# Patient Record
Sex: MALE | Race: WHITE | NOT HISPANIC OR LATINO | URBAN - NONMETROPOLITAN AREA
[De-identification: names, ages, dates, MRNs, and addresses within clinical notes are randomized per-mention and may not be internally consistent; named-entity substitution may affect disease eponyms.]

---

## 2021-02-05 ENCOUNTER — IMMUNIZATIONS (OUTPATIENT)
Dept: FAMILY MEDICINE CLINIC | Facility: HOSPITAL | Age: 61
End: 2021-02-05

## 2021-02-05 DIAGNOSIS — Z23 ENCOUNTER FOR IMMUNIZATION: Primary | ICD-10-CM

## 2021-02-05 PROCEDURE — 91301 SARS-COV-2 / COVID-19 MRNA VACCINE (MODERNA) 100 MCG: CPT

## 2021-02-05 PROCEDURE — 0011A SARS-COV-2 / COVID-19 MRNA VACCINE (MODERNA) 100 MCG: CPT

## 2021-03-03 ENCOUNTER — IMMUNIZATIONS (OUTPATIENT)
Dept: FAMILY MEDICINE CLINIC | Facility: HOSPITAL | Age: 61
End: 2021-03-03

## 2021-03-03 DIAGNOSIS — Z23 ENCOUNTER FOR IMMUNIZATION: Primary | ICD-10-CM

## 2021-03-03 PROCEDURE — 91301 SARS-COV-2 / COVID-19 MRNA VACCINE (MODERNA) 100 MCG: CPT

## 2021-03-03 PROCEDURE — 0012A SARS-COV-2 / COVID-19 MRNA VACCINE (MODERNA) 100 MCG: CPT

## 2023-01-01 ENCOUNTER — HOSPITAL ENCOUNTER (INPATIENT)
Facility: HOSPITAL | Age: 63
LOS: 8 days | End: 2023-05-10
Attending: INTERNAL MEDICINE | Admitting: EMERGENCY MEDICINE

## 2023-01-01 ENCOUNTER — APPOINTMENT (INPATIENT)
Dept: GASTROENTEROLOGY | Facility: HOSPITAL | Age: 63
End: 2023-01-01

## 2023-01-01 ENCOUNTER — TRANSCRIBE ORDERS (OUTPATIENT)
Dept: RADIATION ONCOLOGY | Facility: HOSPITAL | Age: 63
End: 2023-01-01

## 2023-01-01 ENCOUNTER — APPOINTMENT (OUTPATIENT)
Dept: RADIOLOGY | Facility: HOSPITAL | Age: 63
End: 2023-01-01

## 2023-01-01 ENCOUNTER — APPOINTMENT (OUTPATIENT)
Dept: RADIOLOGY | Facility: HOSPITAL | Age: 63
End: 2023-01-01
Attending: INTERNAL MEDICINE

## 2023-01-01 ENCOUNTER — APPOINTMENT (OUTPATIENT)
Dept: RADIOLOGY | Facility: HOSPITAL | Age: 63
End: 2023-01-01
Attending: RADIOLOGY

## 2023-01-01 VITALS
SYSTOLIC BLOOD PRESSURE: 95 MMHG | WEIGHT: 221.56 LBS | HEIGHT: 73 IN | HEART RATE: 90 BPM | TEMPERATURE: 101.5 F | DIASTOLIC BLOOD PRESSURE: 66 MMHG | BODY MASS INDEX: 29.36 KG/M2 | OXYGEN SATURATION: 97 % | RESPIRATION RATE: 21 BRPM

## 2023-01-01 DIAGNOSIS — G93.40 ENCEPHALOPATHY: ICD-10-CM

## 2023-01-01 DIAGNOSIS — C34.90 SQUAMOUS CELL LUNG CANCER (HCC): ICD-10-CM

## 2023-01-01 DIAGNOSIS — J96.01 ACUTE RESPIRATORY FAILURE WITH HYPOXIA (HCC): ICD-10-CM

## 2023-01-01 DIAGNOSIS — C76.0 MALIGNANT NEOPLASM OF HEAD, FACE, AND NECK (HCC): ICD-10-CM

## 2023-01-01 DIAGNOSIS — R91.8 LUNG MASS: Primary | ICD-10-CM

## 2023-01-01 DIAGNOSIS — C76.0 MALIGNANT NEOPLASM OF HEAD, FACE, AND NECK (HCC): Primary | ICD-10-CM

## 2023-01-01 DIAGNOSIS — R22.1 NECK MASS: ICD-10-CM

## 2023-01-01 LAB
ANION GAP SERPL CALCULATED.3IONS-SCNC: -1 MMOL/L (ref 4–13)
ANION GAP SERPL CALCULATED.3IONS-SCNC: -2 MMOL/L (ref 4–13)
ANION GAP SERPL CALCULATED.3IONS-SCNC: 0 MMOL/L (ref 4–13)
ANION GAP SERPL CALCULATED.3IONS-SCNC: 0 MMOL/L (ref 4–13)
ANION GAP SERPL CALCULATED.3IONS-SCNC: 1 MMOL/L (ref 4–13)
ANION GAP SERPL CALCULATED.3IONS-SCNC: 2 MMOL/L (ref 4–13)
ANION GAP SERPL CALCULATED.3IONS-SCNC: 2 MMOL/L (ref 4–13)
ANION GAP SERPL CALCULATED.3IONS-SCNC: 3 MMOL/L (ref 4–13)
ANION GAP SERPL CALCULATED.3IONS-SCNC: 5 MMOL/L (ref 4–13)
APTT PPP: 112 SECONDS (ref 23–37)
APTT PPP: 159 SECONDS (ref 23–37)
APTT PPP: 45 SECONDS (ref 23–37)
APTT PPP: 52 SECONDS (ref 23–37)
APTT PPP: 54 SECONDS (ref 23–37)
APTT PPP: 57 SECONDS (ref 23–37)
APTT PPP: 57 SECONDS (ref 23–37)
APTT PPP: 63 SECONDS (ref 23–37)
APTT PPP: 64 SECONDS (ref 23–37)
APTT PPP: 65 SECONDS (ref 23–37)
APTT PPP: 65 SECONDS (ref 23–37)
APTT PPP: 66 SECONDS (ref 23–37)
APTT PPP: 68 SECONDS (ref 23–37)
APTT PPP: 79 SECONDS (ref 23–37)
APTT PPP: 96 SECONDS (ref 23–37)
APTT PPP: 99 SECONDS (ref 23–37)
ATRIAL RATE: 143 BPM
BASE EX.OXY STD BLDV CALC-SCNC: 95.2 % (ref 60–80)
BASE EXCESS BLDA CALC-SCNC: 0.8 MMOL/L
BASE EXCESS BLDA CALC-SCNC: 1.6 MMOL/L
BASE EXCESS BLDA CALC-SCNC: 2.7 MMOL/L
BASE EXCESS BLDA CALC-SCNC: 2.9 MMOL/L
BASE EXCESS BLDA CALC-SCNC: 3.9 MMOL/L
BASE EXCESS BLDA CALC-SCNC: 4.7 MMOL/L
BASE EXCESS BLDV CALC-SCNC: 0.6 MMOL/L
BASOPHILS # BLD AUTO: 0.03 THOUSANDS/ÂΜL (ref 0–0.1)
BASOPHILS # BLD AUTO: 0.06 THOUSANDS/ÂΜL (ref 0–0.1)
BASOPHILS # BLD AUTO: 0.06 THOUSANDS/ÂΜL (ref 0–0.1)
BASOPHILS NFR BLD AUTO: 0 % (ref 0–1)
BUN SERPL-MCNC: 15 MG/DL (ref 5–25)
BUN SERPL-MCNC: 16 MG/DL (ref 5–25)
BUN SERPL-MCNC: 16 MG/DL (ref 5–25)
BUN SERPL-MCNC: 17 MG/DL (ref 5–25)
BUN SERPL-MCNC: 18 MG/DL (ref 5–25)
BUN SERPL-MCNC: 18 MG/DL (ref 5–25)
BUN SERPL-MCNC: 19 MG/DL (ref 5–25)
BUN SERPL-MCNC: 23 MG/DL (ref 5–25)
BUN SERPL-MCNC: 28 MG/DL (ref 5–25)
CA-I BLD-SCNC: 1.05 MMOL/L (ref 1.12–1.32)
CA-I BLD-SCNC: 1.12 MMOL/L (ref 1.12–1.32)
CA-I BLD-SCNC: 1.12 MMOL/L (ref 1.12–1.32)
CALCIUM SERPL-MCNC: 10.3 MG/DL (ref 8.3–10.1)
CALCIUM SERPL-MCNC: 7.7 MG/DL (ref 8.3–10.1)
CALCIUM SERPL-MCNC: 7.8 MG/DL (ref 8.3–10.1)
CALCIUM SERPL-MCNC: 7.9 MG/DL (ref 8.3–10.1)
CALCIUM SERPL-MCNC: 8 MG/DL (ref 8.3–10.1)
CALCIUM SERPL-MCNC: 8.3 MG/DL (ref 8.3–10.1)
CALCIUM SERPL-MCNC: 8.3 MG/DL (ref 8.3–10.1)
CALCIUM SERPL-MCNC: 8.8 MG/DL (ref 8.3–10.1)
CALCIUM SERPL-MCNC: 8.9 MG/DL (ref 8.3–10.1)
CALCIUM SERPL-MCNC: 8.9 MG/DL (ref 8.3–10.1)
CALCIUM SERPL-MCNC: 9 MG/DL (ref 8.3–10.1)
CALCIUM SERPL-MCNC: 9.8 MG/DL (ref 8.3–10.1)
CHLORIDE SERPL-SCNC: 111 MMOL/L (ref 96–108)
CHLORIDE SERPL-SCNC: 112 MMOL/L (ref 96–108)
CHLORIDE SERPL-SCNC: 113 MMOL/L (ref 96–108)
CHLORIDE SERPL-SCNC: 114 MMOL/L (ref 96–108)
CHLORIDE SERPL-SCNC: 115 MMOL/L (ref 96–108)
CHLORIDE SERPL-SCNC: 116 MMOL/L (ref 96–108)
CHLORIDE SERPL-SCNC: 117 MMOL/L (ref 96–108)
CHLORIDE SERPL-SCNC: 117 MMOL/L (ref 96–108)
CHLORIDE SERPL-SCNC: 118 MMOL/L (ref 96–108)
CHLORIDE SERPL-SCNC: 118 MMOL/L (ref 96–108)
CO2 SERPL-SCNC: 28 MMOL/L (ref 21–32)
CO2 SERPL-SCNC: 28 MMOL/L (ref 21–32)
CO2 SERPL-SCNC: 29 MMOL/L (ref 21–32)
CO2 SERPL-SCNC: 31 MMOL/L (ref 21–32)
CO2 SERPL-SCNC: 32 MMOL/L (ref 21–32)
CO2 SERPL-SCNC: 32 MMOL/L (ref 21–32)
CREAT SERPL-MCNC: 0.72 MG/DL (ref 0.6–1.3)
CREAT SERPL-MCNC: 0.77 MG/DL (ref 0.6–1.3)
CREAT SERPL-MCNC: 0.78 MG/DL (ref 0.6–1.3)
CREAT SERPL-MCNC: 0.78 MG/DL (ref 0.6–1.3)
CREAT SERPL-MCNC: 0.8 MG/DL (ref 0.6–1.3)
CREAT SERPL-MCNC: 0.81 MG/DL (ref 0.6–1.3)
CREAT SERPL-MCNC: 0.82 MG/DL (ref 0.6–1.3)
CREAT SERPL-MCNC: 0.83 MG/DL (ref 0.6–1.3)
CREAT SERPL-MCNC: 0.84 MG/DL (ref 0.6–1.3)
CREAT SERPL-MCNC: 0.86 MG/DL (ref 0.6–1.3)
CREAT SERPL-MCNC: 0.93 MG/DL (ref 0.6–1.3)
CREAT SERPL-MCNC: 1.04 MG/DL (ref 0.6–1.3)
EOSINOPHIL # BLD AUTO: 0.01 THOUSAND/ÂΜL (ref 0–0.61)
EOSINOPHIL # BLD AUTO: 0.02 THOUSAND/ÂΜL (ref 0–0.61)
EOSINOPHIL # BLD AUTO: 0.08 THOUSAND/ÂΜL (ref 0–0.61)
EOSINOPHIL NFR BLD AUTO: 0 % (ref 0–6)
EOSINOPHIL NFR BLD AUTO: 0 % (ref 0–6)
EOSINOPHIL NFR BLD AUTO: 1 % (ref 0–6)
ERYTHROCYTE [DISTWIDTH] IN BLOOD BY AUTOMATED COUNT: 13.1 % (ref 11.6–15.1)
ERYTHROCYTE [DISTWIDTH] IN BLOOD BY AUTOMATED COUNT: 13.2 % (ref 11.6–15.1)
ERYTHROCYTE [DISTWIDTH] IN BLOOD BY AUTOMATED COUNT: 13.4 % (ref 11.6–15.1)
ERYTHROCYTE [DISTWIDTH] IN BLOOD BY AUTOMATED COUNT: 13.5 % (ref 11.6–15.1)
ERYTHROCYTE [DISTWIDTH] IN BLOOD BY AUTOMATED COUNT: 13.9 % (ref 11.6–15.1)
ERYTHROCYTE [DISTWIDTH] IN BLOOD BY AUTOMATED COUNT: 14.1 % (ref 11.6–15.1)
ERYTHROCYTE [DISTWIDTH] IN BLOOD BY AUTOMATED COUNT: 14.3 % (ref 11.6–15.1)
GFR SERPL CREATININE-BSD FRML MDRD: 100 ML/MIN/1.73SQ M
GFR SERPL CREATININE-BSD FRML MDRD: 77 ML/MIN/1.73SQ M
GFR SERPL CREATININE-BSD FRML MDRD: 88 ML/MIN/1.73SQ M
GFR SERPL CREATININE-BSD FRML MDRD: 93 ML/MIN/1.73SQ M
GFR SERPL CREATININE-BSD FRML MDRD: 94 ML/MIN/1.73SQ M
GFR SERPL CREATININE-BSD FRML MDRD: 94 ML/MIN/1.73SQ M
GFR SERPL CREATININE-BSD FRML MDRD: 95 ML/MIN/1.73SQ M
GFR SERPL CREATININE-BSD FRML MDRD: 95 ML/MIN/1.73SQ M
GFR SERPL CREATININE-BSD FRML MDRD: 96 ML/MIN/1.73SQ M
GFR SERPL CREATININE-BSD FRML MDRD: 97 ML/MIN/1.73SQ M
GLUCOSE SERPL-MCNC: 105 MG/DL (ref 65–140)
GLUCOSE SERPL-MCNC: 112 MG/DL (ref 65–140)
GLUCOSE SERPL-MCNC: 115 MG/DL (ref 65–140)
GLUCOSE SERPL-MCNC: 121 MG/DL (ref 65–140)
GLUCOSE SERPL-MCNC: 124 MG/DL (ref 65–140)
GLUCOSE SERPL-MCNC: 127 MG/DL (ref 65–140)
GLUCOSE SERPL-MCNC: 128 MG/DL (ref 65–140)
GLUCOSE SERPL-MCNC: 131 MG/DL (ref 65–140)
GLUCOSE SERPL-MCNC: 133 MG/DL (ref 65–140)
GLUCOSE SERPL-MCNC: 137 MG/DL (ref 65–140)
GLUCOSE SERPL-MCNC: 142 MG/DL (ref 65–140)
GLUCOSE SERPL-MCNC: 143 MG/DL (ref 65–140)
GLUCOSE SERPL-MCNC: 149 MG/DL (ref 65–140)
GLUCOSE SERPL-MCNC: 150 MG/DL (ref 65–140)
GLUCOSE SERPL-MCNC: 150 MG/DL (ref 65–140)
GLUCOSE SERPL-MCNC: 152 MG/DL (ref 65–140)
GLUCOSE SERPL-MCNC: 156 MG/DL (ref 65–140)
GLUCOSE SERPL-MCNC: 157 MG/DL (ref 65–140)
GLUCOSE SERPL-MCNC: 158 MG/DL (ref 65–140)
GLUCOSE SERPL-MCNC: 160 MG/DL (ref 65–140)
GLUCOSE SERPL-MCNC: 163 MG/DL (ref 65–140)
GLUCOSE SERPL-MCNC: 166 MG/DL (ref 65–140)
GLUCOSE SERPL-MCNC: 174 MG/DL (ref 65–140)
GLUCOSE SERPL-MCNC: 176 MG/DL (ref 65–140)
GLUCOSE SERPL-MCNC: 177 MG/DL (ref 65–140)
GLUCOSE SERPL-MCNC: 184 MG/DL (ref 65–140)
GLUCOSE SERPL-MCNC: 185 MG/DL (ref 65–140)
GLUCOSE SERPL-MCNC: 188 MG/DL (ref 65–140)
GLUCOSE SERPL-MCNC: 194 MG/DL (ref 65–140)
GLUCOSE SERPL-MCNC: 194 MG/DL (ref 65–140)
GLUCOSE SERPL-MCNC: 203 MG/DL (ref 65–140)
GLUCOSE SERPL-MCNC: 207 MG/DL (ref 65–140)
GLUCOSE SERPL-MCNC: 210 MG/DL (ref 65–140)
GLUCOSE SERPL-MCNC: 219 MG/DL (ref 65–140)
GLUCOSE SERPL-MCNC: 222 MG/DL (ref 65–140)
GLUCOSE SERPL-MCNC: 222 MG/DL (ref 65–140)
GLUCOSE SERPL-MCNC: 245 MG/DL (ref 65–140)
GLUCOSE SERPL-MCNC: 257 MG/DL (ref 65–140)
GLUCOSE SERPL-MCNC: 258 MG/DL (ref 65–140)
GLUCOSE SERPL-MCNC: 279 MG/DL (ref 65–140)
GLUCOSE SERPL-MCNC: 294 MG/DL (ref 65–140)
GLUCOSE SERPL-MCNC: 348 MG/DL (ref 65–140)
GLUCOSE SERPL-MCNC: 350 MG/DL (ref 65–140)
HCO3 BLDA-SCNC: 26.5 MMOL/L (ref 22–28)
HCO3 BLDA-SCNC: 27.2 MMOL/L (ref 22–28)
HCO3 BLDA-SCNC: 27.5 MMOL/L (ref 22–28)
HCO3 BLDA-SCNC: 28.4 MMOL/L (ref 22–28)
HCO3 BLDA-SCNC: 31.8 MMOL/L (ref 22–28)
HCO3 BLDA-SCNC: 34 MMOL/L (ref 22–28)
HCO3 BLDV-SCNC: 26 MMOL/L (ref 24–30)
HCT VFR BLD AUTO: 33.4 % (ref 36.5–49.3)
HCT VFR BLD AUTO: 34.4 % (ref 36.5–49.3)
HCT VFR BLD AUTO: 34.6 % (ref 36.5–49.3)
HCT VFR BLD AUTO: 35.9 % (ref 36.5–49.3)
HCT VFR BLD AUTO: 36.3 % (ref 36.5–49.3)
HCT VFR BLD AUTO: 37.3 % (ref 36.5–49.3)
HCT VFR BLD AUTO: 37.6 % (ref 36.5–49.3)
HCT VFR BLD AUTO: 38.1 % (ref 36.5–49.3)
HCT VFR BLD AUTO: 39.1 % (ref 36.5–49.3)
HGB BLD-MCNC: 10.4 G/DL (ref 12–17)
HGB BLD-MCNC: 10.8 G/DL (ref 12–17)
HGB BLD-MCNC: 10.9 G/DL (ref 12–17)
HGB BLD-MCNC: 10.9 G/DL (ref 12–17)
HGB BLD-MCNC: 11.7 G/DL (ref 12–17)
HGB BLD-MCNC: 11.8 G/DL (ref 12–17)
HGB BLD-MCNC: 12.1 G/DL (ref 12–17)
HGB BLD-MCNC: 12.3 G/DL (ref 12–17)
HGB BLD-MCNC: 12.3 G/DL (ref 12–17)
HOROWITZ INDEX BLDA+IHG-RTO: 70 MM[HG]
HOROWITZ INDEX BLDA+IHG-RTO: 80 MM[HG]
HOROWITZ INDEX BLDA+IHG-RTO: 80 MM[HG]
HOROWITZ INDEX BLDA+IHG-RTO: 90 MM[HG]
HU1 AB TITR SER: NEGATIVE {TITER}
HU2 AB TITR SER IF: NEGATIVE {TITER}
IMM GRANULOCYTES # BLD AUTO: 0.07 THOUSAND/UL (ref 0–0.2)
IMM GRANULOCYTES # BLD AUTO: 0.09 THOUSAND/UL (ref 0–0.2)
IMM GRANULOCYTES # BLD AUTO: 0.15 THOUSAND/UL (ref 0–0.2)
IMM GRANULOCYTES NFR BLD AUTO: 0 % (ref 0–2)
IMM GRANULOCYTES NFR BLD AUTO: 1 % (ref 0–2)
IMM GRANULOCYTES NFR BLD AUTO: 1 % (ref 0–2)
INR PPP: 1.45 (ref 0.84–1.19)
LACTATE SERPL-SCNC: 1.5 MMOL/L (ref 0.5–2)
LYMPHOCYTES # BLD AUTO: 0.57 THOUSANDS/ÂΜL (ref 0.6–4.47)
LYMPHOCYTES # BLD AUTO: 0.79 THOUSANDS/ÂΜL (ref 0.6–4.47)
LYMPHOCYTES # BLD AUTO: 0.84 THOUSANDS/ÂΜL (ref 0.6–4.47)
LYMPHOCYTES NFR BLD AUTO: 4 % (ref 14–44)
LYMPHOCYTES NFR BLD AUTO: 5 % (ref 14–44)
LYMPHOCYTES NFR BLD AUTO: 5 % (ref 14–44)
MAGNESIUM SERPL-MCNC: 1.6 MG/DL (ref 1.6–2.6)
MAGNESIUM SERPL-MCNC: 1.8 MG/DL (ref 1.6–2.6)
MAGNESIUM SERPL-MCNC: 1.9 MG/DL (ref 1.6–2.6)
MAGNESIUM SERPL-MCNC: 2.1 MG/DL (ref 1.6–2.6)
MAGNESIUM SERPL-MCNC: 2.1 MG/DL (ref 1.6–2.6)
MAGNESIUM SERPL-MCNC: 2.2 MG/DL (ref 1.6–2.6)
MCH RBC QN AUTO: 28.3 PG (ref 26.8–34.3)
MCH RBC QN AUTO: 28.6 PG (ref 26.8–34.3)
MCH RBC QN AUTO: 28.8 PG (ref 26.8–34.3)
MCH RBC QN AUTO: 29.1 PG (ref 26.8–34.3)
MCH RBC QN AUTO: 29.1 PG (ref 26.8–34.3)
MCH RBC QN AUTO: 29.4 PG (ref 26.8–34.3)
MCHC RBC AUTO-ENTMCNC: 30.4 G/DL (ref 31.4–37.4)
MCHC RBC AUTO-ENTMCNC: 31.1 G/DL (ref 31.4–37.4)
MCHC RBC AUTO-ENTMCNC: 31.2 G/DL (ref 31.4–37.4)
MCHC RBC AUTO-ENTMCNC: 31.5 G/DL (ref 31.4–37.4)
MCHC RBC AUTO-ENTMCNC: 31.6 G/DL (ref 31.4–37.4)
MCHC RBC AUTO-ENTMCNC: 31.7 G/DL (ref 31.4–37.4)
MCHC RBC AUTO-ENTMCNC: 31.8 G/DL (ref 31.4–37.4)
MCHC RBC AUTO-ENTMCNC: 32.2 G/DL (ref 31.4–37.4)
MCHC RBC AUTO-ENTMCNC: 32.7 G/DL (ref 31.4–37.4)
MCV RBC AUTO: 89 FL (ref 82–98)
MCV RBC AUTO: 90 FL (ref 82–98)
MCV RBC AUTO: 91 FL (ref 82–98)
MCV RBC AUTO: 92 FL (ref 82–98)
MCV RBC AUTO: 95 FL (ref 82–98)
MONOCYTES # BLD AUTO: 1.07 THOUSAND/ÂΜL (ref 0.17–1.22)
MONOCYTES # BLD AUTO: 1.36 THOUSAND/ÂΜL (ref 0.17–1.22)
MONOCYTES # BLD AUTO: 1.4 THOUSAND/ÂΜL (ref 0.17–1.22)
MONOCYTES NFR BLD AUTO: 7 % (ref 4–12)
MONOCYTES NFR BLD AUTO: 8 % (ref 4–12)
MONOCYTES NFR BLD AUTO: 8 % (ref 4–12)
NASAL CANNULA: 8
NEUTROPHILS # BLD AUTO: 14.14 THOUSANDS/ÂΜL (ref 1.85–7.62)
NEUTROPHILS # BLD AUTO: 15.02 THOUSANDS/ÂΜL (ref 1.85–7.62)
NEUTROPHILS # BLD AUTO: 15.37 THOUSANDS/ÂΜL (ref 1.85–7.62)
NEUTS SEG NFR BLD AUTO: 86 % (ref 43–75)
NEUTS SEG NFR BLD AUTO: 87 % (ref 43–75)
NEUTS SEG NFR BLD AUTO: 87 % (ref 43–75)
NRBC BLD AUTO-RTO: 0 /100 WBCS
O2 CT BLDA-SCNC: 15.5 ML/DL (ref 16–23)
O2 CT BLDA-SCNC: 15.5 ML/DL (ref 16–23)
O2 CT BLDA-SCNC: 16.8 ML/DL (ref 16–23)
O2 CT BLDA-SCNC: 16.9 ML/DL (ref 16–23)
O2 CT BLDA-SCNC: 17.4 ML/DL (ref 16–23)
O2 CT BLDA-SCNC: 18.6 ML/DL (ref 16–23)
O2 CT BLDV-SCNC: 17.6 ML/DL
OXYHGB MFR BLDA: 88.3 % (ref 94–97)
OXYHGB MFR BLDA: 92.6 % (ref 94–97)
OXYHGB MFR BLDA: 93.9 % (ref 94–97)
OXYHGB MFR BLDA: 97.2 % (ref 94–97)
OXYHGB MFR BLDA: 97.8 % (ref 94–97)
OXYHGB MFR BLDA: 98.5 % (ref 94–97)
P AXIS: 77 DEGREES
PCA TYPE-1 (ANTI-YO) AB: NEGATIVE
PCO2 BLDA: 41.6 MM HG (ref 36–44)
PCO2 BLDA: 42.1 MM HG (ref 36–44)
PCO2 BLDA: 43 MM HG (ref 36–44)
PCO2 BLDA: 59.1 MM HG (ref 36–44)
PCO2 BLDA: 65.3 MM HG (ref 36–44)
PCO2 BLDA: 76.7 MM HG (ref 36–44)
PCO2 BLDV: 45 MM HG (ref 42–50)
PEEP RESPIRATORY: 10 CM[H2O]
PEEP RESPIRATORY: 10 CM[H2O]
PEEP RESPIRATORY: 8 CM[H2O]
PEEP RESPIRATORY: 8 CM[H2O]
PH BLDA: 7.26 [PH] (ref 7.35–7.45)
PH BLDA: 7.3 [PH] (ref 7.35–7.45)
PH BLDA: 7.31 [PH] (ref 7.35–7.45)
PH BLDA: 7.41 [PH] (ref 7.35–7.45)
PH BLDA: 7.43 [PH] (ref 7.35–7.45)
PH BLDA: 7.43 [PH] (ref 7.35–7.45)
PH BLDV: 7.38 [PH] (ref 7.3–7.4)
PHOSPHATE SERPL-MCNC: 1.2 MG/DL (ref 2.3–4.1)
PHOSPHATE SERPL-MCNC: 1.5 MG/DL (ref 2.3–4.1)
PHOSPHATE SERPL-MCNC: 1.7 MG/DL (ref 2.3–4.1)
PHOSPHATE SERPL-MCNC: 2.1 MG/DL (ref 2.3–4.1)
PHOSPHATE SERPL-MCNC: 2.1 MG/DL (ref 2.3–4.1)
PHOSPHATE SERPL-MCNC: 2.6 MG/DL (ref 2.3–4.1)
PHOSPHATE SERPL-MCNC: 2.6 MG/DL (ref 2.3–4.1)
PHOSPHATE SERPL-MCNC: 2.8 MG/DL (ref 2.3–4.1)
PHOSPHATE SERPL-MCNC: 2.9 MG/DL (ref 2.3–4.1)
PLATELET # BLD AUTO: 281 THOUSANDS/UL (ref 149–390)
PLATELET # BLD AUTO: 287 THOUSANDS/UL (ref 149–390)
PLATELET # BLD AUTO: 300 THOUSANDS/UL (ref 149–390)
PLATELET # BLD AUTO: 310 THOUSANDS/UL (ref 149–390)
PLATELET # BLD AUTO: 321 THOUSANDS/UL (ref 149–390)
PLATELET # BLD AUTO: 353 THOUSANDS/UL (ref 149–390)
PLATELET # BLD AUTO: 361 THOUSANDS/UL (ref 149–390)
PLATELET # BLD AUTO: 366 THOUSANDS/UL (ref 149–390)
PLATELET # BLD AUTO: 366 THOUSANDS/UL (ref 149–390)
PMV BLD AUTO: 10 FL (ref 8.9–12.7)
PMV BLD AUTO: 10.3 FL (ref 8.9–12.7)
PMV BLD AUTO: 10.6 FL (ref 8.9–12.7)
PMV BLD AUTO: 10.7 FL (ref 8.9–12.7)
PMV BLD AUTO: 10.9 FL (ref 8.9–12.7)
PMV BLD AUTO: 8.9 FL (ref 8.9–12.7)
PMV BLD AUTO: 9 FL (ref 8.9–12.7)
PMV BLD AUTO: 9.2 FL (ref 8.9–12.7)
PMV BLD AUTO: 9.4 FL (ref 8.9–12.7)
PO2 BLDA: 112.9 MM HG (ref 75–129)
PO2 BLDA: 120.5 MM HG (ref 75–129)
PO2 BLDA: 144.1 MM HG (ref 75–129)
PO2 BLDA: 60.4 MM HG (ref 75–129)
PO2 BLDA: 70.4 MM HG (ref 75–129)
PO2 BLDA: 72.4 MM HG (ref 75–129)
PO2 BLDV: 92 MM HG (ref 35–45)
POTASSIUM SERPL-SCNC: 3.2 MMOL/L (ref 3.5–5.3)
POTASSIUM SERPL-SCNC: 3.2 MMOL/L (ref 3.5–5.3)
POTASSIUM SERPL-SCNC: 3.3 MMOL/L (ref 3.5–5.3)
POTASSIUM SERPL-SCNC: 3.4 MMOL/L (ref 3.5–5.3)
POTASSIUM SERPL-SCNC: 3.5 MMOL/L (ref 3.5–5.3)
POTASSIUM SERPL-SCNC: 3.6 MMOL/L (ref 3.5–5.3)
POTASSIUM SERPL-SCNC: 3.7 MMOL/L (ref 3.5–5.3)
POTASSIUM SERPL-SCNC: 3.7 MMOL/L (ref 3.5–5.3)
POTASSIUM SERPL-SCNC: 4.2 MMOL/L (ref 3.5–5.3)
POTASSIUM SERPL-SCNC: 4.3 MMOL/L (ref 3.5–5.3)
POTASSIUM SERPL-SCNC: 5.1 MMOL/L (ref 3.5–5.3)
POTASSIUM SERPL-SCNC: 5.4 MMOL/L (ref 3.5–5.3)
PR INTERVAL: 217 MS
PROTHROMBIN TIME: 17.9 SECONDS (ref 11.6–14.5)
QRS AXIS: 76 DEGREES
QRSD INTERVAL: 108 MS
QT INTERVAL: 271 MS
QTC INTERVAL: 418 MS
RBC # BLD AUTO: 3.68 MILLION/UL (ref 3.88–5.62)
RBC # BLD AUTO: 3.75 MILLION/UL (ref 3.88–5.62)
RBC # BLD AUTO: 3.75 MILLION/UL (ref 3.88–5.62)
RBC # BLD AUTO: 3.79 MILLION/UL (ref 3.88–5.62)
RBC # BLD AUTO: 4.06 MILLION/UL (ref 3.88–5.62)
RBC # BLD AUTO: 4.1 MILLION/UL (ref 3.88–5.62)
RBC # BLD AUTO: 4.16 MILLION/UL (ref 3.88–5.62)
RBC # BLD AUTO: 4.18 MILLION/UL (ref 3.88–5.62)
RBC # BLD AUTO: 4.3 MILLION/UL (ref 3.88–5.62)
SODIUM SERPL-SCNC: 143 MMOL/L (ref 135–147)
SODIUM SERPL-SCNC: 144 MMOL/L (ref 135–147)
SODIUM SERPL-SCNC: 144 MMOL/L (ref 135–147)
SODIUM SERPL-SCNC: 145 MMOL/L (ref 135–147)
SODIUM SERPL-SCNC: 146 MMOL/L (ref 135–147)
SODIUM SERPL-SCNC: 147 MMOL/L (ref 135–147)
SODIUM SERPL-SCNC: 147 MMOL/L (ref 135–147)
SODIUM SERPL-SCNC: 149 MMOL/L (ref 135–147)
SODIUM SERPL-SCNC: 150 MMOL/L (ref 135–147)
SODIUM SERPL-SCNC: 151 MMOL/L (ref 135–147)
SPECIMEN SOURCE: ABNORMAL
T WAVE AXIS: 78 DEGREES
TRIGL SERPL-MCNC: 169 MG/DL
VANCOMYCIN SERPL-MCNC: 6.8 UG/ML (ref 10–20)
VENT AC: 16
VENT AC: 20
VENT AC: 22
VENT AC: 22
VENT- AC: AC
VENTRICULAR RATE: 143 BPM
VT SETTING VENT: 480 ML
VT SETTING VENT: 480 ML
VT SETTING VENT: 500 ML
VT SETTING VENT: 500 ML
WBC # BLD AUTO: 13.78 THOUSAND/UL (ref 4.31–10.16)
WBC # BLD AUTO: 14.8 THOUSAND/UL (ref 4.31–10.16)
WBC # BLD AUTO: 15.53 THOUSAND/UL (ref 4.31–10.16)
WBC # BLD AUTO: 15.8 THOUSAND/UL (ref 4.31–10.16)
WBC # BLD AUTO: 16.04 THOUSAND/UL (ref 4.31–10.16)
WBC # BLD AUTO: 17.38 THOUSAND/UL (ref 4.31–10.16)
WBC # BLD AUTO: 17.71 THOUSAND/UL (ref 4.31–10.16)
WBC # BLD AUTO: 17.86 THOUSAND/UL (ref 4.31–10.16)
WBC # BLD AUTO: 18.71 THOUSAND/UL (ref 4.31–10.16)

## 2023-01-01 PROCEDURE — DB061ZZ BEAM RADIATION OF MEDIASTINUM USING PHOTONS 1 - 10 MEV: ICD-10-PCS | Performed by: RADIOLOGY

## 2023-01-01 PROCEDURE — DW011ZZ BEAM RADIATION OF HEAD AND NECK USING PHOTONS 1 - 10 MEV: ICD-10-PCS | Performed by: RADIOLOGY

## 2023-01-01 PROCEDURE — DB021ZZ BEAM RADIATION OF LUNG USING PHOTONS 1 - 10 MEV: ICD-10-PCS | Performed by: RADIOLOGY

## 2023-01-01 PROCEDURE — 0BH17EZ INSERTION OF ENDOTRACHEAL AIRWAY INTO TRACHEA, VIA NATURAL OR ARTIFICIAL OPENING: ICD-10-PCS | Performed by: INTERNAL MEDICINE

## 2023-01-01 PROCEDURE — 0B968ZZ DRAINAGE OF RIGHT LOWER LOBE BRONCHUS, VIA NATURAL OR ARTIFICIAL OPENING ENDOSCOPIC: ICD-10-PCS | Performed by: INTERNAL MEDICINE

## 2023-01-01 PROCEDURE — 4A133B1 MONITORING OF ARTERIAL PRESSURE, PERIPHERAL, PERCUTANEOUS APPROACH: ICD-10-PCS | Performed by: INTERNAL MEDICINE

## 2023-01-01 PROCEDURE — 03HY32Z INSERTION OF MONITORING DEVICE INTO UPPER ARTERY, PERCUTANEOUS APPROACH: ICD-10-PCS | Performed by: INTERNAL MEDICINE

## 2023-01-01 PROCEDURE — 0B958ZZ DRAINAGE OF RIGHT MIDDLE LOBE BRONCHUS, VIA NATURAL OR ARTIFICIAL OPENING ENDOSCOPIC: ICD-10-PCS | Performed by: INTERNAL MEDICINE

## 2023-01-01 PROCEDURE — 0BC38ZZ EXTIRPATION OF MATTER FROM RIGHT MAIN BRONCHUS, VIA NATURAL OR ARTIFICIAL OPENING ENDOSCOPIC: ICD-10-PCS | Performed by: INTERNAL MEDICINE

## 2023-01-01 PROCEDURE — 5A1955Z RESPIRATORY VENTILATION, GREATER THAN 96 CONSECUTIVE HOURS: ICD-10-PCS | Performed by: INTERNAL MEDICINE

## 2023-01-01 PROCEDURE — 4A133J1 MONITORING OF ARTERIAL PULSE, PERIPHERAL, PERCUTANEOUS APPROACH: ICD-10-PCS | Performed by: INTERNAL MEDICINE

## 2023-01-01 PROCEDURE — 0B948ZZ DRAINAGE OF RIGHT UPPER LOBE BRONCHUS, VIA NATURAL OR ARTIFICIAL OPENING ENDOSCOPIC: ICD-10-PCS | Performed by: INTERNAL MEDICINE

## 2023-01-01 PROCEDURE — 06HY33Z INSERTION OF INFUSION DEVICE INTO LOWER VEIN, PERCUTANEOUS APPROACH: ICD-10-PCS | Performed by: INTERNAL MEDICINE

## 2023-01-01 RX ORDER — MIDAZOLAM HYDROCHLORIDE 2 MG/2ML
INJECTION, SOLUTION INTRAMUSCULAR; INTRAVENOUS
Status: DISPENSED
Start: 2023-01-01 | End: 2023-01-01

## 2023-01-01 RX ORDER — DEXTROSE MONOHYDRATE 50 MG/ML
50 INJECTION, SOLUTION INTRAVENOUS CONTINUOUS
Status: DISCONTINUED | OUTPATIENT
Start: 2023-01-01 | End: 2023-01-01

## 2023-01-01 RX ORDER — SODIUM CHLORIDE, SODIUM GLUCONATE, SODIUM ACETATE, POTASSIUM CHLORIDE, MAGNESIUM CHLORIDE, SODIUM PHOSPHATE, DIBASIC, AND POTASSIUM PHOSPHATE .53; .5; .37; .037; .03; .012; .00082 G/100ML; G/100ML; G/100ML; G/100ML; G/100ML; G/100ML; G/100ML
500 INJECTION, SOLUTION INTRAVENOUS ONCE
Status: COMPLETED | OUTPATIENT
Start: 2023-01-01 | End: 2023-01-01

## 2023-01-01 RX ORDER — POTASSIUM CHLORIDE 29.8 MG/ML
40 INJECTION INTRAVENOUS 2 TIMES DAILY
Status: COMPLETED | OUTPATIENT
Start: 2023-01-01 | End: 2023-01-01

## 2023-01-01 RX ORDER — INSULIN LISPRO 100 [IU]/ML
1-6 INJECTION, SOLUTION INTRAVENOUS; SUBCUTANEOUS EVERY 6 HOURS SCHEDULED
Status: DISCONTINUED | OUTPATIENT
Start: 2023-01-01 | End: 2023-01-01

## 2023-01-01 RX ORDER — BISACODYL 10 MG
10 SUPPOSITORY, RECTAL RECTAL DAILY PRN
Status: DISCONTINUED | OUTPATIENT
Start: 2023-01-01 | End: 2023-01-01

## 2023-01-01 RX ORDER — AMOXICILLIN 250 MG
1 CAPSULE ORAL
Status: DISCONTINUED | OUTPATIENT
Start: 2023-01-01 | End: 2023-01-01

## 2023-01-01 RX ORDER — DEXMEDETOMIDINE HYDROCHLORIDE 4 UG/ML
.1-.7 INJECTION, SOLUTION INTRAVENOUS
Status: DISCONTINUED | OUTPATIENT
Start: 2023-01-01 | End: 2023-01-01

## 2023-01-01 RX ORDER — MIDAZOLAM HYDROCHLORIDE 2 MG/2ML
4 INJECTION, SOLUTION INTRAMUSCULAR; INTRAVENOUS ONCE
Status: COMPLETED | OUTPATIENT
Start: 2023-01-01 | End: 2023-01-01

## 2023-01-01 RX ORDER — SUCCINYLCHOLINE/SOD CL,ISO/PF 100 MG/5ML
100 SYRINGE (ML) INTRAVENOUS ONCE
Status: COMPLETED | OUTPATIENT
Start: 2023-01-01 | End: 2023-01-01

## 2023-01-01 RX ORDER — BISACODYL 10 MG
10 SUPPOSITORY, RECTAL RECTAL DAILY
Status: DISCONTINUED | OUTPATIENT
Start: 2023-01-01 | End: 2023-01-01

## 2023-01-01 RX ORDER — FENTANYL CITRATE-0.9 % NACL/PF 10 MCG/ML
100 PLASTIC BAG, INJECTION (ML) INTRAVENOUS CONTINUOUS
Status: DISCONTINUED | OUTPATIENT
Start: 2023-01-01 | End: 2023-01-01

## 2023-01-01 RX ORDER — MAGNESIUM SULFATE HEPTAHYDRATE 40 MG/ML
2 INJECTION, SOLUTION INTRAVENOUS ONCE
Status: COMPLETED | OUTPATIENT
Start: 2023-01-01 | End: 2023-01-01

## 2023-01-01 RX ORDER — INSULIN LISPRO 100 [IU]/ML
2-12 INJECTION, SOLUTION INTRAVENOUS; SUBCUTANEOUS
Status: DISCONTINUED | OUTPATIENT
Start: 2023-01-01 | End: 2023-01-01

## 2023-01-01 RX ORDER — SODIUM CHLORIDE, SODIUM GLUCONATE, SODIUM ACETATE, POTASSIUM CHLORIDE, MAGNESIUM CHLORIDE, SODIUM PHOSPHATE, DIBASIC, AND POTASSIUM PHOSPHATE .53; .5; .37; .037; .03; .012; .00082 G/100ML; G/100ML; G/100ML; G/100ML; G/100ML; G/100ML; G/100ML
250 INJECTION, SOLUTION INTRAVENOUS ONCE
Status: COMPLETED | OUTPATIENT
Start: 2023-01-01 | End: 2023-01-01

## 2023-01-01 RX ORDER — PANTOPRAZOLE SODIUM 40 MG/10ML
40 INJECTION, POWDER, LYOPHILIZED, FOR SOLUTION INTRAVENOUS
Status: DISCONTINUED | OUTPATIENT
Start: 2023-01-01 | End: 2023-01-01

## 2023-01-01 RX ORDER — FENTANYL CITRATE 50 UG/ML
50 INJECTION, SOLUTION INTRAMUSCULAR; INTRAVENOUS ONCE AS NEEDED
Status: DISCONTINUED | OUTPATIENT
Start: 2023-01-01 | End: 2023-01-01

## 2023-01-01 RX ORDER — ASPIRIN 81 MG/1
81 TABLET, CHEWABLE ORAL DAILY
Status: DISCONTINUED | OUTPATIENT
Start: 2023-01-01 | End: 2023-01-01

## 2023-01-01 RX ORDER — FENTANYL CITRATE 50 UG/ML
50 INJECTION, SOLUTION INTRAMUSCULAR; INTRAVENOUS
Status: DISCONTINUED | OUTPATIENT
Start: 2023-01-01 | End: 2023-01-01

## 2023-01-01 RX ORDER — ALBUMIN, HUMAN INJ 5% 5 %
25 SOLUTION INTRAVENOUS ONCE
Status: COMPLETED | OUTPATIENT
Start: 2023-01-01 | End: 2023-01-01

## 2023-01-01 RX ORDER — DEXAMETHASONE SODIUM PHOSPHATE 10 MG/ML
10 INJECTION, SOLUTION INTRAMUSCULAR; INTRAVENOUS ONCE
Status: COMPLETED | OUTPATIENT
Start: 2023-01-01 | End: 2023-01-01

## 2023-01-01 RX ORDER — LORAZEPAM 2 MG/ML
1 INJECTION INTRAMUSCULAR
Status: DISCONTINUED | OUTPATIENT
Start: 2023-01-01 | End: 2023-01-01 | Stop reason: HOSPADM

## 2023-01-01 RX ORDER — HEPARIN SODIUM 10000 [USP'U]/100ML
3-30 INJECTION, SOLUTION INTRAVENOUS
Status: DISCONTINUED | OUTPATIENT
Start: 2023-01-01 | End: 2023-01-01

## 2023-01-01 RX ORDER — PRAVASTATIN SODIUM 40 MG
40 TABLET ORAL
Status: DISCONTINUED | OUTPATIENT
Start: 2023-01-01 | End: 2023-01-01

## 2023-01-01 RX ORDER — ETOMIDATE 2 MG/ML
20 INJECTION INTRAVENOUS ONCE
Status: COMPLETED | OUTPATIENT
Start: 2023-01-01 | End: 2023-01-01

## 2023-01-01 RX ORDER — NICOTINE 21 MG/24HR
14 PATCH, TRANSDERMAL 24 HOURS TRANSDERMAL DAILY
Status: DISCONTINUED | OUTPATIENT
Start: 2023-01-01 | End: 2023-01-01

## 2023-01-01 RX ORDER — POTASSIUM CHLORIDE 14.9 MG/ML
20 INJECTION INTRAVENOUS ONCE
Status: COMPLETED | OUTPATIENT
Start: 2023-01-01 | End: 2023-01-01

## 2023-01-01 RX ORDER — POLYETHYLENE GLYCOL 3350 17 G/17G
17 POWDER, FOR SOLUTION ORAL DAILY PRN
Status: DISCONTINUED | OUTPATIENT
Start: 2023-01-01 | End: 2023-01-01

## 2023-01-01 RX ORDER — FENTANYL CITRATE 50 UG/ML
100 INJECTION, SOLUTION INTRAMUSCULAR; INTRAVENOUS ONCE
Status: COMPLETED | OUTPATIENT
Start: 2023-01-01 | End: 2023-01-01

## 2023-01-01 RX ORDER — SODIUM CHLORIDE FOR INHALATION 3 %
4 VIAL, NEBULIZER (ML) INHALATION
Status: DISCONTINUED | OUTPATIENT
Start: 2023-01-01 | End: 2023-01-01

## 2023-01-01 RX ORDER — POTASSIUM CHLORIDE 20 MEQ/1
40 TABLET, EXTENDED RELEASE ORAL ONCE
Status: COMPLETED | OUTPATIENT
Start: 2023-01-01 | End: 2023-01-01

## 2023-01-01 RX ORDER — LIDOCAINE HYDROCHLORIDE 10 MG/ML
INJECTION, SOLUTION EPIDURAL; INFILTRATION; INTRACAUDAL; PERINEURAL
Status: DISCONTINUED
Start: 2023-01-01 | End: 2023-01-01 | Stop reason: HOSPADM

## 2023-01-01 RX ORDER — SODIUM CHLORIDE, SODIUM GLUCONATE, SODIUM ACETATE, POTASSIUM CHLORIDE, MAGNESIUM CHLORIDE, SODIUM PHOSPHATE, DIBASIC, AND POTASSIUM PHOSPHATE .53; .5; .37; .037; .03; .012; .00082 G/100ML; G/100ML; G/100ML; G/100ML; G/100ML; G/100ML; G/100ML
50 INJECTION, SOLUTION INTRAVENOUS CONTINUOUS
Status: DISCONTINUED | OUTPATIENT
Start: 2023-01-01 | End: 2023-01-01

## 2023-01-01 RX ORDER — INSULIN LISPRO 100 [IU]/ML
1-6 INJECTION, SOLUTION INTRAVENOUS; SUBCUTANEOUS
Status: DISCONTINUED | OUTPATIENT
Start: 2023-01-01 | End: 2023-01-01

## 2023-01-01 RX ORDER — DEXTROSE, SODIUM CHLORIDE, SODIUM LACTATE, POTASSIUM CHLORIDE, AND CALCIUM CHLORIDE 5; .6; .31; .03; .02 G/100ML; G/100ML; G/100ML; G/100ML; G/100ML
100 INJECTION, SOLUTION INTRAVENOUS CONTINUOUS
Status: DISCONTINUED | OUTPATIENT
Start: 2023-01-01 | End: 2023-01-01

## 2023-01-01 RX ORDER — POLYETHYLENE GLYCOL 3350 17 G/17G
17 POWDER, FOR SOLUTION ORAL DAILY
Status: DISCONTINUED | OUTPATIENT
Start: 2023-01-01 | End: 2023-01-01

## 2023-01-01 RX ORDER — ALBUMIN, HUMAN INJ 5% 5 %
SOLUTION INTRAVENOUS
Status: COMPLETED
Start: 2023-01-01 | End: 2023-01-01

## 2023-01-01 RX ORDER — ACETAMINOPHEN 325 MG/1
325 TABLET ORAL ONCE
Status: COMPLETED | OUTPATIENT
Start: 2023-01-01 | End: 2023-01-01

## 2023-01-01 RX ORDER — MIDAZOLAM HYDROCHLORIDE 2 MG/2ML
2 INJECTION, SOLUTION INTRAMUSCULAR; INTRAVENOUS ONCE AS NEEDED
Status: DISCONTINUED | OUTPATIENT
Start: 2023-01-01 | End: 2023-01-01

## 2023-01-01 RX ORDER — NOREPINEPHRINE BITARTRATE 1 MG/ML
INJECTION, SOLUTION INTRAVENOUS
Status: DISPENSED
Start: 2023-01-01 | End: 2023-01-01

## 2023-01-01 RX ORDER — ACETAMINOPHEN 325 MG/1
650 TABLET ORAL EVERY 6 HOURS PRN
Status: DISCONTINUED | OUTPATIENT
Start: 2023-01-01 | End: 2023-01-01

## 2023-01-01 RX ORDER — LEVALBUTEROL INHALATION SOLUTION 1.25 MG/3ML
SOLUTION RESPIRATORY (INHALATION)
Status: COMPLETED
Start: 2023-01-01 | End: 2023-01-01

## 2023-01-01 RX ORDER — LANOLIN ALCOHOL/MO/W.PET/CERES
3 CREAM (GRAM) TOPICAL
Status: DISCONTINUED | OUTPATIENT
Start: 2023-01-01 | End: 2023-01-01

## 2023-01-01 RX ORDER — CHLORHEXIDINE GLUCONATE 0.12 MG/ML
15 RINSE ORAL EVERY 12 HOURS SCHEDULED
Status: DISCONTINUED | OUTPATIENT
Start: 2023-01-01 | End: 2023-01-01

## 2023-01-01 RX ORDER — LANOLIN ALCOHOL/MO/W.PET/CERES
100 CREAM (GRAM) TOPICAL DAILY
Status: DISCONTINUED | OUTPATIENT
Start: 2023-01-01 | End: 2023-01-01

## 2023-01-01 RX ORDER — LEVALBUTEROL INHALATION SOLUTION 1.25 MG/3ML
1.25 SOLUTION RESPIRATORY (INHALATION)
Status: DISCONTINUED | OUTPATIENT
Start: 2023-01-01 | End: 2023-01-01

## 2023-01-01 RX ORDER — KETOROLAC TROMETHAMINE 30 MG/ML
15 INJECTION, SOLUTION INTRAMUSCULAR; INTRAVENOUS ONCE
Status: COMPLETED | OUTPATIENT
Start: 2023-01-01 | End: 2023-01-01

## 2023-01-01 RX ORDER — QUETIAPINE FUMARATE 25 MG/1
25 TABLET, FILM COATED ORAL
Status: DISCONTINUED | OUTPATIENT
Start: 2023-01-01 | End: 2023-01-01

## 2023-01-01 RX ORDER — QUETIAPINE FUMARATE 25 MG/1
12.5 TABLET, FILM COATED ORAL
Status: DISCONTINUED | OUTPATIENT
Start: 2023-01-01 | End: 2023-01-01

## 2023-01-01 RX ORDER — SODIUM CHLORIDE, SODIUM GLUCONATE, SODIUM ACETATE, POTASSIUM CHLORIDE, MAGNESIUM CHLORIDE, SODIUM PHOSPHATE, DIBASIC, AND POTASSIUM PHOSPHATE .53; .5; .37; .037; .03; .012; .00082 G/100ML; G/100ML; G/100ML; G/100ML; G/100ML; G/100ML; G/100ML
1000 INJECTION, SOLUTION INTRAVENOUS ONCE
Status: COMPLETED | OUTPATIENT
Start: 2023-01-01 | End: 2023-01-01

## 2023-01-01 RX ORDER — FENTANYL CITRATE 50 UG/ML
200 INJECTION, SOLUTION INTRAMUSCULAR; INTRAVENOUS ONCE
Status: COMPLETED | OUTPATIENT
Start: 2023-01-01 | End: 2023-01-01

## 2023-01-01 RX ORDER — GLYCOPYRROLATE 0.2 MG/ML
0.1 INJECTION INTRAMUSCULAR; INTRAVENOUS EVERY 4 HOURS PRN
Status: DISCONTINUED | OUTPATIENT
Start: 2023-01-01 | End: 2023-01-01 | Stop reason: HOSPADM

## 2023-01-01 RX ORDER — FUROSEMIDE 10 MG/ML
40 INJECTION INTRAMUSCULAR; INTRAVENOUS ONCE
Status: COMPLETED | OUTPATIENT
Start: 2023-01-01 | End: 2023-01-01

## 2023-01-01 RX ORDER — POTASSIUM CHLORIDE 20MEQ/15ML
40 LIQUID (ML) ORAL ONCE
Status: COMPLETED | OUTPATIENT
Start: 2023-01-01 | End: 2023-01-01

## 2023-01-01 RX ORDER — MIDAZOLAM HYDROCHLORIDE 2 MG/2ML
INJECTION, SOLUTION INTRAMUSCULAR; INTRAVENOUS
Status: COMPLETED
Start: 2023-01-01 | End: 2023-01-01

## 2023-01-01 RX ORDER — ASPIRIN 81 MG/1
81 TABLET ORAL DAILY
Status: DISCONTINUED | OUTPATIENT
Start: 2023-01-01 | End: 2023-01-01

## 2023-01-01 RX ORDER — MAGNESIUM SULFATE HEPTAHYDRATE 40 MG/ML
2 INJECTION, SOLUTION INTRAVENOUS ONCE
Status: DISCONTINUED | OUTPATIENT
Start: 2023-01-01 | End: 2023-01-01

## 2023-01-01 RX ORDER — DEXAMETHASONE SODIUM PHOSPHATE 4 MG/ML
6 INJECTION, SOLUTION INTRA-ARTICULAR; INTRALESIONAL; INTRAMUSCULAR; INTRAVENOUS; SOFT TISSUE EVERY 8 HOURS SCHEDULED
Status: COMPLETED | OUTPATIENT
Start: 2023-01-01 | End: 2023-01-01

## 2023-01-01 RX ORDER — FENTANYL CITRATE 50 UG/ML
INJECTION, SOLUTION INTRAMUSCULAR; INTRAVENOUS
Status: COMPLETED
Start: 2023-01-01 | End: 2023-01-01

## 2023-01-01 RX ORDER — HYDROMORPHONE HCL/PF 1 MG/ML
1 SYRINGE (ML) INJECTION ONCE
Status: COMPLETED | OUTPATIENT
Start: 2023-01-01 | End: 2023-01-01

## 2023-01-01 RX ORDER — CALCIUM GLUCONATE 20 MG/ML
2 INJECTION, SOLUTION INTRAVENOUS ONCE
Status: COMPLETED | OUTPATIENT
Start: 2023-01-01 | End: 2023-01-01

## 2023-01-01 RX ORDER — FUROSEMIDE 10 MG/ML
40 INJECTION INTRAMUSCULAR; INTRAVENOUS
Status: DISCONTINUED | OUTPATIENT
Start: 2023-01-01 | End: 2023-01-01

## 2023-01-01 RX ORDER — ACETAMINOPHEN 325 MG/1
975 TABLET ORAL EVERY 8 HOURS SCHEDULED
Status: DISCONTINUED | OUTPATIENT
Start: 2023-01-01 | End: 2023-01-01

## 2023-01-01 RX ORDER — MIDAZOLAM HYDROCHLORIDE 2 MG/2ML
2 INJECTION, SOLUTION INTRAMUSCULAR; INTRAVENOUS EVERY 4 HOURS PRN
Status: DISCONTINUED | OUTPATIENT
Start: 2023-01-01 | End: 2023-01-01

## 2023-01-01 RX ORDER — ALBUTEROL SULFATE 2.5 MG/3ML
2.5 SOLUTION RESPIRATORY (INHALATION) EVERY 4 HOURS PRN
Status: DISCONTINUED | OUTPATIENT
Start: 2023-01-01 | End: 2023-01-01

## 2023-01-01 RX ORDER — PROPOFOL 10 MG/ML
5-50 INJECTION, EMULSION INTRAVENOUS
Status: DISCONTINUED | OUTPATIENT
Start: 2023-01-01 | End: 2023-01-01

## 2023-01-01 RX ADMIN — SODIUM CHLORIDE SOLN NEBU 3% 4 ML: 3 NEBU SOLN at 07:15

## 2023-01-01 RX ADMIN — INSULIN HUMAN 8 UNITS: 100 INJECTION, SOLUTION PARENTERAL at 05:44

## 2023-01-01 RX ADMIN — MELATONIN 3 MG: at 21:07

## 2023-01-01 RX ADMIN — ALBUTEROL SULFATE 2.5 MG: 2.5 SOLUTION RESPIRATORY (INHALATION) at 18:20

## 2023-01-01 RX ADMIN — SODIUM CHLORIDE SOLN NEBU 3% 4 ML: 3 NEBU SOLN at 19:21

## 2023-01-01 RX ADMIN — CHLORHEXIDINE GLUCONATE 0.12% ORAL RINSE 15 ML: 1.2 LIQUID ORAL at 10:30

## 2023-01-01 RX ADMIN — PROPOFOL 40 MCG/KG/MIN: 10 INJECTION, EMULSION INTRAVENOUS at 22:10

## 2023-01-01 RX ADMIN — Medication 50 MCG/HR: at 20:22

## 2023-01-01 RX ADMIN — CHLORHEXIDINE GLUCONATE 0.12% ORAL RINSE 15 ML: 1.2 LIQUID ORAL at 21:20

## 2023-01-01 RX ADMIN — SODIUM CHLORIDE SOLN NEBU 3% 4 ML: 3 NEBU SOLN at 13:52

## 2023-01-01 RX ADMIN — INSULIN HUMAN 8 UNITS: 100 INJECTION, SOLUTION PARENTERAL at 23:53

## 2023-01-01 RX ADMIN — LEVALBUTEROL HYDROCHLORIDE 1.25 MG: 1.25 SOLUTION RESPIRATORY (INHALATION) at 07:58

## 2023-01-01 RX ADMIN — HEPARIN SODIUM 19 UNITS/KG/HR: 10000 INJECTION, SOLUTION INTRAVENOUS at 00:11

## 2023-01-01 RX ADMIN — ASPIRIN 81 MG 81 MG: 81 TABLET ORAL at 10:12

## 2023-01-01 RX ADMIN — SODIUM CHLORIDE, SODIUM GLUCONATE, SODIUM ACETATE, POTASSIUM CHLORIDE, MAGNESIUM CHLORIDE, SODIUM PHOSPHATE, DIBASIC, AND POTASSIUM PHOSPHATE 500 ML: .53; .5; .37; .037; .03; .012; .00082 INJECTION, SOLUTION INTRAVENOUS at 08:00

## 2023-01-01 RX ADMIN — NICOTINE 14 MG: 14 PATCH, EXTENDED RELEASE TRANSDERMAL at 11:59

## 2023-01-01 RX ADMIN — PIPERACILLIN SODIUM AND TAZOBACTAM SODIUM 3.38 G: 36; 4.5 INJECTION, POWDER, LYOPHILIZED, FOR SOLUTION INTRAVENOUS at 16:47

## 2023-01-01 RX ADMIN — PANTOPRAZOLE SODIUM 40 MG: 40 INJECTION, POWDER, FOR SOLUTION INTRAVENOUS at 08:41

## 2023-01-01 RX ADMIN — PRAVASTATIN SODIUM 40 MG: 40 TABLET ORAL at 16:52

## 2023-01-01 RX ADMIN — IPRATROPIUM BROMIDE 0.5 MG: 0.5 SOLUTION RESPIRATORY (INHALATION) at 07:42

## 2023-01-01 RX ADMIN — LEVALBUTEROL HYDROCHLORIDE 1.25 MG: 1.25 SOLUTION RESPIRATORY (INHALATION) at 19:23

## 2023-01-01 RX ADMIN — PIPERACILLIN SODIUM AND TAZOBACTAM SODIUM 3.38 G: 36; 4.5 INJECTION, POWDER, LYOPHILIZED, FOR SOLUTION INTRAVENOUS at 10:23

## 2023-01-01 RX ADMIN — MAGNESIUM SULFATE HEPTAHYDRATE 2 G: 40 INJECTION, SOLUTION INTRAVENOUS at 07:57

## 2023-01-01 RX ADMIN — POTASSIUM CHLORIDE 20 MEQ: 14.9 INJECTION, SOLUTION INTRAVENOUS at 06:13

## 2023-01-01 RX ADMIN — IPRATROPIUM BROMIDE 0.5 MG: 0.5 SOLUTION RESPIRATORY (INHALATION) at 19:54

## 2023-01-01 RX ADMIN — IPRATROPIUM BROMIDE 0.5 MG: 0.5 SOLUTION RESPIRATORY (INHALATION) at 09:20

## 2023-01-01 RX ADMIN — PROPOFOL 40 MCG/KG/MIN: 10 INJECTION, EMULSION INTRAVENOUS at 13:34

## 2023-01-01 RX ADMIN — LEVALBUTEROL HYDROCHLORIDE 1.25 MG: 1.25 SOLUTION RESPIRATORY (INHALATION) at 19:13

## 2023-01-01 RX ADMIN — DEXTROSE 50 ML/HR: 5 SOLUTION INTRAVENOUS at 11:19

## 2023-01-01 RX ADMIN — IPRATROPIUM BROMIDE 0.5 MG: 0.5 SOLUTION RESPIRATORY (INHALATION) at 13:44

## 2023-01-01 RX ADMIN — PHENYLEPHRINE HYDROCHLORIDE 130 MCG/MIN: 50 INJECTION INTRAVENOUS at 14:42

## 2023-01-01 RX ADMIN — BISACODYL 10 MG: 10 SUPPOSITORY RECTAL at 08:36

## 2023-01-01 RX ADMIN — PROPOFOL 10 MCG/KG/MIN: 10 INJECTION, EMULSION INTRAVENOUS at 08:00

## 2023-01-01 RX ADMIN — HEPARIN SODIUM 23 UNITS/KG/HR: 10000 INJECTION, SOLUTION INTRAVENOUS at 05:55

## 2023-01-01 RX ADMIN — POTASSIUM CHLORIDE 40 MEQ: 29.8 INJECTION, SOLUTION INTRAVENOUS at 11:29

## 2023-01-01 RX ADMIN — INSULIN HUMAN 8 UNITS: 100 INJECTION, SOLUTION PARENTERAL at 12:37

## 2023-01-01 RX ADMIN — NICOTINE 14 MG: 14 PATCH, EXTENDED RELEASE TRANSDERMAL at 11:17

## 2023-01-01 RX ADMIN — NOREPINEPHRINE BITARTRATE 14 MCG/MIN: 1 INJECTION, SOLUTION, CONCENTRATE INTRAVENOUS at 22:00

## 2023-01-01 RX ADMIN — ACETAMINOPHEN 975 MG: 325 TABLET ORAL at 06:01

## 2023-01-01 RX ADMIN — IPRATROPIUM BROMIDE 0.5 MG: 0.5 SOLUTION RESPIRATORY (INHALATION) at 14:25

## 2023-01-01 RX ADMIN — CHLORHEXIDINE GLUCONATE 0.12% ORAL RINSE 15 ML: 1.2 LIQUID ORAL at 20:57

## 2023-01-01 RX ADMIN — IPRATROPIUM BROMIDE 0.5 MG: 0.5 SOLUTION RESPIRATORY (INHALATION) at 19:23

## 2023-01-01 RX ADMIN — PROPOFOL 40 MCG/KG/MIN: 10 INJECTION, EMULSION INTRAVENOUS at 18:09

## 2023-01-01 RX ADMIN — SODIUM CHLORIDE, SODIUM GLUCONATE, SODIUM ACETATE, POTASSIUM CHLORIDE, MAGNESIUM CHLORIDE, SODIUM PHOSPHATE, DIBASIC, AND POTASSIUM PHOSPHATE 50 ML/HR: .53; .5; .37; .037; .03; .012; .00082 INJECTION, SOLUTION INTRAVENOUS at 02:46

## 2023-01-01 RX ADMIN — ACETAMINOPHEN 650 MG: 325 TABLET ORAL at 16:04

## 2023-01-01 RX ADMIN — LEVALBUTEROL HYDROCHLORIDE 1.25 MG: 1.25 SOLUTION RESPIRATORY (INHALATION) at 14:01

## 2023-01-01 RX ADMIN — PROPOFOL 20 MCG/KG/MIN: 10 INJECTION, EMULSION INTRAVENOUS at 03:30

## 2023-01-01 RX ADMIN — SODIUM CHLORIDE SOLN NEBU 3% 4 ML: 3 NEBU SOLN at 19:23

## 2023-01-01 RX ADMIN — IPRATROPIUM BROMIDE 0.5 MG: 0.5 SOLUTION RESPIRATORY (INHALATION) at 13:59

## 2023-01-01 RX ADMIN — FOLIC ACID 1 MG: 5 INJECTION, SOLUTION INTRAMUSCULAR; INTRAVENOUS; SUBCUTANEOUS at 09:04

## 2023-01-01 RX ADMIN — SODIUM CHLORIDE, SODIUM GLUCONATE, SODIUM ACETATE, POTASSIUM CHLORIDE, MAGNESIUM CHLORIDE, SODIUM PHOSPHATE, DIBASIC, AND POTASSIUM PHOSPHATE 100 ML/HR: .53; .5; .37; .037; .03; .012; .00082 INJECTION, SOLUTION INTRAVENOUS at 22:13

## 2023-01-01 RX ADMIN — NOREPINEPHRINE BITARTRATE 14 MCG/MIN: 1 INJECTION, SOLUTION, CONCENTRATE INTRAVENOUS at 15:33

## 2023-01-01 RX ADMIN — THIAMINE HCL TAB 100 MG 100 MG: 100 TAB at 12:41

## 2023-01-01 RX ADMIN — MAGNESIUM SULFATE HEPTAHYDRATE 2 G: 40 INJECTION, SOLUTION INTRAVENOUS at 23:21

## 2023-01-01 RX ADMIN — HYDROMORPHONE HYDROCHLORIDE 1 MG: 1 INJECTION, SOLUTION INTRAMUSCULAR; INTRAVENOUS; SUBCUTANEOUS at 15:10

## 2023-01-01 RX ADMIN — SODIUM CHLORIDE SOLN NEBU 3% 4 ML: 3 NEBU SOLN at 14:26

## 2023-01-01 RX ADMIN — IPRATROPIUM BROMIDE 0.5 MG: 0.5 SOLUTION RESPIRATORY (INHALATION) at 08:07

## 2023-01-01 RX ADMIN — QUETIAPINE FUMARATE 12.5 MG: 25 TABLET ORAL at 21:07

## 2023-01-01 RX ADMIN — LORAZEPAM 1 MG: 2 INJECTION INTRAMUSCULAR; INTRAVENOUS at 14:55

## 2023-01-01 RX ADMIN — HEPARIN SODIUM 21 UNITS/KG/HR: 10000 INJECTION, SOLUTION INTRAVENOUS at 13:15

## 2023-01-01 RX ADMIN — ALBUMIN (HUMAN) 25 G: 12.5 INJECTION, SOLUTION INTRAVENOUS at 00:13

## 2023-01-01 RX ADMIN — HEPARIN SODIUM 17 UNITS/KG/HR: 10000 INJECTION, SOLUTION INTRAVENOUS at 15:32

## 2023-01-01 RX ADMIN — DEXMEDETOMIDINE HYDROCHLORIDE 0.4 MCG/KG/HR: 400 INJECTION INTRAVENOUS at 04:59

## 2023-01-01 RX ADMIN — INSULIN LISPRO 1 UNITS: 100 INJECTION, SOLUTION INTRAVENOUS; SUBCUTANEOUS at 05:57

## 2023-01-01 RX ADMIN — HEPARIN SODIUM 19 UNITS/KG/HR: 10000 INJECTION, SOLUTION INTRAVENOUS at 06:18

## 2023-01-01 RX ADMIN — PRAVASTATIN SODIUM 40 MG: 40 TABLET ORAL at 16:21

## 2023-01-01 RX ADMIN — CHLORHEXIDINE GLUCONATE 0.12% ORAL RINSE 15 ML: 1.2 LIQUID ORAL at 21:07

## 2023-01-01 RX ADMIN — SODIUM CHLORIDE, SODIUM GLUCONATE, SODIUM ACETATE, POTASSIUM CHLORIDE, MAGNESIUM CHLORIDE, SODIUM PHOSPHATE, DIBASIC, AND POTASSIUM PHOSPHATE 1000 ML: .53; .5; .37; .037; .03; .012; .00082 INJECTION, SOLUTION INTRAVENOUS at 10:39

## 2023-01-01 RX ADMIN — FENTANYL CITRATE 150 MCG/HR: 0.05 INJECTION, SOLUTION INTRAMUSCULAR; INTRAVENOUS at 06:19

## 2023-01-01 RX ADMIN — LEVALBUTEROL HYDROCHLORIDE 1.25 MG: 1.25 SOLUTION RESPIRATORY (INHALATION) at 20:28

## 2023-01-01 RX ADMIN — FENTANYL CITRATE 150 MCG/HR: 0.05 INJECTION, SOLUTION INTRAMUSCULAR; INTRAVENOUS at 20:15

## 2023-01-01 RX ADMIN — DEXAMETHASONE SODIUM PHOSPHATE 6 MG: 4 INJECTION INTRA-ARTICULAR; INTRALESIONAL; INTRAMUSCULAR; INTRAVENOUS; SOFT TISSUE at 05:09

## 2023-01-01 RX ADMIN — INSULIN LISPRO 1 UNITS: 100 INJECTION, SOLUTION INTRAVENOUS; SUBCUTANEOUS at 18:22

## 2023-01-01 RX ADMIN — DEXMEDETOMIDINE HYDROCHLORIDE 0.7 MCG/KG/HR: 400 INJECTION INTRAVENOUS at 08:15

## 2023-01-01 RX ADMIN — FOLIC ACID 1 MG: 5 INJECTION, SOLUTION INTRAMUSCULAR; INTRAVENOUS; SUBCUTANEOUS at 10:30

## 2023-01-01 RX ADMIN — INSULIN LISPRO 1 UNITS: 100 INJECTION, SOLUTION INTRAVENOUS; SUBCUTANEOUS at 12:08

## 2023-01-01 RX ADMIN — INSULIN LISPRO 1 UNITS: 100 INJECTION, SOLUTION INTRAVENOUS; SUBCUTANEOUS at 17:46

## 2023-01-01 RX ADMIN — INSULIN LISPRO 3 UNITS: 100 INJECTION, SOLUTION INTRAVENOUS; SUBCUTANEOUS at 16:56

## 2023-01-01 RX ADMIN — ACETAMINOPHEN 650 MG: 325 TABLET ORAL at 00:03

## 2023-01-01 RX ADMIN — FENTANYL CITRATE 150 MCG: 50 INJECTION INTRAMUSCULAR; INTRAVENOUS at 08:20

## 2023-01-01 RX ADMIN — INSULIN LISPRO 1 UNITS: 100 INJECTION, SOLUTION INTRAVENOUS; SUBCUTANEOUS at 06:11

## 2023-01-01 RX ADMIN — LEVALBUTEROL HYDROCHLORIDE 1.25 MG: 1.25 SOLUTION RESPIRATORY (INHALATION) at 07:42

## 2023-01-01 RX ADMIN — PHENYLEPHRINE HYDROCHLORIDE 25 MCG/MIN: 50 INJECTION INTRAVENOUS at 22:00

## 2023-01-01 RX ADMIN — PIPERACILLIN SODIUM AND TAZOBACTAM SODIUM 3.38 G: 36; 4.5 INJECTION, POWDER, LYOPHILIZED, FOR SOLUTION INTRAVENOUS at 00:58

## 2023-01-01 RX ADMIN — PIPERACILLIN SODIUM AND TAZOBACTAM SODIUM 3.38 G: 36; 4.5 INJECTION, POWDER, LYOPHILIZED, FOR SOLUTION INTRAVENOUS at 02:04

## 2023-01-01 RX ADMIN — MAGNESIUM SULFATE HEPTAHYDRATE 2 G: 40 INJECTION, SOLUTION INTRAVENOUS at 08:17

## 2023-01-01 RX ADMIN — VASOPRESSIN 0.04 UNITS/MIN: 20 INJECTION INTRAVENOUS at 16:46

## 2023-01-01 RX ADMIN — HYDROMORPHONE HYDROCHLORIDE 1 MG: 1 INJECTION, SOLUTION INTRAMUSCULAR; INTRAVENOUS; SUBCUTANEOUS at 21:31

## 2023-01-01 RX ADMIN — ETOMIDATE 20 MG: 20 INJECTION, SOLUTION INTRAVENOUS at 07:57

## 2023-01-01 RX ADMIN — CHLORHEXIDINE GLUCONATE 0.12% ORAL RINSE 15 ML: 1.2 LIQUID ORAL at 20:26

## 2023-01-01 RX ADMIN — POLYETHYLENE GLYCOL 3350 17 G: 17 POWDER, FOR SOLUTION ORAL at 10:12

## 2023-01-01 RX ADMIN — ASPIRIN 81 MG 81 MG: 81 TABLET ORAL at 08:35

## 2023-01-01 RX ADMIN — CHLORHEXIDINE GLUCONATE 0.12% ORAL RINSE 15 ML: 1.2 LIQUID ORAL at 08:11

## 2023-01-01 RX ADMIN — PROPOFOL 40 MCG/KG/MIN: 10 INJECTION, EMULSION INTRAVENOUS at 17:55

## 2023-01-01 RX ADMIN — IPRATROPIUM BROMIDE 0.5 MG: 0.5 SOLUTION RESPIRATORY (INHALATION) at 07:24

## 2023-01-01 RX ADMIN — LEVALBUTEROL HYDROCHLORIDE 1.25 MG: 1.25 SOLUTION RESPIRATORY (INHALATION) at 14:26

## 2023-01-01 RX ADMIN — LEVALBUTEROL HYDROCHLORIDE 1.25 MG: 1.25 SOLUTION RESPIRATORY (INHALATION) at 19:54

## 2023-01-01 RX ADMIN — NOREPINEPHRINE BITARTRATE 1 MCG/MIN: 1 INJECTION, SOLUTION, CONCENTRATE INTRAVENOUS at 13:43

## 2023-01-01 RX ADMIN — POTASSIUM CHLORIDE 40 MEQ: 29.8 INJECTION, SOLUTION INTRAVENOUS at 09:18

## 2023-01-01 RX ADMIN — VASOPRESSIN 0.04 UNITS/MIN: 20 INJECTION INTRAVENOUS at 17:33

## 2023-01-01 RX ADMIN — IPRATROPIUM BROMIDE 0.5 MG: 0.5 SOLUTION RESPIRATORY (INHALATION) at 19:24

## 2023-01-01 RX ADMIN — HEPARIN SODIUM 23 UNITS/KG/HR: 10000 INJECTION, SOLUTION INTRAVENOUS at 03:13

## 2023-01-01 RX ADMIN — FENTANYL CITRATE 50 MCG: 50 INJECTION INTRAMUSCULAR; INTRAVENOUS at 08:53

## 2023-01-01 RX ADMIN — IPRATROPIUM BROMIDE 0.5 MG: 0.5 SOLUTION RESPIRATORY (INHALATION) at 20:28

## 2023-01-01 RX ADMIN — DEXAMETHASONE SODIUM PHOSPHATE 6 MG: 4 INJECTION INTRA-ARTICULAR; INTRALESIONAL; INTRAMUSCULAR; INTRAVENOUS; SOFT TISSUE at 21:26

## 2023-01-01 RX ADMIN — HYDROCORTISONE SODIUM SUCCINATE 50 MG: 100 INJECTION, POWDER, FOR SOLUTION INTRAMUSCULAR; INTRAVENOUS at 21:20

## 2023-01-01 RX ADMIN — CALCIUM GLUCONATE 2 G: 20 INJECTION, SOLUTION INTRAVENOUS at 16:13

## 2023-01-01 RX ADMIN — DEXMEDETOMIDINE HYDROCHLORIDE 0.3 MCG/KG/HR: 400 INJECTION INTRAVENOUS at 13:28

## 2023-01-01 RX ADMIN — POTASSIUM PHOSPHATE, MONOBASIC AND POTASSIUM PHOSPHATE, DIBASIC 30 MMOL: 224; 236 INJECTION, SOLUTION, CONCENTRATE INTRAVENOUS at 14:15

## 2023-01-01 RX ADMIN — HEPARIN SODIUM 19 UNITS/KG/HR: 10000 INJECTION, SOLUTION INTRAVENOUS at 23:29

## 2023-01-01 RX ADMIN — NICOTINE 14 MG: 14 PATCH, EXTENDED RELEASE TRANSDERMAL at 08:36

## 2023-01-01 RX ADMIN — VANCOMYCIN HYDROCHLORIDE 1250 MG: 10 INJECTION, POWDER, LYOPHILIZED, FOR SOLUTION INTRAVENOUS at 08:54

## 2023-01-01 RX ADMIN — LEVALBUTEROL HYDROCHLORIDE 1.25 MG: 1.25 SOLUTION RESPIRATORY (INHALATION) at 02:49

## 2023-01-01 RX ADMIN — IPRATROPIUM BROMIDE 0.5 MG: 0.5 SOLUTION RESPIRATORY (INHALATION) at 20:25

## 2023-01-01 RX ADMIN — SODIUM CHLORIDE SOLN NEBU 3% 4 ML: 3 NEBU SOLN at 19:24

## 2023-01-01 RX ADMIN — MIDAZOLAM 4 MG: 1 INJECTION INTRAMUSCULAR; INTRAVENOUS at 08:10

## 2023-01-01 RX ADMIN — POTASSIUM PHOSPHATE, MONOBASIC AND POTASSIUM PHOSPHATE, DIBASIC 30 MMOL: 224; 236 INJECTION, SOLUTION, CONCENTRATE INTRAVENOUS at 23:42

## 2023-01-01 RX ADMIN — SODIUM CHLORIDE SOLN NEBU 3% 4 ML: 3 NEBU SOLN at 07:58

## 2023-01-01 RX ADMIN — POTASSIUM & SODIUM PHOSPHATES POWDER PACK 280-160-250 MG 2 PACKET: 280-160-250 PACK at 09:22

## 2023-01-01 RX ADMIN — PROPOFOL 40 MCG/KG/MIN: 10 INJECTION, EMULSION INTRAVENOUS at 00:31

## 2023-01-01 RX ADMIN — HYDROCORTISONE 25 MG: 5 TABLET ORAL at 23:47

## 2023-01-01 RX ADMIN — PIPERACILLIN SODIUM AND TAZOBACTAM SODIUM 3.38 G: 36; 4.5 INJECTION, POWDER, LYOPHILIZED, FOR SOLUTION INTRAVENOUS at 00:53

## 2023-01-01 RX ADMIN — BISACODYL 10 MG: 10 SUPPOSITORY RECTAL at 13:18

## 2023-01-01 RX ADMIN — INSULIN HUMAN 5 UNITS: 100 INJECTION, SOLUTION PARENTERAL at 18:02

## 2023-01-01 RX ADMIN — NOREPINEPHRINE BITARTRATE 3 MCG/MIN: 1 INJECTION, SOLUTION, CONCENTRATE INTRAVENOUS at 18:22

## 2023-01-01 RX ADMIN — MIDAZOLAM HYDROCHLORIDE 4 MG: 2 INJECTION, SOLUTION INTRAMUSCULAR; INTRAVENOUS at 08:10

## 2023-01-01 RX ADMIN — ACETAMINOPHEN 650 MG: 325 TABLET ORAL at 13:58

## 2023-01-01 RX ADMIN — VANCOMYCIN HYDROCHLORIDE 1250 MG: 10 INJECTION, POWDER, LYOPHILIZED, FOR SOLUTION INTRAVENOUS at 20:12

## 2023-01-01 RX ADMIN — SODIUM CHLORIDE, SODIUM GLUCONATE, SODIUM ACETATE, POTASSIUM CHLORIDE, MAGNESIUM CHLORIDE, SODIUM PHOSPHATE, DIBASIC, AND POTASSIUM PHOSPHATE 250 ML: .53; .5; .37; .037; .03; .012; .00082 INJECTION, SOLUTION INTRAVENOUS at 10:18

## 2023-01-01 RX ADMIN — FUROSEMIDE 40 MG: 10 INJECTION, SOLUTION INTRAMUSCULAR; INTRAVENOUS at 16:04

## 2023-01-01 RX ADMIN — FENTANYL CITRATE 50 MCG: 50 INJECTION INTRAMUSCULAR; INTRAVENOUS at 09:33

## 2023-01-01 RX ADMIN — LEVALBUTEROL HYDROCHLORIDE 1.25 MG: 1.25 SOLUTION RESPIRATORY (INHALATION) at 13:52

## 2023-01-01 RX ADMIN — DEXAMETHASONE SODIUM PHOSPHATE 10 MG: 10 INJECTION, SOLUTION INTRAMUSCULAR; INTRAVENOUS at 21:31

## 2023-01-01 RX ADMIN — PROPOFOL 30 MCG/KG/MIN: 10 INJECTION, EMULSION INTRAVENOUS at 06:12

## 2023-01-01 RX ADMIN — INSULIN LISPRO 2 UNITS: 100 INJECTION, SOLUTION INTRAVENOUS; SUBCUTANEOUS at 17:56

## 2023-01-01 RX ADMIN — INSULIN LISPRO 2 UNITS: 100 INJECTION, SOLUTION INTRAVENOUS; SUBCUTANEOUS at 23:47

## 2023-01-01 RX ADMIN — IPRATROPIUM BROMIDE 0.5 MG: 0.5 SOLUTION RESPIRATORY (INHALATION) at 21:18

## 2023-01-01 RX ADMIN — FOLIC ACID 1 MG: 5 INJECTION, SOLUTION INTRAMUSCULAR; INTRAVENOUS; SUBCUTANEOUS at 08:17

## 2023-01-01 RX ADMIN — MELATONIN 3 MG: at 21:26

## 2023-01-01 RX ADMIN — LEVALBUTEROL HYDROCHLORIDE 1.25 MG: 1.25 SOLUTION RESPIRATORY (INHALATION) at 09:20

## 2023-01-01 RX ADMIN — THIAMINE HYDROCHLORIDE 500 MG: 100 INJECTION, SOLUTION INTRAMUSCULAR; INTRAVENOUS at 09:59

## 2023-01-01 RX ADMIN — PROPOFOL 20 MCG/KG/MIN: 10 INJECTION, EMULSION INTRAVENOUS at 12:26

## 2023-01-01 RX ADMIN — ALBUMIN, HUMAN INJ 5% 25 G: 5 SOLUTION at 00:13

## 2023-01-01 RX ADMIN — MIDAZOLAM 2 MG: 1 INJECTION INTRAMUSCULAR; INTRAVENOUS at 17:00

## 2023-01-01 RX ADMIN — Medication 50 MCG/HR: at 15:44

## 2023-01-01 RX ADMIN — SODIUM CHLORIDE SOLN NEBU 3% 4 ML: 3 NEBU SOLN at 13:44

## 2023-01-01 RX ADMIN — INSULIN LISPRO 1 UNITS: 100 INJECTION, SOLUTION INTRAVENOUS; SUBCUTANEOUS at 23:40

## 2023-01-01 RX ADMIN — INSULIN HUMAN 5 UNITS: 100 INJECTION, SOLUTION PARENTERAL at 12:31

## 2023-01-01 RX ADMIN — NICOTINE 14 MG: 14 PATCH, EXTENDED RELEASE TRANSDERMAL at 09:03

## 2023-01-01 RX ADMIN — Medication 100 MCG/HR: at 08:15

## 2023-01-01 RX ADMIN — INSULIN LISPRO 2 UNITS: 100 INJECTION, SOLUTION INTRAVENOUS; SUBCUTANEOUS at 05:39

## 2023-01-01 RX ADMIN — HEPARIN SODIUM 23 UNITS/KG/HR: 10000 INJECTION, SOLUTION INTRAVENOUS at 17:39

## 2023-01-01 RX ADMIN — VASOPRESSIN 0.04 UNITS/MIN: 20 INJECTION INTRAVENOUS at 20:58

## 2023-01-01 RX ADMIN — NOREPINEPHRINE BITARTRATE 18 MCG/MIN: 1 INJECTION, SOLUTION, CONCENTRATE INTRAVENOUS at 10:18

## 2023-01-01 RX ADMIN — IPRATROPIUM BROMIDE 0.5 MG: 0.5 SOLUTION RESPIRATORY (INHALATION) at 07:11

## 2023-01-01 RX ADMIN — PROPOFOL 40 MCG/KG/MIN: 10 INJECTION, EMULSION INTRAVENOUS at 01:15

## 2023-01-01 RX ADMIN — FUROSEMIDE 40 MG: 10 INJECTION, SOLUTION INTRAMUSCULAR; INTRAVENOUS at 12:57

## 2023-01-01 RX ADMIN — HEPARIN SODIUM 19 UNITS/KG/HR: 10000 INJECTION, SOLUTION INTRAVENOUS at 16:00

## 2023-01-01 RX ADMIN — LEVALBUTEROL HYDROCHLORIDE 1.25 MG: 1.25 SOLUTION RESPIRATORY (INHALATION) at 07:15

## 2023-01-01 RX ADMIN — IPRATROPIUM BROMIDE 0.5 MG: 0.5 SOLUTION RESPIRATORY (INHALATION) at 07:37

## 2023-01-01 RX ADMIN — LEVALBUTEROL HYDROCHLORIDE 1.25 MG: 1.25 SOLUTION RESPIRATORY (INHALATION) at 13:32

## 2023-01-01 RX ADMIN — DEXAMETHASONE SODIUM PHOSPHATE 6 MG: 4 INJECTION INTRA-ARTICULAR; INTRALESIONAL; INTRAMUSCULAR; INTRAVENOUS; SOFT TISSUE at 14:03

## 2023-01-01 RX ADMIN — ASPIRIN 81 MG: 81 TABLET, COATED ORAL at 10:29

## 2023-01-01 RX ADMIN — NOREPINEPHRINE BITARTRATE 30 MCG/MIN: 1 INJECTION, SOLUTION, CONCENTRATE INTRAVENOUS at 11:59

## 2023-01-01 RX ADMIN — KETOROLAC TROMETHAMINE 15 MG: 30 INJECTION, SOLUTION INTRAMUSCULAR; INTRAVENOUS at 00:05

## 2023-01-01 RX ADMIN — PROPOFOL 50 MCG/KG/MIN: 10 INJECTION, EMULSION INTRAVENOUS at 23:52

## 2023-01-01 RX ADMIN — FOLIC ACID 1 MG: 5 INJECTION, SOLUTION INTRAMUSCULAR; INTRAVENOUS; SUBCUTANEOUS at 10:52

## 2023-01-01 RX ADMIN — MELATONIN 3 MG: at 22:35

## 2023-01-01 RX ADMIN — PIPERACILLIN SODIUM AND TAZOBACTAM SODIUM 3.38 G: 36; 4.5 INJECTION, POWDER, LYOPHILIZED, FOR SOLUTION INTRAVENOUS at 11:35

## 2023-01-01 RX ADMIN — PANTOPRAZOLE SODIUM 40 MG: 40 INJECTION, POWDER, FOR SOLUTION INTRAVENOUS at 08:11

## 2023-01-01 RX ADMIN — DEXMEDETOMIDINE HYDROCHLORIDE 0.4 MCG/KG/HR: 400 INJECTION INTRAVENOUS at 10:13

## 2023-01-01 RX ADMIN — FENTANYL CITRATE 50 MCG: 50 INJECTION INTRAMUSCULAR; INTRAVENOUS at 09:12

## 2023-01-01 RX ADMIN — LEVALBUTEROL HYDROCHLORIDE 1.25 MG: 1.25 SOLUTION RESPIRATORY (INHALATION) at 20:25

## 2023-01-01 RX ADMIN — PIPERACILLIN SODIUM AND TAZOBACTAM SODIUM 3.38 G: 36; 4.5 INJECTION, POWDER, LYOPHILIZED, FOR SOLUTION INTRAVENOUS at 02:40

## 2023-01-01 RX ADMIN — PIPERACILLIN SODIUM AND TAZOBACTAM SODIUM 3.38 G: 36; 4.5 INJECTION, POWDER, LYOPHILIZED, FOR SOLUTION INTRAVENOUS at 09:33

## 2023-01-01 RX ADMIN — SODIUM CHLORIDE SOLN NEBU 3% 4 ML: 3 NEBU SOLN at 13:32

## 2023-01-01 RX ADMIN — PROPOFOL 20 MCG/KG/MIN: 10 INJECTION, EMULSION INTRAVENOUS at 11:26

## 2023-01-01 RX ADMIN — SODIUM CHLORIDE, SODIUM GLUCONATE, SODIUM ACETATE, POTASSIUM CHLORIDE, MAGNESIUM CHLORIDE, SODIUM PHOSPHATE, DIBASIC, AND POTASSIUM PHOSPHATE 100 ML/HR: .53; .5; .37; .037; .03; .012; .00082 INJECTION, SOLUTION INTRAVENOUS at 08:31

## 2023-01-01 RX ADMIN — HEPARIN SODIUM 19 UNITS/KG/HR: 10000 INJECTION, SOLUTION INTRAVENOUS at 08:19

## 2023-01-01 RX ADMIN — INSULIN HUMAN 8 UNITS: 100 INJECTION, SOLUTION PARENTERAL at 23:54

## 2023-01-01 RX ADMIN — HEPARIN SODIUM 19 UNITS/KG/HR: 10000 INJECTION, SOLUTION INTRAVENOUS at 00:10

## 2023-01-01 RX ADMIN — IPRATROPIUM BROMIDE 0.5 MG: 0.5 SOLUTION RESPIRATORY (INHALATION) at 19:13

## 2023-01-01 RX ADMIN — MIDAZOLAM 2 MG: 1 INJECTION INTRAMUSCULAR; INTRAVENOUS at 08:52

## 2023-01-01 RX ADMIN — INSULIN LISPRO 2 UNITS: 100 INJECTION, SOLUTION INTRAVENOUS; SUBCUTANEOUS at 12:31

## 2023-01-01 RX ADMIN — PIPERACILLIN SODIUM AND TAZOBACTAM SODIUM 3.38 G: 36; 4.5 INJECTION, POWDER, LYOPHILIZED, FOR SOLUTION INTRAVENOUS at 10:18

## 2023-01-01 RX ADMIN — PRAVASTATIN SODIUM 40 MG: 40 TABLET ORAL at 16:17

## 2023-01-01 RX ADMIN — CHLORHEXIDINE GLUCONATE 0.12% ORAL RINSE 15 ML: 1.2 LIQUID ORAL at 08:41

## 2023-01-01 RX ADMIN — Medication 100 MG: at 07:58

## 2023-01-01 RX ADMIN — PIPERACILLIN SODIUM AND TAZOBACTAM SODIUM 3.38 G: 36; 4.5 INJECTION, POWDER, LYOPHILIZED, FOR SOLUTION INTRAVENOUS at 16:44

## 2023-01-01 RX ADMIN — LEVALBUTEROL HYDROCHLORIDE 1.25 MG: 1.25 SOLUTION RESPIRATORY (INHALATION) at 03:30

## 2023-01-01 RX ADMIN — FENTANYL CITRATE 50 MCG: 50 INJECTION INTRAMUSCULAR; INTRAVENOUS at 20:08

## 2023-01-01 RX ADMIN — VASOPRESSIN 0.04 UNITS/MIN: 20 INJECTION INTRAVENOUS at 10:17

## 2023-01-01 RX ADMIN — PROPOFOL 10 MCG/KG/MIN: 10 INJECTION, EMULSION INTRAVENOUS at 05:20

## 2023-01-01 RX ADMIN — INSULIN LISPRO 3 UNITS: 100 INJECTION, SOLUTION INTRAVENOUS; SUBCUTANEOUS at 23:57

## 2023-01-01 RX ADMIN — DEXTROSE, SODIUM CHLORIDE, SODIUM LACTATE, POTASSIUM CHLORIDE, AND CALCIUM CHLORIDE 100 ML/HR: 5; .6; .31; .03; .02 INJECTION, SOLUTION INTRAVENOUS at 06:08

## 2023-01-01 RX ADMIN — SODIUM PHOSPHATE, MONOBASIC, MONOHYDRATE AND SODIUM PHOSPHATE, DIBASIC, ANHYDROUS 30 MMOL: 142; 276 INJECTION, SOLUTION INTRAVENOUS at 15:41

## 2023-01-01 RX ADMIN — INSULIN LISPRO 2 UNITS: 100 INJECTION, SOLUTION INTRAVENOUS; SUBCUTANEOUS at 23:53

## 2023-01-01 RX ADMIN — HEPARIN SODIUM 19 UNITS/KG/HR: 10000 INJECTION, SOLUTION INTRAVENOUS at 08:39

## 2023-01-01 RX ADMIN — INSULIN LISPRO 1 UNITS: 100 INJECTION, SOLUTION INTRAVENOUS; SUBCUTANEOUS at 23:52

## 2023-01-01 RX ADMIN — ACETAMINOPHEN 325 MG: 325 TABLET ORAL at 00:05

## 2023-01-01 RX ADMIN — HYDROCORTISONE SODIUM SUCCINATE 50 MG: 100 INJECTION, POWDER, FOR SOLUTION INTRAMUSCULAR; INTRAVENOUS at 13:58

## 2023-01-01 RX ADMIN — QUETIAPINE FUMARATE 12.5 MG: 25 TABLET ORAL at 21:26

## 2023-01-01 RX ADMIN — POTASSIUM CHLORIDE 40 MEQ: 1.5 SOLUTION ORAL at 11:59

## 2023-01-01 RX ADMIN — FENTANYL CITRATE 50 MCG: 50 INJECTION INTRAMUSCULAR; INTRAVENOUS at 17:10

## 2023-01-01 RX ADMIN — IPRATROPIUM BROMIDE 0.5 MG: 0.5 SOLUTION RESPIRATORY (INHALATION) at 14:01

## 2023-01-01 RX ADMIN — QUETIAPINE FUMARATE 25 MG: 25 TABLET ORAL at 22:35

## 2023-01-01 RX ADMIN — ASPIRIN 81 MG: 81 TABLET, COATED ORAL at 08:41

## 2023-01-01 RX ADMIN — FOLIC ACID 1 MG: 5 INJECTION, SOLUTION INTRAMUSCULAR; INTRAVENOUS; SUBCUTANEOUS at 08:32

## 2023-01-01 RX ADMIN — LEVALBUTEROL HYDROCHLORIDE 1.25 MG: 1.25 SOLUTION RESPIRATORY (INHALATION) at 19:24

## 2023-01-01 RX ADMIN — DEXMEDETOMIDINE HYDROCHLORIDE 0.4 MCG/KG/HR: 400 INJECTION INTRAVENOUS at 06:22

## 2023-01-01 RX ADMIN — NOREPINEPHRINE BITARTRATE 4 MCG/MIN: 1 INJECTION, SOLUTION, CONCENTRATE INTRAVENOUS at 08:28

## 2023-01-01 RX ADMIN — PIPERACILLIN SODIUM AND TAZOBACTAM SODIUM 3.38 G: 36; 4.5 INJECTION, POWDER, LYOPHILIZED, FOR SOLUTION INTRAVENOUS at 21:48

## 2023-01-01 RX ADMIN — LEVALBUTEROL HYDROCHLORIDE 1.25 MG: 1.25 SOLUTION RESPIRATORY (INHALATION) at 07:24

## 2023-01-01 RX ADMIN — THIAMINE HYDROCHLORIDE 500 MG: 100 INJECTION, SOLUTION INTRAMUSCULAR; INTRAVENOUS at 10:30

## 2023-01-01 RX ADMIN — DEXMEDETOMIDINE HYDROCHLORIDE 0.2 MCG/KG/HR: 400 INJECTION INTRAVENOUS at 02:53

## 2023-01-01 RX ADMIN — VASOPRESSIN 0.04 UNITS/MIN: 20 INJECTION INTRAVENOUS at 00:37

## 2023-01-01 RX ADMIN — HYDROCORTISONE SODIUM SUCCINATE 50 MG: 100 INJECTION, POWDER, FOR SOLUTION INTRAMUSCULAR; INTRAVENOUS at 05:37

## 2023-01-01 RX ADMIN — FENTANYL CITRATE 50 MCG: 50 INJECTION INTRAMUSCULAR; INTRAVENOUS at 17:03

## 2023-01-01 RX ADMIN — NICOTINE 14 MG: 14 PATCH, EXTENDED RELEASE TRANSDERMAL at 08:43

## 2023-01-01 RX ADMIN — GLYCOPYRROLATE 0.1 MG: 0.2 INJECTION, SOLUTION INTRAMUSCULAR; INTRAVENOUS at 15:04

## 2023-01-01 RX ADMIN — IPRATROPIUM BROMIDE 0.5 MG: 0.5 SOLUTION RESPIRATORY (INHALATION) at 07:58

## 2023-01-01 RX ADMIN — NICOTINE 14 MG: 14 PATCH, EXTENDED RELEASE TRANSDERMAL at 10:11

## 2023-01-01 RX ADMIN — FOLIC ACID 1 MG: 5 INJECTION, SOLUTION INTRAMUSCULAR; INTRAVENOUS; SUBCUTANEOUS at 08:41

## 2023-01-01 RX ADMIN — MIDAZOLAM 2 MG: 1 INJECTION INTRAMUSCULAR; INTRAVENOUS at 08:57

## 2023-01-01 RX ADMIN — PIPERACILLIN SODIUM AND TAZOBACTAM SODIUM 3.38 G: 36; 4.5 INJECTION, POWDER, LYOPHILIZED, FOR SOLUTION INTRAVENOUS at 16:52

## 2023-01-01 RX ADMIN — INSULIN HUMAN 8 UNITS: 100 INJECTION, SOLUTION PARENTERAL at 17:55

## 2023-01-01 RX ADMIN — PIPERACILLIN SODIUM AND TAZOBACTAM SODIUM 3.38 G: 36; 4.5 INJECTION, POWDER, LYOPHILIZED, FOR SOLUTION INTRAVENOUS at 08:41

## 2023-01-01 RX ADMIN — NOREPINEPHRINE BITARTRATE 1 MCG/MIN: 1 INJECTION, SOLUTION, CONCENTRATE INTRAVENOUS at 22:58

## 2023-01-01 RX ADMIN — IPRATROPIUM BROMIDE 0.5 MG: 0.5 SOLUTION RESPIRATORY (INHALATION) at 13:31

## 2023-01-01 RX ADMIN — PROPOFOL 30 MCG/KG/MIN: 10 INJECTION, EMULSION INTRAVENOUS at 12:00

## 2023-01-01 RX ADMIN — MAGNESIUM SULFATE HEPTAHYDRATE 2 G: 40 INJECTION, SOLUTION INTRAVENOUS at 13:10

## 2023-01-01 RX ADMIN — CHLORHEXIDINE GLUCONATE 0.12% ORAL RINSE 15 ML: 1.2 LIQUID ORAL at 20:58

## 2023-01-01 RX ADMIN — SODIUM CHLORIDE SOLN NEBU 3% 4 ML: 3 NEBU SOLN at 08:07

## 2023-01-01 RX ADMIN — INSULIN LISPRO 2 UNITS: 100 INJECTION, SOLUTION INTRAVENOUS; SUBCUTANEOUS at 16:23

## 2023-01-01 RX ADMIN — PANTOPRAZOLE SODIUM 40 MG: 40 INJECTION, POWDER, FOR SOLUTION INTRAVENOUS at 10:43

## 2023-01-01 RX ADMIN — PROPOFOL 25 MCG/KG/MIN: 10 INJECTION, EMULSION INTRAVENOUS at 23:00

## 2023-01-01 RX ADMIN — VASOPRESSIN 0.04 UNITS/MIN: 20 INJECTION INTRAVENOUS at 10:47

## 2023-01-01 RX ADMIN — PIPERACILLIN SODIUM AND TAZOBACTAM SODIUM 3.38 G: 36; 4.5 INJECTION, POWDER, LYOPHILIZED, FOR SOLUTION INTRAVENOUS at 00:49

## 2023-01-01 RX ADMIN — SODIUM CHLORIDE SOLN NEBU 3% 4 ML: 3 NEBU SOLN at 09:20

## 2023-01-01 RX ADMIN — POTASSIUM PHOSPHATE, MONOBASIC AND POTASSIUM PHOSPHATE, DIBASIC 30 MMOL: 224; 236 INJECTION, SOLUTION, CONCENTRATE INTRAVENOUS at 10:03

## 2023-01-01 RX ADMIN — SODIUM PHOSPHATE, MONOBASIC, MONOHYDRATE AND SODIUM PHOSPHATE, DIBASIC, ANHYDROUS 30 MMOL: 142; 276 INJECTION, SOLUTION INTRAVENOUS at 12:06

## 2023-01-01 RX ADMIN — NOREPINEPHRINE BITARTRATE 11 MCG/MIN: 1 INJECTION, SOLUTION, CONCENTRATE INTRAVENOUS at 21:34

## 2023-01-01 RX ADMIN — CHLORHEXIDINE GLUCONATE 0.12% ORAL RINSE 15 ML: 1.2 LIQUID ORAL at 21:26

## 2023-01-01 RX ADMIN — PIPERACILLIN SODIUM AND TAZOBACTAM SODIUM 3.38 G: 36; 4.5 INJECTION, POWDER, LYOPHILIZED, FOR SOLUTION INTRAVENOUS at 20:10

## 2023-01-01 RX ADMIN — DEXMEDETOMIDINE HYDROCHLORIDE 0.4 MCG/KG/HR: 400 INJECTION INTRAVENOUS at 01:13

## 2023-01-01 RX ADMIN — CHLORHEXIDINE GLUCONATE 0.12% ORAL RINSE 15 ML: 1.2 LIQUID ORAL at 10:11

## 2023-01-01 RX ADMIN — DEXMEDETOMIDINE HYDROCHLORIDE 0.4 MCG/KG/HR: 400 INJECTION INTRAVENOUS at 21:02

## 2023-01-01 RX ADMIN — HEPARIN SODIUM 19 UNITS/KG/HR: 10000 INJECTION, SOLUTION INTRAVENOUS at 16:42

## 2023-01-01 RX ADMIN — NICOTINE 14 MG: 14 PATCH, EXTENDED RELEASE TRANSDERMAL at 08:11

## 2023-01-01 RX ADMIN — HYDROCORTISONE 25 MG: 5 TABLET ORAL at 08:37

## 2023-01-01 RX ADMIN — PIPERACILLIN SODIUM AND TAZOBACTAM SODIUM 3.38 G: 36; 4.5 INJECTION, POWDER, LYOPHILIZED, FOR SOLUTION INTRAVENOUS at 10:12

## 2023-01-01 RX ADMIN — MIDAZOLAM 2 MG: 1 INJECTION INTRAMUSCULAR; INTRAVENOUS at 19:58

## 2023-01-01 RX ADMIN — THIAMINE HYDROCHLORIDE 500 MG: 100 INJECTION, SOLUTION INTRAMUSCULAR; INTRAVENOUS at 11:24

## 2023-01-01 RX ADMIN — PROPOFOL 40 MCG/KG/MIN: 10 INJECTION, EMULSION INTRAVENOUS at 05:37

## 2023-01-01 RX ADMIN — SODIUM CHLORIDE SOLN NEBU 3% 4 ML: 3 NEBU SOLN at 20:25

## 2023-01-01 RX ADMIN — INSULIN LISPRO 3 UNITS: 100 INJECTION, SOLUTION INTRAVENOUS; SUBCUTANEOUS at 05:49

## 2023-01-01 RX ADMIN — LEVALBUTEROL HYDROCHLORIDE 1.25 MG: 1.25 SOLUTION RESPIRATORY (INHALATION) at 13:59

## 2023-01-01 RX ADMIN — IPRATROPIUM BROMIDE 0.5 MG: 0.5 SOLUTION RESPIRATORY (INHALATION) at 02:49

## 2023-01-01 RX ADMIN — INSULIN LISPRO 1 UNITS: 100 INJECTION, SOLUTION INTRAVENOUS; SUBCUTANEOUS at 12:37

## 2023-01-01 RX ADMIN — PROPOFOL 30 MCG/KG/MIN: 10 INJECTION, EMULSION INTRAVENOUS at 16:42

## 2023-01-01 RX ADMIN — SODIUM CHLORIDE SOLN NEBU 3% 4 ML: 3 NEBU SOLN at 07:11

## 2023-01-01 RX ADMIN — FENTANYL CITRATE 100 MCG/HR: 0.05 INJECTION, SOLUTION INTRAMUSCULAR; INTRAVENOUS at 19:22

## 2023-01-01 RX ADMIN — PROPOFOL 10 MCG/KG/MIN: 10 INJECTION, EMULSION INTRAVENOUS at 14:05

## 2023-01-01 RX ADMIN — Medication 50 MCG/HR: at 08:20

## 2023-01-01 RX ADMIN — INSULIN LISPRO 1 UNITS: 100 INJECTION, SOLUTION INTRAVENOUS; SUBCUTANEOUS at 17:55

## 2023-01-01 RX ADMIN — VASOPRESSIN 0.04 UNITS/MIN: 20 INJECTION INTRAVENOUS at 01:37

## 2023-01-01 RX ADMIN — PIPERACILLIN SODIUM AND TAZOBACTAM SODIUM 3.38 G: 36; 4.5 INJECTION, POWDER, LYOPHILIZED, FOR SOLUTION INTRAVENOUS at 17:45

## 2023-01-01 RX ADMIN — HYDROCORTISONE 25 MG: 5 TABLET ORAL at 16:21

## 2023-01-01 RX ADMIN — INSULIN HUMAN 5 UNITS: 100 INJECTION, SOLUTION PARENTERAL at 05:39

## 2023-01-01 RX ADMIN — FOLIC ACID 1 MG: 5 INJECTION, SOLUTION INTRAMUSCULAR; INTRAVENOUS; SUBCUTANEOUS at 10:12

## 2023-01-01 RX ADMIN — FENTANYL CITRATE 50 MCG: 50 INJECTION INTRAMUSCULAR; INTRAVENOUS at 12:25

## 2023-01-01 RX ADMIN — VANCOMYCIN HYDROCHLORIDE 1250 MG: 10 INJECTION, POWDER, LYOPHILIZED, FOR SOLUTION INTRAVENOUS at 10:23

## 2023-01-01 RX ADMIN — HEPARIN SODIUM 18 UNITS/KG/HR: 10000 INJECTION, SOLUTION INTRAVENOUS at 21:05

## 2023-01-01 RX ADMIN — SODIUM CHLORIDE SOLN NEBU 3% 4 ML: 3 NEBU SOLN at 19:13

## 2023-01-01 RX ADMIN — Medication 50 MCG/HR: at 00:11

## 2023-01-01 RX ADMIN — SODIUM CHLORIDE SOLN NEBU 3% 4 ML: 3 NEBU SOLN at 19:54

## 2023-01-01 RX ADMIN — MIDAZOLAM 2 MG: 1 INJECTION INTRAMUSCULAR; INTRAVENOUS at 06:40

## 2023-01-01 RX ADMIN — LEVALBUTEROL HYDROCHLORIDE 1.25 MG: 1.25 SOLUTION RESPIRATORY (INHALATION) at 08:07

## 2023-01-01 RX ADMIN — SODIUM CHLORIDE SOLN NEBU 3% 4 ML: 3 NEBU SOLN at 07:24

## 2023-01-01 RX ADMIN — ASPIRIN 81 MG: 81 TABLET, COATED ORAL at 11:59

## 2023-01-01 RX ADMIN — PROPOFOL 20 MCG/KG/MIN: 10 INJECTION, EMULSION INTRAVENOUS at 17:02

## 2023-01-01 RX ADMIN — PROPOFOL 40 MCG/KG/MIN: 10 INJECTION, EMULSION INTRAVENOUS at 14:20

## 2023-01-01 RX ADMIN — INSULIN LISPRO 1 UNITS: 100 INJECTION, SOLUTION INTRAVENOUS; SUBCUTANEOUS at 00:30

## 2023-01-01 RX ADMIN — FENTANYL CITRATE 50 MCG: 50 INJECTION INTRAMUSCULAR; INTRAVENOUS at 06:28

## 2023-01-01 RX ADMIN — IPRATROPIUM BROMIDE 0.5 MG: 0.5 SOLUTION RESPIRATORY (INHALATION) at 13:52

## 2023-01-01 RX ADMIN — FENTANYL CITRATE 150 MCG/HR: 0.05 INJECTION, SOLUTION INTRAMUSCULAR; INTRAVENOUS at 05:55

## 2023-01-01 RX ADMIN — PRAVASTATIN SODIUM 40 MG: 40 TABLET ORAL at 15:56

## 2023-01-01 RX ADMIN — FENTANYL CITRATE 50 MCG: 50 INJECTION INTRAMUSCULAR; INTRAVENOUS at 11:19

## 2023-01-01 RX ADMIN — PRAVASTATIN SODIUM 40 MG: 40 TABLET ORAL at 17:48

## 2023-01-01 RX ADMIN — Medication 50 MCG/HR: at 10:51

## 2023-01-01 RX ADMIN — IPRATROPIUM BROMIDE 0.5 MG: 0.5 SOLUTION RESPIRATORY (INHALATION) at 07:15

## 2023-01-01 RX ADMIN — POTASSIUM CHLORIDE 20 MEQ: 14.9 INJECTION, SOLUTION INTRAVENOUS at 10:13

## 2023-01-01 RX ADMIN — LEVALBUTEROL HYDROCHLORIDE 1.25 MG: 1.25 SOLUTION RESPIRATORY (INHALATION) at 19:21

## 2023-01-01 RX ADMIN — ACETAMINOPHEN 650 MG: 325 TABLET ORAL at 23:00

## 2023-01-01 RX ADMIN — PIPERACILLIN SODIUM AND TAZOBACTAM SODIUM 3.38 G: 36; 4.5 INJECTION, POWDER, LYOPHILIZED, FOR SOLUTION INTRAVENOUS at 01:11

## 2023-01-01 RX ADMIN — CHLORHEXIDINE GLUCONATE 0.12% ORAL RINSE 15 ML: 1.2 LIQUID ORAL at 11:59

## 2023-01-01 RX ADMIN — SODIUM CHLORIDE SOLN NEBU 3% 4 ML: 3 NEBU SOLN at 13:59

## 2023-01-01 RX ADMIN — PIPERACILLIN SODIUM AND TAZOBACTAM SODIUM 3.38 G: 36; 4.5 INJECTION, POWDER, LYOPHILIZED, FOR SOLUTION INTRAVENOUS at 18:02

## 2023-01-01 RX ADMIN — Medication 20 MG: at 08:36

## 2023-01-01 RX ADMIN — SODIUM CHLORIDE SOLN NEBU 3% 4 ML: 3 NEBU SOLN at 18:21

## 2023-01-01 RX ADMIN — LEVALBUTEROL HYDROCHLORIDE 1.25 MG: 1.25 SOLUTION RESPIRATORY (INHALATION) at 13:44

## 2023-01-01 RX ADMIN — PIPERACILLIN SODIUM AND TAZOBACTAM SODIUM 3.38 G: 36; 4.5 INJECTION, POWDER, LYOPHILIZED, FOR SOLUTION INTRAVENOUS at 01:55

## 2023-01-01 RX ADMIN — FENTANYL CITRATE 50 MCG: 50 INJECTION INTRAMUSCULAR; INTRAVENOUS at 20:49

## 2023-01-01 RX ADMIN — SODIUM CHLORIDE SOLN NEBU 3% 4 ML: 3 NEBU SOLN at 07:37

## 2023-01-01 RX ADMIN — CHLORHEXIDINE GLUCONATE 0.12% ORAL RINSE 15 ML: 1.2 LIQUID ORAL at 08:36

## 2023-01-01 RX ADMIN — INSULIN HUMAN 5 UNITS: 100 INJECTION, SOLUTION PARENTERAL at 23:47

## 2023-01-01 RX ADMIN — CHLORHEXIDINE GLUCONATE 0.12% ORAL RINSE 15 ML: 1.2 LIQUID ORAL at 20:12

## 2023-01-01 RX ADMIN — INSULIN HUMAN 8 UNITS: 100 INJECTION, SOLUTION PARENTERAL at 18:26

## 2023-01-01 RX ADMIN — ACETAMINOPHEN 650 MG: 325 TABLET ORAL at 16:17

## 2023-01-01 RX ADMIN — INSULIN LISPRO 1 UNITS: 100 INJECTION, SOLUTION INTRAVENOUS; SUBCUTANEOUS at 05:44

## 2023-01-01 RX ADMIN — LEVALBUTEROL HYDROCHLORIDE 1.25 MG: 1.25 SOLUTION RESPIRATORY (INHALATION) at 07:11

## 2023-01-01 RX ADMIN — FENTANYL CITRATE 50 MCG: 50 INJECTION INTRAMUSCULAR; INTRAVENOUS at 08:57

## 2023-01-01 RX ADMIN — FENTANYL CITRATE 50 MCG: 50 INJECTION INTRAMUSCULAR; INTRAVENOUS at 08:06

## 2023-01-01 RX ADMIN — SODIUM CHLORIDE SOLN NEBU 3% 4 ML: 3 NEBU SOLN at 13:31

## 2023-01-01 RX ADMIN — IPRATROPIUM BROMIDE 0.5 MG: 0.5 SOLUTION RESPIRATORY (INHALATION) at 13:32

## 2023-01-01 RX ADMIN — LEVALBUTEROL HYDROCHLORIDE 1.25 MG: 1.25 SOLUTION RESPIRATORY (INHALATION) at 13:31

## 2023-01-01 RX ADMIN — POTASSIUM CHLORIDE 40 MEQ: 1500 TABLET, EXTENDED RELEASE ORAL at 10:29

## 2023-01-01 RX ADMIN — FENTANYL CITRATE 50 MCG: 50 INJECTION INTRAMUSCULAR; INTRAVENOUS at 04:36

## 2023-01-01 RX ADMIN — LEVALBUTEROL HYDROCHLORIDE 1.25 MG: 1.25 SOLUTION RESPIRATORY (INHALATION) at 07:38

## 2023-01-01 RX ADMIN — IPRATROPIUM BROMIDE 0.5 MG: 0.5 SOLUTION RESPIRATORY (INHALATION) at 19:21

## 2023-01-01 RX ADMIN — MIDAZOLAM 2 MG: 1 INJECTION INTRAMUSCULAR; INTRAVENOUS at 00:58

## 2023-01-01 RX ADMIN — CHLORHEXIDINE GLUCONATE 0.12% ORAL RINSE 15 ML: 1.2 LIQUID ORAL at 09:03

## 2023-01-01 RX ADMIN — LEVALBUTEROL HYDROCHLORIDE 1.25 MG: 1.25 SOLUTION RESPIRATORY (INHALATION) at 21:18

## 2023-01-01 RX ADMIN — IPRATROPIUM BROMIDE 0.5 MG: 0.5 SOLUTION RESPIRATORY (INHALATION) at 03:20

## 2023-01-01 RX ADMIN — PANTOPRAZOLE SODIUM 40 MG: 40 INJECTION, POWDER, FOR SOLUTION INTRAVENOUS at 10:12

## 2023-01-01 RX ADMIN — CHLORHEXIDINE GLUCONATE 0.12% ORAL RINSE 15 ML: 1.2 LIQUID ORAL at 21:06

## 2023-01-01 RX ADMIN — PROPOFOL 50 MCG/KG/MIN: 10 INJECTION, EMULSION INTRAVENOUS at 20:58

## 2023-01-01 RX ADMIN — PROPOFOL 40 MCG/KG/MIN: 10 INJECTION, EMULSION INTRAVENOUS at 11:22

## 2023-01-01 RX ADMIN — ASPIRIN 81 MG 81 MG: 81 TABLET ORAL at 08:11

## 2023-04-20 ENCOUNTER — APPOINTMENT (EMERGENCY)
Dept: RADIOLOGY | Facility: HOSPITAL | Age: 63
End: 2023-04-20

## 2023-04-20 ENCOUNTER — HOSPITAL ENCOUNTER (INPATIENT)
Facility: HOSPITAL | Age: 63
LOS: 1 days | Discharge: HOME | End: 2023-04-21
Attending: EMERGENCY MEDICINE | Admitting: FAMILY MEDICINE

## 2023-04-20 ENCOUNTER — APPOINTMENT (EMERGENCY)
Dept: CT IMAGING | Facility: HOSPITAL | Age: 63
End: 2023-04-20

## 2023-04-20 DIAGNOSIS — J18.9 PNEUMONIA OF RIGHT LOWER LOBE DUE TO INFECTIOUS ORGANISM: Primary | ICD-10-CM

## 2023-04-20 DIAGNOSIS — R09.02 HYPOXIA: ICD-10-CM

## 2023-04-20 DIAGNOSIS — E87.1 HYPONATREMIA: ICD-10-CM

## 2023-04-20 DIAGNOSIS — R91.8 LUNG MASS: ICD-10-CM

## 2023-04-20 DIAGNOSIS — E83.52 HYPERCALCEMIA: ICD-10-CM

## 2023-04-20 DIAGNOSIS — R22.1 NECK MASS: ICD-10-CM

## 2023-04-20 DIAGNOSIS — R65.10 SIRS (SYSTEMIC INFLAMMATORY RESPONSE SYNDROME) (HCC): ICD-10-CM

## 2023-04-20 DIAGNOSIS — I82.B11 THROMBOSIS OF RIGHT SUBCLAVIAN VEIN (HCC): ICD-10-CM

## 2023-04-20 DIAGNOSIS — R59.1 LYMPHADENOPATHY: ICD-10-CM

## 2023-04-20 PROBLEM — I82.B19 SUBCLAVIAN VEIN THROMBOSIS (HCC): Status: ACTIVE | Noted: 2023-04-20

## 2023-04-20 PROBLEM — E11.9 TYPE 2 DIABETES MELLITUS, WITHOUT LONG-TERM CURRENT USE OF INSULIN (HCC): Status: ACTIVE | Noted: 2023-04-20

## 2023-04-20 LAB
ALBUMIN SERPL BCP-MCNC: 3.9 G/DL (ref 3.5–5)
ALP SERPL-CCNC: 109 U/L (ref 34–104)
ALT SERPL W P-5'-P-CCNC: 38 U/L (ref 7–52)
ANION GAP SERPL CALCULATED.3IONS-SCNC: 7 MMOL/L (ref 4–13)
APTT PPP: 119 SECONDS (ref 23–37)
APTT PPP: 31 SECONDS (ref 23–37)
AST SERPL W P-5'-P-CCNC: 34 U/L (ref 13–39)
ATRIAL RATE: 115 BPM
BASOPHILS # BLD AUTO: 0.07 THOUSANDS/ΜL (ref 0–0.1)
BASOPHILS NFR BLD AUTO: 1 % (ref 0–1)
BILIRUB SERPL-MCNC: 0.55 MG/DL (ref 0.2–1)
BUN SERPL-MCNC: 6 MG/DL (ref 5–25)
CALCIUM SERPL-MCNC: 12.3 MG/DL (ref 8.4–10.2)
CHLORIDE SERPL-SCNC: 93 MMOL/L (ref 96–108)
CO2 SERPL-SCNC: 29 MMOL/L (ref 21–32)
CREAT SERPL-MCNC: 0.61 MG/DL (ref 0.6–1.3)
EOSINOPHIL # BLD AUTO: 0.3 THOUSAND/ΜL (ref 0–0.61)
EOSINOPHIL NFR BLD AUTO: 2 % (ref 0–6)
ERYTHROCYTE [DISTWIDTH] IN BLOOD BY AUTOMATED COUNT: 12.8 % (ref 11.6–15.1)
GFR SERPL CREATININE-BSD FRML MDRD: 107 ML/MIN/1.73SQ M
GLUCOSE SERPL-MCNC: 118 MG/DL (ref 65–140)
GLUCOSE SERPL-MCNC: 150 MG/DL (ref 65–140)
GLUCOSE SERPL-MCNC: 161 MG/DL (ref 65–140)
GLUCOSE SERPL-MCNC: 91 MG/DL (ref 65–140)
HCT VFR BLD AUTO: 44 % (ref 36.5–49.3)
HGB BLD-MCNC: 14.4 G/DL (ref 12–17)
IMM GRANULOCYTES # BLD AUTO: 0.08 THOUSAND/UL (ref 0–0.2)
IMM GRANULOCYTES NFR BLD AUTO: 1 % (ref 0–2)
INR PPP: 1.16 (ref 0.84–1.19)
LACTATE SERPL-SCNC: 1.5 MMOL/L (ref 0.5–2)
LIPASE SERPL-CCNC: 17 U/L (ref 11–82)
LYMPHOCYTES # BLD AUTO: 1.05 THOUSANDS/ΜL (ref 0.6–4.47)
LYMPHOCYTES NFR BLD AUTO: 7 % (ref 14–44)
MCH RBC QN AUTO: 28.8 PG (ref 26.8–34.3)
MCHC RBC AUTO-ENTMCNC: 32.7 G/DL (ref 31.4–37.4)
MCV RBC AUTO: 88 FL (ref 82–98)
MONOCYTES # BLD AUTO: 1.36 THOUSAND/ΜL (ref 0.17–1.22)
MONOCYTES NFR BLD AUTO: 10 % (ref 4–12)
NEUTROPHILS # BLD AUTO: 11.4 THOUSANDS/ΜL (ref 1.85–7.62)
NEUTS SEG NFR BLD AUTO: 79 % (ref 43–75)
NRBC BLD AUTO-RTO: 0 /100 WBCS
OSMOLALITY UR/SERPL-RTO: 280 MMOL/KG (ref 282–298)
OSMOLALITY UR: 180 MMOL/KG
P AXIS: 81 DEGREES
PLATELET # BLD AUTO: 374 THOUSANDS/UL (ref 149–390)
PMV BLD AUTO: 8.7 FL (ref 8.9–12.7)
POTASSIUM SERPL-SCNC: 4 MMOL/L (ref 3.5–5.3)
PR INTERVAL: 192 MS
PROT SERPL-MCNC: 7.2 G/DL (ref 6.4–8.4)
PROTHROMBIN TIME: 14.9 SECONDS (ref 11.6–14.5)
QRS AXIS: 63 DEGREES
QRSD INTERVAL: 114 MS
QT INTERVAL: 330 MS
QTC INTERVAL: 456 MS
RBC # BLD AUTO: 5 MILLION/UL (ref 3.88–5.62)
S PYO DNA THROAT QL NAA+PROBE: NOT DETECTED
SODIUM 24H UR-SCNC: 47 MOL/L
SODIUM SERPL-SCNC: 129 MMOL/L (ref 135–147)
T WAVE AXIS: 45 DEGREES
VENTRICULAR RATE: 115 BPM
WBC # BLD AUTO: 14.26 THOUSAND/UL (ref 4.31–10.16)

## 2023-04-20 RX ORDER — NICOTINE 21 MG/24HR
21 PATCH, TRANSDERMAL 24 HOURS TRANSDERMAL DAILY
Status: DISCONTINUED | OUTPATIENT
Start: 2023-04-20 | End: 2023-04-21 | Stop reason: HOSPADM

## 2023-04-20 RX ORDER — HEPARIN SODIUM 1000 [USP'U]/ML
4000 INJECTION, SOLUTION INTRAVENOUS; SUBCUTANEOUS EVERY 6 HOURS PRN
Status: DISCONTINUED | OUTPATIENT
Start: 2023-04-20 | End: 2023-04-20

## 2023-04-20 RX ORDER — VALSARTAN 160 MG/1
1 TABLET ORAL DAILY
Status: ON HOLD | COMMUNITY
Start: 2023-03-16

## 2023-04-20 RX ORDER — HEPARIN SODIUM 1000 [USP'U]/ML
8000 INJECTION, SOLUTION INTRAVENOUS; SUBCUTANEOUS ONCE
Status: COMPLETED | OUTPATIENT
Start: 2023-04-20 | End: 2023-04-20

## 2023-04-20 RX ORDER — CALCITONIN SALMON 200 [USP'U]/ML
4 INJECTION, SOLUTION INTRAMUSCULAR; SUBCUTANEOUS EVERY 12 HOURS SCHEDULED
Status: COMPLETED | OUTPATIENT
Start: 2023-04-20 | End: 2023-04-21

## 2023-04-20 RX ORDER — ACETAMINOPHEN 325 MG/1
650 TABLET ORAL EVERY 6 HOURS PRN
Status: DISCONTINUED | OUTPATIENT
Start: 2023-04-20 | End: 2023-04-21 | Stop reason: HOSPADM

## 2023-04-20 RX ORDER — HEPARIN SODIUM 10000 [USP'U]/100ML
3-30 INJECTION, SOLUTION INTRAVENOUS
Status: DISCONTINUED | OUTPATIENT
Start: 2023-04-20 | End: 2023-04-21

## 2023-04-20 RX ORDER — HEPARIN SODIUM 10000 [USP'U]/100ML
3-30 INJECTION, SOLUTION INTRAVENOUS
Status: DISCONTINUED | OUTPATIENT
Start: 2023-04-20 | End: 2023-04-20

## 2023-04-20 RX ORDER — CEFTRIAXONE 1 G/50ML
1000 INJECTION, SOLUTION INTRAVENOUS EVERY 24 HOURS
Status: DISCONTINUED | OUTPATIENT
Start: 2023-04-21 | End: 2023-04-21 | Stop reason: HOSPADM

## 2023-04-20 RX ORDER — HEPARIN SODIUM 1000 [USP'U]/ML
8000 INJECTION, SOLUTION INTRAVENOUS; SUBCUTANEOUS EVERY 6 HOURS PRN
Status: DISCONTINUED | OUTPATIENT
Start: 2023-04-20 | End: 2023-04-21

## 2023-04-20 RX ORDER — SODIUM CHLORIDE 9 MG/ML
100 INJECTION, SOLUTION INTRAVENOUS CONTINUOUS
Status: DISCONTINUED | OUTPATIENT
Start: 2023-04-20 | End: 2023-04-21 | Stop reason: HOSPADM

## 2023-04-20 RX ORDER — NICOTINE 21 MG/24HR
1 PATCH, TRANSDERMAL 24 HOURS TRANSDERMAL DAILY
Status: DISCONTINUED | OUTPATIENT
Start: 2023-04-20 | End: 2023-04-20

## 2023-04-20 RX ORDER — AZITHROMYCIN 250 MG/1
500 TABLET, FILM COATED ORAL EVERY 24 HOURS
Status: DISCONTINUED | OUTPATIENT
Start: 2023-04-21 | End: 2023-04-21 | Stop reason: HOSPADM

## 2023-04-20 RX ORDER — ALLOPURINOL 300 MG/1
300 TABLET ORAL DAILY
Status: DISCONTINUED | OUTPATIENT
Start: 2023-04-21 | End: 2023-04-21 | Stop reason: HOSPADM

## 2023-04-20 RX ORDER — CEFTRIAXONE 1 G/50ML
1000 INJECTION, SOLUTION INTRAVENOUS ONCE
Status: COMPLETED | OUTPATIENT
Start: 2023-04-20 | End: 2023-04-20

## 2023-04-20 RX ORDER — GLIPIZIDE 5 MG/1
5 TABLET, FILM COATED, EXTENDED RELEASE ORAL DAILY
Status: ON HOLD | COMMUNITY

## 2023-04-20 RX ORDER — HEPARIN SODIUM 1000 [USP'U]/ML
4000 INJECTION, SOLUTION INTRAVENOUS; SUBCUTANEOUS EVERY 6 HOURS PRN
Status: DISCONTINUED | OUTPATIENT
Start: 2023-04-20 | End: 2023-04-21

## 2023-04-20 RX ORDER — INSULIN LISPRO 100 [IU]/ML
2-12 INJECTION, SOLUTION INTRAVENOUS; SUBCUTANEOUS
Status: DISCONTINUED | OUTPATIENT
Start: 2023-04-20 | End: 2023-04-21 | Stop reason: HOSPADM

## 2023-04-20 RX ORDER — SIMVASTATIN 20 MG
1 TABLET ORAL DAILY
Status: ON HOLD | COMMUNITY

## 2023-04-20 RX ORDER — HEPARIN SODIUM 1000 [USP'U]/ML
8000 INJECTION, SOLUTION INTRAVENOUS; SUBCUTANEOUS EVERY 6 HOURS PRN
Status: DISCONTINUED | OUTPATIENT
Start: 2023-04-20 | End: 2023-04-20

## 2023-04-20 RX ORDER — PRAVASTATIN SODIUM 20 MG
20 TABLET ORAL
Status: DISCONTINUED | OUTPATIENT
Start: 2023-04-20 | End: 2023-04-21 | Stop reason: HOSPADM

## 2023-04-20 RX ORDER — ONDANSETRON 2 MG/ML
4 INJECTION INTRAMUSCULAR; INTRAVENOUS EVERY 6 HOURS PRN
Status: DISCONTINUED | OUTPATIENT
Start: 2023-04-20 | End: 2023-04-21 | Stop reason: HOSPADM

## 2023-04-20 RX ORDER — ALLOPURINOL 300 MG/1
1 TABLET ORAL DAILY
Status: ON HOLD | COMMUNITY

## 2023-04-20 RX ADMIN — SODIUM CHLORIDE 100 ML/HR: 0.9 INJECTION, SOLUTION INTRAVENOUS at 13:45

## 2023-04-20 RX ADMIN — AZITHROMYCIN MONOHYDRATE 500 MG: 500 INJECTION, POWDER, LYOPHILIZED, FOR SOLUTION INTRAVENOUS at 10:10

## 2023-04-20 RX ADMIN — HEPARIN SODIUM 8000 UNITS: 1000 INJECTION INTRAVENOUS; SUBCUTANEOUS at 11:28

## 2023-04-20 RX ADMIN — CALCITONIN SALMON 416 UNITS: 200 INJECTION, SOLUTION INTRAMUSCULAR; SUBCUTANEOUS at 13:51

## 2023-04-20 RX ADMIN — CALCITONIN SALMON 416 UNITS: 200 INJECTION, SOLUTION INTRAMUSCULAR; SUBCUTANEOUS at 20:14

## 2023-04-20 RX ADMIN — HEPARIN SODIUM 18 UNITS/KG/HR: 10000 INJECTION, SOLUTION INTRAVENOUS at 11:28

## 2023-04-20 RX ADMIN — SODIUM CHLORIDE 1000 ML: 0.9 INJECTION, SOLUTION INTRAVENOUS at 08:49

## 2023-04-20 RX ADMIN — NICOTINE 21 MG: 21 PATCH, EXTENDED RELEASE TRANSDERMAL at 13:09

## 2023-04-20 RX ADMIN — SODIUM CHLORIDE 100 ML/HR: 0.9 INJECTION, SOLUTION INTRAVENOUS at 22:56

## 2023-04-20 RX ADMIN — PRAVASTATIN SODIUM 20 MG: 20 TABLET ORAL at 17:34

## 2023-04-20 RX ADMIN — IOHEXOL 85 ML: 350 INJECTION, SOLUTION INTRAVENOUS at 09:52

## 2023-04-20 RX ADMIN — CEFTRIAXONE 1000 MG: 1 INJECTION, SOLUTION INTRAVENOUS at 09:21

## 2023-04-20 NOTE — ASSESSMENT & PLAN NOTE
Extensive necrotic lymphadenopathy involving partially imaged mediastinum, bilateral hilum, and left greater than right bilateral cervical levels III and IV suspicious for malignancy      2   Retropharyngeal edema from C1-C6      3   Partially imaged right pleural effusion and groundglass opacities in the right lower lobe  Please refer to the report of concurrent CT chest/abdomen/pelvis for additional findings  So placed to ENT and pulmonology  Discussed with IR    Will have outpatient biopsy of neck mass to be done at another campus as we do not have pathology onsite here

## 2023-04-20 NOTE — ASSESSMENT & PLAN NOTE
Probably has hypercalcemia secondary to malignancy  Calcium of 12 3  We will check PTH level    Placed on IV fluid hydration and ordered calcitonin and recheck BMP tomorrow

## 2023-04-20 NOTE — ASSESSMENT & PLAN NOTE
As noted on CT chest right middle lobe 5 7 cm necrotic cavitary mass with extensive mediastinal adenopathy  Also placed to pulmonology  Will probably require biopsies to be done on an outpatient basis  Counseled on smoking cessation    Discussed with patient that he is high risk for malignancy based on CT findings

## 2023-04-20 NOTE — H&P
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 more likely than lymphoma  Can Not rule out infection at this point in time and hence will place on Rocephin and Zithromax  Hypercalcemia  Assessment & Plan  Probably has hypercalcemia secondary to malignancy  Calcium of 12 3  We will check PTH level  Placed on IV fluid hydration and ordered calcitonin and recheck BMP tomorrow    Lung mass  Assessment & Plan  As noted on CT chest right middle lobe 5 7 cm necrotic cavitary mass with extensive mediastinal adenopathy  Also placed to pulmonology  Will probably require biopsies to be done on an outpatient basis  Counseled on smoking cessation  Discussed with patient that he is high risk for malignancy based on CT findings    Type 2 diabetes mellitus, without long-term current use of insulin (HCC)  Assessment & Plan  No results found for: HGBA1C    Recent Labs     04/20/23  1343   POCGLU 91       Blood Sugar Average: Last 72 hrs:  (P) 91   On diabetic diet and insulin sliding scale for now  Hold glipizide    Subclavian vein thrombosis (HCC)  Assessment & Plan  Noted To have subclavian vein thrombosis probably secondary to underlying malignancy    Currently on heparin drip will need to transition to DOACs upon discharge    Hyponatremia  Assessment & Plan   secondary to lung mass suspect SIADH we will check urine studies including urine sodium urine osmolarity serum osmolarity  Right now we will place on IV fluid hydration secondary to hypercalcemia  Under effect on sodium levels    Neck mass  Assessment & Plan  Extensive necrotic lymphadenopathy involving partially imaged mediastinum, bilateral hilum, and left greater than right bilateral cervical levels III and IV suspicious for malignancy      2   Retropharyngeal edema from C1-C6      3   Partially imaged right pleural effusion and groundglass opacities in the right lower lobe  Please refer to the report of concurrent CT chest/abdomen/pelvis for additional findings  So placed to ENT and pulmonology  Discussed with IR  Will have outpatient biopsy of neck mass to be done at another campus as we do not have pathology onsite here         VTE Prophylaxis: Heparin Drip  / sequential compression device   Code Status: Full code  POLST: There is no POLST form on file for this patient (pre-hospital)  Discussion with family: disCussed with wife at bedside    Anticipated Length of Stay:  Patient will be admitted on an Inpatient basis with an anticipated length of stay of more than 2 midnights  Justification for Hospital Stay: Newly diagnosed lung and head and neck lymphadenopathy    Total Time for Visit, including Counseling / Coordination of Care: 60 minutes  Greater than 50% of this total time spent on direct patient counseling and coordination of care  Chief Complaint:   Cough    History of Present Illness:    Gumaro De Leon is a 66 y o  male who presents with cough and URI symptoms for the last few weeks  Patient states that he also has a little blood in his sputum at times  He states that his cough has been getting worse over the last few days and also having some swelling in his neck and mild shortness of breath which got him to come to the ED to be evaluated  No known diagnosis of cancer in the past smokes 1 pack/day for over 30 years    Denies any weight loss fevers chills or night sweats  Review of Systems:    Review of Systems   Constitutional: Negative for appetite change, chills, fatigue and fever  HENT: Positive for sore throat  Negative for hearing loss and trouble swallowing  Eyes: Negative for photophobia, discharge and visual disturbance  Respiratory: Positive for cough and shortness of breath  Negative for chest tightness  Cardiovascular: Negative for chest pain and palpitations  Gastrointestinal: Negative for abdominal pain, blood in stool and vomiting  Endocrine: Negative for polydipsia and polyuria  Genitourinary: Negative for difficulty urinating, dysuria, flank pain and hematuria  Musculoskeletal: Negative for back pain and gait problem  Skin: Negative for rash  Allergic/Immunologic: Negative for environmental allergies and food allergies  Neurological: Negative for dizziness, seizures, syncope and headaches  Hematological: Does not bruise/bleed easily  Psychiatric/Behavioral: Negative for behavioral problems  All other systems reviewed and are negative  Past Medical and Surgical History:     Past Medical History:   Diagnosis Date   • Diabetes mellitus ([facility] [address] )        Past Surgical History:   Procedure Laterality Date   • TONSILLECTOMY         Meds/Allergies:    Prior to Admission medications    Medication Sig Start Date End Date Taking? Authorizing Provider   allopurinol (ZYLOPRIM) 300 mg tablet Take 1 tablet by mouth in the morning   Yes Historical Provider, MD   glipiZIDE (GLUCOTROL XL) 5 mg 24 hr tablet Take 5 mg by mouth daily   Yes Historical Provider, MD   simvastatin (ZOCOR) 20 mg tablet Take 1 tablet by mouth in the morning   Yes Historical Provider, MD   valsartan (DIOVAN) 160 mg tablet Take 1 tablet by mouth daily 3/16/23  Yes Historical Provider, MD     I have reviewed home medications with patient personally      Allergies: No Known Allergies    Social History:     Marital Status: /Civil Union "    Social History     Substance and Sexual Activity   Alcohol Use Yes     Social History     Tobacco Use   Smoking Status Every Day   • Packs/day: 1 00   • Types: Cigarettes   Smokeless Tobacco Never     Social History     Substance and Sexual Activity   Drug Use Not Currently       Family History:    History reviewed  No pertinent family history  Physical Exam:     Vitals:   Blood Pressure: 133/76 (04/20/23 1216)  Pulse: 103 (04/20/23 1216)  Temperature: 98 8 °F (37 1 °C) (04/20/23 1216)  Temp Source: Temporal (04/20/23 1216)  Respirations: 18 (04/20/23 1216)  Height: 6' 1\" (185 4 cm) (04/20/23 1216)  Weight - Scale: 99 2 kg (218 lb 11 1 oz) (04/20/23 1216)  SpO2: 94 % (04/20/23 1216)    Physical Exam  Vitals and nursing note reviewed  Constitutional:       Appearance: Normal appearance  HENT:      Head: Normocephalic and atraumatic  Right Ear: External ear normal       Left Ear: External ear normal       Nose: Nose normal       Mouth/Throat:      Mouth: Mucous membranes are dry  Pharynx: Oropharynx is clear  Eyes:      Pupils: Pupils are equal, round, and reactive to light  Cardiovascular:      Rate and Rhythm: Normal rate and regular rhythm  Heart sounds: Normal heart sounds  Pulmonary:      Effort: Pulmonary effort is normal       Breath sounds: Normal breath sounds  Abdominal:      General: Bowel sounds are normal       Palpations: Abdomen is soft  Tenderness: There is no abdominal tenderness  Musculoskeletal:         General: Normal range of motion  Cervical back: Normal range of motion and neck supple  Lymphadenopathy:      Cervical: Cervical adenopathy present  Skin:     General: Skin is warm and dry  Capillary Refill: Capillary refill takes less than 2 seconds  Neurological:      General: No focal deficit present  Mental Status: He is alert and oriented to person, place, and time     Psychiatric:         Mood and Affect: Mood normal  " Additional Data:     Lab Results: I have personally reviewed pertinent reports  Results from last 7 days   Lab Units 04/20/23  0849   WBC Thousand/uL 14 26*   HEMOGLOBIN g/dL 14 4   HEMATOCRIT % 44 0   PLATELETS Thousands/uL 374   NEUTROS PCT % 79*   LYMPHS PCT % 7*   MONOS PCT % 10   EOS PCT % 2     Results from last 7 days   Lab Units 04/20/23  0849   SODIUM mmol/L 129*   POTASSIUM mmol/L 4 0   CHLORIDE mmol/L 93*   CO2 mmol/L 29   BUN mg/dL 6   CREATININE mg/dL 0 61   ANION GAP mmol/L 7   CALCIUM mg/dL 12 3*   ALBUMIN g/dL 3 9   TOTAL BILIRUBIN mg/dL 0 55   ALK PHOS U/L 109*   ALT U/L 38   AST U/L 34   GLUCOSE RANDOM mg/dL 150*     Results from last 7 days   Lab Units 04/20/23  0849   INR  1 16     Results from last 7 days   Lab Units 04/20/23  1343   POC GLUCOSE mg/dl 91         Results from last 7 days   Lab Units 04/20/23  0849   LACTIC ACID mmol/L 1 5       Imaging: I have personally reviewed pertinent reports  CT chest abdomen pelvis wo contrast   Final Result by Adrian Young MD (04/20 7346)      Right middle lobe 5 7 cm necrotic/cavitary mass with extensive mediastinal adenopathy narrowing the tracheal airway  No significant adenopathy is seen in the abdomen and pelvis the exception of some small periceliac nodes  Prominent right axillary    node  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 more likely than lymphoma  Edema the right axilla related to 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  There is compression of the SVC  No obvious filling defect in the pulmonary arterial system although this is poorly evaluated delayed phase examination  The    patient would be at risk for pulmonary embolism      This was discussed with Dr Greta Bo at 11:10 AM            Workstation performed: [id number]         CT soft tissue neck   Final Result by Yudelka Dickens MD (04/20 3664)      1    Extensive necrotic lymphadenopathy involving partially imaged mediastinum, bilateral hilum, and left greater than right bilateral cervical levels III and IV suspicious for malignancy  2   Retropharyngeal edema from C1-C6  3   Partially imaged right pleural effusion and groundglass opacities in the right lower lobe  Please refer to the report of concurrent CT chest/abdomen/pelvis for additional findings  The study was marked in EPIC for significant notification  Workstation performed: [id number]         XR chest 1 view portable   ED Interpretation by Kamar Echeverria MD (04/20 0900)   Right lower lobe pneumonia      Final Result by Jaison Kahn MD (04/20 2803)         1  Infiltrate at the right lung base along with a right pleural effusion  2   Right paratracheal fullness with narrowing of the airway  CT scan of the chest recommended  The study was marked in [facility] for immediate notification  Workstation performed: [id number]             EKG, Pathology, and Other Studies Reviewed on Admission:   · EKG: sinus rhythm    Allscripts / Epic Records Reviewed: Yes     ** Please Note: This note has been constructed using a voice recognition system   **

## 2023-04-20 NOTE — ASSESSMENT & PLAN NOTE
secondary to lung mass suspect SIADH we will check urine studies including urine sodium urine osmolarity serum osmolarity    Right now we will place on IV fluid hydration secondary to hypercalcemia  Under effect on sodium levels

## 2023-04-20 NOTE — PROGRESS NOTES
Patient nauseous at dinner  Declined prn zofran at this time  Patient ate 25%  Per family patient had a bloody nose which was not actively bleeding when notified  PTT returned at 119 and heparin was placed on hold per order paramters  Family at bedside

## 2023-04-20 NOTE — ASSESSMENT & PLAN NOTE
No results found for: HGBA1C    Recent Labs     04/20/23  1343   POCGLU 91       Blood Sugar Average: Last 72 hrs:  (P) 91   On diabetic diet and insulin sliding scale for now    Hold glipizide

## 2023-04-20 NOTE — ED NOTES
Pt removed from ox currently stating 93% [id number] later  pt dropped to 88-89 w/ good pleft on rm air physican notified pt was not ambulated and placed back on 2l o2 nasal can       Jennifer Garza  04/20/23 0726

## 2023-04-20 NOTE — CONSULTS
Dr Audrey Vaughan dictating report of ENT consultation on Malena Jang    Report of consultation : Mr Elodia Uriostegui has been admitted to [facility] from the emergency room where he presents with 2-week history of coughing  He has a longstanding history of smoking for 30 years  He has had some shortness of breath however he is feeling better since he has been in the hospital   He denies any nasal blockage or soreness in his throat  He had CT scan of his chest and neck which revealed a large necrotic mass with the lung with extensive mediastinal lymphadenopathy  Is also reported to be lymphadenopathy in the axilla with thrombosis of subclavian vein  He also has evidence of lymphadenopathy and level 3 and 5 in his neck  There is reported to be soft tissue prominence of the retropharyngeal space  Patient denies any difficulty with swallowing  On examination patient is laying comfortably in bed  He has nasal oxygen in place  Examination of his ears revealed both external ear canals are patent and tympanic membranes are normal   Examination of nasal cavity reveals slight dryness of nasal mucosa  There was no evidence of acute infection in the nasal cavity  It was no evidence of bleeding in the nasal cavity  Examination of oral cavity does not reveal any infection or neoplasm along floor of the mouth margins of the tongue posterior pharyngeal wall retromolar trigone soft and hard palate  He has had tonsillectomy several years ago which has healed nicely  Careful palpation of his neck does not reveal any discrete cervical lymph nodes clinically palpable  Impression : 5 7 cm necrotic mass with extensive mediastinal adenopathy  The lung masses in the right middle lobe  There is also reported to be narrowing of the tracheal airway    #2 metastatic lymph node enlargement in the axilla    I feel that his primary diagnosis 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

## 2023-04-20 NOTE — ASSESSMENT & PLAN NOTE
Noted To have subclavian vein thrombosis probably secondary to underlying malignancy    Currently on heparin drip will need to transition to DOACs upon discharge

## 2023-04-20 NOTE — ED PROVIDER NOTES
History  Chief Complaint   Patient presents with   • Shortness of Breath     Patient c/o sob, cough and swollen neck  Patient complains of for a few days of cough  Feels short of breath  Bringing up phlegm  States his neck feels swollen  No fevers or chills  No vomiting or diarrhea  No rash  No drooling  History provided by:  Patient   used: No    Shortness of Breath  Severity:  Mild  Onset quality:  Gradual  Timing:  Constant  Progression:  Unchanged  Chronicity:  New  Context: URI    Context: not animal exposure and not pollens    Relieved by:  Nothing  Worsened by:  Nothing  Ineffective treatments:  None tried  Associated symptoms: cough and sputum production    Associated symptoms: no abdominal pain, no chest pain, no ear pain, no fever, no headaches, no neck pain, no rash, no sore throat, no vomiting and no wheezing        None       Past Medical History:   Diagnosis Date   • Diabetes mellitus ([facility]        Past Surgical History:   Procedure Laterality Date   • TONSILLECTOMY         History reviewed  No pertinent family history  I have reviewed and agree with the history as documented  E-Cigarette/Vaping   • E-Cigarette Use Never User      E-Cigarette/Vaping Substances     Social History     Tobacco Use   • Smoking status: Every Day     Packs/day: 1 00     Types: Cigarettes   • Smokeless tobacco: Never   Vaping Use   • Vaping Use: Never used   Substance Use Topics   • Alcohol use: Yes   • Drug use: Not Currently       Review of Systems   Constitutional: Negative for chills and fever  HENT: Negative for ear pain, hearing loss, sore throat, trouble swallowing and voice change  Eyes: Negative for pain and discharge  Respiratory: Positive for cough, sputum production and shortness of breath  Negative for wheezing  Cardiovascular: Negative for chest pain and palpitations     Gastrointestinal: Negative for abdominal pain, blood in stool, constipation, diarrhea, nausea and vomiting  Genitourinary: Negative for dysuria, flank pain, frequency and hematuria  Musculoskeletal: Negative for joint swelling, neck pain and neck stiffness  Skin: Negative for rash and wound  Neurological: Negative for dizziness, seizures, syncope, facial asymmetry and headaches  Psychiatric/Behavioral: Negative for hallucinations, self-injury and suicidal ideas  All other systems reviewed and are negative  Physical Exam  Physical Exam  Vitals and nursing note reviewed  Constitutional:       General: He is not in acute distress  Appearance: He is well-developed  HENT:      Head: Normocephalic and atraumatic  Right Ear: External ear normal       Left Ear: External ear normal       Mouth/Throat:      Comments: Oropharynx with slight erythema  Eyes:      General: No scleral icterus  Right eye: No discharge  Left eye: No discharge  Extraocular Movements: Extraocular movements intact  Conjunctiva/sclera: Conjunctivae normal    Cardiovascular:      Rate and Rhythm: Normal rate and regular rhythm  Heart sounds: Normal heart sounds  No murmur heard  Pulmonary:      Effort: Pulmonary effort is normal       Breath sounds: Normal breath sounds  No wheezing or rales  Abdominal:      General: Bowel sounds are normal  There is no distension  Palpations: Abdomen is soft  Tenderness: There is no abdominal tenderness  There is no guarding or rebound  Musculoskeletal:         General: No deformity  Normal range of motion  Cervical back: Normal range of motion and neck supple  Skin:     General: Skin is warm and dry  Findings: No rash  Neurological:      General: No focal deficit present  Mental Status: He is alert and oriented to person, place, and time  Cranial Nerves: No cranial nerve deficit  Psychiatric:         Mood and Affect: Mood normal          Behavior: Behavior normal          Thought Content:  Thought content normal          Judgment: Judgment normal          Vital Signs  ED Triage Vitals   Temperature Pulse Respirations Blood Pressure SpO2   04/20/23 0843 04/20/23 0843 04/20/23 0843 04/20/23 0843 04/20/23 0843   (!) 96 7 °F (35 9 °C) (!) 115 20 139/87 92 %      Temp Source Heart Rate Source Patient Position - Orthostatic VS BP Location FiO2 (%)   04/20/23 0843 04/20/23 0843 04/20/23 0843 04/20/23 0843 --   Temporal Monitor Lying Right arm       Pain Score       04/20/23 0915       No Pain           Vitals:    04/20/23 0915 04/20/23 1014 04/20/23 1045 04/20/23 1100   BP: 134/76 133/72 128/75 130/79   Pulse: 105 105 96 98   Patient Position - Orthostatic VS:             Visual Acuity      ED Medications  Medications   heparin (VTE/PE) high (has no administration in time range)   sodium chloride 0 9 % bolus 1,000 mL (0 mL Intravenous Stopped 4/20/23 0949)   cefTRIAXone (ROCEPHIN) IVPB (premix in dextrose) 1,000 mg 50 mL (0 mg Intravenous Stopped 4/20/23 0951)   azithromycin (ZITHROMAX) 500 mg in sodium chloride 0 9 % 250 mL IVPB (500 mg Intravenous New Bag 4/20/23 1010)   iohexol (OMNIPAQUE) 350 MG/ML injection (SINGLE-DOSE) 85 mL (85 mL Intravenous Given 4/20/23 0952)       Diagnostic Studies  Results Reviewed     Procedure Component Value Units Date/Time    Comprehensive metabolic panel [[id number]]  (Abnormal) Collected: 04/20/23 0849    Lab Status: Final result Specimen: Blood from Arm, Right Updated: 04/20/23 6039     Sodium 129 mmol/L      Potassium 4 0 mmol/L      Chloride 93 mmol/L      CO2 29 mmol/L      ANION GAP 7 mmol/L      BUN 6 mg/dL      Creatinine 0 61 mg/dL      Glucose 150 mg/dL      Calcium 12 3 mg/dL      AST 34 U/L      ALT 38 U/L      Alkaline Phosphatase 109 U/L      Total Protein 7 2 g/dL      Albumin 3 9 g/dL      Total Bilirubin 0 55 mg/dL      eGFR 107 ml/min/1 73sq m     Narrative:      [place] guidelines for Chronic Kidney Disease (CKD):   •  Stage 1 with normal or high GFR (GFR > 90 mL/min/1 73 square meters)  •  Stage 2 Mild CKD (GFR = 60-89 mL/min/1 73 square meters)  •  Stage 3A Moderate CKD (GFR = 45-59 mL/min/1 73 square meters)  •  Stage 3B Moderate CKD (GFR = 30-44 mL/min/1 73 square meters)  •  Stage 4 Severe CKD (GFR = 15-29 mL/min/1 73 square meters)  •  Stage 5 End Stage CKD (GFR <15 mL/min/1 73 square meters)  Note: GFR calculation is accurate only with a steady state creatinine    Lipase [[id number]]  (Normal) Collected: 04/20/23 0849    Lab Status: Final result Specimen: Blood from Arm, Right Updated: 04/20/23 0937     Lipase 17 u/L     Lactic acid, plasma (w/reflex if result > 2 0) [[id number]]  (Normal) Collected: 04/20/23 0849    Lab Status: Final result Specimen: Blood from Arm, Right Updated: 04/20/23 0937     LACTIC ACID 1 5 mmol/L     Narrative:      Result may be elevated if tourniquet was used during collection  Protime-INR [[id number]]  (Abnormal) Collected: 04/20/23 0849    Lab Status: Final result Specimen: Blood from Arm, Right Updated: 04/20/23 0936     Protime 14 9 seconds      INR 1 16    APTT [[id number]]  (Normal) Collected: 04/20/23 0849    Lab Status: Final result Specimen: Blood from Arm, Right Updated: 04/20/23 0936     PTT 31 seconds     Blood culture #2 [[id number]] Collected: 04/20/23 0920    Lab Status: In process Specimen: Blood from Arm, Right Updated: 04/20/23 0924    Blood culture #1 [[id number]] Collected: 04/20/23 0921    Lab Status:  In process Specimen: Blood from Arm, Left Updated: 04/20/23 0924    Strep A PCR [[id number]]  (Normal) Collected: 04/20/23 0849    Lab Status: Final result Specimen: Throat Updated: 04/20/23 0920     STREP A PCR Not Detected    CBC and differential [[id number]]  (Abnormal) Collected: 04/20/23 0849    Lab Status: Final result Specimen: Blood from Arm, Right Updated: 04/20/23 0856     WBC 14 26 Thousand/uL      RBC 5 00 Million/uL      Hemoglobin 14 4 g/dL      Hematocrit 44 0 %      MCV 88 fL      MCH 28 8 pg      MCHC 32 7 g/dL      RDW 12 8 %      MPV 8 7 fL      Platelets 578 Thousands/uL      nRBC 0 /100 WBCs      Neutrophils Relative 79 %      Immat GRANS % 1 %      Lymphocytes Relative 7 %      Monocytes Relative 10 %      Eosinophils Relative 2 %      Basophils Relative 1 %      Neutrophils Absolute 11 40 Thousands/µL      Immature Grans Absolute 0 08 Thousand/uL      Lymphocytes Absolute 1 05 Thousands/µL      Monocytes Absolute 1 36 Thousand/µL      Eosinophils Absolute 0 30 Thousand/µL      Basophils Absolute 0 07 Thousands/µL                  CT soft tissue neck   Final Result by Blake Don MD (04/20 1048)      1  Extensive necrotic lymphadenopathy involving partially imaged mediastinum, bilateral hilum, and left greater than right bilateral cervical levels III and IV suspicious for malignancy  2   Retropharyngeal edema from C1-C6  3   Partially imaged right pleural effusion and groundglass opacities in the right lower lobe  Please refer to the report of concurrent CT chest/abdomen/pelvis for additional findings  The study was marked in EPIC for significant notification  Workstation performed: [id number]         XR chest 1 view portable   ED Interpretation by Sirena Atwood MD (04/20 0900)   Right lower lobe pneumonia      Final Result by Luis Bridges MD (04/20 9024)         1  Infiltrate at the right lung base along with a right pleural effusion  2   Right paratracheal fullness with narrowing of the airway  CT scan of the chest recommended  The study was marked in [facility] for immediate notification              Workstation performed: [id number]         CT chest abdomen pelvis wo contrast    (Results Pending)              Procedures  ECG 12 Lead Documentation Only    Date/Time: 4/20/2023 8:48 AM  Performed by: Sirena Atwood MD  Authorized by: Sirena Atwood MD     ECG reviewed by me, the ED Provider: yes    Patient location:  ED  Rate:     ECG rate: 110  Rhythm:     Rhythm: sinus rhythm    Ectopy:     Ectopy: none    QRS:     QRS axis:  Normal             ED Course  ED Course as of 04/20/23 1114   Thu Apr 20, 2023   0913 Patient in the bed dropped to 89% without any exertion  No home oxygen  No history of COPD    1058 Discussed with ENT on-call, Dr Kaylynn Santiago  States the patient can stay here  He can see the patient as consult  SBIRT 20yo+    Flowsheet Row Most Recent Value   Initial Alcohol Screen: US AUDIT-C     1  How often do you have a drink containing alcohol? 0 Filed at: 04/20/2023 0853   2  How many drinks containing alcohol do you have on a typical day you are drinking? 0 Filed at: 04/20/2023 0853   3a  Male UNDER 65: How often do you have five or more drinks on one occasion? 0 Filed at: 04/20/2023 0853   3b  FEMALE Any Age, or MALE 65+: How often do you have 4 or more drinks on one occassion? 0 Filed at: 04/20/2023 0853   Audit-C Score 0 Filed at: 04/20/2023 3543   MARIUSZ: How many times in the past year have you    Used an illegal drug or used a prescription medication for non-medical reasons? Never Filed at: 04/20/2023 4004                    Medical Decision Making  Amount and/or Complexity of Data Reviewed  Labs: ordered  Decision-making details documented in ED Course  Radiology: ordered and independent interpretation performed  Decision-making details documented in ED Course  ECG/medicine tests: ordered and independent interpretation performed  Decision-making details documented in ED Course  Risk  OTC drugs  Prescription drug management  Decision regarding hospitalization            Disposition  Final diagnoses:   Pneumonia of right lower lobe due to infectious organism   Hypoxia   Hyponatremia   Hypercalcemia   Lymphadenopathy - With retropharyngeal edema   Thrombosis of right subclavian vein ([facility] )     Time reflects when diagnosis was documented in both MDM as applicable and the Disposition within this note     Time User Action Codes Description Comment    4/20/2023  9:44 AM Viktoria Beaver Add [J18 9] Pneumonia of right lower lobe due to infectious organism     4/20/2023  9:45 AM Vivienne Gonzales Add [R09 02] Hypoxia     4/20/2023  9:45 AM Viktoria Beaver Add [E87 1] Hyponatremia     4/20/2023  9:45 AM Viktoria Beaver Add [E83 52] Hypercalcemia     4/20/2023 11:06 AM Viktoria Beaver Add [R59 1] Lymphadenopathy     4/20/2023 11:06 AM Viktoria Beaver Modify [R59 1] Lymphadenopathy With retropharyngeal edema    4/20/2023 11:14 AM Viktoria Beaver Add Uriel Vita  B11] Thrombosis of right subclavian vein [facility])       ED Disposition     ED Disposition   Admit    Condition   Stable    Date/Time   Thu Apr 20, 2023 11:05 AM    Comment   Case was discussed with Dr Mehreen Issa and the patient's admission status was agreed to be to the service of Dr Mehreen Issa  Follow-up Information    None         Patient's Medications    No medications on file       No discharge procedures on file      PDMP Review     None          ED Provider  Electronically Signed by           Maggie Vázquez MD  04/20/23 3495 [facility] Monalisa Magana MD  04/20/23 0848

## 2023-04-20 NOTE — ASSESSMENT & PLAN NOTE
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

## 2023-04-21 VITALS
BODY MASS INDEX: 28.14 KG/M2 | TEMPERATURE: 98.1 F | OXYGEN SATURATION: 92 % | HEIGHT: 73 IN | HEART RATE: 96 BPM | WEIGHT: 212.3 LBS | RESPIRATION RATE: 17 BRPM | SYSTOLIC BLOOD PRESSURE: 127 MMHG | DIASTOLIC BLOOD PRESSURE: 76 MMHG

## 2023-04-21 PROBLEM — D68.59 HYPERCOAGULOPATHY (HCC): Status: ACTIVE | Noted: 2023-01-01

## 2023-04-21 PROBLEM — R65.10 SIRS (SYSTEMIC INFLAMMATORY RESPONSE SYNDROME) (HCC): Status: ACTIVE | Noted: 2023-01-01

## 2023-04-21 LAB
ALBUMIN SERPL BCP-MCNC: 3.5 G/DL (ref 3.5–5)
ALP SERPL-CCNC: 92 U/L (ref 34–104)
ALT SERPL W P-5'-P-CCNC: 25 U/L (ref 7–52)
ANION GAP SERPL CALCULATED.3IONS-SCNC: 7 MMOL/L (ref 4–13)
APTT PPP: 49 SECONDS (ref 23–37)
APTT PPP: 66 SECONDS (ref 23–37)
AST SERPL W P-5'-P-CCNC: 22 U/L (ref 13–39)
BILIRUB SERPL-MCNC: 0.38 MG/DL (ref 0.2–1)
BUN SERPL-MCNC: 6 MG/DL (ref 5–25)
CALCIUM SERPL-MCNC: 10.1 MG/DL (ref 8.4–10.2)
CHLORIDE SERPL-SCNC: 101 MMOL/L (ref 96–108)
CO2 SERPL-SCNC: 24 MMOL/L (ref 21–32)
CREAT SERPL-MCNC: 0.49 MG/DL (ref 0.6–1.3)
ERYTHROCYTE [DISTWIDTH] IN BLOOD BY AUTOMATED COUNT: 13 % (ref 11.6–15.1)
GFR SERPL CREATININE-BSD FRML MDRD: 117 ML/MIN/1.73SQ M
GLUCOSE SERPL-MCNC: 123 MG/DL (ref 65–140)
GLUCOSE SERPL-MCNC: 126 MG/DL (ref 65–140)
GLUCOSE SERPL-MCNC: 149 MG/DL (ref 65–140)
HCT VFR BLD AUTO: 39.5 % (ref 36.5–49.3)
HGB BLD-MCNC: 13.1 G/DL (ref 12–17)
MCH RBC QN AUTO: 29 PG (ref 26.8–34.3)
MCHC RBC AUTO-ENTMCNC: 33.2 G/DL (ref 31.4–37.4)
MCV RBC AUTO: 88 FL (ref 82–98)
PLATELET # BLD AUTO: 365 THOUSANDS/UL (ref 149–390)
PMV BLD AUTO: 8.8 FL (ref 8.9–12.7)
POTASSIUM SERPL-SCNC: 4.1 MMOL/L (ref 3.5–5.3)
PROCALCITONIN SERPL-MCNC: 0.07 NG/ML
PROT SERPL-MCNC: 6.5 G/DL (ref 6.4–8.4)
PTH-INTACT SERPL-MCNC: 13.8 PG/ML (ref 18.4–80.1)
RBC # BLD AUTO: 4.51 MILLION/UL (ref 3.88–5.62)
SODIUM SERPL-SCNC: 132 MMOL/L (ref 135–147)
TSH SERPL DL<=0.05 MIU/L-ACNC: 1.51 UIU/ML (ref 0.45–4.5)
WBC # BLD AUTO: 15.3 THOUSAND/UL (ref 4.31–10.16)

## 2023-04-21 RX ADMIN — HEPARIN SODIUM 4000 UNITS: 1000 INJECTION INTRAVENOUS; SUBCUTANEOUS at 09:29

## 2023-04-21 RX ADMIN — NICOTINE 21 MG: 21 PATCH, EXTENDED RELEASE TRANSDERMAL at 08:21

## 2023-04-21 RX ADMIN — CEFTRIAXONE 1000 MG: 1 INJECTION, SOLUTION INTRAVENOUS at 09:30

## 2023-04-21 RX ADMIN — SODIUM CHLORIDE 100 ML/HR: 0.9 INJECTION, SOLUTION INTRAVENOUS at 09:31

## 2023-04-21 RX ADMIN — AZITHROMYCIN MONOHYDRATE 500 MG: 250 TABLET ORAL at 11:58

## 2023-04-21 RX ADMIN — APIXABAN 10 MG: 5 TABLET, FILM COATED ORAL at 11:58

## 2023-04-21 RX ADMIN — CALCITONIN SALMON 416 UNITS: 200 INJECTION, SOLUTION INTRAMUSCULAR; SUBCUTANEOUS at 08:21

## 2023-04-21 RX ADMIN — ALLOPURINOL 300 MG: 300 TABLET ORAL at 08:21

## 2023-04-21 RX ADMIN — HEPARIN SODIUM 15 UNITS/KG/HR: 10000 INJECTION, SOLUTION INTRAVENOUS at 04:07

## 2023-04-21 NOTE — ASSESSMENT & PLAN NOTE
· Noted To have subclavian vein thrombosis probably secondary to underlying malignancy      · Currently on heparin drip will need to transition to DOACs upon discharge

## 2023-04-21 NOTE — DISCHARGE SUMMARY
[address]  Discharge- Allie Hernandez 1960, 66 y o  male MRN: [id number]  Unit/Bed#: -01 Encounter: [id number]  Primary Care Provider: Shalom Villegas DO   Date and time admitted to hospital: 4/20/2023  8:37 AM    * Acute pneumonia  Assessment & Plan  · Patient noted on CT scan to have new diagnosis of lymphadenopathy and lung mass Right middle lobe 5 7 cm necrotic/cavitary mass with extensive mediastinal adenopathy narrowing the tracheal airway  No significant adenopathy is seen in the abdomen and pelvis the exception of some small periceliac nodes  Prominent right axillary node  · 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 more likely than lymphoma  · Discussion with pulmonology low suspicion for pneumonia  · Elevated WBC and HR likely due to mass effect  Patient is afebrile  · Discontinue antibiotics  · procal normal    · Blood cultures x 2 pending    SIRS (systemic inflammatory response syndrome) (HCC)  Assessment & Plan  · Discussion with pulmonology likely WBC and HR is from the mass and not infection driven  · Patient afebrile with normal procalcitonin   · Leukocytosis likely reactive     Neck mass  Assessment & Plan  · CT imaging with the following : Extensive necrotic lymphadenopathy involving partially imaged mediastinum, bilateral hilum, and left greater than right bilateral cervical levels III and IV suspicious for malignancy  Retropharyngeal edema from C1-C6  Partially imaged right pleural effusion and groundglass opacities in the right lower lobe  Please refer to the report of concurrent CT chest/abdomen/pelvis for additional findings  · ENT evaluated due to concern for tracheal narrowing, based on eval patient is stable from an oropharynx and nasal standpoint     · Pulmonology consult pending; previous provider further discussed case with IR to have outpatient biopsy of neck mass to be done at another campus as we do not have pathology onsite here    Hypercoagulopathy [facility])  Assessment & Plan  · Probable hypercoagulopathy due to likely underlying malignancy evident by thrombosis right subclavian vein   · Patient initiated on IV heparin now transitioned to eliquis 10 mg bid x 7days then 5 mg bid thereafter  · Out patient follow up with pulmonology, oncology/hematatology     Type 2 diabetes mellitus, without long-term current use of insulin [facility])  Assessment & Plan  No results found for: HGBA1C    Recent Labs     04/20/23  1343 04/20/23  1607 04/20/23  2051 04/21/23  0741   POCGLU 91 118 161* 126       Blood Sugar Average: Last 72 hrs:  (P) 124   · Home oral med held  · Continue SSI and blood glucose monitoring  · CC diet    Subclavian vein thrombosis (HCC)  Assessment & Plan  · Noted To have subclavian vein thrombosis probably secondary to underlying malignancy  · Currently on heparin drip will need to transition to DOACs upon discharge    Hyponatremia  Assessment & Plan  ·  secondary to lung mass suspect SIADH   · Urine labs reviewed  · Continue IVF await repeat am labs  · Bmp in am     Hypercalcemia  Assessment & Plan  · Probably has hypercalcemia secondary to malignancy  · Calcium of 12 3 on initial labs  ·  Will check PTH level  · Continue IV fluid hydration   · Labs pending this morning     Lung mass  Assessment & Plan  · As noted on CT chest right middle lobe 5 7 cm necrotic cavitary mass with extensive mediastinal adenopathy  · Also placed to pulmonology  · Will likely require biopsies to be done on an outpatient basis  · Counseled on smoking cessation      · Previous provider discussed with patient that he is high risk for malignancy based on CT findings        Medical Problems     Resolved Problems  Date Reviewed: 4/21/2023   None       Discharging Physician / Practitioner: GILLIAN Andre  PCP: Roman Uribe DO  Admission Date:   Admission Orders (From admission, onward)     Ordered        04/20/23 1103 INPATIENT ADMISSION  Once                      Discharge Date: 04/21/23    Consultations During Hospital Stay:  · ENT   · pulmonology    Procedures Performed:   · none    Significant Findings / Test Results:   · SIRS  · Acute pneumonia ruled out   · Lung mass; extensive necrotic lymphoadenopathy mediastinum, bilateral hilum, and left>right bilateral cervical levels III and IV suspicious for malignancy  · Neck Mass/Retropharyngeal edema from C1-C6  Evaluated by ENT oropharyngeal intact no concerns  Incidental Findings:   · Right Subclavian vein thrombosis extending to the SVC  Patient discharging on eliquis     · Hyponatremia  · Hypercalcemia   · I reviewed the above mentioned incidental findings with the patient and/or family and they expressed understanding  Test Results Pending at Discharge (will require follow up):   · PTH  · Blood cultures x 2      Outpatient Tests Requested:  · Cbc/bmp Wednesday  · IR biopsy  · PET scan     Complications:  none    Reason for Admission: Lung mass/SIRS neck mass need for ENT and pulmonology evaluation     Hospital Course:   Ciera Marino is a 66 y o  male patient who originally presented to the hospital on 4/20/2023 due to cough and URI symptoms for a few weeks  Patient noting blood in his sputum, swelling in the neck, mild SOB  Patient has hx of 1 pack/day smoking for over 30 years  Patient found to have on imaging with Lung mass with necrotic lymphoadenopathy, Neck mass with edema,and SVC thrombosis  Patient started on IV heparin, IV antibiotics after discussion with pulmonology  ENT consulted and evaluated on day of admission due to concern for cervical edema  ENT had no oropharyngeal concerns  Patient evaluated next day by pulmonology has low suspicion for pneumonia and HR/leukocytosis most likely due to lung mass/malignancy concern  IV heparin transitioned to eliquis, rx sent to pharmacy   Patient cleared by pulmonology with out patient follow up with their team to "schedule IR biopsy and PET SCAN, further recommended palliative and hematology/oncology out patient referrals that were also sent  Follow up labs Wednesday for electrolyte monitoring  Hyponatremia likely in setting of dehydration  Hypocalcemia resolved with IVF suspect in setting of malignancy  Patient cleared for discharge  Ambulated prior to discharge off oxygen with no desaturations  The patient, initially admitted to the hospital as inpatient, was discharged earlier than expected given the following: patient does not have pneumonia, symptoms likely being driven by malignancy with need for out patient work up   Please see above list of diagnoses and related plan for additional information  Condition at Discharge: stable    Discharge Day Visit / Exam:   Subjective:  Patient reports feeling better and understands he needs more of an out patient work up  Family at bedside discussed plan at length questions answered   Vitals: Blood Pressure: 127/76 (04/21/23 0721)  Pulse: 96 (04/21/23 0721)  Temperature: 98 1 °F (36 7 °C) (04/21/23 0721)  Temp Source: Temporal (04/20/23 1515)  Respirations: 17 (04/21/23 0721)  Height: 6' 1\" (185 4 cm) (04/20/23 1216)  Weight - Scale: 96 3 kg (212 lb 4 9 oz) (04/21/23 0555)  SpO2: 92 % (04/21/23 0721)  Exam:   Physical Exam  Vitals and nursing note reviewed  Constitutional:       General: He is not in acute distress  Appearance: He is well-developed  HENT:      Head: Normocephalic and atraumatic  Eyes:      Conjunctiva/sclera: Conjunctivae normal    Cardiovascular:      Rate and Rhythm: Normal rate and regular rhythm  Heart sounds: No murmur heard  Pulmonary:      Effort: Pulmonary effort is normal  No respiratory distress  Breath sounds: Normal breath sounds  Abdominal:      Palpations: Abdomen is soft  Tenderness: There is no abdominal tenderness  Musculoskeletal:         General: No swelling  Cervical back: Neck supple     Skin:     " General: Skin is warm and dry  Capillary Refill: Capillary refill takes less than 2 seconds  Neurological:      Mental Status: He is alert and oriented to person, place, and time  Psychiatric:         Mood and Affect: Mood normal           Discussion with Family: Updated  (wife and daughter) at bedside  Discharge instructions/Information to patient and family:   See after visit summary for information provided to patient and family  Provisions for Follow-Up Care:  See after visit summary for information related to follow-up care and any pertinent home health orders  Disposition:   Home    Planned Readmission: none     Discharge Statement:  I spent 40 minutes discharging the patient  This time was spent on the day of discharge  I had direct contact with the patient on the day of discharge  Greater than 50% of the total time was spent examining patient, answering all patient questions, arranging and discussing plan of care with patient as well as directly providing post-discharge instructions  Additional time then spent on discharge activities  Discharge Medications:  See after visit summary for reconciled discharge medications provided to patient and/or family        **Please Note: This note may have been constructed using a voice recognition system**

## 2023-04-21 NOTE — NURSING NOTE
Reviewed AVS with patient  Discussed s/s to monitor for, smoking cessation, new and current medications, follow-up visits, and provided education on blood thinners  Patient and spouse verbalized understanding  IV catheters removed and intact   Patient escorted out by PCA

## 2023-04-21 NOTE — CASE MANAGEMENT
Case Management Discharge Planning Note    Patient name Pinkeugene [place]  Location /-32 MRN [id number]  : 1960 Date 2023       Current Admission Date: 2023  Current Admission Diagnosis:Acute pneumonia   Patient Active Problem List    Diagnosis Date Noted   • Acute pneumonia 2023   • Lung mass 2023   • Neck mass 2023   • Hypercalcemia 2023   • Hyponatremia 2023   • Subclavian vein thrombosis ([facility] [address] ) 2023   • Type 2 diabetes mellitus, without long-term current use of insulin ([facility] [address] ) 2023      LOS (days): 1  Geometric Mean LOS (GMLOS) (days): 2 90  Days to GMLOS:1 9     OBJECTIVE:  Risk of Unplanned Readmission Score: 9 39         Current admission status: Inpatient   Preferred Pharmacy:   [address] [place] [address]  Phone: [phone] Fax: [phone]    Primary Care Provider: Jed Marshall DO    Primary Insurance: BLUE CROSS  Secondary Insurance:     DISCHARGE DETAILS:                 CM placing call to [facility], to inquire the cost of pts prescribed Eliquis, as per pharmacist, they do not have an active insurance card on file for pt and are unable to do price check at this time  CM giving pt two coupons, one for first month free Eliquis and one for $10/month Eliquis    Pt educating pt he must bring in his current insurance card to pharmacy , pt and family verbalize understanding

## 2023-04-21 NOTE — ASSESSMENT & PLAN NOTE
· Probable hypercoagulopathy due to likely underlying malignancy evident by thrombosis right subclavian vein   · Patient initiated on IV heparin now transitioned to eliquis 10 mg bid x 7days then 5 mg bid thereafter  · Out patient follow up with pulmonology, oncology/hematatology

## 2023-04-21 NOTE — PLAN OF CARE
Problem: Potential for Falls  Goal: Patient will remain free of falls  Description: INTERVENTIONS:  - Educate patient/family on patient safety including physical limitations  - Instruct patient to call for assistance with activity   - Consult OT/PT to assist with strengthening/mobility   - Keep Call bell within reach  - Keep bed low and locked with side rails adjusted as appropriate  - Keep care items and personal belongings within reach  - Initiate and maintain comfort rounds  - Make Fall Risk Sign visible to staff  - Offer Toileting every 2 Hours, in advance of need  - Initiate/Maintain bed alarm  - Obtain necessary fall risk management equipment  - Apply yellow socks and bracelet for high fall risk patients  - Consider moving patient to room near nurses station  Outcome: Progressing     Problem: PAIN - ADULT  Goal: Verbalizes/displays adequate comfort level or baseline comfort level  Description: Interventions:  - Encourage patient to monitor pain and request assistance  - Assess pain using appropriate pain scale  - Administer analgesics based on type and severity of pain and evaluate response  - Implement non-pharmacological measures as appropriate and evaluate response  - Consider cultural and social influences on pain and pain management  - Notify physician/advanced practitioner if interventions unsuccessful or patient reports new pain  Outcome: Progressing     Problem: INFECTION - ADULT  Goal: Absence or prevention of progression during hospitalization  Description: INTERVENTIONS:  - Assess and monitor for signs and symptoms of infection  - Monitor lab/diagnostic results  - Monitor all insertion sites, i e  indwelling lines, tubes, and drains  - Monitor endotracheal if appropriate and nasal secretions for changes in amount and color  - [place] appropriate cooling/warming therapies per order  - Administer medications as ordered  - Instruct and encourage patient and family to use good hand hygiene technique  - Identify and instruct in appropriate isolation precautions for identified infection/condition  Outcome: Progressing  Goal: Absence of fever/infection during neutropenic period  Description: INTERVENTIONS:  - Monitor WBC    Outcome: Progressing     Problem: SAFETY ADULT  Goal: Patient will remain free of falls  Description: INTERVENTIONS:  - Educate patient/family on patient safety including physical limitations  - Instruct patient to call for assistance with activity   - Consult OT/PT to assist with strengthening/mobility   - Keep Call bell within reach  - Keep bed low and locked with side rails adjusted as appropriate  - Keep care items and personal belongings within reach  - Initiate and maintain comfort rounds  - Make Fall Risk Sign visible to staff  - Offer Toileting every 2 Hours, in advance of need  - Initiate/Maintain bed alarm  - Obtain necessary fall risk management equipment  - Apply yellow socks and bracelet for high fall risk patients  - Consider moving patient to room near nurses station  Outcome: Progressing  Goal: Maintain or return to baseline ADL function  Description: INTERVENTIONS:  -  Assess patient's ability to carry out ADLs; assess patient's baseline for ADL function and identify physical deficits which impact ability to perform ADLs (bathing, care of mouth/teeth, toileting, grooming, dressing, etc )  - Assess/evaluate cause of self-care deficits   - Assess range of motion  - Assess patient's mobility; develop plan if impaired  - Assess patient's need for assistive devices and provide as appropriate  - Encourage maximum independence but intervene and supervise when necessary  - Involve family in performance of ADLs  - Assess for home care needs following discharge   - Consider OT consult to assist with ADL evaluation and planning for discharge  - Provide patient education as appropriate  Outcome: Progressing  Goal: Maintains/Returns to pre admission functional level  Description: INTERVENTIONS:  - Perform BMAT or MOVE assessment daily    - Set and communicate daily mobility goal to care team and patient/family/caregiver  - Collaborate with rehabilitation services on mobility goals if consulted  - Perform Range of Motion 3 times a day  - Reposition patient every 2 hours  - Dangle patient 3 times a day  - Stand patient 3 times a day  - Ambulate patient 3 times a day  - Out of bed to chair 3 times a day   - Out of bed for meals 3 times a day  - Out of bed for toileting  - Record patient progress and toleration of activity level   Outcome: Progressing     Problem: DISCHARGE PLANNING  Goal: Discharge to home or other facility with appropriate resources  Description: INTERVENTIONS:  - Identify barriers to discharge w/patient and caregiver  - Arrange for needed discharge resources and transportation as appropriate  - Identify discharge learning needs (meds, wound care, etc )  - Arrange for interpretive services to assist at discharge as needed  - Refer to Case Management Department for coordinating discharge planning if the patient needs post-hospital services based on physician/advanced practitioner order or complex needs related to functional status, cognitive ability, or social support system  Outcome: Progressing     Problem: Knowledge Deficit  Goal: Patient/family/caregiver demonstrates understanding of disease process, treatment plan, medications, and discharge instructions  Description: Complete learning assessment and assess knowledge base    Interventions:  - Provide teaching at level of understanding  - Provide teaching via preferred learning methods  Outcome: Progressing

## 2023-04-21 NOTE — ASSESSMENT & PLAN NOTE
· Probably has hypercalcemia secondary to malignancy  · Calcium of 12 3 on initial labs  ·  Will check PTH level      · Continue IV fluid hydration   · Labs pending this morning

## 2023-04-21 NOTE — ASSESSMENT & PLAN NOTE
· As noted on CT chest right middle lobe 5 7 cm necrotic cavitary mass with extensive mediastinal adenopathy  · Also placed to pulmonology  · Will likely require biopsies to be done on an outpatient basis  · Counseled on smoking cessation      · Previous provider discussed with patient that he is high risk for malignancy based on CT findings

## 2023-04-21 NOTE — ASSESSMENT & PLAN NOTE
· Discussion with pulmonology likely WBC and HR is from the mass and not infection driven  · Patient afebrile with normal procalcitonin   · Leukocytosis likely reactive

## 2023-04-21 NOTE — ASSESSMENT & PLAN NOTE
No results found for: HGBA1C    Recent Labs     04/20/23  1343 04/20/23  1607 04/20/23  2051 04/21/23  0741   POCGLU 91 118 161* 126       Blood Sugar Average: Last 72 hrs:  (P) 124   · Home oral med held  · Continue SSI and blood glucose monitoring  · CC diet

## 2023-04-21 NOTE — INCIDENTAL FINDINGS
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 more likely than lymphoma    Edema the right axilla related to 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  There is compression of the SVC  No obvious filling defect in the pulmonary arterial system although this is poorly evaluated delayed phase examination  The patient would be at risk for pulmonary embolism  1  Extensive necrotic lymphadenopathy involving partially imaged mediastinum, bilateral hilum, and left greater than right bilateral cervical levels III and IV suspicious for malignancy  2  Retropharyngeal edema from C1-C6  3  Partially imaged right pleural effusion and groundglass opacities in the right lower lobe  Please refer to the report of concurrent CT chest/abdomen/pelvis for additional findings       Follow up required: yes   Follow up should be done within 1-2 week(s)    Please notify the following clinician to assist with the follow up:   Dr Jason Badillo

## 2023-04-21 NOTE — UTILIZATION REVIEW
Initial Clinical Review    Admission: Date/Time/Statement:   Admission Orders (From admission, onward)     Ordered        04/20/23 1105  INPATIENT ADMISSION  Once                      Orders Placed This Encounter   Procedures   • INPATIENT ADMISSION     Standing Status:   Standing     Number of Occurrences:   1     Order Specific Question:   Level of Care     Answer:   Med Surg [16]     Order Specific Question:   Estimated length of stay     Answer:   More than 2 Midnights     Order Specific Question:   Certification     Answer:   I certify that inpatient services are medically necessary for this patient for a duration of greater than two midnights  See H&P and MD Progress Notes for additional information about the patient's course of treatment  ED Arrival Information     Expected   -    Arrival   4/20/2023 08:36    Acuity   Emergent            Means of arrival   Walk-In    Escorted by   Spouse    Service   Hospitalist    Admission type   Emergency            Arrival complaint   Sob,  Swollen throat            Chief Complaint   Patient presents with   • Shortness of Breath     Patient c/o sob, cough and swollen neck  Initial Presentation: 66 y o  male to ED presents with worsening cough and URI symptoms for last few wks  Also noted some swelling in his neck and mild shortness of breath  Pt states he also had a little blood in his sputum at times  Smokes 1 pack/day for over 30 years  PMH for T2DM, Subclavian vein thrombosis  Admit Inpatient level of are for Acute pneumonia, Hypercalcemia, Hyponatremia, Lung mass and Neck mass  New diagnosis of lymphadenopathy and lung mass  Ca of 12 3, check PTH level  IVFs  Check calcitonin and recheck BMP tomorrow 4/21  Na 129  Pulmonology and ENT consult     Right middle lobe 5 7 cm necrotic/cavitary mass with extensive mediastinal adenopathy narrowing the tracheal airway   No significant adenopathy is seen in the abdomen and pelvis the exception of some small periceliac nodes   Prominent right axillary node  4/20  Per ED Tech; Pt removed from ox currently stating 93% [id number] later  pt dropped to 88-89 w/ good pleft on rm air physican notified pt was not ambulated and placed back on 2l o2 nasal can  ENT cons; 5 7 cm necrotic mass with extensive mediastinal adenopathy  The lung masses in the right middle lobe  There is also reported to be narrowing of the tracheal airway  #2 metastatic lymph node enlargement in the axilla  Feel that his primary diagnosis 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 secondary to longstanding history of smoking  Will leave up to pulmonary consultation to make further arrangements for biopsy of the mass in his lung and mediastinum  Based on my evaluation today think he is stable from the standpoint of oropharynx and nose      Date: 4/21   Day 2:   Pulmonology cons; 30 pack-year tobacco history, shortness of breath and found to have extensive mediastinal and cervical adenopathy  Overall constellation of radiographic findings is very concerning for primary malignancy (likely lung)  Right middle lobe 5 7 cm necrotic/cavitary mass with extensive mediastinal adenopathy  Extensive necrotic lymphadenopathy involving mediastinum, bilateral hilum and right bilateral cervical  Pharyngeal edema C1-C6  Right pleural effusion  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  Recommend IR biopsy of one of the cervical lymph nodes which will hopefully provide diagnosis and staging of what is presumably advanced cancer  If cervical lymph node biopsy is nondiagnostic then could proceed with percutaneous biopsy of the right mass  Lymph nodes are also amenable to EBUS if needed but would recommend less invasive approach first  Would likely need outpatient PET scan  Outpt f/u  Progress notes; Hypercoagulopathy   Probable hypercoagulopathy due to likely underlying malignancy evident by thrombosis right subclavian vein  Low suspicion for pneumonia  D/c antibiotics  Bld cultures pending  ED Triage Vitals   Temperature Pulse Respirations Blood Pressure SpO2   04/20/23 0843 04/20/23 0843 04/20/23 0843 04/20/23 0843 04/20/23 0843   (!) 96 7 °F (35 9 °C) (!) 115 20 139/87 92 %      Temp Source Heart Rate Source Patient Position - Orthostatic VS BP Location FiO2 (%)   04/20/23 0843 04/20/23 0843 04/20/23 0843 04/20/23 0843 --   Temporal Monitor Lying Right arm       Pain Score       04/20/23 0915       No Pain          Wt Readings from Last 1 Encounters:   04/21/23 96 3 kg (212 lb 4 9 oz)     Additional Vital Signs:   04/21/23 07:21:01 98 1 °F (36 7 °C) 96 17 127/76 93 92 % -- -- -- --   04/20/23 2230 -- -- -- -- -- 91 % 28 2 L/min Nasal cannula --   04/20/23 22:17:13 98 8 °F (37 1 °C) 99 17 134/77 96 89 %   Abnormal  -- -- -- --   04/20/23 2015 -- -- -- -- -- 91 % 28 2 L/min Nasal cannula --   04/20/23 15:15:16 98 8 °F (37 1 °C) 105 18 136/74 95 91 % 28 2 L/min None (Room air) --   04/20/23 15:00:05 98 2 °F (36 8 °C) 108   Abnormal  18 134/73 93 92 % -- -- -- --   04/20/23 12:16:05 98 8 °F (37 1 °C) 103 18 133/76 95 94 % -- -- -- --   04/20/23 1130 -- 101 22 117/67 85 95 % 28 2 L/min Nasal cannula      Pertinent Labs/Diagnostic Test Results:   CT chest abdomen pelvis wo contrast   Final Result by Alberto Lewis MD (04/20 1115)      Right middle lobe 5 7 cm necrotic/cavitary mass with extensive mediastinal adenopathy narrowing the tracheal airway  No significant adenopathy is seen in the abdomen and pelvis the exception of some small periceliac nodes  Prominent right axillary    node  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 more likely than lymphoma  Edema the right axilla related to 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  There is compression of the SVC    No obvious filling defect in the pulmonary arterial system although this is poorly evaluated delayed phase examination  The    patient would be at risk for pulmonary embolism      This was discussed with Dr Sergio Barrera at 11:10 AM            Workstation performed: [id number]         CT soft tissue neck   Final Result by Susu Yee MD (04/20 1048)      1  Extensive necrotic lymphadenopathy involving partially imaged mediastinum, bilateral hilum, and left greater than right bilateral cervical levels III and IV suspicious for malignancy  2   Retropharyngeal edema from C1-C6  3   Partially imaged right pleural effusion and groundglass opacities in the right lower lobe  Please refer to the report of concurrent CT chest/abdomen/pelvis for additional findings  The study was marked in EPIC for significant notification  Workstation performed: [id number]         XR chest 1 view portable   ED Interpretation by Kecia Betts MD (04/20 0900)   Right lower lobe pneumonia      Final Result by Missy Aj MD (04/20 0078)         1  Infiltrate at the right lung base along with a right pleural effusion  2   Right paratracheal fullness with narrowing of the airway  CT scan of the chest recommended  The study was marked in [facility] for immediate notification              Workstation performed: [id number]               Results from last 7 days   Lab Units 04/21/23  0900 04/20/23  0849   WBC Thousand/uL 15 30* 14 26*   HEMOGLOBIN g/dL 13 1 14 4   HEMATOCRIT % 39 5 44 0   PLATELETS Thousands/uL 365 374   NEUTROS ABS Thousands/µL  --  11 40*         Results from last 7 days   Lab Units 04/21/23  0823 04/20/23  0849   SODIUM mmol/L 132* 129*   POTASSIUM mmol/L 4 1 4 0   CHLORIDE mmol/L 101 93*   CO2 mmol/L 24 29   ANION GAP mmol/L 7 7   BUN mg/dL 6 6   CREATININE mg/dL 0 49* 0 61   EGFR ml/min/1 73sq m 117 107   CALCIUM mg/dL 10 1 12 3*     Results from last 7 days   Lab Units 04/21/23  0823 04/20/23  0849   AST U/L 22 34   ALT U/L 25 38   ALK PHOS U/L 92 109*   TOTAL PROTEIN g/dL 6 5 7 2   ALBUMIN g/dL 3 5 3 9   TOTAL BILIRUBIN mg/dL 0 38 0 55     Results from last 7 days   Lab Units 04/21/23  1149 04/21/23  0741 04/20/23  2051 04/20/23  1607 04/20/23  1343   POC GLUCOSE mg/dl 149* 126 161* 118 91     Results from last 7 days   Lab Units 04/21/23  0823 04/20/23  0849   GLUCOSE RANDOM mg/dL 123 150*     Results from last 7 days   Lab Units 04/20/23  1441   OSMOLALITY, SERUM mmol/*       Results from last 7 days   Lab Units 04/21/23  0823 04/21/23  0122 04/20/23  1724 04/20/23  0849   PROTIME seconds  --   --   --  14 9*   INR   --   --   --  1 16   PTT seconds 49* 66* 119* 31     Results from last 7 days   Lab Units 04/21/23  0823   TSH 3RD GENERATON uIU/mL 1 506     Results from last 7 days   Lab Units 04/21/23  0900   PROCALCITONIN ng/ml 0 07     Results from last 7 days   Lab Units 04/20/23  0849   LACTIC ACID mmol/L 1 5       Results from last 7 days   Lab Units 04/20/23  0849   LIPASE u/L 17             Results from last 7 days   Lab Units 04/20/23  1459 04/20/23  1441   OSMOLALITY, SERUM mmol/KG  --  280*   OSMO UR mmol/*  --      Results from last 7 days   Lab Units 04/20/23  1459   SODIUM UR  47       Results from last 7 days   Lab Units 04/20/23  0921 04/20/23  0920   BLOOD CULTURE  Received in Microbiology Lab  Culture in Progress  Received in Microbiology Lab  Culture in Progress         ED Treatment:   Medication Administration from 04/20/2023 0836 to 04/20/2023 1213       Date/Time Order Dose Route Action     04/20/2023 0849 EDT sodium chloride 0 9 % bolus 1,000 mL 1,000 mL Intravenous New Bag     04/20/2023 0921 EDT cefTRIAXone (ROCEPHIN) IVPB (premix in dextrose) 1,000 mg 50 mL 1,000 mg Intravenous New Bag     04/20/2023 1010 EDT azithromycin (ZITHROMAX) 500 mg in sodium chloride 0 9 % 250 mL IVPB 500 mg Intravenous New Bag     04/20/2023 0952 EDT iohexol (OMNIPAQUE) 350 MG/ML injection (SINGLE-DOSE) 85 mL 85 mL Intravenous Given     04/20/2023 1128 EDT heparin (porcine) injection 8,000 Units 8,000 Units Intravenous Given     04/20/2023 1128 EDT heparin (porcine) 25,000 units in 0 45% NaCl 250 mL infusion (premix) 18 Units/kg/hr Intravenous New Bag        Past Medical History:   Diagnosis Date   • Diabetes mellitus ([facility] [address] )      Present on Admission:  **None**      Admitting Diagnosis: Hypercalcemia [E83 52]  Hyponatremia [E87 1]  Lymphadenopathy [R59 1]  SOB (shortness of breath) [R06 02]  Hypoxia [R09 02]  Thrombosis of right subclavian vein (HCC) [F50  B11]  Pneumonia of right lower lobe due to infectious organism [J18 9]  Age/Sex: 66 y o  male     Admission Orders:  Scheduled Medications:  allopurinol, 300 mg, Oral, Daily  apixaban, 10 mg, Oral, BID  azithromycin, 500 mg, Oral, Q24H  cefTRIAXone, 1,000 mg, Intravenous, Q24H  insulin lispro, 2-12 Units, Subcutaneous, TID AC  nicotine, 21 mg, Transdermal, Daily  pravastatin, 20 mg, Oral, Daily With Dinner      Continuous IV Infusions:  sodium chloride, 100 mL/hr, Intravenous, Continuous    heparin (porcine) 25,000 units in 0 45% NaCl 250 mL infusion (premix)  Rate: 3-30 mL/hr Dose: 3-30 Units/kg/hr  Weight Dosing Info: 100 kg (Order-Specific)  Freq: Titrated Route: IV  Last Dose: 17 Units/kg/hr (04/21/23 0925)  Start: 04/20/23 1245 End: 04/21/23 1148      PRN Meds:  acetaminophen, 650 mg, Oral, Q6H PRN  ondansetron, 4 mg, Intravenous, Q6H PRN      Bld and culture x2  IP CONSULT TO PULMONOLOGY  IP CONSULT TO ENT    Network Utilization Review Department  ATTENTION: Please call with any questions or concerns to [phone] and carefully listen to the prompts so that you are directed to the right person  All voicemails are confidential   [place] all requests for admission clinical reviews, approved or denied determinations and any other requests to dedicated fax number below belonging to the campus where the patient is receiving treatment   List of dedicated fax numbers for the Facilities:  [facility] ADMISSION DENIALS (Administrative/Medical Necessity) [phone]   [address] (Maternity/NICU/Pediatrics) Martha Lowe [address] Day[place] Rubio  [phone]   [address] [place] [id number] [place] [address] [address] [facility] [place] [address] 5 [facility] [phone]

## 2023-04-21 NOTE — ASSESSMENT & PLAN NOTE
·  secondary to lung mass suspect SIADH   · Urine labs reviewed  · Continue IVF await repeat am labs  · Bmp in am

## 2023-04-21 NOTE — ASSESSMENT & PLAN NOTE
· CT imaging with the following : Extensive necrotic lymphadenopathy involving partially imaged mediastinum, bilateral hilum, and left greater than right bilateral cervical levels III and IV suspicious for malignancy  Retropharyngeal edema from C1-C6  Partially imaged right pleural effusion and groundglass opacities in the right lower lobe  Please refer to the report of concurrent CT chest/abdomen/pelvis for additional findings  · ENT evaluated due to concern for tracheal narrowing, based on eval patient is stable from an oropharynx and nasal standpoint     · Pulmonology consult pending; previous provider further discussed case with IR to have outpatient biopsy of neck mass to be done at another campus as we do not have pathology onsite here

## 2023-04-21 NOTE — CONSULTS
Consultation - Pulmonary Medicine   Gordyergiovanny Artist 66 y o  male MRN: [id number]  Unit/Bed#: -01 Encounter: [id number]      Physician Requesting Consult: Dr Dave Duvall  Reason for Consult: Lung mass, extensive lymphadenopathy involving neck and mediastinum    Assessment/Plan:    Patient is a 67 y/o male with a 30 pack-year tobacco history who presents due to shortness of breath and found to have extensive mediastinal and cervical adenopathy  Overall constellation of radiographic findings is very concerning for primary malignancy (likely lung)  Differenital diagnosis includes head and neck cancer, less likely infection, less likely inflammatory lung disease such as sarcoidosis or other autoimmune process    Problems       1  Right middle lobe 5 7 cm necrotic/cavitary mass with extensive mediastinal adenopathy  2  Extensive necrotic lymphadenopathy involving mediastinum, bilateral hilum and right bilateral cervical  3  Pharyngeal edema C1-C6  4  Right pleural effusion  5  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      Recommendations     1  Okay to discharge to home from pulmonary standpoint  Will need close outpatient follow-up  2  Would recommend IR biopsy of one of the cervical lymph nodes which will hopefully provide diagnosis and staging of what is presumably advanced cancer  If cervical lymph node biopsy is nondiagnostic then could proceed with percutaneous biopsy of the right mass  Lymph nodes are also amenable to EBUS if needed but would recommend less invasive approach first    3  Would likely need outpatient PET scan  Will need hematology oncology consultation  4  May benefit from palliative care consultation sooner rather than later as an outpatient  5  We will need heparin changed to NOAC for treatment of subclavian thrombosis  6  Follow-up with primary care physician, pulmonary, hematology oncology and possibly infectious disease depending on diagnosis  7   Please reach out if there are further concerns or questions    ____________________________________________________________    HPI:    Elian Desouza is a 66 y o  male with who presented to [address] on 4/20/2023 due to cough and upper respiratory symptoms for the past several weeks  Ports a scant hemoptysis  Also reported some neck swelling and shortness of breath that prompted him to come to the emergency room  Has a 30-pack-year tobacco history  Denies any fevers chills sweats change in appetite or weight loss  The emergency room vital signs remarkable for temperature 96 7 satting 92% on room air  Otherwise he was hemodynamically stable  Labs were remarkable for sodium 129, chloride 93, calcium 12 3, alkaline phosphatase 109  CBC showed leukocytosis with WBC of 14 26    He underwent imaging to include CT of the neck and chest that showed extensive necrotic adenopathy involving the mediastinum, bilateral hilum and left greater than right bilateral cervical levels 3 through 4, retropharyngeal edema from C6 1 to C6, 5 7 cm necrotic/cavitary mass, right-sided pleural effusion  [facility] was consulted for possible biopsy of one of the cervical lymph nodes and/or the right-sided lung mass  However they feel that the procedure should be performed at a alternate campus  Patient is due to be transferred  Patient was seen this morning  Overall feeling okay  His cough is somewhat improved  States that he felt he had an upper respiratory infection  He was not experiencing any fevers, chills or sweats  Denied any changes in his appetite or his weight  He has not had any recent travel history  He has a pet dog and cat but has not had any wild animal exposure  He works as a  and has some exposure to inhalants/dusts/chemicals on his job but otherwise no significant occupational or hobby exposures  Has had some specks of blood in his sputum but no sarah hemoptysis    Denies any systemic symptoms such as fevers, arthralgias (other than chronic neck pain from arthritis) myalgias or skin changes  PFT results:  None on file     Review of Systems:    Full 12 point review of systems was performed  Aside from what was mentioned in the HPI, it is otherwise negative  Historical Information   Past Medical History:   Diagnosis Date   • Diabetes mellitus ([facility] [address] )      Past Surgical History:   Procedure Laterality Date   • TONSILLECTOMY       Social History   Social History     Substance and Sexual Activity   Alcohol Use Yes     Social History     Tobacco Use   Smoking Status Every Day   • Packs/day: 1 00   • Types: Cigarettes   Smokeless Tobacco Never       Occupational history:      Family History:   History reviewed  No pertinent family history  Medications: The patient's active and prehospital medications were reviewed    Current Facility-Administered Medications   Medication Dose Route Frequency Provider Last Rate   • acetaminophen  650 mg Oral Q6H PRN Mónica Thrasher MD     • allopurinol  300 mg Oral Daily Mónica Thrasher MD     • azithromycin  500 mg Oral Q24H Mónica Thrasher MD     • cefTRIAXone  1,000 mg Intravenous Q24H Mónica Thrasher MD     • heparin (porcine)  3-30 Units/kg/hr (Order-Specific) Intravenous Titrated Mónica Thrasher MD 15 Units/kg/hr (04/21/23 0407)   • heparin (porcine)  4,000 Units Intravenous Q6H PRN Mónica Thrasher MD     • heparin (porcine)  8,000 Units Intravenous Q6H PRN Mónica Thrasher MD     • insulin lispro  2-12 Units Subcutaneous TID AC Mónica Thrasher MD     • nicotine  21 mg Transdermal Daily Mónica Thrasher MD     • ondansetron  4 mg Intravenous Q6H PRN Mónica Thrasher MD     • pravastatin  20 mg Oral Daily With Lynette Banks MD     • sodium chloride  100 mL/hr Intravenous Continuous Mónica Thrasher  mL/hr (04/20/23 2256)         PhysicalExamination:  Vitals:   Vitals:    04/20/23 2217 04/20/23 2230 04/21/23 0555 04/21/23 0721   BP: 134/77   127/76   BP Location:       Pulse: 99 "96   Resp: 17   17   Temp: 98 8 °F (37 1 °C)   98 1 °F (36 7 °C)   TempSrc:       SpO2: (!) 89% 91%  92%   Weight:   96 3 kg (212 lb 4 9 oz)    Height:         Body mass index is 28 01 kg/m²  Temp  Min: 96 7 °F (35 9 °C)  Max: 98 8 °F (37 1 °C)  IBW (Ideal Body Weight): 79 9 kg    SpO2: 92 %,   SpO2 Activity: At Rest,   O2 Device: Nasal cannula    /76   Pulse 96   Temp 98 1 °F (36 7 °C)   Resp 17   Ht 6' 1\" (1 854 m)   Wt 96 3 kg (212 lb 4 9 oz)   SpO2 92%   BMI 28 01 kg/m²     General Appearance:    Alert, cooperative, no distress, appears stated age   Head:    Normocephalic, without obvious abnormality, atraumatic   Eyes:    PERRL, conjunctiva/corneas clear, both eyes        Ears:    Normal  external ear canals, both ears   Nose:   Nares normal, septum midline, mucosa normal,   Throat:   Lips, mucosa, and tongue normal; teeth and gums normal   Neck:   Supple, symmetrical, trachea midline, no adenopathy;             Lungs:     Clear to auscultation bilaterally, respirations unlabored       Heart:    Regular rate and rhythm, S1 and S2 normal, no murmur, rub    or gallop   Abdomen:     Soft, non-tender, bowel sounds active all four quadrants,     no masses,            Extremities:   Extremities normal, atraumatic, no cyanosis or edema   Pulses:   2+ and symmetric all extremities   Skin:   Skin color, no rashes or lesions   Lymph nodes:   Cervical, submental,  supraclavicular, nodes normal   Neurologic:   CNII-XII  grossly intact           Diagnostic Data:  CBC:   Results from last 7 days   Lab Units 04/21/23  0900 04/20/23  0849   WBC Thousand/uL 15 30* 14 26*   HEMOGLOBIN g/dL 13 1 14 4   HEMATOCRIT % 39 5 44 0   PLATELETS Thousands/uL 365 374       CMP:   Results from last 7 days   Lab Units 04/20/23  0849   SODIUM mmol/L 129*   POTASSIUM mmol/L 4 0   CHLORIDE mmol/L 93*   CO2 mmol/L 29   BUN mg/dL 6   CREATININE mg/dL 0 61   CALCIUM mg/dL 12 3*   ALK PHOS U/L 109*   ALT U/L 38   AST U/L 34 " Microbiology:  Results from last 7 days   Lab Units 04/20/23  0921 04/20/23  0920   BLOOD CULTURE  Received in Microbiology Lab  Culture in Progress  Received in Microbiology Lab  Culture in Progress  ABG: No results found for: PHART, HTA3KBR, PO2ART, FUA1VYI, F2PQCCYK, BEART, SOURCE    Imaging: Personally reviewed the images and reports    CT soft tissue neck 4/20/2023    IMPRESSION:     1  Extensive necrotic lymphadenopathy involving partially imaged mediastinum, bilateral hilum, and left greater than right bilateral cervical levels III and IV suspicious for malignancy      2   Retropharyngeal edema from C1-C6      3   Partially imaged right pleural effusion and groundglass opacities in the right lower lobe  Please refer to the report of concurrent CT chest/abdomen/pelvis for additional findings        CT chest 4/20/2023  IMPRESSION:     Right middle lobe 5 7 cm necrotic/cavitary mass with extensive mediastinal adenopathy narrowing the tracheal airway  No significant adenopathy is seen in the abdomen and pelvis the exception of some small periceliac nodes  Prominent right axillary   node      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

## 2023-04-21 NOTE — ASSESSMENT & PLAN NOTE
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

## 2023-04-21 NOTE — PLAN OF CARE
Problem: Potential for Falls  Goal: Patient will remain free of falls  Description: INTERVENTIONS:  - Educate patient/family on patient safety including physical limitations  - Instruct patient to call for assistance with activity   - Consult OT/PT to assist with strengthening/mobility   - Keep Call bell within reach  - Keep bed low and locked with side rails adjusted as appropriate  - Keep care items and personal belongings within reach  - Initiate and maintain comfort rounds  - Make Fall Risk Sign visible to staff  - Apply yellow socks and bracelet for high fall risk patients  - Consider moving patient to room near nurses station  Outcome: Progressing     Problem: PAIN - ADULT  Goal: Verbalizes/displays adequate comfort level or baseline comfort level  Description: Interventions:  - Encourage patient to monitor pain and request assistance  - Assess pain using appropriate pain scale  - Administer analgesics based on type and severity of pain and evaluate response  - Implement non-pharmacological measures as appropriate and evaluate response  - Consider cultural and social influences on pain and pain management  - Notify physician/advanced practitioner if interventions unsuccessful or patient reports new pain  Outcome: Progressing     Problem: INFECTION - ADULT  Goal: Absence or prevention of progression during hospitalization  Description: INTERVENTIONS:  - Assess and monitor for signs and symptoms of infection  - Monitor lab/diagnostic results  - Monitor all insertion sites, i e  indwelling lines, tubes, and drains  - Monitor endotracheal if appropriate and nasal secretions for changes in amount and color  - [place] appropriate cooling/warming therapies per order  - Administer medications as ordered  - Instruct and encourage patient and family to use good hand hygiene technique  - Identify and instruct in appropriate isolation precautions for identified infection/condition  Outcome: Progressing  Goal: Absence of fever/infection during neutropenic period  Description: INTERVENTIONS:  - Monitor WBC    Outcome: Progressing     Problem: SAFETY ADULT  Goal: Patient will remain free of falls  Description: INTERVENTIONS:  - Educate patient/family on patient safety including physical limitations  - Instruct patient to call for assistance with activity   - Consult OT/PT to assist with strengthening/mobility   - Keep Call bell within reach  - Keep bed low and locked with side rails adjusted as appropriate  - Keep care items and personal belongings within reach  - Initiate and maintain comfort rounds  - Make Fall Risk Sign visible to staff  - Obtain necessary fall risk management equipment  - Apply yellow socks and bracelet for high fall risk patients  - Consider moving patient to room near nurses station  Outcome: Progressing  Goal: Maintain or return to baseline ADL function  Description: INTERVENTIONS:  -  Assess patient's ability to carry out ADLs; assess patient's baseline for ADL function and identify physical deficits which impact ability to perform ADLs (bathing, care of mouth/teeth, toileting, grooming, dressing, etc )  - Assess/evaluate cause of self-care deficits   - Assess range of motion  - Assess patient's mobility; develop plan if impaired  - Assess patient's need for assistive devices and provide as appropriate  - Encourage maximum independence but intervene and supervise when necessary  - Involve family in performance of ADLs  - Assess for home care needs following discharge   - Consider OT consult to assist with ADL evaluation and planning for discharge  - Provide patient education as appropriate  Outcome: Progressing  Goal: Maintains/Returns to pre admission functional level  Description: INTERVENTIONS:  - Perform BMAT or MOVE assessment daily    - Set and communicate daily mobility goal to care team and patient/family/caregiver     - Collaborate with rehabilitation services on mobility goals if consulted  - Ambulate patient 3 times a day  - Out of bed to chair 3 times a day   - Out of bed for meals 3 times a day  - Out of bed for toileting  - Record patient progress and toleration of activity level   Outcome: Progressing     Problem: DISCHARGE PLANNING  Goal: Discharge to home or other facility with appropriate resources  Description: INTERVENTIONS:  - Identify barriers to discharge w/patient and caregiver  - Arrange for needed discharge resources and transportation as appropriate  - Identify discharge learning needs (meds, wound care, etc )  - Arrange for interpretive services to assist at discharge as needed  - Refer to Case Management Department for coordinating discharge planning if the patient needs post-hospital services based on physician/advanced practitioner order or complex needs related to functional status, cognitive ability, or social support system  Outcome: Progressing     Problem: Knowledge Deficit  Goal: Patient/family/caregiver demonstrates understanding of disease process, treatment plan, medications, and discharge instructions  Description: Complete learning assessment and assess knowledge base    Interventions:  - Provide teaching at level of understanding  - Provide teaching via preferred learning methods  Outcome: Progressing     Problem: CARDIOVASCULAR - ADULT  Goal: Maintains optimal cardiac output and hemodynamic stability  Description: INTERVENTIONS:  - Monitor I/O, vital signs and rhythm  - Monitor for S/S and trends of decreased cardiac output  - Administer and titrate ordered vasoactive medications to optimize hemodynamic stability  - Assess quality of pulses, skin color and temperature  - Assess for signs of decreased coronary artery perfusion  - Instruct patient to report change in severity of symptoms  Outcome: Progressing     Problem: HEMATOLOGIC - ADULT  Goal: Maintains hematologic stability  Description: INTERVENTIONS  - Assess for signs and symptoms of bleeding or hemorrhage  - Monitor labs  - Administer supportive blood products/factors as ordered and appropriate  Outcome: Progressing

## 2023-04-24 ENCOUNTER — DOCUMENTATION (OUTPATIENT)
Dept: HEMATOLOGY ONCOLOGY | Facility: CLINIC | Age: 63
End: 2023-04-24

## 2023-04-24 NOTE — UTILIZATION REVIEW
NOTIFICATION OF ADMISSION DISCHARGE   This is a Notification of Discharge from [address]  Please be advised that this patient has been discharge from our facility  Below you will find the admission and discharge date and time including the patient’s disposition  UTILIZATION REVIEW CONTACT:  Raine Dillon  Utilization   Network Utilization Review Department  Phone: [phone] x carefully listen to the prompts  All voicemails are confidential   Email: [email or web address]     ADMISSION INFORMATION  PRESENTATION DATE: 4/20/2023  8:37 AM  OBERVATION ADMISSION DATE:   INPATIENT ADMISSION DATE: 4/20/23 11:05 AM   DISCHARGE DATE: 4/21/2023  1:14 PM   DISPOSITION:Home/Self Care    IMPORTANT INFORMATION:  Send all requests for admission clinical reviews, approved or denied determinations and any other requests to dedicated fax number below belonging to the campus where the patient is receiving treatment   List of dedicated fax numbers:  [address] DENIALS (Administrative/Medical Necessity) [phone]   [address] (Maternity/NICU/Pediatrics) [phone]   [facility] [phone]   [place] [address] [phone]   [address] [phone]   [address] [facility] [phone]   [address] [place] [address] [phone]   [address] [place] [address] 119 [phone]   [facility]  [phone]   [address] [facility] [phone]   [address] [facility] [address] [facility] [phone]

## 2023-04-24 NOTE — PROGRESS NOTES
Intake received/ Chart reviewed: 4/24/23    Pathology completed:  - Referred to IR, Not scheduled as of yet  I did call into IR and spoke with Barney Maurer and case is still waiting to be review by physician to be scheduled  Imaging completed:  - 4/20/23 CT CAP  - 4/20/23 CXR  - PET/ CT Scheduled 5/10/23    I'll keep patient on my radar, as a confirm dx is needed prior to his med/ onc consult  Otherwise, All records needed are in patients chart  No records retrieval needed at this time

## 2023-04-25 ENCOUNTER — TELEPHONE (OUTPATIENT)
Dept: PULMONOLOGY | Facility: CLINIC | Age: 63
End: 2023-04-25

## 2023-04-25 LAB
BACTERIA BLD CULT: NORMAL
BACTERIA BLD CULT: NORMAL

## 2023-04-25 NOTE — TELEPHONE ENCOUNTER
S/W Keeley Hidden from Procedures she is calling in asking if this should be done one side or both they weren't sure and would need this clarified  [phone]   If this is just for the lung she said then please contact IR and let them know  She seemed unsure of what was to be done

## 2023-04-26 NOTE — TELEPHONE ENCOUNTER
Tried to call the number  No one answered  Can you please let them know I would like one of the cervical lymph nodes biopsied (the radiologist can choose the best site)  We don't have to biopsy the lung mass at this time  Only if the cervical lymph node cannot be biopsied or doesn't yield an answer

## 2023-04-27 ENCOUNTER — TELEPHONE (OUTPATIENT)
Dept: PULMONOLOGY | Facility: CLINIC | Age: 63
End: 2023-04-27

## 2023-04-27 NOTE — TELEPHONE ENCOUNTER
I think that we should proceed with the biopsy first  Once that is obtained and we have a diagnosis it will be easier to justify getting a PET scan approved

## 2023-04-27 NOTE — TELEPHONE ENCOUNTER
Patients wife LVM stating that a new script for the PET scan has to be put in and it has to say that this is medically necessary in order for the insurance to cover it  If not it will cost the patient $10,000  Please advise

## 2023-04-28 ENCOUNTER — APPOINTMENT (INPATIENT)
Dept: NON INVASIVE DIAGNOSTICS | Facility: HOSPITAL | Age: 63
End: 2023-04-28

## 2023-04-28 ENCOUNTER — HOSPITAL ENCOUNTER (INPATIENT)
Facility: HOSPITAL | Age: 63
LOS: 4 days | End: 2023-05-02
Attending: EMERGENCY MEDICINE | Admitting: STUDENT IN AN ORGANIZED HEALTH CARE EDUCATION/TRAINING PROGRAM

## 2023-04-28 ENCOUNTER — APPOINTMENT (EMERGENCY)
Dept: CT IMAGING | Facility: HOSPITAL | Age: 63
End: 2023-04-28

## 2023-04-28 ENCOUNTER — APPOINTMENT (INPATIENT)
Dept: MRI IMAGING | Facility: HOSPITAL | Age: 63
End: 2023-04-28

## 2023-04-28 ENCOUNTER — APPOINTMENT (INPATIENT)
Dept: CT IMAGING | Facility: HOSPITAL | Age: 63
End: 2023-04-28

## 2023-04-28 DIAGNOSIS — R91.8 LUNG MASS: ICD-10-CM

## 2023-04-28 DIAGNOSIS — E83.52 HYPERCALCEMIA: ICD-10-CM

## 2023-04-28 DIAGNOSIS — I26.99 BILATERAL PULMONARY EMBOLISM (HCC): ICD-10-CM

## 2023-04-28 DIAGNOSIS — J96.01 ACUTE RESPIRATORY FAILURE WITH HYPOXIA (HCC): ICD-10-CM

## 2023-04-28 DIAGNOSIS — G93.40 ACUTE ENCEPHALOPATHY: ICD-10-CM

## 2023-04-28 DIAGNOSIS — R41.82 AMS (ALTERED MENTAL STATUS): ICD-10-CM

## 2023-04-28 DIAGNOSIS — E87.1 HYPONATREMIA: ICD-10-CM

## 2023-04-28 DIAGNOSIS — R22.1 NECK MASS: ICD-10-CM

## 2023-04-28 DIAGNOSIS — R09.02 HYPOXIA: ICD-10-CM

## 2023-04-28 DIAGNOSIS — R41.0 CONFUSION: ICD-10-CM

## 2023-04-28 DIAGNOSIS — J90 PLEURAL EFFUSION, RIGHT: Primary | ICD-10-CM

## 2023-04-28 PROBLEM — Z72.0 TOBACCO ABUSE: Status: ACTIVE | Noted: 2019-02-18

## 2023-04-28 PROBLEM — I10 ESSENTIAL HYPERTENSION: Status: ACTIVE | Noted: 2019-02-18

## 2023-04-28 PROBLEM — E78.2 MIXED DYSLIPIDEMIA: Status: ACTIVE | Noted: 2019-02-18

## 2023-04-28 PROBLEM — M10.9 GOUT: Status: ACTIVE | Noted: 2023-04-28

## 2023-04-28 PROBLEM — R65.10 SEVERE SYSTEMIC INFLAMMATORY RESPONSE SYNDROME (SIRS) (HCC): Status: ACTIVE | Noted: 2023-04-28

## 2023-04-28 PROBLEM — E11.9 TYPE 2 DIABETES MELLITUS WITHOUT COMPLICATION, WITHOUT LONG-TERM CURRENT USE OF INSULIN (HCC): Chronic | Status: ACTIVE | Noted: 2023-04-20

## 2023-04-28 LAB
25(OH)D3 SERPL-MCNC: 18.5 NG/ML (ref 30–100)
2HR DELTA HS TROPONIN: -8 NG/L
4HR DELTA HS TROPONIN: -1 NG/L
ALBUMIN SERPL BCP-MCNC: 3.5 G/DL (ref 3.5–5)
ALP SERPL-CCNC: 151 U/L (ref 34–104)
ALT SERPL W P-5'-P-CCNC: 137 U/L (ref 7–52)
ANION GAP SERPL CALCULATED.3IONS-SCNC: 6 MMOL/L (ref 4–13)
ANION GAP SERPL CALCULATED.3IONS-SCNC: 6 MMOL/L (ref 4–13)
ANION GAP SERPL CALCULATED.3IONS-SCNC: 7 MMOL/L (ref 4–13)
ANION GAP SERPL CALCULATED.3IONS-SCNC: 9 MMOL/L (ref 4–13)
AORTIC ROOT: 3.9 CM
AORTIC VALVE MEAN VELOCITY: 18.1 M/S
APICAL FOUR CHAMBER EJECTION FRACTION: 77 %
APTT PPP: 38 SECONDS (ref 23–37)
APTT PPP: 60 SECONDS (ref 23–37)
APTT PPP: 71 SECONDS (ref 23–37)
AST SERPL W P-5'-P-CCNC: 75 U/L (ref 13–39)
AV AREA BY CONTINUOUS VTI: 1.5 CM2
AV AREA PEAK VELOCITY: 1.3 CM2
AV LVOT MEAN GRADIENT: 3 MMHG
AV LVOT PEAK GRADIENT: 5 MMHG
AV MEAN GRADIENT: 15 MMHG
AV PEAK GRADIENT: 27 MMHG
AV VALVE AREA: 1.48 CM2
AV VELOCITY RATIO: 0.41
BACTERIA UR QL AUTO: ABNORMAL /HPF
BASOPHILS # BLD AUTO: 0.06 THOUSANDS/ΜL (ref 0–0.1)
BASOPHILS NFR BLD AUTO: 0 % (ref 0–1)
BILIRUB SERPL-MCNC: 0.66 MG/DL (ref 0.2–1)
BILIRUB UR QL STRIP: NEGATIVE
BNP SERPL-MCNC: 30 PG/ML (ref 0–100)
BUN SERPL-MCNC: 11 MG/DL (ref 5–25)
BUN SERPL-MCNC: 11 MG/DL (ref 5–25)
BUN SERPL-MCNC: 12 MG/DL (ref 5–25)
BUN SERPL-MCNC: 12 MG/DL (ref 5–25)
CA-I BLD-SCNC: 1.57 MMOL/L (ref 1.12–1.32)
CALCIUM SERPL-MCNC: 11.3 MG/DL (ref 8.4–10.2)
CALCIUM SERPL-MCNC: 11.4 MG/DL (ref 8.4–10.2)
CALCIUM SERPL-MCNC: 13.3 MG/DL (ref 8.4–10.2)
CALCIUM SERPL-MCNC: 13.4 MG/DL (ref 8.4–10.2)
CARDIAC TROPONIN I PNL SERPL HS: 36 NG/L
CARDIAC TROPONIN I PNL SERPL HS: 43 NG/L
CARDIAC TROPONIN I PNL SERPL HS: 44 NG/L
CHLORIDE SERPL-SCNC: 90 MMOL/L (ref 96–108)
CHLORIDE SERPL-SCNC: 92 MMOL/L (ref 96–108)
CHLORIDE SERPL-SCNC: 96 MMOL/L (ref 96–108)
CHLORIDE SERPL-SCNC: 97 MMOL/L (ref 96–108)
CLARITY UR: CLEAR
CO2 SERPL-SCNC: 29 MMOL/L (ref 21–32)
CO2 SERPL-SCNC: 30 MMOL/L (ref 21–32)
COLOR UR: YELLOW
CREAT SERPL-MCNC: 0.71 MG/DL (ref 0.6–1.3)
CREAT SERPL-MCNC: 0.75 MG/DL (ref 0.6–1.3)
CREAT SERPL-MCNC: 0.81 MG/DL (ref 0.6–1.3)
CREAT SERPL-MCNC: 0.83 MG/DL (ref 0.6–1.3)
DOP CALC AO PEAK VEL: 2.57 M/S
DOP CALC AO VTI: 47.65 CM
DOP CALC LVOT AREA: 3.14 CM2
DOP CALC LVOT DIAMETER: 2 CM
DOP CALC LVOT PEAK VEL VTI: 22.44 CM
DOP CALC LVOT PEAK VEL: 1.06 M/S
DOP CALC LVOT STROKE INDEX: 30.7 ML/M2
DOP CALC LVOT STROKE VOLUME: 70.46
E WAVE DECELERATION TIME: 204 MS
EOSINOPHIL # BLD AUTO: 0.09 THOUSAND/ΜL (ref 0–0.61)
EOSINOPHIL NFR BLD AUTO: 1 % (ref 0–6)
ERYTHROCYTE [DISTWIDTH] IN BLOOD BY AUTOMATED COUNT: 12.5 % (ref 11.6–15.1)
ERYTHROCYTE [DISTWIDTH] IN BLOOD BY AUTOMATED COUNT: 12.5 % (ref 11.6–15.1)
FLUAV RNA RESP QL NAA+PROBE: NEGATIVE
FLUBV RNA RESP QL NAA+PROBE: NEGATIVE
FRACTIONAL SHORTENING: 32 (ref 28–44)
GFR SERPL CREATININE-BSD FRML MDRD: 101 ML/MIN/1.73SQ M
GFR SERPL CREATININE-BSD FRML MDRD: 94 ML/MIN/1.73SQ M
GFR SERPL CREATININE-BSD FRML MDRD: 95 ML/MIN/1.73SQ M
GFR SERPL CREATININE-BSD FRML MDRD: 99 ML/MIN/1.73SQ M
GLUCOSE SERPL-MCNC: 110 MG/DL (ref 65–140)
GLUCOSE SERPL-MCNC: 116 MG/DL (ref 65–140)
GLUCOSE SERPL-MCNC: 130 MG/DL (ref 65–140)
GLUCOSE SERPL-MCNC: 130 MG/DL (ref 65–140)
GLUCOSE SERPL-MCNC: 149 MG/DL (ref 65–140)
GLUCOSE SERPL-MCNC: 164 MG/DL (ref 65–140)
GLUCOSE SERPL-MCNC: 166 MG/DL (ref 65–140)
GLUCOSE SERPL-MCNC: 204 MG/DL (ref 65–140)
GLUCOSE SERPL-MCNC: 96 MG/DL (ref 65–140)
GLUCOSE SERPL-MCNC: 98 MG/DL (ref 65–140)
GLUCOSE UR STRIP-MCNC: NEGATIVE MG/DL
HCT VFR BLD AUTO: 37.7 % (ref 36.5–49.3)
HCT VFR BLD AUTO: 37.9 % (ref 36.5–49.3)
HGB BLD-MCNC: 12.7 G/DL (ref 12–17)
HGB BLD-MCNC: 12.9 G/DL (ref 12–17)
HGB UR QL STRIP.AUTO: ABNORMAL
IMM GRANULOCYTES # BLD AUTO: 0.11 THOUSAND/UL (ref 0–0.2)
IMM GRANULOCYTES NFR BLD AUTO: 1 % (ref 0–2)
INR PPP: 1.71 (ref 0.84–1.19)
INTERVENTRICULAR SEPTUM IN DIASTOLE (PARASTERNAL SHORT AXIS VIEW): 1.4 CM
INTERVENTRICULAR SEPTUM: 1.4 CM (ref 0.6–1.1)
KETONES UR STRIP-MCNC: NEGATIVE MG/DL
LACTATE SERPL-SCNC: 0.9 MMOL/L (ref 0.5–2)
LEFT ATRIUM SIZE: 3.9 CM
LEFT INTERNAL DIMENSION IN SYSTOLE: 3 CM (ref 2.1–4)
LEFT VENTRICLE DIASTOLIC VOLUME (MOD BIPLANE): 52 ML
LEFT VENTRICLE SYSTOLIC VOLUME (MOD BIPLANE): 13 ML
LEFT VENTRICULAR INTERNAL DIMENSION IN DIASTOLE: 4.4 CM (ref 3.5–6)
LEFT VENTRICULAR POSTERIOR WALL IN END DIASTOLE: 1.3 CM
LEFT VENTRICULAR STROKE VOLUME: 54 ML
LEUKOCYTE ESTERASE UR QL STRIP: NEGATIVE
LV EF: 74 %
LVSV (TEICH): 54 ML
LYMPHOCYTES # BLD AUTO: 1 THOUSANDS/ΜL (ref 0.6–4.47)
LYMPHOCYTES NFR BLD AUTO: 7 % (ref 14–44)
MAGNESIUM SERPL-MCNC: 1.4 MG/DL (ref 1.9–2.7)
MAGNESIUM SERPL-MCNC: 1.8 MG/DL (ref 1.9–2.7)
MCH RBC QN AUTO: 28.9 PG (ref 26.8–34.3)
MCH RBC QN AUTO: 29 PG (ref 26.8–34.3)
MCHC RBC AUTO-ENTMCNC: 33.7 G/DL (ref 31.4–37.4)
MCHC RBC AUTO-ENTMCNC: 34 G/DL (ref 31.4–37.4)
MCV RBC AUTO: 85 FL (ref 82–98)
MCV RBC AUTO: 86 FL (ref 82–98)
MONOCYTES # BLD AUTO: 1.73 THOUSAND/ΜL (ref 0.17–1.22)
MONOCYTES NFR BLD AUTO: 12 % (ref 4–12)
MV E'TISSUE VEL-LAT: 10 CM/S
MV E'TISSUE VEL-SEP: 8 CM/S
MV PEAK A VEL: 0.94 M/S
MV PEAK E VEL: 82 CM/S
MV STENOSIS PRESSURE HALF TIME: 59 MS
MV VALVE AREA P 1/2 METHOD: 3.73
NEUTROPHILS # BLD AUTO: 11.9 THOUSANDS/ΜL (ref 1.85–7.62)
NEUTS SEG NFR BLD AUTO: 79 % (ref 43–75)
NITRITE UR QL STRIP: NEGATIVE
NON-SQ EPI CELLS URNS QL MICRO: ABNORMAL /HPF
NRBC BLD AUTO-RTO: 0 /100 WBCS
PH UR STRIP.AUTO: 6 [PH]
PHOSPHATE SERPL-MCNC: 2.4 MG/DL (ref 2.3–4.1)
PLATELET # BLD AUTO: 490 THOUSANDS/UL (ref 149–390)
PLATELET # BLD AUTO: 516 THOUSANDS/UL (ref 149–390)
PMV BLD AUTO: 8.8 FL (ref 8.9–12.7)
PMV BLD AUTO: 9.1 FL (ref 8.9–12.7)
POTASSIUM SERPL-SCNC: 3.5 MMOL/L (ref 3.5–5.3)
POTASSIUM SERPL-SCNC: 3.5 MMOL/L (ref 3.5–5.3)
POTASSIUM SERPL-SCNC: 3.6 MMOL/L (ref 3.5–5.3)
POTASSIUM SERPL-SCNC: 3.6 MMOL/L (ref 3.5–5.3)
PROCALCITONIN SERPL-MCNC: 0.15 NG/ML
PROT SERPL-MCNC: 7 G/DL (ref 6.4–8.4)
PROT UR STRIP-MCNC: NEGATIVE MG/DL
PROTHROMBIN TIME: 20.1 SECONDS (ref 11.6–14.5)
PTH-INTACT SERPL-MCNC: 11.6 PG/ML (ref 18.4–80.1)
RA PRESSURE ESTIMATED: 3 MMHG
RBC # BLD AUTO: 4.4 MILLION/UL (ref 3.88–5.62)
RBC # BLD AUTO: 4.45 MILLION/UL (ref 3.88–5.62)
RBC #/AREA URNS AUTO: ABNORMAL /HPF
RSV RNA RESP QL NAA+PROBE: NEGATIVE
SARS-COV-2 RNA RESP QL NAA+PROBE: NEGATIVE
SL CV LV EF: 70
SL CV PED ECHO LEFT VENTRICLE DIASTOLIC VOLUME (MOD BIPLANE) 2D: 88 ML
SL CV PED ECHO LEFT VENTRICLE SYSTOLIC VOLUME (MOD BIPLANE) 2D: 34 ML
SODIUM SERPL-SCNC: 128 MMOL/L (ref 135–147)
SODIUM SERPL-SCNC: 128 MMOL/L (ref 135–147)
SODIUM SERPL-SCNC: 131 MMOL/L (ref 135–147)
SODIUM SERPL-SCNC: 133 MMOL/L (ref 135–147)
SP GR UR STRIP.AUTO: 1.02 (ref 1–1.03)
TRICUSPID ANNULAR PLANE SYSTOLIC EXCURSION: 2 CM
UROBILINOGEN UR QL STRIP.AUTO: 0.2 E.U./DL
WBC # BLD AUTO: 14.89 THOUSAND/UL (ref 4.31–10.16)
WBC # BLD AUTO: 16.47 THOUSAND/UL (ref 4.31–10.16)
WBC #/AREA URNS AUTO: ABNORMAL /HPF

## 2023-04-28 RX ORDER — HEPARIN SODIUM 10000 [USP'U]/100ML
3-30 INJECTION, SOLUTION INTRAVENOUS
Status: DISCONTINUED | OUTPATIENT
Start: 2023-04-28 | End: 2023-04-30

## 2023-04-28 RX ORDER — SODIUM CHLORIDE 9 MG/ML
125 INJECTION, SOLUTION INTRAVENOUS CONTINUOUS
Status: DISCONTINUED | OUTPATIENT
Start: 2023-04-28 | End: 2023-04-29

## 2023-04-28 RX ORDER — MAGNESIUM SULFATE HEPTAHYDRATE 40 MG/ML
2 INJECTION, SOLUTION INTRAVENOUS ONCE
Status: COMPLETED | OUTPATIENT
Start: 2023-04-28 | End: 2023-04-28

## 2023-04-28 RX ORDER — ONDANSETRON 2 MG/ML
4 INJECTION INTRAMUSCULAR; INTRAVENOUS EVERY 4 HOURS PRN
Status: DISCONTINUED | OUTPATIENT
Start: 2023-04-28 | End: 2023-05-02 | Stop reason: HOSPADM

## 2023-04-28 RX ORDER — INSULIN LISPRO 100 [IU]/ML
1-6 INJECTION, SOLUTION INTRAVENOUS; SUBCUTANEOUS
Status: DISCONTINUED | OUTPATIENT
Start: 2023-04-28 | End: 2023-05-02 | Stop reason: HOSPADM

## 2023-04-28 RX ORDER — CALCITONIN SALMON 200 [USP'U]/ML
4 INJECTION, SOLUTION INTRAMUSCULAR; SUBCUTANEOUS EVERY 12 HOURS SCHEDULED
Status: DISCONTINUED | OUTPATIENT
Start: 2023-04-28 | End: 2023-04-29

## 2023-04-28 RX ORDER — IBUPROFEN 400 MG/1
400 TABLET ORAL EVERY 6 HOURS PRN
Status: ON HOLD | COMMUNITY

## 2023-04-28 RX ORDER — ALLOPURINOL 300 MG/1
300 TABLET ORAL DAILY
Status: DISCONTINUED | OUTPATIENT
Start: 2023-04-28 | End: 2023-05-02 | Stop reason: HOSPADM

## 2023-04-28 RX ORDER — INSULIN LISPRO 100 [IU]/ML
1-6 INJECTION, SOLUTION INTRAVENOUS; SUBCUTANEOUS
Status: DISCONTINUED | OUTPATIENT
Start: 2023-04-28 | End: 2023-04-28

## 2023-04-28 RX ORDER — DOCUSATE SODIUM 100 MG/1
100 CAPSULE, LIQUID FILLED ORAL 2 TIMES DAILY PRN
Status: DISCONTINUED | OUTPATIENT
Start: 2023-04-28 | End: 2023-05-02 | Stop reason: HOSPADM

## 2023-04-28 RX ORDER — LOSARTAN POTASSIUM 50 MG/1
100 TABLET ORAL DAILY
Status: DISCONTINUED | OUTPATIENT
Start: 2023-04-28 | End: 2023-05-02 | Stop reason: HOSPADM

## 2023-04-28 RX ORDER — MAGNESIUM HYDROXIDE/ALUMINUM HYDROXICE/SIMETHICONE 120; 1200; 1200 MG/30ML; MG/30ML; MG/30ML
30 SUSPENSION ORAL EVERY 6 HOURS PRN
Status: DISCONTINUED | OUTPATIENT
Start: 2023-04-28 | End: 2023-05-02 | Stop reason: HOSPADM

## 2023-04-28 RX ORDER — PRAVASTATIN SODIUM 40 MG
40 TABLET ORAL
Status: DISCONTINUED | OUTPATIENT
Start: 2023-04-28 | End: 2023-05-02 | Stop reason: HOSPADM

## 2023-04-28 RX ORDER — SODIUM CHLORIDE 9 MG/ML
100 INJECTION, SOLUTION INTRAVENOUS CONTINUOUS
Status: DISCONTINUED | OUTPATIENT
Start: 2023-04-28 | End: 2023-04-28

## 2023-04-28 RX ORDER — ACETAMINOPHEN 325 MG/1
650 TABLET ORAL EVERY 6 HOURS PRN
Status: DISCONTINUED | OUTPATIENT
Start: 2023-04-28 | End: 2023-05-02 | Stop reason: HOSPADM

## 2023-04-28 RX ADMIN — SODIUM CHLORIDE 100 ML/HR: 0.9 INJECTION, SOLUTION INTRAVENOUS at 14:43

## 2023-04-28 RX ADMIN — IOHEXOL 85 ML: 350 INJECTION, SOLUTION INTRAVENOUS at 13:43

## 2023-04-28 RX ADMIN — IOHEXOL 100 ML: 350 INJECTION, SOLUTION INTRAVENOUS at 03:08

## 2023-04-28 RX ADMIN — SODIUM CHLORIDE 100 ML/HR: 0.9 INJECTION, SOLUTION INTRAVENOUS at 09:57

## 2023-04-28 RX ADMIN — GADOBUTROL 9 ML: 604.72 INJECTION INTRAVENOUS at 08:28

## 2023-04-28 RX ADMIN — SODIUM CHLORIDE 125 ML/HR: 0.9 INJECTION, SOLUTION INTRAVENOUS at 22:20

## 2023-04-28 RX ADMIN — INSULIN LISPRO 1 UNITS: 100 INJECTION, SOLUTION INTRAVENOUS; SUBCUTANEOUS at 17:44

## 2023-04-28 RX ADMIN — LOSARTAN POTASSIUM 100 MG: 50 TABLET, FILM COATED ORAL at 09:24

## 2023-04-28 RX ADMIN — SODIUM CHLORIDE 500 ML: 0.9 INJECTION, SOLUTION INTRAVENOUS at 06:15

## 2023-04-28 RX ADMIN — PRAVASTATIN SODIUM 40 MG: 40 TABLET ORAL at 17:02

## 2023-04-28 RX ADMIN — MAGNESIUM SULFATE HEPTAHYDRATE 2 G: 40 INJECTION, SOLUTION INTRAVENOUS at 11:15

## 2023-04-28 RX ADMIN — HEPARIN SODIUM 18 UNITS/KG/HR: 10000 INJECTION, SOLUTION INTRAVENOUS at 03:57

## 2023-04-28 RX ADMIN — MAGNESIUM SULFATE HEPTAHYDRATE 2 G: 40 INJECTION, SOLUTION INTRAVENOUS at 21:38

## 2023-04-28 RX ADMIN — CALCITONIN SALMON 396 UNITS: 200 INJECTION, SOLUTION INTRAMUSCULAR; SUBCUTANEOUS at 09:24

## 2023-04-28 RX ADMIN — ALLOPURINOL 300 MG: 300 TABLET ORAL at 09:24

## 2023-04-28 RX ADMIN — CALCITONIN SALMON 396 UNITS: 200 INJECTION, SOLUTION INTRAMUSCULAR; SUBCUTANEOUS at 21:04

## 2023-04-28 RX ADMIN — HEPARIN SODIUM 18 UNITS/KG/HR: 10000 INJECTION, SOLUTION INTRAVENOUS at 18:38

## 2023-04-28 RX ADMIN — SODIUM CHLORIDE 125 ML/HR: 0.9 INJECTION, SOLUTION INTRAVENOUS at 17:04

## 2023-04-28 NOTE — PHYSICAL THERAPY NOTE
PHYSICAL THERAPY EVALUATION  NAME:  Walt Benton  DATE: 04/28/23    AGE:   66 y o  Mrn:   [id number]  ADMIT DX:  Hypercalcemia [E83 52]  Confusion [R41 0]  Hyponatremia [E87 1]  Lung mass [R91 8]  Hypoxia [R09 02]  Pleural effusion, right [J90]  Bilateral pulmonary embolism (HCC) [I26 99]  Acute respiratory failure with hypoxia (HCC) [J96 01]  Acute encephalopathy [G93 40]  AMS (altered mental status) [R41 82]    Past Medical History:   Diagnosis Date    Bilateral pulmonary embolism ([facility] ) 04/28/2023    Current smoker     Diabetes mellitus ([place] [address] )     GERD (gastroesophageal reflux disease)     Gout     Hypertension     Lung cancer ([place] [address] )     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)     Mixed dyslipidemia     Pleural effusion, right 04/28/2023    Subclavian vein thrombosis (HCC)     Type 2 diabetes mellitus without complication, without long-term current use of insulin ([place] [address] ) 04/20/2023     Length Of Stay: 0  Performed at least 2 patient identifiers during session: Name and Birthday  PHYSICAL THERAPY EVALUATION :    04/28/23 1017   PT Last Visit   PT Visit Date 04/28/23   Note Type   Note type Evaluation   Pain Assessment   Pain Assessment Tool 0-10   Pain Score No Pain   Restrictions/Precautions   Other Precautions Chair Alarm; Bed Alarm;Cognitive; Fall Risk;Multiple lines;Telemetry;O2   Home Living   Type of Home House  (5 SAM R HR)   Home Layout One level;Performs ADLs on one level; Able to live on main level with bedroom/bathroom   Bathroom Shower/Tub Walk-in shower   Bathroom Toilet Raised   Bathroom Equipment Shower chair;Grab bars in [facility]   (no AD )   Additional Comments Pt reports living in a [facility] with 5 SAM R HR and not using an AD PTA  Prior Function   Level of [place] Independent with ADLs; Independent with functional mobility; Independent with IADLS   Lives With Spouse   Receives Help From Family   IADLs Independent with driving; Independent with meal prep; Independent with medication "management   Falls in the last 6 months 0   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)   Comments Reports being independent with mobility, ADLs and ADLs  General   Additional Pertinent History MRI brain showing: Tiny focus of acute or subacute infarct in the left cerebellum  CTA chest showing: Left lower lobe segmental pulmonary embolism  Right lower lobe segmental pulmonary embolism, Massively extensively diffuse mediastinal and hilar adenopathy resulting in mass effect on the trachea and pulmonary arteries bilaterally, Moderate diffuse edematous changes throughout the lungs  Right right lower lobe pulmonary infarction    Right middle lobe atelectasis secondary to mass effect  Cognition   Attention Difficulty attending to directions   Orientation Level Oriented to person;Oriented to place;Oriented to situation  (partially to time reporting \"it's 4, now it's March  then reported \"It was April so now it is March  \" cues for month  able to report year)   Following Commands Follows one step commands with increased time or repetition   Comments requires cues for increased attention to task and redirection   Subjective   Subjective \"I'm in engineering  \"   RLE Assessment   RLE Assessment WFL  (4/5)   LLE Assessment   LLE Assessment WFL  (4/5)   Coordination   Movements are Fluid and Coordinated 0   Coordination and Movement Description impaired with L LE during ambulation, left leg slight drag, varied TANISHA, slight ataxia on swing through   Rapid Alternating Movements   (slightly impaired left LE)   Light Touch   RLE Light Touch Grossly intact   LLE Light Touch Grossly intact   Bed Mobility   Supine to Sit 5  Supervision   Additional items Verbal cues; Impulsive   Additional Comments HOB flat without bedrails  pt quick to complete bed mobility     Transfers   Sit to Stand   (steadying assistance)   Additional items Increased time required;Verbal cues   Stand to Sit   (steadying assistance)   Additional items " "Increased time required;Verbal cues   Stand pivot   (steadying asisstance)   Additional items Increased time required;Verbal cues   Additional Comments no AD  sit<>stand with wide TANISHA with inc time with steadying assistance to complete for balance  spt without AD with steadying assistance for balance with cues for turnign completely, direction and, hand placement prior to sitting      Ambulation/Elevation   Gait pattern Narrow TANISHA; Wide TANISHA; Improper Weight shift;L Foot drag;Decreased foot clearance; Short stride; Ataxia; Excessively slow; Foward flexed  (reaching for external support)   Gait Assistance 4  Minimal assist  (miniaml)   Additional items Assist x 1;Verbal cues   Assistive Device None   Distance ambulated 25'x1 without AD with minimal assistance with varied TANISHA, scissoring at times left LE, slight ataxia note don swing through with decreased left foot clearance  cues for inc step length and foot clearance  Balance   Static Sitting Good   Dynamic Sitting Fair +   Static Standing Fair   Dynamic Standing Fair -   Ambulatory Poor +   Endurance Deficit   Endurance Deficit Yes   Endurance Deficit Description fatigue, min PRETTY  Spo2 at rest on 2-3 lpm 90-92%  HR 90s to low 100s  with activity, O2 at 3 lpm SpO2 at 94%  min PRETTY  cues for pursed lip breathing  HR increased to 109 bpm    Activity Tolerance   Activity Tolerance Patient limited by fatigue   Medical Staff Made Aware Smiley CASTREJON   Nurse Made Aware RNFranco   Assessment   Prognosis Good   Problem List Decreased strength;Decreased endurance; Impaired balance;Decreased mobility; Decreased coordination;Decreased safety awareness;Decreased cognition   Barriers to Discharge Decreased caregiver support; Inaccessible home environment   Barriers to Discharge Comments requires assistance to complete mobility, lives with spouse who works during the day     Goals   Patient Goals \"Go to the bathroom\" (see OT note)   STG Expiration Date 05/12/23   PT Treatment Day 1   Plan " Treatment/Interventions ADL retraining;Functional transfer training;LE strengthening/ROM; Elevations; Therapeutic exercise; Endurance training;Patient/family training;Equipment eval/education;Cognitive reorientation; Bed mobility;Gait training; Compensatory technique education;Spoke to nursing;Spoke to case management;OT   PT Frequency 3-5x/wk   Recommendation   PT Discharge Recommendation Post acute rehabilitation services  (consider acute inpatient rehab )   Equipment Recommended   (TBD by rehab)   AM-PAC Basic Mobility Inpatient   Turning in Flat Bed Without Bedrails 3   Lying on Back to Sitting on Edge of Flat Bed Without Bedrails 3   Moving Bed to Chair 3   Standing Up From Chair Using Arms 3   Walk in Room 3   Climb 3-5 Stairs With Railing 1   Basic Mobility Inpatient Raw Score 16   Basic Mobility Standardized Score 38 32   Highest Level Of Mobility   -HLM Goal 5: Stand one or more mins   -HLM Achieved 7: Walk 25 feet or more   Additional Treatment Session   Start Time 1033   End Time 1043   Treatment Assessment Pt tolerated session fairly  He was able to amublate inc distance with RW, but continues to require minimal assistance at times for RW management as well as balance especially when turning  He requires increased cues for proper use and safety with RW  He fatigues quickly  He is limtied by decreased strenght, bnalance, endurance and coordination as well as impaired cognition  He will continue to benefit form PT services to maximize LOF  Equipment Use initiated use of RW  cues for hand placement for safety with RW  sit<>stand with SBA with inc time with min cues for hand placement  spt with RW with steadying assistance for balance with tactile and verbal cues for staying wihtin TANISHA of RW when turning throughout and for turnign completely wiht RW prior to sititng  cues for increased attention to task   ambulated 25'x1 with RW with steadying to minAx1 wiht manual cues for RW at times and verbal cues for direction, inc foot clearance, uprihgt posture and to stay within TANISHA of RW  pt noted to have NBOS with slight ataxia left LE and left foot drag  cues for inc foot clearance  then ambulated 72'x1 with RW with steadying to minAx1 with manual cues for RW management and balance when turning  cues for positioning and safety with RW  Spo2 94 on 3 lpm with mobility  -109 bpm with activity  min PRETTY noted  cues for pursed lip breathing  End of Consult   Patient Position at End of Consult Bedside chair;Bed/Chair alarm activated; All needs within reach       Pt requires PT/OT co-eval due to medical complexity, signficant assistance with mobility and/or cognitive-behavioral impairments  (Please find full objective findings from PT assessment regarding body systems outlined above)  Assessment: Pt is a 66 y o  male seen for PT evaluation s/p admission to [address] [place] [address] on 4/28/2023 with Acute encephalopathy  Order placed for PT services    Upon evaluation: Pt is presenting with impaired functional mobility due to decreased strength, decreased endurance, impaired balance, impaired coordination, gait deviations, impaired cognition, decreased safety awareness, impaired judgment, and fall risk requiring  supervision assistance for bed mobility, steadying assistance for transfers, and minimal assistance for ambulation with RW   Pt's clinical presentation is currently unpredictable given the functional mobility deficits above, especially weakness, decreased endurance, impaired coordination, gait deviations, decreased activity tolerance, decreased functional mobility tolerance, decreased safety awareness, impaired judgement, and SOB upon exertion, coupled with fall risks as indicated by AM-PAC 6-Clicks: 13/99 as well as impaired balance, polypharmacy, impaired coordination, impaired judgement, decreased safety awareness and decreased cognition and combined with medical complications of abnormal WBCs, abnormal sodium values, low SpO2 values, new onset O2 use, multiple readmissions, need for input for mobility technique/safety and elevated platelets, right pleural effusion, acute encephalopathy, bilateral PE on heparin, SIRS, acute respiratory failure with hypoxia requiring new onset O2, Tiny focus of acute or subacute infarct in the left cerebellum, hypercalcemia, hyponatremia  Pt's PMHx and comorbidities that may affect physical performance and progress include: DM, HTN and recent diagnosis subclavian vein thrombosis, lung/neck mass metastasis to mediastinum, gout  Personal factors affecting pt at time of IE include: inaccessible home environment, step(s) to enter environment, limited home support, inability to perform current job functions, inability to perform IADLs, inability to perform ADLs, inability to navigate level surfaces without external assistance, inability to ambulate household distances, limited insight into impairments and tobacco use  Pt will benefit from continued skilled PT services to address deficits as defined above and to maximize level of functional mobility to facilitate return toward PLOF and improved QOL  From PT/mobility standpoint, recommendation at time of d/c would be post acute rehab ([facility]) pending progress in order to reduce fall risk and maximize pt's functional independence and consistency with mobility in order to facilitate return to PLOF  Recommend trial with walker next 1-2 sessions and ther ex next 1-2 sessions  The patient's AM-PAC Basic Mobility Inpatient Short Form Raw Score is 16  A Raw score of less than or equal to 16 suggests the patient may benefit from discharge to post-acute rehabilitation services  Please also refer to the recommendation of the Physical Therapist for safe discharge planning  Goals: Pt will: Perform bed mobility tasks with modified Independent to reposition in bed and prepare for transfers   Pt will perform transfers with modified Independent to decrease burden of care, decrease risk for falls and improve activity tolerance and prepare for ambulation  Pt will ambulate with LRAD for >/= 250' with  modified Independent  to decrease burden of care, decrease risk for falls, improve activity tolerance and improve gait quality and to access home environment  Pt will complete 1 step and >/= 12 steps with unilateral handrail with modified Independent to decrease burden of care, decrease risk for falls, improve activity tolerance and improve gait quality  Pt will participate in objective balance assessment to determine baseline fall risk  Pt will participate in SSWS assessment to determine level of mobility  Pt will increase B LE strength >/= 1/2 MMT grade to facilitate functional mobility        Karina Love, PT,DPT

## 2023-04-28 NOTE — ASSESSMENT & PLAN NOTE
Patient presenting for acute encephalopathy for several hours  Recent diagnosis of lung/neck masses (likely malignant) and subclavian thrombus  Patient mildly hypoxic at presentation (2 LNC), but encephalopathy not resolving with treatment of hypoxia in ER  Patient not taking any known psychoactive medications    Most likely remaining etiologies on differential diagnosis include:    Multifactorial in nature-electrolyte imbalance, underlying malignancy,  MRI shows infection of the cerebellum which can also contribute

## 2023-04-28 NOTE — PLAN OF CARE
Problem: Potential for Falls  Goal: Patient will remain free of falls  Description: INTERVENTIONS:  - Educate patient/family on patient safety including physical limitations  - Instruct patient to call for assistance with activity   - Consult OT/PT to assist with strengthening/mobility   - Keep Call bell within reach  - Keep bed low and locked with side rails adjusted as appropriate  - Keep care items and personal belongings within reach  - Initiate and maintain comfort rounds  - Make Fall Risk Sign visible to staff  - Offer Toileting every 2 Hours, in advance of need  - Initiate/Maintain bed/chair alarm  - Apply yellow socks and bracelet for high fall risk patients  - Consider moving patient to room near nurses station  Outcome: Progressing     Problem: PAIN - ADULT  Goal: Verbalizes/displays adequate comfort level or baseline comfort level  Description: Interventions:  - Encourage patient to monitor pain and request assistance  - Assess pain using appropriate pain scale  - Administer analgesics based on type and severity of pain and evaluate response  - Implement non-pharmacological measures as appropriate and evaluate response  - Consider cultural and social influences on pain and pain management  - Notify physician/advanced practitioner if interventions unsuccessful or patient reports new pain  Outcome: Progressing     Problem: INFECTION - ADULT  Goal: Absence or prevention of progression during hospitalization  Description: INTERVENTIONS:  - Assess and monitor for signs and symptoms of infection  - Monitor lab/diagnostic results  - Monitor all insertion sites, i e  indwelling lines, tubes, and drains  - Monitor endotracheal if appropriate and nasal secretions for changes in amount and color  - [place] appropriate cooling/warming therapies per order  - Administer medications as ordered  - Instruct and encourage patient and family to use good hand hygiene technique  - Identify and instruct in appropriate isolation precautions for identified infection/condition  Outcome: Progressing     Problem: SAFETY ADULT  Goal: Patient will remain free of falls  Description: INTERVENTIONS:  - Educate patient/family on patient safety including physical limitations  - Instruct patient to call for assistance with activity   - Consult OT/PT to assist with strengthening/mobility   - Keep Call bell within reach  - Keep bed low and locked with side rails adjusted as appropriate  - Keep care items and personal belongings within reach  - Initiate and maintain comfort rounds  - Make Fall Risk Sign visible to staff  - Offer Toileting every 2 Hours, in advance of need  - Initiate/Maintain bed/chair alarm  - Apply yellow socks and bracelet for high fall risk patients  - Consider moving patient to room near nurses station  Outcome: Progressing  Goal: Maintain or return to baseline ADL function  Description: INTERVENTIONS:  -  Assess patient's ability to carry out ADLs; assess patient's baseline for ADL function and identify physical deficits which impact ability to perform ADLs (bathing, care of mouth/teeth, toileting, grooming, dressing, etc )  - Assess/evaluate cause of self-care deficits   - Assess range of motion  - Assess patient's mobility; develop plan if impaired  - Assess patient's need for assistive devices and provide as appropriate  - Encourage maximum independence but intervene and supervise when necessary  - Involve family in performance of ADLs  - Assess for home care needs following discharge   - Consider OT consult to assist with ADL evaluation and planning for discharge  - Provide patient education as appropriate  Outcome: Progressing  Goal: Maintains/Returns to pre admission functional level  Description: INTERVENTIONS:  - Perform BMAT or MOVE assessment daily    - Set and communicate daily mobility goal to care team and patient/family/caregiver     - Collaborate with rehabilitation services on mobility goals if consulted  - Perform Range of Motion 3 times a day  - Reposition patient every 2 hours if unable to reposition self  - Record patient progress and toleration of activity level   Outcome: Progressing     Problem: DISCHARGE PLANNING  Goal: Discharge to home or other facility with appropriate resources  Description: INTERVENTIONS:  - Identify barriers to discharge w/patient and caregiver  - Arrange for needed discharge resources and transportation as appropriate  - Identify discharge learning needs (meds, wound care, etc )  - Arrange for interpretive services to assist at discharge as needed  - Refer to Case Management Department for coordinating discharge planning if the patient needs post-hospital services based on physician/advanced practitioner order or complex needs related to functional status, cognitive ability, or social support system  Outcome: Progressing     Problem: Knowledge Deficit  Goal: Patient/family/caregiver demonstrates understanding of disease process, treatment plan, medications, and discharge instructions  Description: Complete learning assessment and assess knowledge base    Interventions:  - Provide teaching at level of understanding  - Provide teaching via preferred learning methods  Outcome: Progressing     Problem: RESPIRATORY - ADULT  Goal: Achieves optimal ventilation and oxygenation  Description: INTERVENTIONS:  - Assess for changes in respiratory status  - Assess for changes in mentation and behavior  - Position to facilitate oxygenation and minimize respiratory effort  - Oxygen administered by appropriate delivery if ordered  - Initiate smoking cessation education as indicated  - Encourage broncho-pulmonary hygiene including cough, deep breathe, Incentive Spirometry  - Assess the need for suctioning and aspirate as needed  - Assess and instruct to report SOB or any respiratory difficulty  - Respiratory Therapy support as indicated  Outcome: Progressing

## 2023-04-28 NOTE — CONSULTS
INTERPROFESSIONAL (PHONE) Diana Rivera 66 y o  male MRN: [id number]  Encounter Date: 04/28/23      Reason for Consult / Principal Problem: Altered mental status  Consulting Provider: Prieto Owens MD, MS    Requesting Provider: Dr Thu Bullock      Patient not seen, recommendation based on information provided by primary team physician  over the phone and the record review  ASSESSMENT:  82-year-old male with a complicated past medical history including diabetes, hypertension, prolonged smoking history, recent diagnosed lung/neck mass, subclavian vein thrombosis on Eliquis, who was admitted for confusion, short of breath and found to have pulmonary embolism  Patient was found to have hypercalcemia  Neurology was consulted after MRI showed tiny subacute infarct in left cerebellum  Per primary team, patient's neuro exam is most significant for not able to answer orientation question  Possible mild right facial droop and bilateral LE drift  RECOMMENDATIONS:  Subacute punctate left cerebellum infarct  -Recommend to complete stroke work-up with CTA head and neck  -The size of the infarct unlikely explain patient altered mental status   -Etiology of the stroke, suspect related with a hypercoag status under the setting of a malignancy and thrombosis  However, other etiology, such as cardiac embolic cannot completely rule out  Pending complete work-up  -okay to continue Eliquis, pending CTA head and neck, may consider to add aspirin based on results      Reported altered mental status  -Per primary team, patient neurological examination was significant for not able to answer questions, bilateral LE drift   -The differential diagnosis today is broad, could be well be metabolic encephalopathy that related with hypoxia, hypercalcemia, we will defer the management to the primary team   -The tiny subacute left cerebellar infarct should not cause altered mental status  -Other possibilities, including but not limited to CNS infection, paraneoplastic syndrome, seizure cannot completely rule out  If patient's mental status does not improve back to baseline quickly after metabolic disturbance addressed, low threshold to perform lumbar puncture and obtain routine EEG for further investigation  Please call neurology for any question  Total time spent in review of data, discussion with requesting provider and rendering advice was  31 + minutes, >50% of the total time devoted to medical consultative verbal/EMR discussion between providers  Written report will be generated in the EMR

## 2023-04-28 NOTE — ASSESSMENT & PLAN NOTE
Small right pleural effusion noted on CT  Similar in appearance to April 20, unlikely etiology for acute hypoxia  Likely reactive in setting of lung mass  No emergent treatment at this time  Check BNP; consider TTE pending results

## 2023-04-28 NOTE — ASSESSMENT & PLAN NOTE
Recently discovered lung and neck masses, likely malignant  Patient denies any shortness of breath and has no overt dyspnea despite mild acute hypoxia  CT April 20 did not indicate any airway obstruction at that time, and patient has no stridor or other overt indication of airway obstruction now  Neck mass is unlikely etiology of acute hypoxia and does not require urgent treatment  See diagnosis of lung mass for further detail  Patient evaluated by pulmonology for lung mass  Recommends inpatient biopsy since patient developed bilateral PE and remain on heparin drip  Tk with IR, will try to do biopsy on Monday, hold heparin Sunday night

## 2023-04-28 NOTE — ED PROVIDER NOTES
History  Chief Complaint   Patient presents with    Altered Mental Status     Pt since yesterday has been increasingly confused  Pt had unsteady gait walking back to room, pt also having a cough and believes he has pneumonia  Pt had recent dx of blood clot last week  Patient is a 82-year-old male brought to the emergency department by wife with complaints of shortness of breath, wife reports patient's symptoms have been worsening over the past week, he has become slightly confused, especially since 1:00 in the afternoon, nursing staff noted patient to have a staggering gait upon ambulation to triage room, and therefore initiated a stroke alert, patient denies any speech impairment, no headache, he reports generalized weakness with difficulty ambulating but cannot specify whether he feels more weak on 1 side or the other, was recently seen approximately 1 week ago and diagnosed with pneumonia, later felt to be more likely noninfectious and related to primary lung cancer with mediastinal metastases          Prior to Admission Medications   Prescriptions Last Dose Informant Patient Reported? Taking?   allopurinol (ZYLOPRIM) 300 mg tablet   Yes No   Sig: Take 1 tablet by mouth in the morning   apixaban (Eliquis) 5 mg   No No   Sig: Take 2 tablets (10 mg total) by mouth 2 (two) times a day for 7 days, THEN 1 tablet (5 mg total) 2 (two) times a day for 23 days  glipiZIDE (GLUCOTROL XL) 5 mg 24 hr tablet   Yes No   Sig: Take 5 mg by mouth daily   simvastatin (ZOCOR) 20 mg tablet   Yes No   Sig: Take 1 tablet by mouth in the morning   valsartan (DIOVAN) 160 mg tablet   Yes No   Sig: Take 1 tablet by mouth daily      Facility-Administered Medications: None       Past Medical History:   Diagnosis Date    Bilateral pulmonary embolism ([facility] ) 4/28/2023    [facility]        Past Surgical History:   Procedure Laterality Date    TONSILLECTOMY         History reviewed  No pertinent family history    I have reviewed and agree with the history as documented  E-Cigarette/Vaping    E-Cigarette Use Never User      E-Cigarette/Vaping Substances     Social History     Tobacco Use    Smoking status: Every Day     Packs/day: 1 00     Types: Cigarettes    Smokeless tobacco: Never   Vaping Use    Vaping Use: Never used   Substance Use Topics    Alcohol use: Yes    Drug use: Not Currently       Review of Systems   Constitutional: Negative  HENT: Positive for congestion  Eyes: Negative  Respiratory: Positive for cough and shortness of breath  Cardiovascular: Negative  Gastrointestinal: Negative  Endocrine: Negative  Genitourinary: Negative  Musculoskeletal: Negative  Skin: Negative  Allergic/Immunologic: Negative  Neurological: Negative  Hematological: Negative  Psychiatric/Behavioral: Positive for confusion  Physical Exam  Physical Exam  Constitutional:       Appearance: He is well-developed  HENT:      Head: Normocephalic  Mouth/Throat:      Mouth: Mucous membranes are moist    Eyes:      Extraocular Movements: Extraocular movements intact  Cardiovascular:      Rate and Rhythm: Tachycardia present  Heart sounds: Normal heart sounds  Pulmonary:      Effort: Pulmonary effort is normal       Breath sounds: Wheezing and rhonchi present  Abdominal:      Palpations: Abdomen is soft  Musculoskeletal:         General: Normal range of motion  Cervical back: Normal range of motion  Skin:     General: Skin is warm  Neurological:      Mental Status: He is alert  GCS: GCS eye subscore is 4  GCS verbal subscore is 5  GCS motor subscore is 6  Cranial Nerves: No cranial nerve deficit, dysarthria or facial asymmetry  Sensory: Sensation is intact  No sensory deficit  Motor: No weakness or pronator drift        Comments: Good strength in all 4 extremities, no facial asymmetry         Vital Signs  ED Triage Vitals   Temperature Pulse Respirations Blood Pressure SpO2   04/28/23 0035 04/28/23 0030 04/28/23 0030 04/28/23 0030 04/28/23 0030   98 1 °F (36 7 °C) (!) 111 19 139/76 93 %      Temp Source Heart Rate Source Patient Position - Orthostatic VS BP Location FiO2 (%)   04/28/23 0035 04/28/23 0030 04/28/23 0030 04/28/23 0030 --   Temporal Monitor Lying Right arm       Pain Score       --                  Vitals:    04/28/23 0315 04/28/23 0330 04/28/23 0345 04/28/23 0400   BP: 139/63 152/70 136/62 141/77   Pulse: 95 101 95 94   Patient Position - Orthostatic VS: Lying Lying Lying Lying         Visual Acuity  Visual Acuity    Flowsheet Row Most Recent Value   L Pupil Size (mm) 3   R Pupil Size (mm) 3          ED Medications  Medications   heparin (porcine) 25,000 units in 0 45% NaCl 250 mL infusion (premix) (18 Units/kg/hr × 95 kg (Order-Specific) Intravenous New Bag 4/28/23 0357)   iohexol (OMNIPAQUE) 350 MG/ML injection (SINGLE-DOSE) 100 mL (100 mL Intravenous Given 4/28/23 0308)       Diagnostic Studies  Results Reviewed     Procedure Component Value Units Date/Time    HS Troponin I 2hr [[id number]]  (Normal) Collected: 04/28/23 0304    Lab Status: Final result Specimen: Blood from Arm, Left Updated: 04/28/23 0402     hs TnI 2hr 36 ng/L      Delta 2hr hsTnI -8 ng/L     FLU/RSV/COVID - if FLU/RSV clinically relevant [[id number]]  (Normal) Collected: 04/28/23 0057    Lab Status: Final result Specimen: Nares from Nose Updated: 04/28/23 0309     SARS-CoV-2 Negative     INFLUENZA A PCR Negative     INFLUENZA B PCR Negative     RSV PCR Negative    Narrative:      FOR PEDIATRIC PATIENTS - copy/paste COVID Guidelines URL to browser: [email or web address]    SARS-CoV-2 assay is a Nucleic Acid Amplification assay intended for the  qualitative detection of nucleic acid from SARS-CoV-2 in nasopharyngeal  swabs  Results are for the presumptive identification of SARS-CoV-2 RNA      Positive results are indicative of infection with SARS-CoV-2, the virus  causing COVID-19, but do not rule out bacterial infection or co-infection  with other viruses  Laboratories within the [place] and its  territories are required to report all positive results to the appropriate  public health authorities  Negative results do not preclude SARS-CoV-2  infection and should not be used as the sole basis for treatment or other  patient management decisions  Negative results must be combined with  clinical observations, patient history, and epidemiological information  This test has not been FDA cleared or approved  This test has been authorized by FDA under an Emergency Use Authorization  (EUA)  This test is only authorized for the duration of time the  declaration that circumstances exist justifying the authorization of the  emergency use of an in vitro diagnostic tests for detection of SARS-CoV-2  virus and/or diagnosis of COVID-19 infection under section 564(b)(1) of  the Act, 21 U  S C  254TKV-9(W)(1), unless the authorization is terminated  or revoked sooner  The test has been validated but independent review by FDA  and CLIA is pending  Test performed using [employer] Gene[employer]pert: This RT-PCR assay targets N2,  a region unique to SARS-CoV-2  A conserved region in the E-gene was chosen  for pan-Sarbecovirus detection which includes SARS-CoV-2  According to CMS-2020-01-R, this platform meets the definition of high-throughput technology      HS Troponin I 4hr [[id number]]     Lab Status: No result Specimen: Blood     Basic metabolic panel [[id number]]  (Abnormal) Collected: 04/28/23 0047    Lab Status: Final result Specimen: Blood from Arm, Left Updated: 04/28/23 0159     Sodium 128 mmol/L      Potassium 3 5 mmol/L      Chloride 90 mmol/L      CO2 29 mmol/L      ANION GAP 9 mmol/L      BUN 12 mg/dL      Creatinine 0 83 mg/dL      Glucose 110 mg/dL      Calcium 13 4 mg/dL      eGFR 94 ml/min/1 73sq m     Narrative:      National Kidney Disease Foundation guidelines for Chronic Kidney Disease (CKD):     Stage 1 with normal or high GFR (GFR > 90 mL/min/1 73 square meters)    Stage 2 Mild CKD (GFR = 60-89 mL/min/1 73 square meters)    Stage 3A Moderate CKD (GFR = 45-59 mL/min/1 73 square meters)    Stage 3B Moderate CKD (GFR = 30-44 mL/min/1 73 square meters)    Stage 4 Severe CKD (GFR = 15-29 mL/min/1 73 square meters)    Stage 5 End Stage CKD (GFR <15 mL/min/1 73 square meters)  Note: GFR calculation is accurate only with a steady state creatinine    Protime-INR [[id number]]  (Abnormal) Collected: 04/28/23 0047    Lab Status: Final result Specimen: Blood from Arm, Left Updated: 04/28/23 0156     Protime 20 1 seconds      INR 1 71    APTT [[id number]]  (Abnormal) Collected: 04/28/23 0047    Lab Status: Final result Specimen: Blood from Arm, Left Updated: 04/28/23 0156     PTT 38 seconds     HS Troponin 0hr (reflex protocol) [[id number]]  (Normal) Collected: 04/28/23 0047    Lab Status: Final result Specimen: Blood from Arm, Left Updated: 04/28/23 0131     hs TnI 0hr 44 ng/L     CBC and Platelet [[id number]]  (Abnormal) Collected: 04/28/23 0047    Lab Status: Final result Specimen: Blood from Arm, Left Updated: 04/28/23 0102     WBC 16 47 Thousand/uL      RBC 4 45 Million/uL      Hemoglobin 12 9 g/dL      Hematocrit 37 9 %      MCV 85 fL      MCH 29 0 pg      MCHC 34 0 g/dL      RDW 12 5 %      Platelets 330 Thousands/uL      MPV 9 1 fL     Fingerstick Glucose (POCT) [[id number]]  (Normal) Collected: 04/28/23 0035    Lab Status: Final result Updated: 04/28/23 0044     POC Glucose 116 mg/dl                  CTA ED chest PE study   Final Result by Kevin Steinberg MD (04/28 0871)      Left lower lobe segmental pulmonary embolism  Right lower lobe segmental pulmonary embolism         Massively extensively diffuse mediastinal and hilar adenopathy resulting in mass effect on the trachea and pulmonary arteries bilaterally         Moderate diffuse edematous changes throughout the lungs  Right right lower lobe pulmonary infarction    Right middle lobe atelectasis secondary to mass effect  I personally discussed this study with Geeta Rollins on 4/28/2023 3:38 AM             Workstation performed: [id number]         CT head wo contrast   Final Result by Bruce Brenner MD (04/28 3488)      No acute intracranial abnormality  Workstation performed: [id number]                    Procedures  ECG 12 Lead Documentation Only    Date/Time: 4/28/2023 1:02 AM  Performed by: Bird Mckeon DO  Authorized by: Bird Mckeon DO     Indications / Diagnosis:  Shortness of breath  ECG reviewed by me, the ED Provider: yes    Patient location:  ED  Previous ECG:     Comparison to cardiac monitor: Yes    Interpretation:     Interpretation: non-specific    Rate:     ECG rate:  103    ECG rate assessment: tachycardic    Rhythm:     Rhythm: sinus tachycardia    QRS:     QRS intervals: Wide  Conduction:     Conduction: abnormal      Abnormal conduction: non-specific intraventricular conduction delay    ST segments:     ST segments:  Normal  T waves:     T waves: normal               ED Course  ED Course as of 04/28/23 0419   Fri Apr 28, 2023   0039 Stroke alert called by nursing staff prior to my evaluation of patient, discussed with on-call neurologist, symptoms have been progressively worsening over the past week according to wife, on exam neurologically intact, will proceed with a CT brain, however stroke alert can be canceled                               SBIRT 20yo+    Flowsheet Row Most Recent Value   Initial Alcohol Screen: US AUDIT-C     1  How often do you have a drink containing alcohol? 0 Filed at: 04/28/2023 0051   2  How many drinks containing alcohol do you have on a typical day you are drinking? 0 Filed at: 04/28/2023 0051   3a  Male UNDER 65: How often do you have five or more drinks on one occasion? 0 Filed at: 04/28/2023 0051   3b   FEMALE Any Age, or MALE 65+: How often do you have 4 or more drinks on one occassion? 0 Filed at: 04/28/2023 0051   Audit-C Score 0 Filed at: 04/28/2023 0292   MARIUSZ: How many times in the past year have you    Used an illegal drug or used a prescription medication for non-medical reasons? Never Filed at: 04/28/2023 0051                    Medical Decision Making  Patient is a 79-year-old male, recently diagnosed with metastatic lung cancer, brought to the emergency department by wife for complaints of confusion with shortness of breath, wife reports has been worsening since he was discharged from this hospital a week ago, nursing staff was concerned that patient had an unsteady gait and therefore a stroke alert was called, on exam patient has no facial asymmetry, equal strength in all 4 extremities, no speech impairment, he is noted to be hypoxic on room air and was placed on nasal cannula oxygen, patient was discussed with neurologist who is in agreement with discontinuing stroke alert since patient's symptoms have been worsening over the past week with no clear-cut last known well time, combined with physical exam findings, work-up in the emergency department with several lab abnormalities, CTA of the chest consistent with bilateral subsegmental PEs, patient's troponin mildly elevated, remained stable on nasal cannula oxygen, therefore admission was advised, heparin drip was started and patient was discussed with hospitalist service who agreed to admit for further evaluation and treatment    AMS (altered mental status): acute illness or injury  Bilateral pulmonary embolism ([address] ): acute illness or injury  Confusion: acute illness or injury  Hypercalcemia: acute illness or injury  Hyponatremia: acute illness or injury  Hypoxia: acute illness or injury  Amount and/or Complexity of Data Reviewed  Independent Historian: spouse  External Data Reviewed: labs, radiology, ECG and notes  Labs: ordered    Radiology: ordered  ECG/medicine tests: ordered and independent interpretation performed  Risk  Prescription drug management  Decision regarding hospitalization  Disposition  Final diagnoses:   AMS (altered mental status)   Confusion   Bilateral pulmonary embolism (HCC)   Hypercalcemia   Hyponatremia   Hypoxia     Time reflects when diagnosis was documented in both MDM as applicable and the Disposition within this note     Time User Action Codes Description Comment    4/28/2023 12:38 AM Polanczyk Rodman Israel Add [R41 82] AMS (altered mental status)     4/28/2023  3:53 AM Polanczyk Rodman Israel Add [R41 0] Confusion     4/28/2023  3:53 AM Polanczyk, Rodman Israel Add [I26 99] Bilateral pulmonary embolism ([address] )     4/28/2023  3:54 AM Polanczyk Rodman Israel Add [E83 52] Hypercalcemia     4/28/2023  3:54 AM Polanczyk Rodman Israel Add [E87 1] Hyponatremia     4/28/2023  3:54 AM Polanczyk Rodman Israel Add [R09 02] Hypoxia       ED Disposition     ED Disposition   Admit    Condition   Stable    Date/Time   Fri Apr 28, 2023  3:54 AM    Comment   Case was discussed with Dr Lars Healy and the patient's admission status was agreed to be Admission Status: inpatient status to the service of Dr Lars Healy   Follow-up Information    None         Patient's Medications   Discharge Prescriptions    No medications on file       No discharge procedures on file      PDMP Review     None          ED Provider  Electronically Signed by           Madie Wright DO  04/28/23 8772

## 2023-04-28 NOTE — ASSESSMENT & PLAN NOTE
Mild hyponatremia (128) at time of presentation, down from 132 on April 21  Likely due to underlying malignancy and recent poor oral intake  Unlikely this contributes significantly to patient's acute encephalopathy based on duration and mild nature of hyponatremia  Patient appears euvolemic on clinical exam, but has some pulmonary edema on CT and is mildly hypoxic  Will give small NS bolus and reassess clinical condition and labs  Sodium level improved, with IV hydration    Nephrology consult appreciated

## 2023-04-28 NOTE — CONSULTS
"Pulmonary Consultation   Tonio Hwang 66 y o  male MRN: [id number]  Unit/Bed#: -01 Encounter: [id number]      Reason for consultation: PE, Lung mass    Requesting physician: Dr Yamileth Joshua    Impressions/Recommendations:     · Right middle lobe lung mass with extensive mediastinal, axillary and cervical adenopathy -highly suggestive of metastatic disease  Would recommend obtaining tissue diagnosis via biopsy of either the axillary or cervical lymph nodes by interventional radiology  This will provide staging information as well as minimize risk of anesthesia  Discussed recommendations with Dr Yamileth Joshua   He will coordinate with IR    · Bilateral pulmonary embolism and subclavian vein thrombosis-would continue IV heparin pending biopsy    · Acute hypoxic respiratory failure secondary to PE-oxygenation is stable on 2 L nasal cannula    · Ongoing tobacco use-encourage smoking cessation  Nicotine replacement if needed    Chief Complaint: \"Shortness of breath\"    History of Present Illness   HPI:  Tonio Hwang is a 66 y o  male who was admitted yesterday with acute encephalopathy  He was recently hospitalized with subclavian thrombus and found to have extensive mediastinal mass with cervical adenopathy  He was started on Eliquis and represented yesterday with confusion  CT of the chest showed bilateral pulmonary emboli and patient is now on IV heparin infusion  Patient remains somewhat confused but is able to give a basic history  He does report a cough productive of yellow sputum  He denies hemoptysis  He denies wheezing  He has shortness of breath with exertion  He has a significant smoking history of 1 pack/day for the past 30 years, he has recently cut back to 1 pack/week  He has lost 10 pounds over the past month  Review of Systems   Constitutional: Positive for fatigue and unexpected weight change  Negative for chills and fever  HENT: Negative for postnasal drip and sore throat      Eyes: Negative " for visual disturbance  Respiratory:        As noted in HPI   Cardiovascular: Negative for chest pain  Gastrointestinal: Negative for abdominal pain, diarrhea and vomiting  Musculoskeletal: Negative for arthralgias  Skin: Negative for rash  Neurological: Negative for headaches  Hematological: Negative for adenopathy  Psychiatric/Behavioral: Positive for confusion  All other systems reviewed and are negative  All other 12-point review of systems are negative  Historical Information   Past Medical History:   Diagnosis Date    Bilateral pulmonary embolism ([place] [id number] ) 04/28/2023    Current smoker     Diabetes mellitus (HCC)     GERD (gastroesophageal reflux disease)     Gout     Hypertension     Lung cancer ([place] [address] )     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)     Mixed dyslipidemia     Pleural effusion, right 04/28/2023    Subclavian vein thrombosis (HCC)     Type 2 diabetes mellitus without complication, without long-term current use of insulin ([place] [address] ) 04/20/2023     Past Surgical History:   Procedure Laterality Date    ANAL FISSURECTOMY      TONSILLECTOMY       History reviewed  No pertinent family history      Tobacco history: Up to 1 pack/day for the past 30 years, currently smoking 1 pack/week    Family history: Negative from pulmonary perspective    Meds/Allergies   Current Facility-Administered Medications   Medication Dose Route Frequency    acetaminophen (TYLENOL) tablet 650 mg  650 mg Oral Q6H PRN    allopurinol (ZYLOPRIM) tablet 300 mg  300 mg Oral Daily    aluminum-magnesium hydroxide-simethicone (MYLANTA) oral suspension 30 mL  30 mL Oral Q6H PRN    calcitonin (salmon) (MIACALCIN) injection 396 Units  4 Units/kg Subcutaneous Q12H [address]    docusate sodium (COLACE) capsule 100 mg  100 mg Oral BID PRN    heparin (porcine) 25,000 units in 0 45% NaCl 250 mL infusion (premix)  3-30 Units/kg/hr (Order-Specific) Intravenous Titrated    insulin lispro (HumaLOG) 100 units/mL subcutaneous "injection 1-6 Units  1-6 Units Subcutaneous HS    insulin lispro (HumaLOG) 100 units/mL subcutaneous injection 1-6 Units  1-6 Units Subcutaneous TID AC    losartan (COZAAR) tablet 100 mg  100 mg Oral Daily    magnesium sulfate 2 g/50 mL IVPB (premix) 2 g  2 g Intravenous Once    ondansetron (ZOFRAN) injection 4 mg  4 mg Intravenous Q4H PRN    pravastatin (PRAVACHOL) tablet 40 mg  40 mg Oral Daily With Dinner    sodium chloride 0 9 % infusion  100 mL/hr Intravenous Continuous     No Known Allergies    Vitals: Blood pressure 128/72, pulse 92, temperature 98 6 °F (37 °C), temperature source Oral, resp  rate 21, height 6' 1\" (1 854 m), weight 93 9 kg (207 lb 0 2 oz), SpO2 91 % , 2L, Body mass index is 27 31 kg/m²  Intake/Output Summary (Last 24 hours) at 4/28/2023 1012  Last data filed at 4/28/2023 0901  Gross per 24 hour   Intake 52 44 ml   Output 1150 ml   Net -1097 56 ml     Physical Exam  Constitutional:       General: He is not in acute distress  Appearance: He is ill-appearing  He is not toxic-appearing  HENT:      Head: Normocephalic  Mouth/Throat:      Pharynx: No oropharyngeal exudate  Eyes:      General: No scleral icterus  Pupils: Pupils are equal, round, and reactive to light  Neck:      Vascular: No JVD  Cardiovascular:      Rate and Rhythm: Normal rate and regular rhythm  Abdominal:      Palpations: Abdomen is soft  Tenderness: There is no abdominal tenderness  Musculoskeletal:      Cervical back: Neck supple  Comments: Right axillary adenopathy   Lymphadenopathy:      Cervical: Cervical adenopathy present  Skin:     General: Skin is warm and dry  Neurological:      Mental Status: He is alert  Motor: Weakness present  Labs: I have personally reviewed pertinent lab results      Results from last 7 days   Lab Units 04/28/23  0629 04/28/23  0047   WBC Thousand/uL 14 89* 16 47*   HEMOGLOBIN g/dL 12 7 12 9   HEMATOCRIT % 37 7 37 9   PLATELETS " Thousands/uL 516* 490*         Results from last 7 days   Lab Units 04/28/23  0629 04/28/23  0047   POTASSIUM mmol/L 3 6 3 5   CHLORIDE mmol/L 92* 90*   CO2 mmol/L 30 29   BUN mg/dL 12 12   CREATININE mg/dL 0 81 0 83   CALCIUM mg/dL 13 3* 13 4*   ALK PHOS U/L 151*  --    ALT U/L 137*  --    AST U/L 75*  --      Results from last 7 days   Lab Units 04/28/23 0047   INR  1 71*   PTT seconds 38*     Results from last 7 days   Lab Units 04/28/23  0629   MAGNESIUM mg/dL 1 4*     Imaging and other studies: I have personally reviewed pertinent reports  and I have personally reviewed pertinent films in PACS chest yesterday measuring 5 76 x 3 cm with extensive mediastinal and hilar adenopathy as well as cervical and axillary adenopathy  There is pulmonary embolus in bilateral lower lobes    Code Status: Level 1 - Full Code    Thank you for allowing us to participate in the care of your patient      Karine Taylor DO

## 2023-04-28 NOTE — QUICK NOTE
Patient seen and evaluated during the rounds  Family member at the bedside  MRI came back positive for cerebellar infarct, NIHSS score is 4 (1-LOC, 1-LOC questions, right facial droop, lower extremity drift bilaterally)  Discussed the case with on-call neurologist, recommends a stroke work-up completion  CTA head neck shows atherosclerotic change -As per neurology, suspect stroke etiology related to underlying hypercoagulable status secondary to malignancy  Patient remained on heparin drip due to PE, will continue at this time, neurology recommends to add aspirin once patient off of heparin drip  Check PT/OT, echocardiogram shows no PFO  Pending speech and swallow evaluation-but advised the nurse to check bedside swallow evaluation  Continue statin, heparin drip at this time  Family member updated on the treatment plan and option  Patient also evaluated by pulmonologist, recommends biopsy, since patient remained on heparin drip, recommends inpatient biopsy  Discussed with IR, will plan for biopsy on Monday, hold heparin and placed 6 hours prior-especially Sunday evening  Family member updated  And also evaluated by nephrology

## 2023-04-28 NOTE — ASSESSMENT & PLAN NOTE
Recently diagnosed condition, presumably due to underlying malignancy  Treated with eliquis since last week; switched to heparin infusion in ER due to new bilateral PE and decline in clinical condition  Continue heparin at this time  May consider to switch to different anticoagulation, may be Lovenox considering patient's underlying lung mass

## 2023-04-28 NOTE — ASSESSMENT & PLAN NOTE
Patient meets 3 of 4 SIRS criteria with leukocytosis, tachycardia, and tachypnea  Associated acute organ dysfunction manifesting as acute encephalopathy and acute hypoxic respiratory failure  Suspect SIRS due to combination of pulmonary emboli, underlying malignancy, and metabolic abnormalities (ie, hypercalcemia, hyponatremia)  COVID-19, RSV, flu negative  Will check procalcitonin, lactic acid, and blood cultures  Defer antibiotics based on more likely non-infectious etiology; monitor clinical condition and serial labs at this time

## 2023-04-28 NOTE — ASSESSMENT & PLAN NOTE
Moderate-severe hypercalcemia (13 4) with acute encephalopathy  Likely due to underlying malignancy  PTH (13 8) low on April 21  Vitamin D level not previously checked, but patient is not on any known vitamin D supplementation  Recheck calcium, along with ionized calcium and vitamin D   IV crystalloids limited by pulmonary edema; will give small NS bolus at this time, along with calcitonin 4 units/kg Q12 hours  Consider addition of zoledronic acid pending labs  Calcium is improving, nephrology consult appreciated, patient remained on IV hydration    Plan is to proceed with Zometa if lab improves

## 2023-04-28 NOTE — H&P
H&P Exam - Lauren Level 66 y o  male MRN: [id number]    Unit/Bed#: ED 01 Encounter: [id number]      Assessment/Plan     * Acute encephalopathy  Assessment & Plan  Patient presenting for acute encephalopathy for several hours  Recent diagnosis of lung/neck masses (likely malignant) and subclavian thrombus  Patient mildly hypoxic at presentation (2 LNC), but encephalopathy not resolving with treatment of hypoxia in ER  Patient not taking any known psychoactive medications  Most likely remaining etiologies on differential diagnosis include:    1)  Hypercalcemia (13 4) - see associated diagnosis below for plan of care  2)  Brain metastasis - will obtain MRI brain without/with contrast to evaluate further  3)  Less likely infectious etiology  Patient afebrile with negative COVID-19/flu/RSV test   Check urine/blood cultures  Pleural effusion, right  Assessment & Plan  Small right pleural effusion noted on CT  Similar in appearance to April 20, unlikely etiology for acute hypoxia  Likely reactive in setting of lung mass  No emergent treatment at this time  Check BNP; consider TTE pending results  Acute respiratory failure with hypoxia [facility])  Assessment & Plan  Patient requiring 2 LNC at time of presentation  Likely due to combination of recently discovered lung mass, bilateral PE, and right pleural effusion  See associated diagnoses for plan of care  Check BNP; consider TTE to evaluate cardiac function as well  Severe systemic inflammatory response syndrome (SIRS) (HCC)  Assessment & Plan  Patient meets 3 of 4 SIRS criteria with leukocytosis, tachycardia, and tachypnea  Associated acute organ dysfunction manifesting as acute encephalopathy and acute hypoxic respiratory failure  Suspect SIRS due to combination of pulmonary emboli, underlying malignancy, and metabolic abnormalities (ie, hypercalcemia, hyponatremia)  COVID-19, RSV, flu negative    Will check procalcitonin, lactic acid, and blood cultures  Defer antibiotics based on more likely non-infectious etiology; monitor clinical condition and serial labs at this time  Bilateral pulmonary embolism [facility])  Assessment & Plan  Segmental PE noted in bilateral lower lobes, new from April 20  Patient also has previously diagnosed right subclavian thrombus  Presumably due to underlying malignancy  -140s/60-70s in ER, slight tachycardia 100s, no heart strain on EKG, troponin stable in 40s x3 overnight  Patient has mild hypoxia, but stable on 2 LNC  Started eliquis 1 week ago, switched to heparin infusion in ER  Will continue heparin for now pending further evaluation  Gout  Assessment & Plan  Chronic stable condition  Treated as outpatient with allopurinol 100 mg daily  Continue outpatient medication regimen  Essential hypertension  Assessment & Plan  Chronic stable condition  Treated as outpatient with valsartan 160 mg daily  Substitute losartan based on hospital formulary  Tobacco abuse  Assessment & Plan  Chronic condition  Encouraged cessation; patient declines assistance at this time  Offer nicotine patch during admission  Type 2 diabetes mellitus without complication, without long-term current use of insulin ([facility] [address] )  Assessment & Plan  No results found for: HGBA1C    Recent Labs     04/28/23  0035   POCGLU 116       Blood Sugar Average: Last 72 hrs:  (P) 116     Last hemoglobin A1c 6 2 in May 2022; recheck A1c with morning labs  Treated as outpatient with glipizide 5 mg daily  Hold oral diabetes medications and initiate insulin protocol during hospitalization  Subclavian vein thrombosis (HCC)  Assessment & Plan  Recently diagnosed condition, presumably due to underlying malignancy  Treated with eliquis since last week; switched to heparin infusion in ER due to new bilateral PE and decline in clinical condition  Continue heparin at this time        Hyponatremia  Assessment & Plan  Mild hyponatremia (128) at time of presentation, down from 132 on April 21  Likely due to underlying malignancy and recent poor oral intake  Unlikely this contributes significantly to patient's acute encephalopathy based on duration and mild nature of hyponatremia  Patient appears euvolemic on clinical exam, but has some pulmonary edema on CT and is mildly hypoxic  Will give small NS bolus and reassess clinical condition and labs  Hypercalcemia  Assessment & Plan  Moderate-severe hypercalcemia (13 4) with acute encephalopathy  Likely due to underlying malignancy  PTH (13 8) low on April 21  Vitamin D level not previously checked, but patient is not on any known vitamin D supplementation  Recheck calcium, along with ionized calcium and vitamin D   IV crystalloids limited by pulmonary edema; will give small NS bolus at this time, along with calcitonin 4 units/kg Q12 hours  Consider addition of zoledronic acid pending labs  Neck mass  Assessment & Plan  Recently discovered lung and neck masses, likely malignant  Patient denies any shortness of breath and has no overt dyspnea despite mild acute hypoxia  CT April 20 did not indicate any airway obstruction at that time, and patient has no stridor or other overt indication of airway obstruction now  Neck mass is unlikely etiology of acute hypoxia and does not require urgent treatment  See diagnosis of lung mass for further detail  Lung mass  Assessment & Plan  Recently diagnosed right lung and neck masses, likely malignant  Patient also diagnosed last week with subclavian vein thrombus, and now with bilateral segmental PE, likely due to same  Currently pending appointment for IR biopsy of neck mass to confirm diagnosis, followed by PET-CT  Patient then needs Pulmonary, Oncology, and Palliative Care appointments (referrals already in place)  Recommend close coordination with case management to assist in scheduling where needed        History of Present Illness History, ROS and PFSH unobtainable from patient due to acute encephalopathy  All history below obtained from patient's spouse Gardenia Quinones) at bedside, ER staff, and electronic record  HPI:  Adrianna Oseguera is a 66 y o  male who presents to ER for acute confusion since at least afternoon of presentation  He was recently hospitalized April 20-21 after presenting for cough and shortness of breath, which he suspected was pneumonia  Instead, symptoms are more likely due to malignancy, with right lung/neck masses, lymphadenopathy, and a right subclavian thrombus  He was discharged home with a new prescription for eliquis, along with plans for outpatient evaluation, including IR biopsy of neck mass, PET-CT, and appointments with Pulmonary, Oncology, and Palliative Care  His wife reports that he has remained short of breath with frequent cough this week, along with poor appetite and sleep  However, no acute change until day of presentation  He woke at about 0300, which is a couple hours earlier than usual, and stated that he needed to get labs done  He then went out to the living room, and she suspected he was going to rest in a recliner for improved comfort  He went to work for his usual shift (8-4), but left early, telling his supervisor that he needed to complete labs  He did not return to work, and the supervisor called his wife  In the meantime, the patient had already gone home, thinking his workday was over  Unclear whether he attempted to have labs drawn anywhere  Throughout the afternoon/evening, he remained disoriented, frequently asking inappropriate questions, possibly having visual hallucinations (seeing his dog in exam room), and making unusual statements  No headache, vertigo/lightheadedness, change in vision (aside from possible hallucination), change in speech, focal numbness/weakness, nausea/vomiting, or witnessed involuntary movements      PCP: Oscar Fofana, DO    Review of Systems   All other systems reviewed and are negative  Historical Information   Past Medical History:   Diagnosis Date    Bilateral pulmonary embolism ([facility] ) 04/28/2023    Current smoker     Diabetes mellitus (HCC)     GERD (gastroesophageal reflux disease)     Gout     Hypertension     Lung cancer ([facility] )     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)     Mixed dyslipidemia     Pleural effusion, right 04/28/2023    Subclavian vein thrombosis (HCC)     Type 2 diabetes mellitus without complication, without long-term current use of insulin ([place] [id number] ) 04/20/2023     Past Surgical History:   Procedure Laterality Date    TONSILLECTOMY       Social History   Social History     Substance and Sexual Activity   Alcohol Use Yes     Social History     Substance and Sexual Activity   Drug Use Not Currently     Social History     Tobacco Use   Smoking Status Every Day    Packs/day: 1 00    Types: Cigarettes   Smokeless Tobacco Never     E-Cigarette/Vaping    E-Cigarette Use Never User       E-Cigarette/Vaping Substances     Family History: History reviewed  No pertinent family history  Meds/Allergies   all medications and allergies reviewed  No Known Allergies    Reviewed PDMP to confirm prescribing pattern for controlled substances  Objective   Vitals: Blood pressure 141/77, pulse 94, temperature 98 1 °F (36 7 °C), temperature source Temporal, resp  rate 21, weight 99 1 kg (218 lb 7 6 oz), SpO2 94 %  No intake or output data in the 24 hours ending 04/28/23 0422    Invasive Devices     Peripheral Intravenous Line  Duration           Peripheral IV 04/28/23 Distal;Dorsal (posterior); Left Forearm <1 day    Peripheral IV 04/28/23 Left Antecubital <1 day                Physical Exam  Vitals and nursing note reviewed  Constitutional:       General: He is not in acute distress  Appearance: He is well-developed and normal weight  He is not ill-appearing, toxic-appearing or diaphoretic  Comments: Patient lying in ER bed    Alert and conversing easily, but clearly disoriented, frequently asking inappropriate questions and unable to provide any reliable history  No significant distress  Oxygen saturation 93% on 2 LNC supplemental oxygen  HENT:      Head: Normocephalic and atraumatic  Right Ear: External ear normal       Left Ear: External ear normal       Nose: Nose normal  No congestion or rhinorrhea  Mouth/Throat:      Mouth: Mucous membranes are moist       Pharynx: Oropharynx is clear  No oropharyngeal exudate or posterior oropharyngeal erythema  Eyes:      General: No scleral icterus  Right eye: No discharge  Left eye: No discharge  Conjunctiva/sclera: Conjunctivae normal       Pupils: Pupils are equal, round, and reactive to light  Neck:      Vascular: No JVD  Trachea: No tracheal deviation  Comments: Anterior neck grossly swollen, limiting palpation  Nontender  Cardiovascular:      Rate and Rhythm: Normal rate and regular rhythm  Pulses: Normal pulses  Heart sounds: Murmur heard  Systolic murmur is present with a grade of 2/6  No friction rub  No gallop  Pulmonary:      Effort: Pulmonary effort is normal  No respiratory distress  Breath sounds: No stridor  Examination of the right-middle field reveals decreased breath sounds  Examination of the right-lower field reveals decreased breath sounds  Decreased breath sounds present  No wheezing, rhonchi or rales  Chest:      Chest wall: No tenderness  Abdominal:      General: Bowel sounds are normal  There is no distension  Palpations: Abdomen is soft  There is no mass  Tenderness: There is no abdominal tenderness  There is no right CVA tenderness, left CVA tenderness, guarding or rebound  Musculoskeletal:         General: No swelling, tenderness, deformity or signs of injury  Cervical back: Neck supple  No muscular tenderness  Right lower leg: No edema  Left lower leg: No edema     Skin: General: Skin is warm and dry  Capillary Refill: Capillary refill takes less than 2 seconds  Coloration: Skin is not jaundiced or pale  Findings: No bruising, erythema, lesion or rash  Neurological:      General: No focal deficit present  Mental Status: He is alert  He is disoriented  GCS: GCS eye subscore is 4  GCS verbal subscore is 4  GCS motor subscore is 6  Cranial Nerves: No dysarthria or facial asymmetry  Sensory: No sensory deficit  Motor: No weakness, tremor, atrophy or abnormal muscle tone  Psychiatric:         Mood and Affect: Mood normal  Affect is labile  Cognition and Memory: Cognition is impaired  Memory is impaired  Comments: Unable to fully assess due to acute encephalopathy       Lab Results: I have personally reviewed pertinent reports  Imaging: I have personally reviewed pertinent reports  and I have personally reviewed pertinent films in PACS  EKG, Pathology, and Other Studies: I have personally reviewed pertinent reports  and I have personally reviewed pertinent films in PACS    Code Status: Level 1 - Full Code  Advance Directive and Living Will:      Power of :    POLST:      Counseling / Coordination of Care  Greater than 75 minutes spent on patient care, including review of electronic record, interpreting results of studies, face-to-face with patient (ie, conducting history/physical exam and discussing diagnosis, prognosis, and plan of care), entering orders, coordinating care with other providers and staff, and completing documentation

## 2023-04-28 NOTE — PLAN OF CARE
Problem: NEUROSENSORY - ADULT  Goal: Achieves stable or improved neurological status  Description: INTERVENTIONS  - Monitor and report changes in neurological status  - Monitor vital signs such as temperature, blood pressure, glucose, and any other labs ordered   - Initiate measures to prevent increased intracranial pressure  - Monitor for seizure activity and implement precautions if appropriate      Outcome: Progressing  Goal: Remains free of injury related to seizures activity  Description: INTERVENTIONS  - Maintain airway, patient safety  and administer oxygen as ordered  - Monitor patient for seizure activity, document and report duration and description of seizure to physician/advanced practitioner  - If seizure occurs,  ensure patient safety during seizure  - Reorient patient post seizure  - Seizure pads on all 4 side rails  - Instruct patient/family to notify RN of any seizure activity including if an aura is experienced  - Instruct patient/family to call for assistance with activity based on nursing assessment  - Administer anti-seizure medications if ordered    Outcome: Progressing  Goal: Achieves maximal functionality and self care  Description: INTERVENTIONS  - Monitor swallowing and airway patency with patient fatigue and changes in neurological status  - Encourage and assist patient to increase activity and self care  - Encourage visually impaired, hearing impaired and aphasic patients to use assistive/communication devices  Outcome: Progressing     Problem: Neurological Deficit  Goal: Neurological status is stable or improving  Description: Interventions:  - Monitor and assess patient's level of consciousness, motor function, sensory function, and level of assistance needed for ADLs  - Monitor and report changes from baseline  Collaborate with interdisciplinary team to initiate plan and implement interventions as ordered  - Provide and maintain a safe environment    - Consider seizure precautions  - Consider fall precautions  - Consider aspiration precautions  - Consider bleeding precautions  Outcome: Progressing     Problem: Activity Intolerance/Impaired Mobility  Goal: Mobility/activity is maintained at optimum level for patient  Description: Interventions:  - Assess and monitor patient  barriers to mobility and need for assistive/adaptive devices  - Assess patient's emotional response to limitations  - Collaborate with interdisciplinary team and initiate plans and interventions as ordered  - Encourage independent activity per ability   - Maintain proper body alignment  - Perform active/passive rom as tolerated/ordered  - Plan activities to conserve energy   - Turn patient as appropriate  Outcome: Progressing     Problem: Communication Impairment  Goal: Ability to express needs and understand communication  Description: Assess patient's communication skills and ability to understand information  Patient will demonstrate use of effective communication techniques, alternative methods of communication and understanding even if not able to speak  - Encourage communication and provide alternate methods of communication as needed  - Collaborate with case management/ for discharge needs  - Include patient/family/caregiver in decisions related to communication  Outcome: Progressing     Problem: Potential for Aspiration  Goal: Non-ventilated patient's risk of aspiration is minimized  Description: Assess and monitor vital signs, respiratory status, and labs (WBC)  Monitor for signs of aspiration (tachypnea, cough, rales, wheezing, cyanosis, fever)  - Assess and monitor patient's ability to swallow  - Place patient up in chair to eat if possible  - HOB up at 90 degrees to eat if unable to get patient up into chair   - Supervise patient during oral intake  - Instruct patient/ family to take small bites    - Instruct patient/ family to take small single sips when taking liquids  - Follow patient-specific strategies generated by speech pathologist   Outcome: Progressing     Problem: Nutrition  Goal: Nutrition/Hydration status is improving  Description: Monitor and assess patient's nutrition/hydration status for malnutrition (ex- brittle hair, bruises, dry skin, pale skin and conjunctiva, muscle wasting, smooth red tongue, and disorientation)  Collaborate with interdisciplinary team and initiate plan and interventions as ordered  Monitor patient's weight and dietary intake as ordered or per policy  Utilize nutrition screening tool and intervene per policy  Determine patient's food preferences and provide high-protein, high-caloric foods as appropriate  - Assist patient with eating   - Allow adequate time for meals   - Encourage patient to take dietary supplement as ordered  - Collaborate with clinical nutritionist   - Include patient/family/caregiver in decisions related to nutrition    Outcome: Progressing

## 2023-04-28 NOTE — ASSESSMENT & PLAN NOTE
No results found for: HGBA1C    Recent Labs     04/28/23  0035   POCGLU 116       Blood Sugar Average: Last 72 hrs:  (P) 116     Last hemoglobin A1c 6 2 in May 2022; recheck A1c with morning labs  Treated as outpatient with glipizide 5 mg daily  Hold oral diabetes medications and initiate insulin protocol during hospitalization

## 2023-04-28 NOTE — OCCUPATIONAL THERAPY NOTE
Occupational Therapy Evaluation     Evaluation: [id number]  Treatment: [id number]    Patient Name: Kevin Sainz  Today's Date: 4/28/2023  Problem List  Principal Problem:    Acute encephalopathy  Active Problems:    Lung mass    Neck mass    Hypercalcemia    Hyponatremia    Subclavian vein thrombosis (HCC)    Type 2 diabetes mellitus without complication, without long-term current use of insulin (HCC)    Tobacco abuse    Essential hypertension    Gout    Bilateral pulmonary embolism (HCC)    Severe systemic inflammatory response syndrome (SIRS) (HCC)    Acute respiratory failure with hypoxia (HCC)    Pleural effusion, right    Past Medical History  Past Medical History:   Diagnosis Date    Bilateral pulmonary embolism ([address] ) 04/28/2023    Current smoker     Diabetes mellitus (HCC)     GERD (gastroesophageal reflux disease)     Gout     Hypertension     Lung cancer (HCC)     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)     Mixed dyslipidemia     Pleural effusion, right 04/28/2023    Subclavian vein thrombosis (HCC)     Type 2 diabetes mellitus without complication, without long-term current use of insulin ([facility] [address] ) 04/20/2023     Past Surgical History  Past Surgical History:   Procedure Laterality Date    ANAL FISSURECTOMY      TONSILLECTOMY           04/28/23 1018   Note Type   Note type Evaluation   Pain Assessment   Pain Assessment Tool 0-10   Pain Score No Pain   Restrictions/Precautions   Other Precautions Chair Alarm; Bed Alarm;Cognitive;Multiple lines; Fall Risk;O2;Telemetry   Home Living   Type of Home House  (5 SAM R HR)   Home Layout One level;Performs ADLs on one level; Able to live on main level with bedroom/bathroom   Bathroom Shower/Tub Walk-in shower   Bathroom Toilet Raised   Bathroom Equipment Shower chair;Grab bars [facility]   (no AD at home)   Additional Comments Pt reports living in a 1 story home with 5 SAM   Pt ambulates with no AD at baselne   Prior Function   Level of [place] Independent with ADLs;Independent with functional mobility; Independent with IADLS   Lives With Spouse   Receives Help From Family   IADLs Independent with driving; Independent with meal prep; Independent with medication management   Falls in the last 6 months 0   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)   Comments Pt reports completing ADLs, IADLs, functional ambulation and community mobility + driving @ I   ADL   Where Assessed Edge of bed   Grooming Assistance 5  Supervision/Setup   Grooming Deficit Setup;Verbal cueing;Supervision/safety; Increased time to complete   UB Dressing Assistance 5  Supervision/Setup   UB Dressing Deficit Setup;Verbal cueing;Supervision/safety; Increased time to complete   LB Dressing Assistance   ([address] )   [address]; Requires assistive device for steadying;Verbal cueing;Supervision/safety; Increased time to complete; Don/doff R sock; Don/doff L sock;Pull up over hips   Toileting Assistance    ([address] )   Toileting Deficit Steadying;Supervison/safety;Verbal cueing; Increased time to complete;Grab bar use;Clothing management up;Clothing management down;Perineal hygiene   Additional Comments Pt completing ADL tasks while seated at EOB  UB dressing @ S after set-up  LB dressing @ Steadying Assist including donning socks/pants around feet and CM around waist with no UB support  Pt requiring increased time and increased effort while donnign B socks   Please refer to treatment note fo rperformance during otileting and grooming tasks   Bed Mobility   Supine to Sit 5  Supervision   Additional items Increased time required;Verbal cues   Additional Comments HOB flat, no bedrails   Transfers   Sit to Stand   [facility] Assist)   Additional items Increased time required;Verbal cues   Stand to Sit   (Steadying Assist)   Additional items Increased time required;Verbal cues   Stand pivot   (Steadying Assist)   Additional items Increased time required;Verbal cues   Toilet "transfer   (Steadying Assist)   Additional items Increased time required;Standard toilet;Verbal cues   Additional Comments Pt completing functional transfers wiht use of no AD for UB support  Steadying Assist for STS and SPT with increased time and vc'ing for safety/technique  occasional swaying noted, although able to be self-corrected   Balance   Static Sitting Good   Dynamic Sitting Fair +   Static Standing Fair   Dynamic Standing Fair -   Activity Tolerance   Activity Tolerance Patient limited by fatigue   Medical Staff Made Aware [facility] with RN, Adiel Billingsley Assessment   RUE Assessment WFL   LUE Assessment   LUE Assessment WFL   Hand Function   Gross Motor Coordination Functional   Fine Motor Coordination Functional   Sensation   Light Touch No apparent deficits   Cognition   Overall Cognitive Status Impaired   Arousal/Participation Alert; Responsive; Cooperative   Attention Difficulty attending to directions   Orientation Level Oriented to person;Oriented to place;Oriented to situation  (partially to time reporting \"it's 4, now it's March  then reported \"It was April so now it is March  \" cues for month)   Memory Within functional limits   Following Commands Follows one step commands with increased time or repetition   Assessment   Limitation Decreased ADL status; Decreased UE strength;Decreased Safe judgement during ADL;Decreased cognition;Decreased endurance;Decreased self-care trans;Decreased high-level ADLs   Prognosis Good   Assessment Pt is a 66 y o  male, admitted to [address] [place] [address] 4/28/2023 d/t experiencing AMS  Dx: acute encephalopathy  Pt with PMHx impacting their performance during ADL tasks, including: recent lung CA with METS diagnosis, B PE, current smoker, DM, GERD, HTN  Prior to admission to the hospital Pt was performing ADLs without physical assistance  IADLs without physical assistance  Functional transfers/ambulation without physical assistance   Cognitive status " was PTA was intact  OT order placed to assess Pt's ADLs, cognitive status, and performance during functional tasks in order to maximize safety and independence while making most appropriate d/c recommendations  Pt's clinical presentation is currently unstable/unpredictable given new onset deficits that effect Pt's occupational performance and ability to safely return to PLOF including decrease activity tolerance, decrease standing balance, decrease performance during ADL tasks, decrease cognition, decrease safety awareness , decrease UB MS, decrease generalized strength, decrease activity engagement, decrease performance during functional transfers, steps to enter home, high fall risk and limited insight to deficits combined with medical complications of change in mental status, low SpO2 values, new onset O2 use, multiple readmissions, impulsivity during admission, need for input for mobility technique/safety and acute B PE  Pt on 3L during session maintaining >92% with minimal SOB noted  HR remained between 95-110bpm with activity  Personal factors affecting Pt at time of initial evaluation include: step(s) to enter environment, inability to perform IADLs, new need for AD, inability to navigate community distances, limited insight into impairments and decreased initiation and engagement  Pt will benefit from continued skilled OT services to address deficits as defined above and to maximize level independence/participation during ADLs and functional tasks to facilitate return toward PLOF and improved quality of life  From an occupational therapy standpoint, recommendation at time of d/c would be post acute rehab  Plan   Treatment Interventions ADL retraining;Functional transfer training;UE strengthening/ROM; Endurance training;Cognitive reorientation;Patient/family training;Equipment evaluation/education; Neuromuscular reeducation; Compensatory technique education;Continued evaluation; Energy conservation; Activityengagement   Goal Expiration Date 05/12/23   OT Treatment Day 1   OT Frequency 3-5x/wk   Recommendation   OT Discharge Recommendation Post acute rehabilitation services   [facility] Daily Activity Inpatient   Lower Body Dressing 3   Bathing 3   Toileting 3   Upper Body Dressing 3   Grooming 3   Eating 3   Daily Activity Raw Score 18   Daily Activity Standardized Score (Calc for Raw Score >=11) 38 66   [facility] Applied Cognition Inpatient   Following a Speech/Presentation 2   Understanding Ordinary Conversation 3   Taking Medications 2   Remembering Where Things Are Placed or Put Away 3   Remembering List of 4-5 Errands 2   Taking Care of Complicated Tasks 2   Applied Cognition Raw Score 14   Applied Cognition Standardized Score 32 02   Additional Treatment Session   Start Time 1034   End Time 1043   Treatment Assessment Pt seen for treatment session #1 this date  Pt alert and agreeable to participate at this time  Pt completing short distance ambulation into restroom @ Steadying Assist with no AD  Pt completing toileting tasks @ Steadying Assist including STS from toilet, CM aroudn waist and thorough pericare  Pt with incresed fatigue and unable to stand at sinkside at this itme  instead, Pt returnign to recliner chair and completing grooming tasks while seated OOB in recliner chair  At en dof session Pt's call bell in reach, chair alamr intact and all needs met at this time  Additional Treatment Day 1     The patient's raw score on the AM-PAC Daily Activity Inpatient Short Form is 18  A raw score of less than 19 suggests the patient may benefit from discharge to post-acute rehabilitation services  Please refer to the recommendation of the Occupational Therapist for safe discharge planning  Pt goals to be met by 5/12/2023    Pt will demonstrate ability to complete LB dressing @ Mod I in order to increase safety and independence during meaningful tasks     Pt will demonstrate ability to drake/doff socks/shoes while sitting EOb @ Mod i in order to increase safety and independence during meaningful tasks  Pt will demonstrate ability to complete toileting tasks including CM and pericare @ Mod I in order to increase safety and independence during meaningful tasks  Pt will demonstrate ability to complete EOB, chair, toilet/commode transfers @ Mod I in order to increase performance and participation during functional tasks  Pt will demonstrate ability to stand for 10+ minutes while maintaining G balance with use of least restrictive device for UB support PRN  Pt will demonstrate ability to tolerate 30-35 minute OT session with no vc'ing for deep breathing or use of energy conservation techniques in order to increase activity tolerance during functional tasks  Pt will demonstrate Good carryover of use of energy conservation/compensatory strategies during ADLs and functional tasks in order to increase safety and reduce risk for falls  Pt will demonstrate Good attention and participation in continued evaluation of functional ambulation house hold distances in order to assist with safe d/c planning  Pt will attend to continued cognitive assessments 100% of the time in order to provide most appropriate d/c recommendations  Pt will follow 100% simple 2-step commands and be A&O x4 consistently with environmental cues to increase participation in functional activities  Pt will identify 3 areas of interest/hobbies and 1 intervention on how to incorporate into daily life in order to increase interaction with environment and peers as well as increase participation in meaningful tasks  Pt will demonstrate 100% carryover of BUE HEP in order to increase BUE MS and increase performance during functional tasks upon d/c home      Vera Lyons OTR/L

## 2023-04-28 NOTE — ASSESSMENT & PLAN NOTE
Patient requiring 2 LNC at time of presentation  Likely due to combination of recently discovered lung mass, bilateral PE, and right pleural effusion  See associated diagnoses for plan of care  Echocardiogram showed:  Left Ventricle: Left ventricular cavity size is normal  Wall thickness is increased  There is mild to moderate concentric hypertrophy  The left ventricular ejection fraction is >70%  Systolic function is vigorous  Although no diagnostic regional wall motion abnormality was identified, this possibility cannot be completely excluded on the basis of this study  Diastolic function is mildly abnormal, consistent with grade I (abnormal) relaxation  Left atrial filling pressure is normal     Atrial Septum: No patent foramen ovale detected using saline contrast injection with provocation by abdominal compression    Aortic Valve: The leaflets are moderately thickened  The leaflets are moderately calcified  There is mildly reduced mobility  There is no evidence of regurgitation  There is mild stenosis  The aortic valve peak velocity is 2 57 m/s  The aortic valve mean gradient is 15 mmHg  The dimensionless velocity index is 0 41  The aortic valve area is 1 48 cm2    Mitral Valve: There is mild calcification  The leaflets exhibit normal mobility  There is trace regurgitation  There is no evidence of stenosis    Tricuspid Valve: Tricuspid valve structure is normal  There is trace regurgitation  There is no evidence of stenosis    Aorta: The aortic root is mildly dilated  The aortic root is 3 90 cm    Pericardium: There is a trivial pericardial effusion      No prior study available for comparison

## 2023-04-28 NOTE — ASSESSMENT & PLAN NOTE
Recently diagnosed right lung and neck masses, likely malignant  Patient also diagnosed last week with subclavian vein thrombus, and now with bilateral segmental PE, likely due to same  Currently pending appointment for IR biopsy of neck mass to confirm diagnosis, followed by PET-CT  Patient then needs Pulmonary, Oncology, and Palliative Care appointments (referrals already in place)  Pulmonary consult appreciated, recommends inpatient biopsy since patient developed bilateral PE and remained on heparin drip  As per IR, will try to proceed on Monday  We will hold heparin Sunday night

## 2023-04-28 NOTE — ASSESSMENT & PLAN NOTE
Segmental PE noted in bilateral lower lobes, new from April 20  Patient also has previously diagnosed right subclavian thrombus  Presumably due to underlying malignancy  -140s/60-70s in ER, slight tachycardia 100s, no heart strain on EKG, troponin stable in 40s x3 overnight  Patient has mild hypoxia, but stable on 2 LNC  Started eliquis 1 week ago, switched to heparin infusion in ER  Will continue heparin for now pending further evaluation      Considering patient's underlying lung mass, suspect malignancy, may consider to adjust anticoagulation upon discharge, may consider Lovenox

## 2023-04-28 NOTE — ASSESSMENT & PLAN NOTE
Chronic stable condition  Treated as outpatient with allopurinol 100 mg daily  Continue outpatient medication regimen

## 2023-04-28 NOTE — PLAN OF CARE
Problem: PHYSICAL THERAPY ADULT  Goal: Performs mobility at highest level of function for planned discharge setting  See evaluation for individualized goals  Description: Treatment/Interventions: ADL retraining, Functional transfer training, LE strengthening/ROM, Elevations, Therapeutic exercise, Endurance training, Patient/family training, Equipment eval/education, Cognitive reorientation, Bed mobility, Gait training, Compensatory technique education, Spoke to nursing, Spoke to case management, OT  Equipment Recommended:  (TBD by rehab)       See flowsheet documentation for full assessment, interventions and recommendations  Note: Prognosis: Good  Problem List: Decreased strength, Decreased endurance, Impaired balance, Decreased mobility, Decreased coordination, Decreased safety awareness, Decreased cognition  Assessment: Pt is a 66 y o  male seen for PT evaluation s/p admission to [address] [place] [address] on 4/28/2023 with Acute encephalopathy  Order placed for PT services    Upon evaluation: Pt is presenting with impaired functional mobility due to decreased strength, decreased endurance, impaired balance, impaired coordination, gait deviations, impaired cognition, decreased safety awareness, impaired judgment, and fall risk requiring  supervision assistance for bed mobility, steadying assistance for transfers, and minimal assistance for ambulation with RW   Pt's clinical presentation is currently unpredictable given the functional mobility deficits above, especially weakness, decreased endurance, impaired coordination, gait deviations, decreased activity tolerance, decreased functional mobility tolerance, decreased safety awareness, impaired judgement, and SOB upon exertion, coupled with fall risks as indicated by AM-PAC 6-Clicks: 02/40 as well as impaired balance, polypharmacy, impaired coordination, impaired judgement, decreased safety awareness and decreased cognition and combined with medical complications of abnormal WBCs, abnormal sodium values, low SpO2 values, new onset O2 use, multiple readmissions, need for input for mobility technique/safety and elevated platelets, right pleural effusion, acute encephalopathy, bilateral PE on heparin, SIRS, acute respiratory failure with hypoxia requiring new onset O2, Tiny focus of acute or subacute infarct in the left cerebellum, hypercalcemia, hyponatremia  Pt's PMHx and comorbidities that may affect physical performance and progress include: DM, HTN and recent diagnosis subclavian vein thrombosis, lung/neck mass metastasis to mediastinum, gout  Personal factors affecting pt at time of IE include: inaccessible home environment, step(s) to enter environment, limited home support, inability to perform current job functions, inability to perform IADLs, inability to perform ADLs, inability to navigate level surfaces without external assistance, inability to ambulate household distances, limited insight into impairments and tobacco use  Pt will benefit from continued skilled PT services to address deficits as defined above and to maximize level of functional mobility to facilitate return toward PLOF and improved QOL  From PT/mobility standpoint, recommendation at time of d/c would be post acute rehab ([facility]) pending progress in order to reduce fall risk and maximize pt's functional independence and consistency  Barriers to Discharge: Decreased caregiver support, Inaccessible home environment  Barriers to Discharge Comments: requires assistance to complete mobility, lives with spouse who works during the day  PT Discharge Recommendation: Post acute rehabilitation services (consider acute inpatient rehab )    See flowsheet documentation for full assessment

## 2023-04-28 NOTE — ASSESSMENT & PLAN NOTE
Chronic stable condition  Treated as outpatient with valsartan 160 mg daily  Substitute losartan based on hospital formulary

## 2023-04-28 NOTE — PLAN OF CARE
Problem: PHYSICAL THERAPY ADULT  Goal: Performs mobility at highest level of function for planned discharge setting  See evaluation for individualized goals  Description: Treatment/Interventions: ADL retraining, Functional transfer training, LE strengthening/ROM, Elevations, Therapeutic exercise, Endurance training, Patient/family training, Equipment eval/education, Cognitive reorientation, Bed mobility, Gait training, Compensatory technique education, Spoke to nursing, Spoke to case management, OT  Equipment Recommended:  (TBD by rehab)       See flowsheet documentation for full assessment, interventions and recommendations  5/[id number] by Brenda Padilla PT  Note: Prognosis: Good  Problem List: Decreased strength, Decreased endurance, Impaired balance, Decreased mobility, Decreased coordination, Decreased safety awareness, Decreased cognition  Pt tolerated session fairly  He was able to amublate inc distance with RW, but continues to require minimal assistance at times for RW management as well as balance especially when turning  He requires increased cues for proper use and safety with RW  He fatigues quickly  He is limtied by decreased strenght, bnalance, endurance and coordination as well as impaired cognition  He will continue to benefit form PT services to maximize LOF  Barriers to Discharge: Decreased caregiver support, Inaccessible home environment  Barriers to Discharge Comments: requires assistance to complete mobility, lives with spouse who works during the day  PT Discharge Recommendation: Post acute rehabilitation services (consider acute inpatient rehab )    See flowsheet documentation for full assessment

## 2023-04-28 NOTE — PLAN OF CARE
Problem: OCCUPATIONAL THERAPY ADULT  Goal: Performs self-care activities at highest level of function for planned discharge setting  See evaluation for individualized goals  Description: Treatment Interventions: ADL retraining, Functional transfer training, UE strengthening/ROM, Endurance training, Cognitive reorientation, Patient/family training, Equipment evaluation/education, Neuromuscular reeducation, Compensatory technique education, Continued evaluation, Energy conservation, Activityengagement          See flowsheet documentation for full assessment, interventions and recommendations  Note: Limitation: Decreased ADL status, Decreased UE strength, Decreased Safe judgement during ADL, Decreased cognition, Decreased endurance, Decreased self-care trans, Decreased high-level ADLs  Prognosis: Good  Assessment: Pt is a 66 y o  male, admitted to [address] [place] [address] 4/28/2023 d/t experiencing AMS  Dx: acute encephalopathy  Pt with PMHx impacting their performance during ADL tasks, including: recent lung CA with METS diagnosis, B PE, current smoker, DM, GERD, HTN  Prior to admission to the hospital Pt was performing ADLs without physical assistance  IADLs without physical assistance  Functional transfers/ambulation without physical assistance  Cognitive status was PTA was intact  OT order placed to assess Pt's ADLs, cognitive status, and performance during functional tasks in order to maximize safety and independence while making most appropriate d/c recommendations   Pt's clinical presentation is currently unstable/unpredictable given new onset deficits that effect Pt's occupational performance and ability to safely return to OF including decrease activity tolerance, decrease standing balance, decrease performance during ADL tasks, decrease cognition, decrease safety awareness , decrease UB MS, decrease generalized strength, decrease activity engagement, decrease performance during functional transfers, steps to enter home, high fall risk and limited insight to deficits combined with medical complications of change in mental status, low SpO2 values, new onset O2 use, multiple readmissions, impulsivity during admission, need for input for mobility technique/safety and acute B PE  Pt on 3L during session maintaining >92% with minimal SOB noted  HR remained between 95-110bpm with activity  Personal factors affecting Pt at time of initial evaluation include: step(s) to enter environment, inability to perform IADLs, new need for AD, inability to navigate community distances, limited insight into impairments and decreased initiation and engagement  Pt will benefit from continued skilled OT services to address deficits as defined above and to maximize level independence/participation during ADLs and functional tasks to facilitate return toward PLOF and improved quality of life  From an occupational therapy standpoint, recommendation at time of d/c would be post acute rehab       OT Discharge Recommendation: Post acute rehabilitation services

## 2023-04-28 NOTE — CONSULTS
CONSULTATION-NEPHROLOGY   Syed Mcclelland 66 y o  male MRN: [id number]  Unit/Bed#: -01 Encounter: [id number]        Assessment and Plan:    1  Moderate, potentially symptomatic, hypercalcemia presumably of malignancy   · Initially noted during previous hospitalization treated with IV fluids and calcitonin with improvement  PTH was suppressed  · Primary team is ordered calcitonin-May be refractory at this point  Will initiate patient on normal saline 100 mL an hour x16 hours-cautiously-monitor for signs and symptoms of pulmonary edema/volume overload  Give Lasix as needed  · We will likely treat with 4 mg Zometa tomorrow once volume status is improved/LFTs downtrend  I discussed with patient's wife and daughter Radha Villela (RN) and they are agreeable to proceed (aware of risk of jaw osteonecrosis/collapsing FSGS although less likely)  · Rest of hypercalcemia work-up is ordered including PTHrP  2  Hyponatremia  · Treat with saline and trend  3  Hypomagnesemia  · 2 g IV magnesium now  4  Lung and neck mass with adenopathy  · Unclear primary  Evaluated by pulmonology and ENT last visit  Management per primary team and other specialists required  5  Acute encephalopathy  · MRI pending to rule out CVA versus brain metastasis  Hypercalcemia may be contributing  6   Hypercoagulable state with recent subclavian vein thrombosis and newly identified bilateral PE  7  SIRS criteria  8  Acute hypoxic respiratory failure  · Likely due to malignancy, PE and effusion  Patient is likely intravascularly volume depleted given osmotic diuresis from hypercalcemia  We will treat with saline as above but hold and give Lasix for signs and symptoms of volume overload      HPI:    Syed Mcclelland is a 66 y o  male with diabetes, hypertension, history of smoker for 30 years who was recently diagnosed lung and neck mass with lymphadenopathy, recent subclavian vein thrombosis transition to Eliquis    Presented to [facility] emergency department today for confusion, shortness of breath, cough, poor appetite and insomnia  CTA significant for pulmonary embolism  Nephrology was consulted for hypercalcemia  Upon discussion with the patient he is a poor historian  He appears confused  He is not able to answer many questions  He denies nausea, vomiting, diarrhea, dysuria, urgency, frequency  He is eating breakfast and seems to be tolerating well  From a calcium perspective, he was noted to have hypercalcemia during last admission which was treated with calcitonin and saline  Reason for Consult: Hypercalcemia    Review of Systems:  A complete 10-point review of systems was performed  Aside from what was mentioned in the HPI, it is otherwise negative  Historical Information   Past Medical History:   Diagnosis Date    Bilateral pulmonary embolism ([facility] ) 04/28/2023    Current smoker     Diabetes mellitus (HCC)     GERD (gastroesophageal reflux disease)     Gout     Hypertension     Lung cancer ([place] [address] )     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)     Mixed dyslipidemia     Pleural effusion, right 04/28/2023    Subclavian vein thrombosis (HCC)     Type 2 diabetes mellitus without complication, without long-term current use of insulin ([place] [address] ) 04/20/2023     Past Surgical History:   Procedure Laterality Date    ANAL FISSURECTOMY      TONSILLECTOMY       Social History   Social History     Substance and Sexual Activity   Alcohol Use Yes     Social History     Substance and Sexual Activity   Drug Use Not Currently     Social History     Tobacco Use   Smoking Status Every Day    Packs/day: 1 00    Types: Cigarettes   Smokeless Tobacco Never       Family History:   History reviewed  No pertinent family history      Medications:  Pertinent medications were reviewed  Current Facility-Administered Medications   Medication Dose Route Frequency Provider Last Rate    acetaminophen  650 mg Oral Q6H PRN MD Kole Riveras "allopurinol  300 mg Oral Daily Lc Li MD      aluminum-magnesium hydroxide-simethicone  30 mL Oral Q6H PRN Lc Li MD      calcitonin (salmon)  4 Units/kg Subcutaneous Q12H [facility] Lc Li MD      docusate sodium  100 mg Oral BID PRN Lc Li MD      heparin (porcine)  3-30 Units/kg/hr (Order-Specific) Intravenous Titrated Lc Li MD 18 Units/kg/hr (04/28/23 0357)    insulin lispro  1-6 Units Subcutaneous HS Lc Li MD      insulin lispro  1-6 Units Subcutaneous TID AC Lc Li MD      losartan  100 mg Oral Daily Lc Li MD      ondansetron  4 mg Intravenous Q4H PRN Lc Li MD      pravastatin  40 mg Oral Daily With Tobi Thayer MD           No Known Allergies      Vitals:   /72 (BP Location: Right arm)   Pulse 92   Temp 98 6 °F (37 °C) (Oral)   Resp 21   Ht 6' 1\" (1 854 m)   Wt 93 9 kg (207 lb 0 2 oz)   SpO2 91%   BMI 27 31 kg/m²   Body mass index is 27 31 kg/m²  SpO2: 91 %,   SpO2 Activity: At Rest,   O2 Device: Nasal cannula      Intake/Output Summary (Last 24 hours) at 4/28/2023 0927  Last data filed at 4/28/2023 0701  Gross per 24 hour   Intake 52 44 ml   Output 700 ml   Net -647 56 ml     Invasive Devices     Peripheral Intravenous Line  Duration           Peripheral IV 04/28/23 Distal;Dorsal (posterior); Left Forearm <1 day    Peripheral IV 04/28/23 Left Antecubital <1 day                Physical Exam:  General: conscious, cooperative, in no acute distress  Eyes: conjunctivae pink, anicteric sclerae  ENT: lips and mucous membranes moist  Neck: supple, no JVD, no masses  Chest: Diminished breath sounds bilateral, no crackles, ronchus or wheezings on nasal cannula  CVS: S1 & S2, normal rate, regular rhythm  Abdomen: soft, non-tender, non-distended, normoactive bowel sounds obese  Extremities: no edema of both legs  Skin: no rash, cedric cheeks  Neuro: awake, alert, " "oriented      Diagnostic Data:  Lab: I have personally reviewed pertinent lab results  ,   CBC:  Results from last 7 days   Lab Units 04/28/23  0629   WBC Thousand/uL 14 89*   HEMOGLOBIN g/dL 12 7   HEMATOCRIT % 37 7   PLATELETS Thousands/uL 516*      CMP:   Lab Results   Component Value Date    SODIUM 128 (L) 04/28/2023    K 3 6 04/28/2023    CL 92 (L) 04/28/2023    CO2 30 04/28/2023    BUN 12 04/28/2023    CREATININE 0 81 04/28/2023    CALCIUM 13 3 (HH) 04/28/2023    AST 75 (H) 04/28/2023     (H) 04/28/2023    ALKPHOS 151 (H) 04/28/2023    EGFR 95 04/28/2023   ,   PT/INR:   Lab Results   Component Value Date    INR 1 71 (H) 04/28/2023   ,   Magnesium: No components found for: MAG,  Phosphorous:   Lab Results   Component Value Date    PHOS 2 4 04/28/2023       Microbiology:  @[facility],(urinecx:7)@        GILLIAN Parr    Portions of the record may have been created with voice recognition software  Occasional wrong word or \"sound a like\" substitutions may have occurred due to the inherent limitations of voice recognition software  Read the chart carefully and recognize, using context, where substitutions have occurred        "

## 2023-04-29 PROBLEM — I63.9 NEW INFARCTION OF CEREBELLUM (HCC): Status: ACTIVE | Noted: 2023-04-29

## 2023-04-29 LAB
ALBUMIN SERPL BCP-MCNC: 3.2 G/DL (ref 3.5–5)
ALP SERPL-CCNC: 123 U/L (ref 34–104)
ALT SERPL W P-5'-P-CCNC: 90 U/L (ref 7–52)
ANION GAP SERPL CALCULATED.3IONS-SCNC: 6 MMOL/L (ref 4–13)
ANION GAP SERPL CALCULATED.3IONS-SCNC: 6 MMOL/L (ref 4–13)
ANION GAP SERPL CALCULATED.3IONS-SCNC: 7 MMOL/L (ref 4–13)
ANION GAP SERPL CALCULATED.3IONS-SCNC: 7 MMOL/L (ref 4–13)
APTT PPP: 63 SECONDS (ref 23–37)
AST SERPL W P-5'-P-CCNC: 40 U/L (ref 13–39)
BILIRUB DIRECT SERPL-MCNC: 0.05 MG/DL (ref 0–0.2)
BILIRUB SERPL-MCNC: 0.38 MG/DL (ref 0.2–1)
BUN SERPL-MCNC: 10 MG/DL (ref 5–25)
BUN SERPL-MCNC: 9 MG/DL (ref 5–25)
CA-I BLD-SCNC: 1.34 MMOL/L (ref 1.12–1.32)
CALCIUM SERPL-MCNC: 10.5 MG/DL (ref 8.4–10.2)
CALCIUM SERPL-MCNC: 10.5 MG/DL (ref 8.4–10.2)
CALCIUM SERPL-MCNC: 10.9 MG/DL (ref 8.4–10.2)
CALCIUM SERPL-MCNC: 11 MG/DL (ref 8.4–10.2)
CHLORIDE SERPL-SCNC: 100 MMOL/L (ref 96–108)
CHLORIDE SERPL-SCNC: 100 MMOL/L (ref 96–108)
CHLORIDE SERPL-SCNC: 98 MMOL/L (ref 96–108)
CHLORIDE SERPL-SCNC: 99 MMOL/L (ref 96–108)
CHOLEST SERPL-MCNC: 110 MG/DL
CO2 SERPL-SCNC: 27 MMOL/L (ref 21–32)
CO2 SERPL-SCNC: 28 MMOL/L (ref 21–32)
CO2 SERPL-SCNC: 28 MMOL/L (ref 21–32)
CO2 SERPL-SCNC: 29 MMOL/L (ref 21–32)
CREAT SERPL-MCNC: 0.65 MG/DL (ref 0.6–1.3)
CREAT SERPL-MCNC: 0.67 MG/DL (ref 0.6–1.3)
CREAT SERPL-MCNC: 0.68 MG/DL (ref 0.6–1.3)
CREAT SERPL-MCNC: 0.7 MG/DL (ref 0.6–1.3)
GFR SERPL CREATININE-BSD FRML MDRD: 101 ML/MIN/1.73SQ M
GFR SERPL CREATININE-BSD FRML MDRD: 103 ML/MIN/1.73SQ M
GFR SERPL CREATININE-BSD FRML MDRD: 103 ML/MIN/1.73SQ M
GFR SERPL CREATININE-BSD FRML MDRD: 105 ML/MIN/1.73SQ M
GLUCOSE SERPL-MCNC: 107 MG/DL (ref 65–140)
GLUCOSE SERPL-MCNC: 113 MG/DL (ref 65–140)
GLUCOSE SERPL-MCNC: 113 MG/DL (ref 65–140)
GLUCOSE SERPL-MCNC: 116 MG/DL (ref 65–140)
GLUCOSE SERPL-MCNC: 117 MG/DL (ref 65–140)
GLUCOSE SERPL-MCNC: 121 MG/DL (ref 65–140)
GLUCOSE SERPL-MCNC: 123 MG/DL (ref 65–140)
GLUCOSE SERPL-MCNC: 123 MG/DL (ref 65–140)
HDLC SERPL-MCNC: 34 MG/DL
KAPPA LC FREE SER-MCNC: 46.9 MG/L (ref 3.3–19.4)
KAPPA LC FREE/LAMBDA FREE SER: 1.25 {RATIO} (ref 0.26–1.65)
LAMBDA LC FREE SERPL-MCNC: 37.5 MG/L (ref 5.7–26.3)
LDLC SERPL CALC-MCNC: 58 MG/DL (ref 0–100)
NONHDLC SERPL-MCNC: 76 MG/DL
POTASSIUM SERPL-SCNC: 3.5 MMOL/L (ref 3.5–5.3)
POTASSIUM SERPL-SCNC: 3.6 MMOL/L (ref 3.5–5.3)
PROT SERPL-MCNC: 6.5 G/DL (ref 6.4–8.4)
SODIUM SERPL-SCNC: 133 MMOL/L (ref 135–147)
SODIUM SERPL-SCNC: 134 MMOL/L (ref 135–147)
TRIGL SERPL-MCNC: 88 MG/DL

## 2023-04-29 RX ADMIN — ALLOPURINOL 300 MG: 300 TABLET ORAL at 08:36

## 2023-04-29 RX ADMIN — HEPARIN SODIUM 18 UNITS/KG/HR: 10000 INJECTION, SOLUTION INTRAVENOUS at 22:00

## 2023-04-29 RX ADMIN — LOSARTAN POTASSIUM 100 MG: 50 TABLET, FILM COATED ORAL at 08:36

## 2023-04-29 RX ADMIN — SODIUM CHLORIDE 125 ML/HR: 0.9 INJECTION, SOLUTION INTRAVENOUS at 05:55

## 2023-04-29 RX ADMIN — HEPARIN SODIUM 18 UNITS/KG/HR: 10000 INJECTION, SOLUTION INTRAVENOUS at 07:37

## 2023-04-29 RX ADMIN — PRAVASTATIN SODIUM 40 MG: 40 TABLET ORAL at 17:11

## 2023-04-29 RX ADMIN — CALCITONIN SALMON 396 UNITS: 200 INJECTION, SOLUTION INTRAMUSCULAR; SUBCUTANEOUS at 11:34

## 2023-04-29 NOTE — SPEECH THERAPY NOTE
Speech-Language Pathology Bedside Swallow Evaluation    Patient Name: Latoya Otto    Today's Date: 4/29/2023     Problem List  Principal Problem:    Acute respiratory failure with hypoxia ([address] )  Active Problems:    Lung mass    Neck mass    Hypercalcemia    Hyponatremia    Subclavian vein thrombosis (HCC)    Type 2 diabetes mellitus without complication, without long-term current use of insulin (HCC)    Tobacco abuse    Essential hypertension    Gout    Bilateral pulmonary embolism (HCC)    Severe systemic inflammatory response syndrome (SIRS) (HCC)    Pleural effusion, right    Acute encephalopathy    New infarction of cerebellum [facility])      Past Medical History  Past Medical History:   Diagnosis Date    Bilateral pulmonary embolism ([address] ) 04/28/2023    Current smoker     Diabetes mellitus (HCC)     GERD (gastroesophageal reflux disease)     Gout     Hypertension     [facility]     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)     Mixed dyslipidemia     Pleural effusion, right 04/28/2023    Subclavian vein thrombosis (HCC)     Type 2 diabetes mellitus without complication, without long-term current use of insulin ([facility] ) 04/20/2023       Past Surgical History  Past Surgical History:   Procedure Laterality Date    ANAL FISSURECTOMY      TONSILLECTOMY         Summary   Bedside dysphagia order received to further assess oropharyngeal function  RN reporting intermittent cough with consumption of thin liquids  R/o swallow relation  Evaluation completed and pt presented with functional appearing oral and pharyngeal swallowing abilities  Sarina Devonshire did affect duration of oral stage with solids however was overall functional  Encouraged moistening agents and increasing liquid washes to accommodate  Also communicated dry mouth concerns with RN for possible trailing of medical treatments  Degree of xeromstomia is reported by pt to negatively impact intake as he will discontinue meal d/t same     Intermittent dry cough throughout, feel it is non-related to swallow based upon timing and dry quality  Spoke with MD who agreed it is related to lung mass size and location in regards to trachea  Dysphagia evaluation only  Pt to continue on a regular diet with thin liquids  Encourage po  Pt will require reminders for use of liquid wash strategy d/t cognitive deficits  Pt also may benefit from multiple little meals d/t to quick agitation levels related to dry mouth resulting in discontinuation of intake  Cognitive deficits evident per screen  Reported by nsg to be a change since admission and non-related to stroke  Further work-up is being completed as MD feels cognitive change maybe d/t other factors  Suggest withholding from addressing cognition until further medical management completed  When attempting to address pt near immediately became agitated and defensive when targeting general orientation  Oriented to self only  Reoriented with no delayed recall  Pt to be discharged from [address] [place] [address] services at this time however re-consult once it is anticipated that pt will participate in cognitive intervention  Discussed same with RN  Risk/s for Aspiration: n/a     Recommended Diet: regular diet and thin liquids   Recommended Form of Meds: whole with liquid   Aspiration precautions and swallowing strategies: alternating bites and sips and moistening of food as able  Other Recommendations: Potential for cognitive treatment in future (refer to above)  Current Medical Status  Walt Benton is a 66 y o  male who presents to ER for acute confusion since at least afternoon of presentation  He was recently hospitalized April 20-21 after presenting for cough and shortness of breath, which he suspected was pneumonia  Instead, symptoms are more likely due to malignancy, with right lung/neck masses, lymphadenopathy, and a right subclavian thrombus    He was discharged home with a new prescription for eliquis, along with plans for outpatient evaluation, including IR biopsy of neck mass, PET-CT, and appointments with Pulmonary, Oncology, and Palliative Care  His wife reports that he has remained short of breath with frequent cough this week, along with poor appetite and sleep  However, no acute change until day of presentation  He woke at about 0300, which is a couple hours earlier than usual, and stated that he needed to get labs done  He then went out to the living room, and she suspected he was going to rest in a recliner for improved comfort  He went to work for his usual shift (8-4), but left early, telling his supervisor that he needed to complete labs  He did not return to work, and the supervisor called his wife  In the meantime, the patient had already gone home, thinking his workday was over  Unclear whether he attempted to have labs drawn anywhere  Throughout the afternoon/evening, he remained disoriented, frequently asking inappropriate questions, possibly having visual hallucinations (seeing his dog in exam room), and making unusual statements  No headache, vertigo/lightheadedness, change in vision (aside from possible hallucination), change in speech, focal numbness/weakness, nausea/vomiting, or witnessed involuntary movements  Current Precautions:  Fall     Allergies:  No known food allergies    Past medical history:  Please see H&P for details    Special Studies:  4/28/23 CT head:Atherosclerotic change of the carotid bifurcations with moderate stenosis of the distal common carotid artery and mild narrowing of the proximal internal carotid artery on the right  No significant left-sided stenosis  Unremarkable intracranial   vasculature  Extensive lower cervical adenopathy as seen on recent CT of the chest  This results in significant narrowing of the trachea in the transverse dimension, unchanged  There is also a filling defect present within the right innominate vein within the inferior neck extending into the superior aspect of the SVC  This is consistent with thrombosis  There is only partial thrombosis of the most superior aspect of the SVC  Below this the vessel enhances appropriately  4/28/23 MRI brain:1   Tiny focus of acute or subacute infarct in the left cerebellum  2   No intracranial metastasis  4/28/23 CT chest:Left lower lobe segmental pulmonary embolism  Right lower lobe segmental pulmonary embolism     Massively extensively diffuse mediastinal and hilar adenopathy resulting in mass effect on the trachea and pulmonary arteries bilaterally     Moderate diffuse edematous changes throughout the lungs  Right right lower lobe pulmonary infarction    Right middle lobe atelectasis secondary to mass effect  Social/Education/Vocational Hx:  Pt lives home with wife  Swallow Information   Current Risks for Dysphagia & Aspiration: CVA and AMS  Current Symptoms/Concerns: stroke pathway  coughing with thin at times per RN report  Current Diet: regular diet and thin liquids   Baseline Diet: regular diet and thin liquids      Baseline Assessment   Behavior/Cognition: alert  Speech/Language Status: able to participate in conversation with confusion  Intermittent mild word distortions but when asked for clarification self corrected and anomia  Patient Positioning: with RN assist, transitioned pt to chair bedside  Pain Status/Interventions/Response to Interventions:  No report of or nonverbal indications of pain  Swallow Mechanism Exam  Facial: symmetrical  Labial: WFL  Lingual: WFL  Velum: symmetrical  Mandible: adequate ROM  Dentition: adequate  Vocal quality:clear/adequate   Volitional Cough: strong/productive   Respiratory Status: supplemental via nasal cannula      Consistencies Assessed and Performance   Consistencies Administered: thin liquids, puree, mechanical soft solids and hard solids    Oral Stage: WFL  Mastication was slightly prolonged d/t xeromstomia but adequate with the materials administered today    Bolus formation and transfer were functional with no significant oral residue noted  No overt s/s reduced oral control  Pharyngeal Stage: WFL  Swallow Mechanics:  Swallowing initiation appeared prompt  Laryngeal rise was palpated and judged to be within functional limits  No coughing, throat clearing, change in vocal quality or respiratory status noted today  Esophageal Concerns: none reported    Strategies and Efficacy: frequent liquid washes, moistening agents for food    Summary and Recommendations (see above)    Results Reviewed with: patient, RN and MD     Treatment Recommended: dysphagia evaluation only   Re-consult following medical intervention for cognitive evaluation (when medical staff feels pt will be participatory)      Rody Paula

## 2023-04-29 NOTE — PROGRESS NOTES
[address]  Progress Note  Name: Syed Mcclelland I  MRN: [id number]  Unit/Bed#: -01 I Date of Admission: 4/28/2023   Date of Service: 4/29/2023 I Hospital Day: 1    Assessment/Plan   New infarction of cerebellum [facility])  Assessment & Plan  MRI:Tiny focus of acute or subacute infarct in the left cerebellum  Initial NIHSS score was 4-repeat NIHSS score is 1  Neurology consult appreciated-suspect most likely hypercoagulable state secondary to underlying lung mass  Recommends to continue current heparin drip as per PE protocol  Once patient off of heparin drip, may start aspirin  CTA head neck shows atherosclerosis  Echocardiogram shows no PFO  Speech and swallow evaluation done, patient passed the swallow test  Follow PT/OT  Follow lipid and A1c    Acute encephalopathy  Assessment & Plan  Patient presenting for acute encephalopathy for several hours  Recent diagnosis of lung/neck masses (likely malignant) and subclavian thrombus  Patient mildly hypoxic at presentation (2 LNC), but encephalopathy not resolving with treatment of hypoxia in ER  Patient not taking any known psychoactive medications  Most likely remaining etiologies on differential diagnosis include:    Multifactorial in nature-electrolyte imbalance, underlying malignancy,  MRI shows infection of the cerebellum which can also contribute      Bilateral pulmonary embolism [facility])  Assessment & Plan  Segmental PE noted in bilateral lower lobes, new from April 20  Patient also has previously diagnosed right subclavian thrombus  Presumably due to underlying malignancy  -140s/60-70s in ER, slight tachycardia 100s, no heart strain on EKG, troponin stable in 40s x3 overnight  Patient has mild hypoxia, but stable on 2 LNC  Started eliquis 1 week ago, switched to heparin infusion in ER  Will continue heparin for now pending further evaluation      Considering patient's underlying lung mass, suspect malignancy, may consider to adjust anticoagulation upon discharge, may consider Lovenox      Hypercalcemia  Assessment & Plan  Moderate-severe hypercalcemia (13 4) with acute encephalopathy  Likely due to underlying malignancy  PTH (13 8) low on April 21  Vitamin D level not previously checked, but patient is not on any known vitamin D supplementation  Recheck calcium, along with ionized calcium and vitamin D   IV crystalloids limited by pulmonary edema; will give small NS bolus at this time, along with calcitonin 4 units/kg Q12 hours  Consider addition of zoledronic acid pending labs  Calcium is improving, nephrology consult appreciated, patient remained on IV hydration  Plan is to proceed with Zometa if lab improves      Lung mass  Assessment & Plan  Recently diagnosed right lung and neck masses, likely malignant  Patient also diagnosed last week with subclavian vein thrombus, and now with bilateral segmental PE, likely due to same  Currently pending appointment for IR biopsy of neck mass to confirm diagnosis, followed by PET-CT  Patient then needs Pulmonary, Oncology, and Palliative Care appointments (referrals already in place)  Pulmonary consult appreciated, recommends inpatient biopsy since patient developed bilateral PE and remained on heparin drip  As per IR, will try to proceed on Monday  We will hold heparin Sunday night  * Acute respiratory failure with hypoxia [facility])  Assessment & Plan  Patient requiring 2 LNC at time of presentation  Likely due to combination of recently discovered lung mass, bilateral PE, and right pleural effusion  See associated diagnoses for plan of care  Echocardiogram showed:  Left Ventricle: Left ventricular cavity size is normal  Wall thickness is increased  There is mild to moderate concentric hypertrophy  The left ventricular ejection fraction is >70%  Systolic function is vigorous   Although no diagnostic regional wall motion abnormality was identified, this possibility cannot be completely excluded on the basis of this study  Diastolic function is mildly abnormal, consistent with grade I (abnormal) relaxation  Left atrial filling pressure is normal     Atrial Septum: No patent foramen ovale detected using saline contrast injection with provocation by abdominal compression    Aortic Valve: The leaflets are moderately thickened  The leaflets are moderately calcified  There is mildly reduced mobility  There is no evidence of regurgitation  There is mild stenosis  The aortic valve peak velocity is 2 57 m/s  The aortic valve mean gradient is 15 mmHg  The dimensionless velocity index is 0 41  The aortic valve area is 1 48 cm2    Mitral Valve: There is mild calcification  The leaflets exhibit normal mobility  There is trace regurgitation  There is no evidence of stenosis    Tricuspid Valve: Tricuspid valve structure is normal  There is trace regurgitation  There is no evidence of stenosis    Aorta: The aortic root is mildly dilated  The aortic root is 3 90 cm    Pericardium: There is a trivial pericardial effusion    No prior study available for comparison      Pleural effusion, right  Assessment & Plan  Small right pleural effusion noted on CT  Similar in appearance to April 20, unlikely etiology for acute hypoxia  Likely reactive in setting of lung mass  No emergent treatment at this time  Check BNP; consider TTE pending results  Severe systemic inflammatory response syndrome (SIRS) (HCC)  Assessment & Plan  Patient meets 3 of 4 SIRS criteria with leukocytosis, tachycardia, and tachypnea  Associated acute organ dysfunction manifesting as acute encephalopathy and acute hypoxic respiratory failure  Suspect SIRS due to combination of pulmonary emboli, underlying malignancy, and metabolic abnormalities (ie, hypercalcemia, hyponatremia)  COVID-19, RSV, flu negative  Will check procalcitonin, lactic acid, and blood cultures    Defer antibiotics based on more likely non-infectious etiology; monitor clinical condition and serial labs at this time  Gout  Assessment & Plan  Chronic stable condition  Treated as outpatient with allopurinol 100 mg daily  Continue outpatient medication regimen  Essential hypertension  Assessment & Plan  Chronic stable condition  Treated as outpatient with valsartan 160 mg daily  Substitute losartan based on hospital formulary  Tobacco abuse  Assessment & Plan  Chronic condition  Encouraged cessation; patient declines assistance at this time  Offer nicotine patch during admission  Type 2 diabetes mellitus without complication, without long-term current use of insulin ([facility] [address] )  Assessment & Plan  No results found for: HGBA1C    Recent Labs     04/28/23  0035   POCGLU 116       Blood Sugar Average: Last 72 hrs:  (P) 116     Last hemoglobin A1c 6 2 in May 2022; recheck A1c with morning labs  Treated as outpatient with glipizide 5 mg daily  Hold oral diabetes medications and initiate insulin protocol during hospitalization  Subclavian vein thrombosis (HCC)  Assessment & Plan  Recently diagnosed condition, presumably due to underlying malignancy  Treated with eliquis since last week; switched to heparin infusion in ER due to new bilateral PE and decline in clinical condition  Continue heparin at this time  May consider to switch to different anticoagulation, may be Lovenox considering patient's underlying lung mass  Hyponatremia  Assessment & Plan  Mild hyponatremia (128) at time of presentation, down from 132 on April 21  Likely due to underlying malignancy and recent poor oral intake  Unlikely this contributes significantly to patient's acute encephalopathy based on duration and mild nature of hyponatremia  Patient appears euvolemic on clinical exam, but has some pulmonary edema on CT and is mildly hypoxic  Will give small NS bolus and reassess clinical condition and labs    Sodium level improved, with IV hydration  Nephrology consult appreciated      Neck mass  Assessment & Plan  Recently discovered lung and neck masses, likely malignant  Patient denies any shortness of breath and has no overt dyspnea despite mild acute hypoxia  CT April 20 did not indicate any airway obstruction at that time, and patient has no stridor or other overt indication of airway obstruction now  Neck mass is unlikely etiology of acute hypoxia and does not require urgent treatment  See diagnosis of lung mass for further detail  Patient evaluated by pulmonology for lung mass  Recommends inpatient biopsy since patient developed bilateral PE and remain on heparin drip  Tk with IR, will try to do biopsy on Monday, hold heparin Sunday night  VTE Pharmacologic Prophylaxis: VTE Score: 10 High Risk (Score >/= 5) - Pharmacological DVT Prophylaxis Ordered: heparin drip  Sequential Compression Devices Ordered  Patient Centered Rounds: I performed bedside rounds with nursing staff today  Discussions with Specialists or Other Care Team Provider: Nephrology    Education and Discussions with Family / Patient: Updated  (wife) at bedside  Total Time Spent on Date of Encounter in care of patient: 10 minutes This time was spent on one or more of the following: performing physical exam; counseling and coordination of care; obtaining or reviewing history; documenting in the medical record; reviewing/ordering tests, medications or procedures; communicating with other healthcare professionals and discussing with patient's family/caregivers  Current Length of Stay: 1 day(s)  Current Patient Status: Inpatient   Certification Statement: The patient will continue to require additional inpatient hospital stay due to To monitor above condition  Discharge Plan: Anticipate discharge in >72 hrs to rehab facility  Code Status: Level 1 - Full Code    Subjective:   Seen and evaluated during the rounds    Patient sitting upright position, patient is much more alert compared to yesterday  Still confused  Denies any nausea, vomiting, diarrhea  Objective:     Vitals:   Temp (24hrs), Av 8 °F (36 6 °C), Min:97 3 °F (36 3 °C), Max:98 5 °F (36 9 °C)    Temp:  [97 3 °F (36 3 °C)-98 5 °F (36 9 °C)] 97 4 °F (36 3 °C)  HR:  [] 89  Resp:  [14-24] 14  BP: (110-136)/(60-74) 131/60  SpO2:  [88 %-96 %] 96 %  Body mass index is 27 31 kg/m²  Input and Output Summary (last 24 hours): Intake/Output Summary (Last 24 hours) at 2023 0948  Last data filed at 2023 0901  Gross per 24 hour   Intake 3353 36 ml   Output 1550 ml   Net 1803 36 ml       Physical Exam:   Physical Exam  Vitals and nursing note reviewed  Constitutional:       Appearance: He is not diaphoretic  HENT:      Mouth/Throat:      Mouth: Mucous membranes are moist       Pharynx: Oropharynx is clear  No oropharyngeal exudate  Eyes:      General: No scleral icterus  Left eye: No discharge  Extraocular Movements: Extraocular movements intact  Conjunctiva/sclera: Conjunctivae normal       Pupils: Pupils are equal, round, and reactive to light  Cardiovascular:      Rate and Rhythm: Normal rate  Heart sounds: No murmur heard  No friction rub  No gallop  Pulmonary:      Effort: Pulmonary effort is normal  No respiratory distress  Breath sounds: No stridor  No wheezing or rhonchi  Abdominal:      General: Abdomen is flat  Bowel sounds are normal  There is no distension  Palpations: There is no mass  Tenderness: There is no abdominal tenderness  Hernia: No hernia is present  Musculoskeletal:         General: No swelling, tenderness, deformity or signs of injury  Normal range of motion  Cervical back: Normal range of motion  Lymphadenopathy:      Cervical: Cervical adenopathy present  Skin:     Coloration: Skin is not jaundiced or pale  Findings: No bruising     Neurological:      Mental Status: He is alert  Sensory: No sensory deficit  Comments: Oriented to person, month, unable to recall place  Additional Data:     Labs:  Results from last 7 days   Lab Units 04/28/23  0629   WBC Thousand/uL 14 89*   HEMOGLOBIN g/dL 12 7   HEMATOCRIT % 37 7   PLATELETS Thousands/uL 516*   NEUTROS PCT % 79*   LYMPHS PCT % 7*   MONOS PCT % 12   EOS PCT % 1     Results from last 7 days   Lab Units 04/29/23  0834 04/29/23  0459   SODIUM mmol/L 134* 134*   POTASSIUM mmol/L 3 5 3 5   CHLORIDE mmol/L 100 99   CO2 mmol/L 28 28   BUN mg/dL 10 10   CREATININE mg/dL 0 68 0 65   ANION GAP mmol/L 6 7   CALCIUM mg/dL 10 5* 10 9*   ALBUMIN g/dL  --  3 2*   TOTAL BILIRUBIN mg/dL  --  0 38   ALK PHOS U/L  --  123*   ALT U/L  --  90*   AST U/L  --  40*   GLUCOSE RANDOM mg/dL 116 113     Results from last 7 days   Lab Units 04/28/23  0047   INR  1 71*     Results from last 7 days   Lab Units 04/29/23  0738 04/28/23  2045 04/28/23  1739 04/28/23  1600 04/28/23  1106 04/28/23  0754 04/28/23  0035   POC GLUCOSE mg/dl 107 130 164* 204* 130 96 116         Results from last 7 days   Lab Units 04/28/23  0629   LACTIC ACID mmol/L 0 9   PROCALCITONIN ng/ml 0 15       Lines/Drains:  Invasive Devices     Peripheral Intravenous Line  Duration           Peripheral IV 04/28/23 Distal;Dorsal (posterior); Left Forearm 1 day    Peripheral IV 04/28/23 Left Antecubital 1 day                  Telemetry:  Telemetry Orders (From admission, onward)             24 Hour Telemetry Monitoring  Continuous x 24 Hours (Telem)        References:    Telemetry Guidelines   Question:  Reason for 24 Hour Telemetry  Answer:  Metabolic/Electrolyte Disturbance with High Probability of Dysrhythmia (K level <3 or >6, or KCL infusion >10mEq/hr)                 Telemetry Reviewed: Normal Sinus Rhythm  Indication for Continued Telemetry Use: Arrthymias requiring medical therapy             Imaging: No pertinent imaging reviewed      Recent Cultures (last 7 days):   Results from last 7 days   Lab Units 04/28/23  0631 04/28/23  8796   BLOOD CULTURE  Received in Microbiology Lab  Culture in Progress  Received in Microbiology Lab  Culture in Progress  Last 24 Hours Medication List:   Current Facility-Administered Medications   Medication Dose Route Frequency Provider Last Rate    acetaminophen  650 mg Oral Q6H PRN Lc Li MD      allopurinol  300 mg Oral Daily Lc Li MD      aluminum-magnesium hydroxide-simethicone  30 mL Oral Q6H PRN Lc Li MD      calcitonin (salmon)  4 Units/kg Subcutaneous Q12H [facility] Lc Li MD      docusate sodium  100 mg Oral BID PRN Lc Li MD      heparin (porcine)  3-30 Units/kg/hr (Order-Specific) Intravenous Titrated Lc Li MD 18 Units/kg/hr (04/29/23 0737)    insulin lispro  1-6 Units Subcutaneous HS Lc Li MD      insulin lispro  1-6 Units Subcutaneous TID [facility] Lc Li MD      losartan  100 mg Oral Daily Lc Li MD      ondansetron  4 mg Intravenous Q4H PRN Lc Li MD      pravastatin  40 mg Oral Daily With Tobi Thayer MD          Today, Patient Was Seen By: Dayana Calderón MD    **Please Note: This note may have been constructed using a voice recognition system  **

## 2023-04-29 NOTE — UTILIZATION REVIEW
Initial Clinical Review    Admission: Date/Time/Statement:   Admission Orders (From admission, onward)     Ordered        04/28/23 0355  INPATIENT ADMISSION  Once                      Orders Placed This Encounter   Procedures    INPATIENT ADMISSION     Standing Status:   Standing     Number of Occurrences:   1     Order Specific Question:   Level of Care     Answer:   Med Surg [16]     Order Specific Question:   Estimated length of stay     Answer:   More than 2 Midnights     Order Specific Question:   Certification     Answer:   I certify that inpatient services are medically necessary for this patient for a duration of greater than two midnights  See H&P and MD Progress Notes for additional information about the patient's course of treatment  ED Arrival Information     Expected   -    Arrival   4/28/2023 00:18    Acuity   Emergent            Means of arrival   Walk-In    Escorted by   Spouse    Service   Hospitalist    Admission type   Emergency            Arrival complaint   ams           Chief Complaint   Patient presents with    Altered Mental Status     Pt since yesterday has been increasingly confused  Pt had unsteady gait walking back to room, pt also having a cough and believes he has pneumonia  Pt had recent dx of blood clot last week  Initial Presentation: 66 y o  male with PMH of DM2, tobacco abuse, gout and HTN who presents to ER from home for evaluation of acute confusion that started this afternoon  He was recently hospitalized April 20-21 after presenting for cough and shortness of breath  Symptoms are likely due to malignancy, with right lung/neck masses, lymphadenopathy, and a right subclavian thrombus  He was discharged home with a new prescription for Eliquis, along with plans for outpatient evaluation, including IR biopsy of neck mass, PET-CT, and appointments with Pulmonary, Oncology, and Palliative Care    His wife reports that he has remained short of breath with frequent cough this week, along with poor appetite and sleep  However, no acute change until day of presentation  He woke at about 0300, which is a couple hours earlier than usual, and stated that he needed to get labs done  He went to work for his usual shift (8-4), but left early, telling his supervisor that he needed to complete labs  He did not return to work, and the supervisor called his wife  In the meantime, the patient had already gone home, thinking his workday was over  Unclear whether he attempted to have labs drawn anywhere  Throughout the afternoon/evening, he remained disoriented, frequently asking inappropriate questions, possibly having visual hallucinations (seeing his dog in exam room), and making unusual statements  ED CTA chest revealed B/L PE, new from 4/20/23  PE:  Alert, disoriented  Cognition and memory impaired  Decreased breath sounds  4/28 Inpatient admission for evaluation and treatment of acute encephalopathy, acute respiratory failure with hypoxia,  B/L PE, hyponatremia, hypercalcemia:  Check MRI brain, follow urine and blood cultures  Check BNP, TTE  Heparin infusion  Small NS bolus and reassess  Recheck calcium with ionized calcium and Vitamin D, calcitonin 4 units/kg Q12 H      4/28 Pulmonology consult:  Right middle lobe lung mass with extensive mediastinal, axillary and cervical adenopathy highly suggestive of metastatic disease  Would recommend obtaining tissue diagnosis via biopsy of either the axillary or cervical lymph nodes by interventional radiology  This will provide staging information as well as minimize risk of anesthesia  Bilateral pulmonary embolism and subclavian vein thrombosis- continue IV heparin pending biopsy  Acute hypoxic respiratory failure secondary to PE-oxygenation is stable on 2 L nasal cannula  Nephrology consult: Moderate, potentially symptomatic, hypercalcemia presumably of malignancy   Initially noted during previous hospitalization treated with IV fluids and calcitonin with improvement  PTH was suppressed  Primary team is ordered calcitonin-May be refractory at this point  Will initiate patient on normal saline 100 mL an hour x16 hours-cautiously-monitor for signs and symptoms of pulmonary edema/volume overload  Give Lasix as needed  We will likely treat with 4 mg Zometa tomorrow once volume status is improved/LFTs downtrend  I discussed with patient's wife and daughter Lois Baezly (RN) and they are agreeable to proceed (aware of risk of jaw osteonecrosis/collapsing FSGS although less likely)  Rest of hypercalcemia work-up is ordered including PTHrP  2   Hyponatremia  Treat with saline and trend  3   Hypomagnesemia, 2 g IV magnesium now  Acute encephalopathy  MRI pending to rule out CVA versus brain metastasis  Hypercalcemia may be contributing  Neurology consult:  Neurology was consulted after MRI showed tiny subacute infarct in left cerebellum -Recommend to complete stroke work-up with CTA head and neck  The size of the infarct unlikely explain patient altered mental status  Etiology of the stroke, suspect related with a hypercoag status under the setting of a malignancy and thrombosis  However, other etiology, such as cardiac embolic cannot completely rule out  Pending complete work-up  OK to continue Eliquis, pending CTA head and neck, may consider to add aspirin based on results  Reported altered mental status  The differential diagnosis today is broad, could be well be metabolic encephalopathy that related with hypoxia, hypercalcemia, other possibilities, including but not limited to CNS infection, paraneoplastic syndrome, seizure cannot completely rule out  If patient's mental status does not improve back to baseline quickly after metabolic disturbance addressed, low threshold to perform lumbar puncture and obtain routine EEG for further investigation     Physical Therapy discharge recommendation is post acute rehab      4/29 Internal Medicine: Patient is much more alert compared to yesterday  Still confused  New infarction of cerebellum  Neurology recommends to continue heparin gtt per PE protocol  Once patient off heparin gtt, can start aspirin  Patient passed Speech eval  Calcium is improving  Pulmonary recommends inpatient biopsy, Per IR, will try to proceed on Monday  Will hold heparin Sunday night  PE: Alert, oriented to person, month, unable to recall place  Cervical adenopathy present  Nephrology: Moderate potentially symptomatic hypercalcemia  Corrected nicely with calcitonin and normal saline  Ionized calcium only slightly elevated  PTH is suppressed  PTH related peptide and serum/urine electrophoresis still pending  Etiology potentially volume depletion versus or in addition to malignancy related  Patient admits that he is not hydrating appropriately at home  Will defer Zometa at this time and will leave this to his outpatient hematology/oncology provider  Hypovolemic hyponatremia  Serum sodium nearing normal with normal saline           ED Triage Vitals   Temperature Pulse Respirations Blood Pressure SpO2   04/28/23 0035 04/28/23 0030 04/28/23 0030 04/28/23 0030 04/28/23 0030   98 1 °F (36 7 °C) (!) 111 19 139/76 93 % room air      Temp Source Heart Rate Source Patient Position - Orthostatic VS BP Location FiO2 (%)   04/28/23 0035 04/28/23 0030 04/28/23 0030 04/28/23 0030 --   Temporal Monitor Lying Right arm       Pain Score       04/28/23 0611       No Pain          Wt Readings from Last 1 Encounters:   04/28/23 93 9 kg (207 lb)     Additional Vital Signs:     Date/Time Temp Pulse Resp BP MAP (mmHg) SpO2 Calculated FIO2 (%) - Nasal Cannula Nasal Cannula O2 Flow Rate (L/min) O2 Device   04/29/23 1114 97 7 °F (36 5 °C) -- -- -- -- -- -- -- Nasal cannula   04/29/23 0739 97 4 °F (36 3 °C) Abnormal  -- -- -- -- -- -- -- Nasal cannula   04/29/23 0306 97 3 °F (36 3 °C) Abnormal  89 14 131/60 87 96 % 32 3 L/min Nasal cannula   04/28/23 2306 98 1 °F (36 7 °C) 89 21 123/72 92 91 % -- -- --   04/28/23 1913 98 5 °F (36 9 °C) 93 15 121/66 87 93 % -- -- --   04/28/23 1500 97 5 °F (36 4 °C) 96 16 110/67 83 93 % 32 3 L/min Nasal cannula   04/28/23 1300 -- 95 -- 119/74 -- -- -- -- --   04/28/23 1243 -- 97 19 -- -- 88 % Abnormal  36 4 L/min  Nasal cannula   04/28/23 1200 -- 101 24 Abnormal  119/74 92 94 % 32 3 L/min Nasal cannula   04/28/23 1106 -- 100 20 136/67 93 92 % 32 3 L/min Nasal cannula   04/28/23 0751 98 6 °F (37 °C) 92 21 128/72 92 91 % 28 2 L/min Nasal cannula   04/28/23 0611 97 9 °F (36 6 °C) 98 22 127/75 96 90 % 28 2 L/min Nasal cannula   04/28/23 0545 -- 101 21 121/73 92 91 % 28 2 L/min Nasal cannula   04/28/23 0530 -- 97 22 128/71 94 90 % 28 2 L/min Nasal cannula   04/28/23 0515 -- 95 21 126/70 86 91 % 28 2 L/min Nasal cannula   04/28/23 0500 -- 94 22 135/73 98 91 % 28 2 L/min Nasal cannula   04/28/23 0445 -- 94 22 130/72 93 90 % 28 2 L/min Nasal cannula   04/28/23 0430 -- 101 19 137/68 95 91 % 28 2 L/min Nasal cannula   04/28/23 0415 -- 97 21 130/75 97 92 % 28 2 L/min Nasal cannula   04/28/23 0400 -- 94 21 141/77 92 94 % 28 2 L/min Nasal cannula   04/28/23 0345 -- 95 16 136/62 89 93 % 28 2 L/min Nasal cannula   04/28/23 0330 -- 101 20 152/70 100 92 % 28 2 L/min Nasal cannula   04/28/23 0315 -- 95 17 139/63 90 93 % 28 2 L/min Nasal cannula   04/28/23 0300 -- 99 19 143/59 92 93 % 28 2 L/min Nasal cannula   04/28/23 0245 -- 97 18 135/78 -- 91 % 28 2 L/min Nasal cannula   04/28/23 0230 -- 97 21 131/68 90 92 % 28 2 L/min Nasal cannula   04/28/23 0215 -- 99 22 135/70 95 90 % 28 2 L/min Nasal cannula   04/28/23 0200 -- 98 19 127/64 88 90 % 28 2 L/min Nasal cannula   04/28/23 0145 -- 99 21 136/70 97 92 % 28 2 L/min Nasal cannula   04/28/23 0130 -- 101 21 139/66 96 92 % 28 2 L/min Nasal cannula   04/28/23 0115 -- 102 17 135/71 97 91 % 28 2 L/min Nasal cannula   04/28/23 0100 -- 102 20 136/70 -- 91 % 28 2 L/min Nasal cannula   04/28/23 0058 -- -- -- -- -- "-- -- -- Nasal cannula   04/28/23 0045 -- 105 19 145/69 -- 93 %  28 2 L/min Nasal cannula   04/28/23 0035 98 1 °F (36 7 °C) 106 Abnormal  18 139/76 -- 89 % Abnormal  -- -- None (Room air)         Date and Time Eye Opening Best Verbal Response Best Motor Response [place] Coma Scale Score   04/29/23 1600 4 4 6 14   04/29/23 1200 4 4 6 14   04/29/23 0800 4 4 6 14   04/29/23 0400 4 4 6 14   04/29/23 0000 4 5 6 15   04/28/23 1945 4 4 6 14   04/28/23 1100 4 4 6 14   04/28/23 1000 4 4 6 14   04/28/23 0900 4 4 6 14   04/28/23 0800 4 4 6 14   04/28/23 0600 4 4 6 14   04/28/23 0500 4 5 6 15   04/28/23 0400 4 5 6 15   04/28/23 0300 4 5 6 15   04/28/23 0200 4 5 6 15   04/28/23 0059 4 5 6 15   04/28/23 0035 4 5 6 15       Pertinent Labs/Diagnostic Test Results:       CTA head and neck w wo contrast   Final Result by Hernan Rey DO (04/28 1414)      Atherosclerotic change of the carotid bifurcations with moderate stenosis of the distal common carotid artery and mild narrowing of the proximal internal carotid artery on the right  No significant left-sided stenosis  Unremarkable intracranial    vasculature  Extensive lower cervical adenopathy as seen on recent CT of the chest  This results in significant narrowing of the trachea in the transverse dimension, unchanged  There is also a filling defect present within the right innominate vein within the inferior neck extending into the superior aspect of the SVC  This is consistent with thrombosis  There is only partial thrombosis of the most superior aspect of the SVC  Below this the vessel enhances appropriately  This examination was marked \"immediate notification\" in Epic in order to begin the standard process by which the radiology reading room liaison alerts the referring practitioner  MRI brain w wo contrast   Final Result by Tyrone Palafox MD (04/28 9850)      1  Tiny focus of acute or subacute infarct in the left cerebellum        2   No " intracranial metastasis  The study was marked in [facility] for immediate notification  CTA ED chest PE study   Final Result by Beny Méndez MD (04/28 1414)      Left lower lobe segmental pulmonary embolism  Right lower lobe segmental pulmonary embolism         Massively extensively diffuse mediastinal and hilar adenopathy resulting in mass effect on the trachea and pulmonary arteries bilaterally         Moderate diffuse edematous changes throughout the lungs  Right right lower lobe pulmonary infarction    Right middle lobe atelectasis secondary to mass effect  CT head wo contrast   Final Result by Shravan Medina MD (04/28 8025)      No acute intracranial abnormality  4/28 ECHO:      Left Ventricle: Left ventricular cavity size is normal  Wall thickness is increased  There is mild to moderate concentric hypertrophy  The left ventricular ejection fraction is >70%  Systolic function is vigorous  Although no diagnostic regional wall motion abnormality was identified, this possibility cannot be completely excluded on the basis of this study  Diastolic function is mildly abnormal, consistent with grade I (abnormal) relaxation  Left atrial filling pressure is normal     Atrial Septum: No patent foramen ovale detected using saline contrast injection with provocation by abdominal compression    Aortic Valve: The leaflets are moderately thickened  The leaflets are moderately calcified  There is mildly reduced mobility  There is no evidence of regurgitation  There is mild stenosis  The aortic valve peak velocity is 2 57 m/s  The aortic valve mean gradient is 15 mmHg  The dimensionless velocity index is 0 41  The aortic valve area is 1 48 cm2    Mitral Valve: There is mild calcification  The leaflets exhibit normal mobility  There is trace regurgitation  There is no evidence of stenosis    Tricuspid Valve: Tricuspid valve structure is normal  There is trace regurgitation   There is no evidence of stenosis    Aorta: The aortic root is mildly dilated  The aortic root is 3 90 cm    Pericardium: There is a trivial pericardial effusion    No prior study available for comparison      4/28 ED EKG:    Previous ECG:     Comparison to cardiac monitor:  Yes     Interpretation:     Interpretation: non-specific     Rate:     ECG rate:  103     ECG rate assessment: tachycardic     Rhythm:     Rhythm: sinus tachycardia     QRS:     QRS intervals:  Wide   Conduction:     Conduction: abnormal       Abnormal conduction: non-specific intraventricular conduction delay     ST segments:     ST segments:  Normal   T waves:     T waves: normal                       Results from last 7 days   Lab Units 04/28/23  0057   SARS-COV-2  Negative     Results from last 7 days   Lab Units 04/28/23  0629 04/28/23  0047   WBC Thousand/uL 14 89* 16 47*   HEMOGLOBIN g/dL 12 7 12 9   HEMATOCRIT % 37 7 37 9   PLATELETS Thousands/uL 516* 490*   NEUTROS ABS Thousands/µL 11 90*  --          Results from last 7 days   Lab Units 04/29/23  1159 04/29/23  0834 04/29/23  0459 04/29/23  0018 04/28/23  1958 04/28/23  1837 04/28/23  0629   SODIUM mmol/L 134* 134* 134* 133* 133* 131* 128*   POTASSIUM mmol/L 3 6 3 5 3 5 3 5 3 6 3 5 3 6   CHLORIDE mmol/L 100 100 99 98 97 96 92*   CO2 mmol/L 27 28 28 29 29 29 30   ANION GAP mmol/L 7 6 7 6 7 6 6   BUN mg/dL 9 10 10 10 11 11 12   CREATININE mg/dL 0 67 0 68 0 65 0 70 0 71 0 75 0 81   EGFR ml/min/1 73sq m 103 103 105 101 101 99 95   CALCIUM mg/dL 10 5* 10 5* 10 9* 11 0* 11 3* 11 4* 13 3*   CALCIUM, IONIZED mmol/L  --   --  1 34*  --   --   --  1 57*   MAGNESIUM mg/dL  --   --   --   --   --  1 8* 1 4*   PHOSPHORUS mg/dL  --   --   --   --   --   --  2 4     Results from last 7 days   Lab Units 04/29/23  0459 04/28/23  0629   AST U/L 40* 75*   ALT U/L 90* 137*   ALK PHOS U/L 123* 151*   TOTAL PROTEIN g/dL 6 5 7 0   ALBUMIN g/dL 3 2* 3 5   TOTAL BILIRUBIN mg/dL 0 38 0 66   BILIRUBIN DIRECT mg/dL 0 05  -- Results from last 7 days   Lab Units 04/29/23  1107 04/29/23  0738 04/28/23  2045 04/28/23  1739 04/28/23  1600 04/28/23  1106 04/28/23  0754 04/28/23  0035   POC GLUCOSE mg/dl 117 107 130 164* 204* 130 96 116     Results from last 7 days   Lab Units 04/29/23  1159 04/29/23  0834 04/29/23  0459 04/29/23  0018 04/28/23  1958 04/28/23  1837 04/28/23  0629 04/28/23  0047   GLUCOSE RANDOM mg/dL 113 116 113 123 149* 166* 98 110               Results from last 7 days   Lab Units 04/28/23  0432 04/28/23  0304 04/28/23  0047   HS TNI 0HR ng/L  --   --  44   HS TNI 2HR ng/L  --  36  --    HSTNI D2 ng/L  --  -8  --    HS TNI 4HR ng/L 43  --   --    HSTNI D4 ng/L -1  --   --          Results from last 7 days   Lab Units 04/29/23  0459 04/28/23  1628 04/28/23  1012 04/28/23  0047   PROTIME seconds  --   --   --  20 1*   INR   --   --   --  1 71*   PTT seconds 63* 71* 60* 38*         Results from last 7 days   Lab Units 04/28/23  0629   PROCALCITONIN ng/ml 0 15     Results from last 7 days   Lab Units 04/28/23  0629   LACTIC ACID mmol/L 0 9             Results from last 7 days   Lab Units 04/28/23  0630   BNP pg/mL 30         Results from last 7 days   Lab Units 04/28/23  0641   CLARITY UA  Clear   COLOR UA  Yellow   SPEC GRAV UA  1 020   PH UA  6 0   GLUCOSE UA mg/dl Negative   KETONES UA mg/dl Negative   BLOOD UA  Moderate*   PROTEIN UA mg/dl Negative   NITRITE UA  Negative   BILIRUBIN UA  Negative   UROBILINOGEN UA E U /dl 0 2   LEUKOCYTES UA  Negative   WBC UA /hpf 1-2   RBC UA /hpf 10-20*   BACTERIA UA /hpf Occasional   EPITHELIAL CELLS WET PREP /hpf Occasional     Results from last 7 days   Lab Units 04/28/23  0057   INFLUENZA A PCR  Negative   INFLUENZA B PCR  Negative   RSV PCR  Negative       Results from last 7 days   Lab Units 04/28/23  0631 04/28/23  0629   BLOOD CULTURE  No Growth at 24 hrs  No Growth at 24 hrs                 ED Treatment:   Medication Administration from 04/28/2023 0018 to 04/28/2023 5583 Date/Time Order Dose Route Action     04/28/2023 0308 EDT iohexol (OMNIPAQUE) 350 MG/ML injection (SINGLE-DOSE) 100 mL 100 mL Intravenous Given     04/28/2023 0357 EDT heparin (porcine) 25,000 units in 0 45% NaCl 250 mL infusion (premix) 18 Units/kg/hr Intravenous New Bag        Past Medical History:   Diagnosis Date    Bilateral pulmonary embolism ([place] [address] ) 04/28/2023    Current smoker     Diabetes mellitus ([place] [address] )     GERD (gastroesophageal reflux disease)     Gout     Hypertension     Lung cancer ([place] [address] )     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)     Mixed dyslipidemia     Pleural effusion, right 04/28/2023    Subclavian vein thrombosis (HCC)     Type 2 diabetes mellitus without complication, without long-term current use of insulin ([place] [address] ) 04/20/2023     Present on Admission:   Bilateral pulmonary embolism (HCC)   Subclavian vein thrombosis (HCC)   Severe systemic inflammatory response syndrome (SIRS) (HCC)   Acute respiratory failure with hypoxia (HCC)   Lung mass   Hypercalcemia   Hyponatremia   Tobacco abuse   Type 2 diabetes mellitus without complication, without long-term current use of insulin (HCC)   Pleural effusion, right   Neck mass   Essential hypertension   Gout   Acute encephalopathy   New infarction of cerebellum (HCC)      Admitting Diagnosis: Hypercalcemia [E83 52]  Confusion [R41 0]  Hyponatremia [E87 1]  Lung mass [R91 8]  Hypoxia [R09 02]  Pleural effusion, right [J90]  Bilateral pulmonary embolism (HCC) [I26 99]  Acute respiratory failure with hypoxia (HCC) [J96 01]  Acute encephalopathy [G93 40]  AMS (altered mental status) [R41 82]  Age/Sex: 66 y o  male       Admission Orders:  Nasal cannula oxygen, SCD, PT/OT, neuro checks           Scheduled Medications:    allopurinol, 300 mg, Oral, Daily  calcitonin (salmon), 4 Units/kg, Subcutaneous, Q12H GAVIN  insulin lispro, 1-6 Units, Subcutaneous, HS  insulin lispro, 1-6 Units, Subcutaneous, TID AC  losartan, 100 mg, Oral, Daily  pravastatin, 40 mg, Oral, Daily With Dinner      Continuous IV Infusions:    heparin (porcine), 3-30 Units/kg/hr (Order-Specific), Intravenous, Titrated      sodium chloride 0 9 % infusion  Rate: 125 mL/hr Dose: 125 mL/hr  Freq: Continuous Route: IV  Indications of Use: IV Hydration  Start: 04/28/23 1645 End: 04/29/23 0857       sodium chloride 0 9 % infusion  Rate: 100 mL/hr Dose: 100 mL/hr  Freq: Continuous Route: IV  Start: 04/28/23 1000 End: 04/28/23 1639        PRN Meds:  acetaminophen, 650 mg, Oral, Q6H PRN  aluminum-magnesium hydroxide-simethicone, 30 mL, Oral, Q6H PRN  docusate sodium, 100 mg, Oral, BID PRN  ondansetron, 4 mg, Intravenous, Q4H PRN        IP CONSULT TO CASE MANAGEMENT  IP CONSULT TO NEPHROLOGY  IP CONSULT TO PULMONOLOGY  IP CONSULT TO NEUROLOGY    Network Utilization Review Department  ATTENTION: Please call with any questions or concerns to [phone] and carefully listen to the prompts so that you are directed to the right person  All voicemails are confidential   Héctor Faulkner all requests for admission clinical reviews, approved or denied determinations and any other requests to dedicated fax number below belonging to the campus where the patient is receiving treatment   List of dedicated fax numbers for the Facilities:  [address] DENIALS (Administrative/Medical Necessity) [phone]   [address] (Maternity/NICU/Pediatrics) [phone] [address] [phone] [address]  [phone]   Madhu Mcfarland 50 [address] [facility] [place] [address] [place] [facility] [address] [facility] [place] [address] [phone]

## 2023-04-29 NOTE — ASSESSMENT & PLAN NOTE
MRI:Tiny focus of acute or subacute infarct in the left cerebellum  Initial NIHSS score was 4-repeat NIHSS score is 1  Neurology consult appreciated-suspect most likely hypercoagulable state secondary to underlying lung mass  Recommends to continue current heparin drip as per PE protocol  Once patient off of heparin drip, may start aspirin  CTA head neck shows atherosclerosis  Echocardiogram shows no PFO    Speech and swallow evaluation done, patient passed the swallow test  Follow PT/OT  Follow lipid and A1c

## 2023-04-29 NOTE — DISCHARGE INSTR - DIET
4/29/23 BSE-Regular with thin  Strategies-frequent LW to accommodate for xeromstomia  Also moistening agents to food as able

## 2023-04-29 NOTE — PLAN OF CARE
Problem: Potential for Falls  Goal: Patient will remain free of falls  Description: INTERVENTIONS:  - Educate patient/family on patient safety including physical limitations  - Instruct patient to call for assistance with activity   - Consult OT/PT to assist with strengthening/mobility   - Keep Call bell within reach  - Keep bed low and locked with side rails adjusted as appropriate  - Keep care items and personal belongings within reach  - Initiate and maintain comfort rounds  - Make Fall Risk Sign visible to staff  - Offer Toileting every 2 Hours, in advance of need  - Initiate/Maintain bed/chair alarm  - Apply yellow socks and bracelet for high fall risk patients  - Consider moving patient to room near nurses station  Outcome: Progressing     Problem: PAIN - ADULT  Goal: Verbalizes/displays adequate comfort level or baseline comfort level  Description: Interventions:  - Encourage patient to monitor pain and request assistance  - Assess pain using appropriate pain scale  - Administer analgesics based on type and severity of pain and evaluate response  - Implement non-pharmacological measures as appropriate and evaluate response  - Consider cultural and social influences on pain and pain management  - Notify physician/advanced practitioner if interventions unsuccessful or patient reports new pain  Outcome: Progressing     Problem: INFECTION - ADULT  Goal: Absence or prevention of progression during hospitalization  Description: INTERVENTIONS:  - Assess and monitor for signs and symptoms of infection  - Monitor lab/diagnostic results  - Monitor all insertion sites, i e  indwelling lines, tubes, and drains  - Monitor endotracheal if appropriate and nasal secretions for changes in amount and color  - Kevil appropriate cooling/warming therapies per order  - Administer medications as ordered  - Instruct and encourage patient and family to use good hand hygiene technique  - Identify and instruct in appropriate isolation precautions for identified infection/condition  Outcome: Progressing     Problem: SAFETY ADULT  Goal: Patient will remain free of falls  Description: INTERVENTIONS:  - Educate patient/family on patient safety including physical limitations  - Instruct patient to call for assistance with activity   - Consult OT/PT to assist with strengthening/mobility   - Keep Call bell within reach  - Keep bed low and locked with side rails adjusted as appropriate  - Keep care items and personal belongings within reach  - Initiate and maintain comfort rounds  - Make Fall Risk Sign visible to staff  - Offer Toileting every 2 Hours, in advance of need  - Initiate/Maintain bed/chair alarm  - Apply yellow socks and bracelet for high fall risk patients  - Consider moving patient to room near nurses station  Outcome: Progressing  Goal: Maintain or return to baseline ADL function  Description: INTERVENTIONS:  -  Assess patient's ability to carry out ADLs; assess patient's baseline for ADL function and identify physical deficits which impact ability to perform ADLs (bathing, care of mouth/teeth, toileting, grooming, dressing, etc )  - Assess/evaluate cause of self-care deficits   - Assess range of motion  - Assess patient's mobility; develop plan if impaired  - Assess patient's need for assistive devices and provide as appropriate  - Encourage maximum independence but intervene and supervise when necessary  - Involve family in performance of ADLs  - Assess for home care needs following discharge   - Consider OT consult to assist with ADL evaluation and planning for discharge  - Provide patient education as appropriate  Outcome: Progressing  Goal: Maintains/Returns to pre admission functional level  Description: INTERVENTIONS:  - Perform BMAT or MOVE assessment daily    - Set and communicate daily mobility goal to care team and patient/family/caregiver     - Collaborate with rehabilitation services on mobility goals if consulted  - Perform Range of Motion 3 times a day  - Reposition patient every 2 hours if unable to reposition self  - Record patient progress and toleration of activity level   Outcome: Progressing     Problem: DISCHARGE PLANNING  Goal: Discharge to home or other facility with appropriate resources  Description: INTERVENTIONS:  - Identify barriers to discharge w/patient and caregiver  - Arrange for needed discharge resources and transportation as appropriate  - Identify discharge learning needs (meds, wound care, etc )  - Arrange for interpretive services to assist at discharge as needed  - Refer to Case Management Department for coordinating discharge planning if the patient needs post-hospital services based on physician/advanced practitioner order or complex needs related to functional status, cognitive ability, or social support system  Outcome: Progressing     Problem: Knowledge Deficit  Goal: Patient/family/caregiver demonstrates understanding of disease process, treatment plan, medications, and discharge instructions  Description: Complete learning assessment and assess knowledge base    Interventions:  - Provide teaching at level of understanding  - Provide teaching via preferred learning methods  Outcome: Progressing     Problem: NEUROSENSORY - ADULT  Goal: Achieves stable or improved neurological status  Description: INTERVENTIONS  - Monitor and report changes in neurological status  - Monitor vital signs such as temperature, blood pressure, glucose, and any other labs ordered   - Initiate measures to prevent increased intracranial pressure  - Monitor for seizure activity and implement precautions if appropriate      Outcome: Progressing  Goal: Remains free of injury related to seizures activity  Description: INTERVENTIONS  - Maintain airway, patient safety  and administer oxygen as ordered  - Monitor patient for seizure activity, document and report duration and description of seizure to physician/advanced practitioner  - If seizure occurs,  ensure patient safety during seizure  - Reorient patient post seizure  - Seizure pads on all 4 side rails  - Instruct patient/family to notify RN of any seizure activity including if an aura is experienced  - Instruct patient/family to call for assistance with activity based on nursing assessment  - Administer anti-seizure medications if ordered    Outcome: Progressing  Goal: Achieves maximal functionality and self care  Description: INTERVENTIONS  - Monitor swallowing and airway patency with patient fatigue and changes in neurological status  - Encourage and assist patient to increase activity and self care     - Encourage visually impaired, hearing impaired and aphasic patients to use assistive/communication devices  Outcome: Progressing     Problem: RESPIRATORY - ADULT  Goal: Achieves optimal ventilation and oxygenation  Description: INTERVENTIONS:  - Assess for changes in respiratory status  - Assess for changes in mentation and behavior  - Position to facilitate oxygenation and minimize respiratory effort  - Oxygen administered by appropriate delivery if ordered  - Initiate smoking cessation education as indicated  - Encourage broncho-pulmonary hygiene including cough, deep breathe, Incentive Spirometry  - Assess the need for suctioning and aspirate as needed  - Assess and instruct to report SOB or any respiratory difficulty  - Respiratory Therapy support as indicated  Outcome: Progressing     Problem: MOBILITY - ADULT  Goal: Maintain or return to baseline ADL function  Description: INTERVENTIONS:  -  Assess patient's ability to carry out ADLs; assess patient's baseline for ADL function and identify physical deficits which impact ability to perform ADLs (bathing, care of mouth/teeth, toileting, grooming, dressing, etc )  - Assess/evaluate cause of self-care deficits   - Assess range of motion  - Assess patient's mobility; develop plan if impaired  - Assess patient's need for assistive devices and provide as appropriate  - Encourage maximum independence but intervene and supervise when necessary  - Involve family in performance of ADLs  - Assess for home care needs following discharge   - Consider OT consult to assist with ADL evaluation and planning for discharge  - Provide patient education as appropriate  Outcome: Progressing  Goal: Maintains/Returns to pre admission functional level  Description: INTERVENTIONS:  - Perform BMAT or MOVE assessment daily    - Set and communicate daily mobility goal to care team and patient/family/caregiver  - Collaborate with rehabilitation services on mobility goals if consulted  - Perform Range of Motion 3 times a day  - Reposition patient every 2 hours if unable to reposition self  - Record patient progress and toleration of activity level   Outcome: Progressing     Problem: Neurological Deficit  Goal: Neurological status is stable or improving  Description: Interventions:  - Monitor and assess patient's level of consciousness, motor function, sensory function, and level of assistance needed for ADLs  - Monitor and report changes from baseline  Collaborate with interdisciplinary team to initiate plan and implement interventions as ordered  - Provide and maintain a safe environment  - Consider seizure precautions  - Consider fall precautions  - Consider aspiration precautions  - Consider bleeding precautions  Outcome: Progressing     Problem: Activity Intolerance/Impaired Mobility  Goal: Mobility/activity is maintained at optimum level for patient  Description: Interventions:  - Assess and monitor patient  barriers to mobility and need for assistive/adaptive devices  - Assess patient's emotional response to limitations  - Collaborate with interdisciplinary team and initiate plans and interventions as ordered  - Encourage independent activity per ability   - Maintain proper body alignment    - Perform active/passive rom as tolerated/ordered  - Plan activities to conserve energy   - Turn patient as appropriate  Outcome: Progressing     Problem: Communication Impairment  Goal: Ability to express needs and understand communication  Description: Assess patient's communication skills and ability to understand information  Patient will demonstrate use of effective communication techniques, alternative methods of communication and understanding even if not able to speak  - Encourage communication and provide alternate methods of communication as needed  - Collaborate with case management/ for discharge needs  - Include patient/family/caregiver in decisions related to communication  Outcome: Progressing     Problem: Potential for Aspiration  Goal: Non-ventilated patient's risk of aspiration is minimized  Description: Assess and monitor vital signs, respiratory status, and labs (WBC)  Monitor for signs of aspiration (tachypnea, cough, rales, wheezing, cyanosis, fever)  - Assess and monitor patient's ability to swallow  - Place patient up in chair to eat if possible  - HOB up at 90 degrees to eat if unable to get patient up into chair   - Supervise patient during oral intake  - Instruct patient/ family to take small bites  - Instruct patient/ family to take small single sips when taking liquids  - Follow patient-specific strategies generated by speech pathologist   Outcome: Progressing     Problem: Nutrition  Goal: Nutrition/Hydration status is improving  Description: Monitor and assess patient's nutrition/hydration status for malnutrition (ex- brittle hair, bruises, dry skin, pale skin and conjunctiva, muscle wasting, smooth red tongue, and disorientation)  Collaborate with interdisciplinary team and initiate plan and interventions as ordered  Monitor patient's weight and dietary intake as ordered or per policy  Utilize nutrition screening tool and intervene per policy   Determine patient's food preferences and provide high-protein, high-caloric foods as appropriate  - Assist patient with eating   - Allow adequate time for meals   - Encourage patient to take dietary supplement as ordered  - Collaborate with clinical nutritionist   - Include patient/family/caregiver in decisions related to nutrition    Outcome: Progressing

## 2023-04-29 NOTE — CASE MANAGEMENT
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[facility] ) 2023    Gout 2023    Bilateral pulmonary embolism ([facility] ) 2023    Severe systemic inflammatory response syndrome (SIRS) ([facility] ) 2023    Acute respiratory failure with hypoxia ([facility] ) 2023    Pleural effusion, right 2023    Acute encephalopathy 2023    SIRS (systemic inflammatory response syndrome) ([facility] ) 2023    Hypercoagulopathy ([facility] ) 2023    Acute pneumonia 2023    Lung mass 2023    Neck mass 2023    Hypercalcemia 2023    Hyponatremia 2023    Subclavian vein thrombosis ([facility] ) 2023    Type 2 diabetes mellitus without complication, without long-term current use of insulin [facility] ) 2023    Tobacco abuse 2019    Essential hypertension 2019    Mixed dyslipidemia 2019      LOS (days): 1  Geometric Mean LOS (GMLOS) (days): 3 90  Days to GMLOS:2 4     OBJECTIVE:  PATIENT READMITTED TO HOSPITAL  Acute encephalopathy - HX lung and neck masses  Risk of Unplanned Readmission Score: 10 12         Current admission status: Inpatient  Referral Reason:  (discharge planning consult post acute care)    Preferred Pharmacy:   [address]  Phone: [phone] Fax: [phone]    Primary Care Provider: Rubén Baumann DO    Primary Insurance: BLUE CROSS  Secondary Insurance:     ASSESSMENT:  CM met with patient at the bedside,baseline information  was obtained   CM discussed the role of CM in helping the patient develop a discharge plan and assist the patient in carry out their plan     Patient was active and working prior to hospitalization  Left work early said he had to get labs done and went home  Wife saw patient was confused and brought to Er  Using art pharm and mail order from his work place        Swati Villanueva Proxies    There are no active [address] on file  Advance Directives  Does patient have a [address]?: No  Was patient offered paperwork?: Yes (provided at bedside)  Does patient currently have a Health Care decision maker?: Yes, please see Health Care Proxy section (wife Norma Viramontes)  Does patient have Advance Directives?: No  Was patient offered paperwork?: Yes (provided at bedside)  Primary Contact: Norma Viramontes wife         Readmission Root Cause  30 Day Readmission: No    Patient Information  Admitted from[de-identified] Home  Mental Status: Alert, Confused  During Assessment patient was accompanied by: Daughter, Spouse  Assessment information provided by[de-identified] Spouse  Primary Caregiver: Spouse  Caregiver's Name[de-identified] Norma Viramontes wife  Caregiver's Relationship to Patient[de-identified] Significant Other  Caregiver's Telephone Number[de-identified] [phone]  Support Systems: Spouse/significant other, Daughter  [place] of Residence: [address] do you live in?: [address] [facility] entry access options   Select all that apply : Ramp  Type of Current Residence: [place]  In the last 12 months, was there a time when you were not able to pay the mortgage or rent on time?: No  In the last 12 months, how many places have you lived?: 1  In the last 12 months, was there a time when you did not have a steady place to sleep or slept in a shelter (including now)?: No  Homeless/housing insecurity resource given?: N/A  Living Arrangements: Lives w/ Spouse/significant other    Activities of Daily Living Prior to Admission  Functional Status: Independent  Completes ADLs independently?: Yes  Ambulates independently?: Yes  Does patient use assisted devices?: No  Does patient currently own DME?: No  Does patient have a history of Outpatient Therapy (PT/OT)?: No  Does the patient have a history of Short-Term Rehab?: No  Does patient have a history of HHC?: No  Does patient currently have [place]lon [place]?: No         Patient Information Continued  Income Source: Employed  Does patient have prescription coverage?: Yes  Within the past 12 months, you worried that your food would run out before you got the money to buy more : Never true  Within the past 12 months, the food you bought just didn't last and you didn't have money to get more : Never true  Food insecurity resource given?: N/A  Does patient receive dialysis treatments?: No  Does patient have a history of substance abuse?: No  Does patient have a history of Mental Health Diagnosis?: No         Means of Transportation  Means of Transport to Appts[de-identified] Drives Self  In the past 12 months, has lack of transportation kept you from medical appointments or from getting medications?: No  In the past 12 months, has lack of transportation kept you from meetings, work, or from getting things needed for daily living?: No  Was application for public transport provided?: N/A        DISCHARGE DETAILS:    Discharge planning discussed with[de-identified] wife Shanika Love and daughters  Kiley Telles and Daphne Buerger at bedside  Freedom of Choice: Yes  Comments - Freedom of Choice: CM discussed FOC with family for PT/Ot recommendations for  Rehab   preferences [place] and [facility]  in agreement with blanket referrals  ( one daughter not liking [facility] )  CM contacted family/caregiver?: Yes (at bedside)  Were Treatment Team discharge recommendations reviewed with patient/caregiver?: Yes  Did patient/caregiver verbalize understanding of patient care needs?: Yes  Were patient/caregiver advised of the risks associated with not following Treatment Team discharge recommendations?: Yes    Contacts  Patient Contacts: wife Shanika Love  Relationship to Patient[de-identified] Family  Contact Method:  In Person  Reason/Outcome: Discharge Planning    Requested Home Health Care         Is the patient interested in [address] at discharge?: No              Would you like to participate in our [address] service program?  : No - Declined           CM met with patient wife German Seay and there 2 daughters Ciara Alvarado and Colton Ford at bedside  Patient was OOB  Alert     Provided information for POC and Living will to family at bedside  CM discussed  FOC with patient and caretaker at bedside  Patient and caretaker agreeable with blanket referrals in [address] [place] [address] for local facilities  to determine bed availability according to your medical needs and insurance coverage   Patient and caretaker looking at [facility] or possible [facility]       Referrals placed in aidin awaiting bed availability    CM to follow

## 2023-04-29 NOTE — QUICK NOTE
Patient family member, including daughter Juan Manuel Padgett was concern regarding patient's confusion  Since electrolyte is improving, and the stroke most likely not contributing to his confusion  Family was concerned that most likely hypoxia or hypercapnia can contribute to his confusion  Discussed in details patient's labs, since we are doing labs every 4 hours, BMP CO2 is normal   Patient also remain on heparin drip, in this case, ABG will be risky considering bleeding risk  VBG will not give accurate reading  about the CO2  Discussed in details with family member regarding the treatment options, will hold off doing ABG at this time, continue to assess patient, if patient gets more confused or obtunded, at that moment can consider ABG  Family agrees  Nurse present

## 2023-04-29 NOTE — PROGRESS NOTES
NEPHROLOGY PROGRESS NOTE   Chu Olivier 66 y o  male MRN: [id number]  Unit/Bed#: -01 Encounter: [id number]    Assessment/Plan:    65 yo man with diabetes, hypertension smoker, recently diagnosed lung and neck mass with lymphadenopathy, recent subclavian vein thrombosis on Eliquis being treated for acute encephalopathy, newly identified acute versus subacute infarct left cerebellum, bilateral PE  Nephrology following along for hypercalcemia    1  Moderate potentially symptomatic hypercalcemia  · Corrected nicely with calcitonin and normal saline  Ionized calcium only slightly elevated  PTH is suppressed  PTH related peptide and serum/urine electrophoresis still pending  Etiology potentially volume depletion versus or in addition to malignancy related  Patient admits that he is not hydrating appropriately at home  Will defer Zometa at this time and will leave this to his outpatient hematology/oncology provider  2   Hypovolemic hyponatremia  · Serum sodium nearing normal with normal saline  3  Hypomagnesemia  · Status post repletion  4  Acute metabolic encephalopathy  · Calcium and serum sodium nearing normal   Work-up and treatment per primary team  5  New infarction of cerebellum  · Per neurology and primary team  6  Bilateral PE  7  Lung mass and neck mass  · Tentative for inpatient biopsy        ROS  Examined sitting in chair surrounded by family  States that he has been having trouble swallowing lately which has decreased his oral intake of fluids  Feels okay today  A complete 10 point review of systems have been performed and are otherwise negative         Historical Information   Past Medical History:   Diagnosis Date    Bilateral pulmonary embolism ([facility] [facility] ) 04/28/2023    Current smoker     Diabetes mellitus ([facility] )     GERD (gastroesophageal reflux disease)     Gout     Hypertension     Lung cancer ([facility] [address] )     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)     Mixed dyslipidemia     Pleural effusion, "right 04/28/2023    Subclavian vein thrombosis (HCC)     Type 2 diabetes mellitus without complication, without long-term current use [facility] insulin [facility] 04/20/2023     Past Surgical History:   Procedure Laterality Date    ANAL FISSURECTOMY      TONSILLECTOMY       Social History   Social History     Substance and Sexual Activity   Alcohol Use Yes     Social History     Substance and Sexual Activity   Drug Use Not Currently     Social History     Tobacco Use   Smoking Status Every Day    Packs/day: 1 00    Types: Cigarettes   Smokeless Tobacco Never       Family History:   History reviewed  No pertinent family history  Medications:  Pertinent medications were reviewed  Current Facility-Administered Medications   Medication Dose Route Frequency Provider Last Rate    acetaminophen  650 mg Oral Q6H PRN Lucy Patton MD      allopurinol  300 mg Oral Daily Lucy Patton MD      aluminum-magnesium hydroxide-simethicone  30 mL Oral Q6H PRN Lucy Patton MD      calcitonin (salmon)  4 Units/kg Subcutaneous Q12H [address] Lucy Patton MD      docusate sodium  100 mg Oral BID PRN Lucy Patton MD      heparin (porcine)  3-30 Units/kg/hr (Order-Specific) Intravenous Titrated Lucy Patton MD 18 Units/kg/hr (04/29/23 0737)    insulin lispro  1-6 Units Subcutaneous HS Lucy Patton MD      insulin lispro  1-6 Units Subcutaneous TID AC Lucy Patton MD      losartan  100 mg Oral Daily Lucy Patton MD      ondansetron  4 mg Intravenous Q4H PRN Lucy Patton MD      pravastatin  40 mg Oral Daily With Chad Singh MD           No Known Allergies      Vitals:   /60 (BP Location: Left arm)   Pulse 89   Temp 97 7 °F (36 5 °C) (Oral)   Resp 14   Ht 6' 1\" (1 854 m)   Wt 93 9 kg (207 lb)   SpO2 96%   BMI 27 31 kg/m²   Body mass index is 27 31 kg/m²    SpO2: 96 %,   SpO2 Activity: At Rest,   O2 Device: Nasal " "cannula      Intake/Output Summary (Last 24 hours) at 4/29/2023 1524  Last data filed at 4/29/2023 1409  Gross per 24 hour   Intake 3518 66 ml   Output 2250 ml   Net 1268 66 ml     Invasive Devices     Peripheral Intravenous Line  Duration           Peripheral IV 04/28/23 Distal;Dorsal (posterior); Left Forearm 1 day    Peripheral IV 04/28/23 Left Antecubital 1 day                Physical Exam  General: conscious, cooperative, in no acute distress  Eyes: conjunctivae pink, anicteric sclerae  ENT: lips and mucous membranes moist  Neck: supple, no JVD, no masses  Chest: Diminished breath sounds bilaterally, no crackles, ronchus or wheezings on nasal cannula  CVS: S1 & S2, normal rate, regular rhythm  Abdomen: soft, non-tender, non-distended, normoactive bowel sounds  Extremities: no edema of both legs  Skin: no rash  Neuro: awake, alert, oriented      Diagnostic Data:  Lab: I have personally reviewed pertinent lab results  ,   CBC:  Results from last 7 days   Lab Units 04/28/23  0629   WBC Thousand/uL 14 89*   HEMOGLOBIN g/dL 12 7   HEMATOCRIT % 37 7   PLATELETS Thousands/uL 516*      CMP:   Lab Results   Component Value Date    SODIUM 134 (L) 04/29/2023    K 3 6 04/29/2023     04/29/2023    CO2 27 04/29/2023    BUN 9 04/29/2023    CREATININE 0 67 04/29/2023    CALCIUM 10 5 (H) 04/29/2023    AST 40 (H) 04/29/2023    ALT 90 (H) 04/29/2023    ALKPHOS 123 (H) 04/29/2023    EGFR 103 04/29/2023   ,   PT/INR: No results found for: PT, INR,   Magnesium: No components found for: MAG,  Phosphorous: No results found for: PHOS    Microbiology:  @LABRCNTIP,(urinecx:7)@        GILLIAN William    Portions of the record may have been created with voice recognition software  Occasional wrong word or \"sound a like\" substitutions may have occurred due to the inherent limitations of voice recognition software  Read the chart carefully and recognize, using context, where substitutions have occurred    "

## 2023-04-30 PROBLEM — E87.8 ELECTROLYTE ABNORMALITY: Status: ACTIVE | Noted: 2023-04-30

## 2023-04-30 LAB
ALBUMIN SERPL BCP-MCNC: 3.2 G/DL (ref 3.5–5)
ALP SERPL-CCNC: 124 U/L (ref 34–104)
ALT SERPL W P-5'-P-CCNC: 71 U/L (ref 7–52)
ANION GAP SERPL CALCULATED.3IONS-SCNC: 8 MMOL/L (ref 4–13)
APTT PPP: 63 SECONDS (ref 23–37)
AST SERPL W P-5'-P-CCNC: 32 U/L (ref 13–39)
BASOPHILS # BLD AUTO: 0.07 THOUSANDS/ΜL (ref 0–0.1)
BASOPHILS NFR BLD AUTO: 1 % (ref 0–1)
BILIRUB SERPL-MCNC: 0.4 MG/DL (ref 0.2–1)
BUN SERPL-MCNC: 10 MG/DL (ref 5–25)
CALCIUM ALBUM COR SERPL-MCNC: 12.2 MG/DL (ref 8.3–10.1)
CALCIUM SERPL-MCNC: 11.6 MG/DL (ref 8.4–10.2)
CHLORIDE SERPL-SCNC: 101 MMOL/L (ref 96–108)
CO2 SERPL-SCNC: 27 MMOL/L (ref 21–32)
CREAT SERPL-MCNC: 0.7 MG/DL (ref 0.6–1.3)
EOSINOPHIL # BLD AUTO: 0.07 THOUSAND/ΜL (ref 0–0.61)
EOSINOPHIL NFR BLD AUTO: 1 % (ref 0–6)
ERYTHROCYTE [DISTWIDTH] IN BLOOD BY AUTOMATED COUNT: 12.8 % (ref 11.6–15.1)
EST. AVERAGE GLUCOSE BLD GHB EST-MCNC: 128 MG/DL
GFR SERPL CREATININE-BSD FRML MDRD: 101 ML/MIN/1.73SQ M
GLUCOSE SERPL-MCNC: 115 MG/DL (ref 65–140)
GLUCOSE SERPL-MCNC: 120 MG/DL (ref 65–140)
GLUCOSE SERPL-MCNC: 126 MG/DL (ref 65–140)
GLUCOSE SERPL-MCNC: 130 MG/DL (ref 65–140)
GLUCOSE SERPL-MCNC: 144 MG/DL (ref 65–140)
GLUCOSE SERPL-MCNC: 85 MG/DL (ref 65–140)
HBA1C MFR BLD: 6.1 %
HCT VFR BLD AUTO: 36.8 % (ref 36.5–49.3)
HGB BLD-MCNC: 11.9 G/DL (ref 12–17)
IMM GRANULOCYTES # BLD AUTO: 0.07 THOUSAND/UL (ref 0–0.2)
IMM GRANULOCYTES NFR BLD AUTO: 1 % (ref 0–2)
LYMPHOCYTES # BLD AUTO: 1 THOUSANDS/ΜL (ref 0.6–4.47)
LYMPHOCYTES NFR BLD AUTO: 7 % (ref 14–44)
MAGNESIUM SERPL-MCNC: 1.5 MG/DL (ref 1.9–2.7)
MCH RBC QN AUTO: 27.9 PG (ref 26.8–34.3)
MCHC RBC AUTO-ENTMCNC: 32.3 G/DL (ref 31.4–37.4)
MCV RBC AUTO: 86 FL (ref 82–98)
MONOCYTES # BLD AUTO: 1.34 THOUSAND/ΜL (ref 0.17–1.22)
MONOCYTES NFR BLD AUTO: 10 % (ref 4–12)
NEUTROPHILS # BLD AUTO: 11.24 THOUSANDS/ΜL (ref 1.85–7.62)
NEUTS SEG NFR BLD AUTO: 80 % (ref 43–75)
NRBC BLD AUTO-RTO: 0 /100 WBCS
PLATELET # BLD AUTO: 544 THOUSANDS/UL (ref 149–390)
PMV BLD AUTO: 8.7 FL (ref 8.9–12.7)
POTASSIUM SERPL-SCNC: 3.4 MMOL/L (ref 3.5–5.3)
PROT SERPL-MCNC: 6.9 G/DL (ref 6.4–8.4)
RBC # BLD AUTO: 4.26 MILLION/UL (ref 3.88–5.62)
SODIUM SERPL-SCNC: 136 MMOL/L (ref 135–147)
WBC # BLD AUTO: 13.79 THOUSAND/UL (ref 4.31–10.16)

## 2023-04-30 RX ORDER — POTASSIUM CHLORIDE 20 MEQ/1
40 TABLET, EXTENDED RELEASE ORAL ONCE
Status: COMPLETED | OUTPATIENT
Start: 2023-04-30 | End: 2023-04-30

## 2023-04-30 RX ORDER — SODIUM CHLORIDE 9 MG/ML
100 INJECTION, SOLUTION INTRAVENOUS CONTINUOUS
Status: DISPENSED | OUTPATIENT
Start: 2023-04-30 | End: 2023-05-01

## 2023-04-30 RX ORDER — MAGNESIUM SULFATE HEPTAHYDRATE 40 MG/ML
2 INJECTION, SOLUTION INTRAVENOUS ONCE
Status: COMPLETED | OUTPATIENT
Start: 2023-04-30 | End: 2023-04-30

## 2023-04-30 RX ADMIN — PRAVASTATIN SODIUM 40 MG: 40 TABLET ORAL at 17:30

## 2023-04-30 RX ADMIN — ALLOPURINOL 300 MG: 300 TABLET ORAL at 08:39

## 2023-04-30 RX ADMIN — MAGNESIUM SULFATE HEPTAHYDRATE 2 G: 40 INJECTION, SOLUTION INTRAVENOUS at 08:57

## 2023-04-30 RX ADMIN — SODIUM CHLORIDE 100 ML/HR: 0.9 INJECTION, SOLUTION INTRAVENOUS at 12:00

## 2023-04-30 RX ADMIN — HEPARIN SODIUM 18 UNITS/KG/HR: 10000 INJECTION, SOLUTION INTRAVENOUS at 11:49

## 2023-04-30 RX ADMIN — POTASSIUM CHLORIDE 40 MEQ: 1500 TABLET, EXTENDED RELEASE ORAL at 12:04

## 2023-04-30 RX ADMIN — ZOLEDRONIC ACID 4 MG: 4 INJECTION INTRAVENOUS at 15:57

## 2023-04-30 RX ADMIN — LOSARTAN POTASSIUM 100 MG: 50 TABLET, FILM COATED ORAL at 08:38

## 2023-04-30 NOTE — PLAN OF CARE
Problem: Potential for Falls  Goal: Patient will remain free of falls  Description: INTERVENTIONS:  - Educate patient/family on patient safety including physical limitations  - Instruct patient to call for assistance with activity   - Consult OT/PT to assist with strengthening/mobility   - Keep Call bell within reach  - Keep bed low and locked with side rails adjusted as appropriate  - Keep care items and personal belongings within reach  - Initiate and maintain comfort rounds  - Make Fall Risk Sign visible to staff  - Offer Toileting every 2 Hours, in advance of need  - Initiate/Maintain bed/chair alarm  - Apply yellow socks and bracelet for high fall risk patients  - Consider moving patient to room near nurses station  Outcome: Progressing     Problem: PAIN - ADULT  Goal: Verbalizes/displays adequate comfort level or baseline comfort level  Description: Interventions:  - Encourage patient to monitor pain and request assistance  - Assess pain using appropriate pain scale  - Administer analgesics based on type and severity of pain and evaluate response  - Implement non-pharmacological measures as appropriate and evaluate response  - Consider cultural and social influences on pain and pain management  - Notify physician/advanced practitioner if interventions unsuccessful or patient reports new pain  Outcome: Progressing     Problem: INFECTION - ADULT  Goal: Absence or prevention of progression during hospitalization  Description: INTERVENTIONS:  - Assess and monitor for signs and symptoms of infection  - Monitor lab/diagnostic results  - Monitor all insertion sites, i e  indwelling lines, tubes, and drains  - Monitor endotracheal if appropriate and nasal secretions for changes in amount and color  - [place] appropriate cooling/warming therapies per order  - Administer medications as ordered  - Instruct and encourage patient and family to use good hand hygiene technique  - Identify and instruct in appropriate isolation precautions for identified infection/condition  Outcome: Progressing     Problem: SAFETY ADULT  Goal: Patient will remain free of falls  Description: INTERVENTIONS:  - Educate patient/family on patient safety including physical limitations  - Instruct patient to call for assistance with activity   - Consult OT/PT to assist with strengthening/mobility   - Keep Call bell within reach  - Keep bed low and locked with side rails adjusted as appropriate  - Keep care items and personal belongings within reach  - Initiate and maintain comfort rounds  - Make Fall Risk Sign visible to staff  - Offer Toileting every 2 Hours, in advance of need  - Initiate/Maintain bed/chair alarm  - Apply yellow socks and bracelet for high fall risk patients  - Consider moving patient to room near nurses station  Outcome: Progressing  Goal: Maintain or return to baseline ADL function  Description: INTERVENTIONS:  -  Assess patient's ability to carry out ADLs; assess patient's baseline for ADL function and identify physical deficits which impact ability to perform ADLs (bathing, care of mouth/teeth, toileting, grooming, dressing, etc )  - Assess/evaluate cause of self-care deficits   - Assess range of motion  - Assess patient's mobility; develop plan if impaired  - Assess patient's need for assistive devices and provide as appropriate  - Encourage maximum independence but intervene and supervise when necessary  - Involve family in performance of ADLs  - Assess for home care needs following discharge   - Consider OT consult to assist with ADL evaluation and planning for discharge  - Provide patient education as appropriate  Outcome: Progressing  Goal: Maintains/Returns to pre admission functional level  Description: INTERVENTIONS:  - Perform BMAT or MOVE assessment daily    - Set and communicate daily mobility goal to care team and patient/family/caregiver     - Collaborate with rehabilitation services on mobility goals if consulted  - Perform Range of Motion 3 times a day  - Reposition patient every 2 hours if unable to reposition self  - Record patient progress and toleration of activity level   Outcome: Progressing     Problem: DISCHARGE PLANNING  Goal: Discharge to home or other facility with appropriate resources  Description: INTERVENTIONS:  - Identify barriers to discharge w/patient and caregiver  - Arrange for needed discharge resources and transportation as appropriate  - Identify discharge learning needs (meds, wound care, etc )  - Arrange for interpretive services to assist at discharge as needed  - Refer to Case Management Department for coordinating discharge planning if the patient needs post-hospital services based on physician/advanced practitioner order or complex needs related to functional status, cognitive ability, or social support system  Outcome: Progressing     Problem: Knowledge Deficit  Goal: Patient/family/caregiver demonstrates understanding of disease process, treatment plan, medications, and discharge instructions  Description: Complete learning assessment and assess knowledge base    Interventions:  - Provide teaching at level of understanding  - Provide teaching via preferred learning methods  Outcome: Progressing     Problem: NEUROSENSORY - ADULT  Goal: Achieves stable or improved neurological status  Description: INTERVENTIONS  - Monitor and report changes in neurological status  - Monitor vital signs such as temperature, blood pressure, glucose, and any other labs ordered   - Initiate measures to prevent increased intracranial pressure  - Monitor for seizure activity and implement precautions if appropriate      Outcome: Progressing  Goal: Remains free of injury related to seizures activity  Description: INTERVENTIONS  - Maintain airway, patient safety  and administer oxygen as ordered  - Monitor patient for seizure activity, document and report duration and description of seizure to physician/advanced practitioner  - If seizure occurs,  ensure patient safety during seizure  - Reorient patient post seizure  - Seizure pads on all 4 side rails  - Instruct patient/family to notify RN of any seizure activity including if an aura is experienced  - Instruct patient/family to call for assistance with activity based on nursing assessment  - Administer anti-seizure medications if ordered    Outcome: Progressing  Goal: Achieves maximal functionality and self care  Description: INTERVENTIONS  - Monitor swallowing and airway patency with patient fatigue and changes in neurological status  - Encourage and assist patient to increase activity and self care     - Encourage visually impaired, hearing impaired and aphasic patients to use assistive/communication devices  Outcome: Progressing     Problem: RESPIRATORY - ADULT  Goal: Achieves optimal ventilation and oxygenation  Description: INTERVENTIONS:  - Assess for changes in respiratory status  - Assess for changes in mentation and behavior  - Position to facilitate oxygenation and minimize respiratory effort  - Oxygen administered by appropriate delivery if ordered  - Initiate smoking cessation education as indicated  - Encourage broncho-pulmonary hygiene including cough, deep breathe, Incentive Spirometry  - Assess the need for suctioning and aspirate as needed  - Assess and instruct to report SOB or any respiratory difficulty  - Respiratory Therapy support as indicated  Outcome: Progressing     Problem: MOBILITY - ADULT  Goal: Maintain or return to baseline ADL function  Description: INTERVENTIONS:  -  Assess patient's ability to carry out ADLs; assess patient's baseline for ADL function and identify physical deficits which impact ability to perform ADLs (bathing, care of mouth/teeth, toileting, grooming, dressing, etc )  - Assess/evaluate cause of self-care deficits   - Assess range of motion  - Assess patient's mobility; develop plan if impaired  - Assess patient's need for assistive devices and provide as appropriate  - Encourage maximum independence but intervene and supervise when necessary  - Involve family in performance of ADLs  - Assess for home care needs following discharge   - Consider OT consult to assist with ADL evaluation and planning for discharge  - Provide patient education as appropriate  Outcome: Progressing  Goal: Maintains/Returns to pre admission functional level  Description: INTERVENTIONS:  - Perform BMAT or MOVE assessment daily    - Set and communicate daily mobility goal to care team and patient/family/caregiver  - Collaborate with rehabilitation services on mobility goals if consulted  - Perform Range of Motion 3 times a day  - Reposition patient every 2 hours if unable to reposition self  - Record patient progress and toleration of activity level   Outcome: Progressing     Problem: Neurological Deficit  Goal: Neurological status is stable or improving  Description: Interventions:  - Monitor and assess patient's level of consciousness, motor function, sensory function, and level of assistance needed for ADLs  - Monitor and report changes from baseline  Collaborate with interdisciplinary team to initiate plan and implement interventions as ordered  - Provide and maintain a safe environment  - Consider seizure precautions  - Consider fall precautions  - Consider aspiration precautions  - Consider bleeding precautions  Outcome: Progressing     Problem: Communication Impairment  Goal: Ability to express needs and understand communication  Description: Assess patient's communication skills and ability to understand information  Patient will demonstrate use of effective communication techniques, alternative methods of communication and understanding even if not able to speak  - Encourage communication and provide alternate methods of communication as needed  - Collaborate with case management/ for discharge needs    - Include patient/family/caregiver in decisions related to communication  Outcome: Progressing     Problem: Potential for Aspiration  Goal: Non-ventilated patient's risk of aspiration is minimized  Description: Assess and monitor vital signs, respiratory status, and labs (WBC)  Monitor for signs of aspiration (tachypnea, cough, rales, wheezing, cyanosis, fever)  - Assess and monitor patient's ability to swallow  - Place patient up in chair to eat if possible  - HOB up at 90 degrees to eat if unable to get patient up into chair   - Supervise patient during oral intake  - Instruct patient/ family to take small bites  - Instruct patient/ family to take small single sips when taking liquids  - Follow patient-specific strategies generated by speech pathologist   Outcome: Progressing     Problem: Nutrition  Goal: Nutrition/Hydration status is improving  Description: Monitor and assess patient's nutrition/hydration status for malnutrition (ex- brittle hair, bruises, dry skin, pale skin and conjunctiva, muscle wasting, smooth red tongue, and disorientation)  Collaborate with interdisciplinary team and initiate plan and interventions as ordered  Monitor patient's weight and dietary intake as ordered or per policy  Utilize nutrition screening tool and intervene per policy  Determine patient's food preferences and provide high-protein, high-caloric foods as appropriate  - Assist patient with eating   - Allow adequate time for meals   - Encourage patient to take dietary supplement as ordered  - Collaborate with clinical nutritionist   - Include patient/family/caregiver in decisions related to nutrition    Outcome: Progressing

## 2023-04-30 NOTE — PLAN OF CARE
Problem: Potential for Falls  Goal: Patient will remain free of falls  Description: INTERVENTIONS:  - Educate patient/family on patient safety including physical limitations  - Instruct patient to call for assistance with activity   - Consult OT/PT to assist with strengthening/mobility   - Keep Call bell within reach  - Keep bed low and locked with side rails adjusted as appropriate  - Keep care items and personal belongings within reach  - Initiate and maintain comfort rounds  - Make Fall Risk Sign visible to staff  - Offer Toileting every 2 Hours, in advance of need  - Initiate/Maintain bed/chair alarm  - Apply yellow socks and bracelet for high fall risk patients  - Consider moving patient to room near nurses station  Outcome: Progressing     Problem: PAIN - ADULT  Goal: Verbalizes/displays adequate comfort level or baseline comfort level  Description: Interventions:  - Encourage patient to monitor pain and request assistance  - Assess pain using appropriate pain scale  - Administer analgesics based on type and severity of pain and evaluate response  - Implement non-pharmacological measures as appropriate and evaluate response  - Consider cultural and social influences on pain and pain management  - Notify physician/advanced practitioner if interventions unsuccessful or patient reports new pain  Outcome: Progressing     Problem: INFECTION - ADULT  Goal: Absence or prevention of progression during hospitalization  Description: INTERVENTIONS:  - Assess and monitor for signs and symptoms of infection  - Monitor lab/diagnostic results  - Monitor all insertion sites, i e  indwelling lines, tubes, and drains  - Monitor endotracheal if appropriate and nasal secretions for changes in amount and color  - [place] appropriate cooling/warming therapies per order  - Administer medications as ordered  - Instruct and encourage patient and family to use good hand hygiene technique  - Identify and instruct in appropriate isolation precautions for identified infection/condition  Outcome: Progressing     Problem: SAFETY ADULT  Goal: Patient will remain free of falls  Description: INTERVENTIONS:  - Educate patient/family on patient safety including physical limitations  - Instruct patient to call for assistance with activity   - Consult OT/PT to assist with strengthening/mobility   - Keep Call bell within reach  - Keep bed low and locked with side rails adjusted as appropriate  - Keep care items and personal belongings within reach  - Initiate and maintain comfort rounds  - Make Fall Risk Sign visible to staff  - Offer Toileting every 2 Hours, in advance of need  - Initiate/Maintain bed/chair alarm  - Apply yellow socks and bracelet for high fall risk patients  - Consider moving patient to room near nurses station  Outcome: Progressing     Problem: SAFETY ADULT  Goal: Patient will remain free of falls  Description: INTERVENTIONS:  - Educate patient/family on patient safety including physical limitations  - Instruct patient to call for assistance with activity   - Consult OT/PT to assist with strengthening/mobility   - Keep Call bell within reach  - Keep bed low and locked with side rails adjusted as appropriate  - Keep care items and personal belongings within reach  - Initiate and maintain comfort rounds  - Make Fall Risk Sign visible to staff  - Offer Toileting every 2 Hours, in advance of need  - Initiate/Maintain bed/chair alarm  - Apply yellow socks and bracelet for high fall risk patients  - Consider moving patient to room near nurses station  Outcome: Progressing     Problem: SAFETY ADULT  Goal: Maintain or return to baseline ADL function  Description: INTERVENTIONS:  -  Assess patient's ability to carry out ADLs; assess patient's baseline for ADL function and identify physical deficits which impact ability to perform ADLs (bathing, care of mouth/teeth, toileting, grooming, dressing, etc )  - Assess/evaluate cause of self-care deficits   - Assess range of motion  - Assess patient's mobility; develop plan if impaired  - Assess patient's need for assistive devices and provide as appropriate  - Encourage maximum independence but intervene and supervise when necessary  - Involve family in performance of ADLs  - Assess for home care needs following discharge   - Consider OT consult to assist with ADL evaluation and planning for discharge  - Provide patient education as appropriate  Outcome: Progressing

## 2023-04-30 NOTE — PROGRESS NOTES
[address]  Progress Note  Name: Raoul Ray  MRN: [id number]  Unit/Bed#: -01 I Date of Admission: 4/28/2023   Date of Service: 4/30/2023 I Hospital Day: 2    Assessment/Plan   New infarction of cerebellum [facility])  Assessment & Plan  MRI:Tiny focus of acute or subacute infarct in the left cerebellum  Initial NIHSS score was 4-repeat NIHSS score is 1  Neurology consult appreciated-suspect most likely hypercoagulable state secondary to underlying lung mass  Recommends to continue current heparin drip as per PE protocol  Once patient off of heparin drip, may start aspirin  CTA head neck shows atherosclerosis  Echocardiogram shows no PFO  Speech and swallow evaluation done, patient passed the swallow test  Follow PT/OT  Follow  A1c  Lab Results   Component Value Date    CHOLESTEROL 110 04/29/2023     Lab Results   Component Value Date    HDL 34 (L) 04/29/2023     Lab Results   Component Value Date    TRIG 88 04/29/2023     Lab Results   Component Value Date    NONHDLC 76 04/29/2023         Acute encephalopathy  Assessment & Plan  Patient presenting for acute encephalopathy for several hours  Recent diagnosis of lung/neck masses (likely malignant) and subclavian thrombus  Patient mildly hypoxic at presentation (2 LNC), but encephalopathy not resolving with treatment of hypoxia in ER  Patient not taking any known psychoactive medications  Most likely remaining etiologies on differential diagnosis include:    Multifactorial in nature-electrolyte imbalance, underlying malignancy,  MRI shows infection of the cerebellum which can also contribute  Patient is alert and oriented compared to previous day, continue current treatment      Bilateral pulmonary embolism [facility])  Assessment & Plan  Segmental PE noted in bilateral lower lobes, new from April 20  Patient also has previously diagnosed right subclavian thrombus  Presumably due to underlying malignancy    -140s/60-70s in ER, slight tachycardia 100s, no heart strain on EKG, troponin stable in 40s x3 overnight  Patient has mild hypoxia, but stable on 2 LNC  Started eliquis 1 week ago, switched to heparin infusion in ER  Will continue heparin for now pending further evaluation  Considering patient's underlying lung mass, suspect malignancy, may consider to adjust anticoagulation upon discharge, may consider Lovenox  Hold heparin drip after midnight for possible IR guided biopsy       Hypercalcemia  Assessment & Plan  Moderate-severe hypercalcemia (13 4) with acute encephalopathy  Likely due to underlying malignancy  PTH (13 8) low on April 21  Vitamin D level not previously checked, but patient is not on any known vitamin D supplementation  Recheck calcium, along with ionized calcium and vitamin D   IV crystalloids limited by pulmonary edema; will give small NS bolus at this time, along with calcitonin 4 units/kg Q12 hours  Consider addition of zoledronic acid pending labs  Calcium is trending up, today calcium is 11 6, will give gentle hydration  Lung mass  Assessment & Plan  Recently diagnosed right lung and neck masses, likely malignant  Patient also diagnosed last week with subclavian vein thrombus, and now with bilateral segmental PE, likely due to same  Currently pending appointment for IR biopsy of neck mass to confirm diagnosis, followed by PET-CT  Patient then needs Pulmonary, Oncology, and Palliative Care appointments (referrals already in place)  Pulmonary consult appreciated, recommends inpatient biopsy since patient developed bilateral PE and remained on heparin drip  As per IR, will try to proceed on Monday  Hold heparin after midnight      * Acute respiratory failure with hypoxia [facility])  Assessment & Plan  Patient requiring 2 LNC at time of presentation  Likely due to combination of recently discovered lung mass, bilateral PE, and right pleural effusion  See associated diagnoses for plan of care  Echocardiogram showed:  Left Ventricle: Left ventricular cavity size is normal  Wall thickness is increased  There is mild to moderate concentric hypertrophy  The left ventricular ejection fraction is >70%  Systolic function is vigorous  Although no diagnostic regional wall motion abnormality was identified, this possibility cannot be completely excluded on the basis of this study  Diastolic function is mildly abnormal, consistent with grade I (abnormal) relaxation  Left atrial filling pressure is normal     Atrial Septum: No patent foramen ovale detected using saline contrast injection with provocation by abdominal compression    Aortic Valve: The leaflets are moderately thickened  The leaflets are moderately calcified  There is mildly reduced mobility  There is no evidence of regurgitation  There is mild stenosis  The aortic valve peak velocity is 2 57 m/s  The aortic valve mean gradient is 15 mmHg  The dimensionless velocity index is 0 41  The aortic valve area is 1 48 cm2    Mitral Valve: There is mild calcification  The leaflets exhibit normal mobility  There is trace regurgitation  There is no evidence of stenosis    Tricuspid Valve: Tricuspid valve structure is normal  There is trace regurgitation  There is no evidence of stenosis    Aorta: The aortic root is mildly dilated  The aortic root is 3 90 cm    Pericardium: There is a trivial pericardial effusion    No prior study available for comparison      Electrolyte abnormality  Assessment & Plan  Hypokalemia, hypomagnesemia-we will replace  Pleural effusion, right  Assessment & Plan  Small right pleural effusion noted on CT  Similar in appearance to April 20, unlikely etiology for acute hypoxia  Likely reactive in setting of lung mass  No emergent treatment at this time     Echocardiogram reviewed    Severe systemic inflammatory response syndrome (SIRS) (HCC)  Assessment & Plan  Patient meets 3 of 4 SIRS criteria with leukocytosis, tachycardia, and tachypnea  Associated acute organ dysfunction manifesting as acute encephalopathy and acute hypoxic respiratory failure  Suspect SIRS due to combination of pulmonary emboli, underlying malignancy, and metabolic abnormalities (ie, hypercalcemia, hyponatremia)  COVID-19, RSV, flu negative  Will check procalcitonin, lactic acid, and blood cultures  Defer antibiotics based on more likely non-infectious etiology; monitor clinical condition and serial labs at this time  Gout  Assessment & Plan  Chronic stable condition  Treated as outpatient with allopurinol 100 mg daily  Continue outpatient medication regimen  Essential hypertension  Assessment & Plan  Chronic stable condition  Treated as outpatient with valsartan 160 mg daily  Substitute losartan based on hospital formulary  Tobacco abuse  Assessment & Plan  Chronic condition  Encouraged cessation; patient declines assistance at this time  Offer nicotine patch during admission  Type 2 diabetes mellitus without complication, without long-term current use of insulin [facility])  Assessment & Plan  No results found for: HGBA1C    Recent Labs     04/29/23  1107 04/29/23  1613 04/29/23  2129 04/30/23  0738   POCGLU 117 121 123 130     Blood Sugar Average: Last 72 hrs:  (P) 130 [id number]   Check A1c level  Last hemoglobin A1c 6 2 in May 2022; Treated as outpatient with glipizide 5 mg daily  Hold oral diabetes medications and initiate insulin protocol during hospitalization  Subclavian vein thrombosis (HCC)  Assessment & Plan  Recently diagnosed condition, presumably due to underlying malignancy  Treated with eliquis since last week; switched to heparin infusion in ER due to new bilateral PE and decline in clinical condition  Continue heparin at this time  May consider to switch to different anticoagulation, may be Lovenox considering patient's underlying lung mass        Hyponatremia  Assessment & Plan  Mild hyponatremia (128) at time of presentation, down from 132 on April 21  Likely due to underlying malignancy and recent poor oral intake  Unlikely this contributes significantly to patient's acute encephalopathy based on duration and mild nature of hyponatremia  Patient appears euvolemic on clinical exam, but has some pulmonary edema on CT and is mildly hypoxic  Will give small NS bolus and reassess clinical condition and labs  Resolved      Neck mass  Assessment & Plan  Recently discovered lung and neck masses, likely malignant  Patient denies any shortness of breath and has no overt dyspnea despite mild acute hypoxia  CT April 20 did not indicate any airway obstruction at that time, and patient has no stridor or other overt indication of airway obstruction now  Neck mass is unlikely etiology of acute hypoxia and does not require urgent treatment  See diagnosis of lung mass for further detail  Patient evaluated by pulmonology for lung mass  Recommends inpatient biopsy since patient developed bilateral PE and remain on heparin drip  Tk with IR, will try to do biopsy on Monday, hold heparin Sunday night  VTE Pharmacologic Prophylaxis: VTE Score: 10 High Risk (Score >/= 5) - Pharmacological DVT Prophylaxis Ordered: heparin drip  Sequential Compression Devices Ordered  Patient Centered Rounds: I performed bedside rounds with nursing staff today  Discussions with Specialists or Other Care Team Provider: None    Education and Discussions with Family / Patient: Updated  (wife and daughter) at bedside      Total Time Spent on Date of Encounter in care of patient: 10 minutes This time was spent on one or more of the following: performing physical exam; counseling and coordination of care; obtaining or reviewing history; documenting in the medical record; reviewing/ordering tests, medications or procedures; communicating with other healthcare professionals and discussing with patient's family/caregivers  Current Length of Stay: 2 day(s)  Current Patient Status: Inpatient   Certification Statement: The patient will continue to require additional inpatient hospital stay due to To monitor levels managed  Discharge Plan: Anticipate discharge in 48-72 hrs to rehab facility  Code Status: Level 1 - Full Code    Subjective:   Seen and evaluated during the rounds  Sitting upright position, patient is much more alert and oriented compared to previous 2 days  Denies any significant complaint  Objective:     Vitals:   Temp (24hrs), Av 8 °F (36 6 °C), Min:97 4 °F (36 3 °C), Max:98 3 °F (36 8 °C)    Temp:  [97 4 °F (36 3 °C)-98 3 °F (36 8 °C)] 98 3 °F (36 8 °C)  HR:  [] 109  Resp:  [18-25] 25  BP: (126-138)/(74-79) 132/79  SpO2:  [92 %-100 %] 93 %  Body mass index is 27 31 kg/m²  Input and Output Summary (last 24 hours): Intake/Output Summary (Last 24 hours) at 2023 1055  Last data filed at 2023 1001  Gross per 24 hour   Intake 1909 17 ml   Output 2150 ml   Net -240 83 ml       Physical Exam:   Physical Exam  Vitals and nursing note reviewed  Constitutional:       Appearance: He is not ill-appearing or diaphoretic  Eyes:      General: No scleral icterus  Left eye: No discharge  Extraocular Movements: Extraocular movements intact  Conjunctiva/sclera: Conjunctivae normal       Pupils: Pupils are equal, round, and reactive to light  Cardiovascular:      Rate and Rhythm: Normal rate  Heart sounds: No murmur heard  No friction rub  No gallop  Pulmonary:      Effort: Pulmonary effort is normal  No respiratory distress  Breath sounds: No stridor  No wheezing or rhonchi  Abdominal:      General: Abdomen is flat  Bowel sounds are normal  There is no distension  Palpations: There is no mass  Musculoskeletal:         General: Swelling (right upper extremity) present     Lymphadenopathy:      Cervical: Cervical adenopathy present  Skin:     General: Skin is warm  Capillary Refill: Capillary refill takes less than 2 seconds  Coloration: Skin is not jaundiced or pale  Findings: No bruising or erythema  Neurological:      General: No focal deficit present  Mental Status: He is alert and oriented to person, place, and time  Cranial Nerves: No cranial nerve deficit  Sensory: No sensory deficit  Motor: No weakness  Coordination: Coordination normal          Additional Data:     Labs:  Results from last 7 days   Lab Units 04/30/23  0524   WBC Thousand/uL 13 79*   HEMOGLOBIN g/dL 11 9*   HEMATOCRIT % 36 8   PLATELETS Thousands/uL 544*   NEUTROS PCT % 80*   LYMPHS PCT % 7*   MONOS PCT % 10   EOS PCT % 1     Results from last 7 days   Lab Units 04/30/23  0524   SODIUM mmol/L 136   POTASSIUM mmol/L 3 4*   CHLORIDE mmol/L 101   CO2 mmol/L 27   BUN mg/dL 10   CREATININE mg/dL 0 70   ANION GAP mmol/L 8   CALCIUM mg/dL 11 6*   ALBUMIN g/dL 3 2*   TOTAL BILIRUBIN mg/dL 0 40   ALK PHOS U/L 124*   ALT U/L 71*   AST U/L 32   GLUCOSE RANDOM mg/dL 115     Results from last 7 days   Lab Units 04/28/23  0047   INR  1 71*     Results from last 7 days   Lab Units 04/30/23  0738 04/29/23  2129 04/29/23  1613 04/29/23  1107 04/29/23  0738 04/28/23  2045 04/28/23  1739 04/28/23  1600 04/28/23  1106 04/28/23  0754 04/28/23  0035   POC GLUCOSE mg/dl 130 123 121 117 107 130 164* 204* 130 96 116         Results from last 7 days   Lab Units 04/28/23  0629   LACTIC ACID mmol/L 0 9   PROCALCITONIN ng/ml 0 15       Lines/Drains:  Invasive Devices     Peripheral Intravenous Line  Duration           Peripheral IV 04/28/23 Distal;Dorsal (posterior); Left Forearm 2 days    Peripheral IV 04/28/23 Left Antecubital 2 days                      Imaging: No pertinent imaging reviewed  Recent Cultures (last 7 days):   Results from last 7 days   Lab Units 04/28/23  0631 04/28/23  0629   BLOOD CULTURE  No Growth at 48 hrs   No Growth at 48 hrs  Last 24 Hours Medication List:   Current Facility-Administered Medications   Medication Dose Route Frequency Provider Last Rate    acetaminophen  650 mg Oral Q6H PRN Minerva Mckenna MD      allopurinol  300 mg Oral Daily Minerva Mckenna MD      aluminum-magnesium hydroxide-simethicone  30 mL Oral Q6H PRN Minerva Mckenna MD      docusate sodium  100 mg Oral BID PRN Minerva Mckenna MD      heparin (porcine)  3-30 Units/kg/hr (Order-Specific) Intravenous Titrated Minerva Mckenna MD 18 Units/kg/hr (04/29/23 2200)    insulin lispro  1-6 Units Subcutaneous HS Minerva Mckenna MD      insulin lispro  1-6 Units Subcutaneous TID AC Minerva Mckenna MD      losartan  100 mg Oral Daily Minerva Mckenna MD      magnesium sulfate  2 g Intravenous Once Tucker Lobo MD 2 g (04/30/23 0857)    ondansetron  4 mg Intravenous Q4H PRN Minerva Mckenna MD      potassium chloride  40 mEq Oral Once Tucker Lobo MD      pravastatin  40 mg Oral Daily With Myra Grande MD      sodium chloride  100 mL/hr Intravenous Continuous Tucker Lobo MD          Today, Patient Was Seen By: Tucker Lobo MD    **Please Note: This note may have been constructed using a voice recognition system  **

## 2023-04-30 NOTE — ASSESSMENT & PLAN NOTE
Patient presenting for acute encephalopathy for several hours  Recent diagnosis of lung/neck masses (likely malignant) and subclavian thrombus  Patient mildly hypoxic at presentation (2 LNC), but encephalopathy not resolving with treatment of hypoxia in ER  Patient not taking any known psychoactive medications    Most likely remaining etiologies on differential diagnosis include:    Multifactorial in nature-electrolyte imbalance, underlying malignancy,  MRI shows infection of the cerebellum which can also contribute  Patient is alert and oriented compared to previous day, continue current treatment

## 2023-04-30 NOTE — ASSESSMENT & PLAN NOTE
Moderate-severe hypercalcemia (13 4) with acute encephalopathy  Likely due to underlying malignancy  PTH (13 8) low on April 21  Vitamin D level not previously checked, but patient is not on any known vitamin D supplementation  Recheck calcium, along with ionized calcium and vitamin D   IV crystalloids limited by pulmonary edema; will give small NS bolus at this time, along with calcitonin 4 units/kg Q12 hours  Consider addition of zoledronic acid pending labs  Calcium is trending up, today calcium is 11 6, will give gentle hydration

## 2023-04-30 NOTE — PROGRESS NOTES
NEPHROLOGY PROGRESS NOTE   Lauren Level 66 y o  male MRN: [id number]  Unit/Bed#: -01 Encounter: [id number]    Assessment/Plan:    65 yo man with diabetes, hypertension smoker, recently diagnosed lung and neck mass with lymphadenopathy, recent subclavian vein thrombosis on Eliquis being treated for acute encephalopathy, newly identified acute versus subacute infarct left cerebellum, bilateral PE  Nephrology following along for hypercalcemia  Tentative biopsy tomorrow     1  Moderate potentially symptomatic hypercalcemia  ? Serum sodium greater than 11 mg/dL today  Normal saline restarted by primary team   Will give Zometa 4 mg now  Previously discussed risks with patient's family-see previous notes-all agreeable to proceed if indicated  Redose per patient's outpatient oncologist   Expect 2 to 3 days to see effect of Zometa  2  Hypovolemic hyponatremia  ? Resolved  3  Hypomagnesemia  ? Potassium and magnesium supplemented per primary team this morning  4  Acute metabolic encephalopathy  ? Treat hypercalcemia as above  5  New infarction of cerebellum  ? Management per primary team and neurology  6  Bilateral PE  7  Lung mass and neck mass  ? 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)     Mixed dyslipidemia     Pleural effusion, right 04/28/2023    Subclavian vein thrombosis (HCC)     Type 2 diabetes mellitus without complication, without long-term current use of insulin ([facility] ) 04/20/2023     Past Surgical History:   Procedure Laterality Date    ANAL FISSURECTOMY "   TONSILLECTOMY       Social History   Social History     Substance and Sexual Activity   Alcohol Use Yes     Social History     Substance and Sexual Activity   Drug Use Not Currently     Social History     Tobacco Use   Smoking Status Every Day    Packs/day: 1 00    Types: Cigarettes   Smokeless Tobacco Never       Family History:   History reviewed  No pertinent family history  Medications:  Pertinent medications were reviewed  Current Facility-Administered Medications   Medication Dose Route Frequency Provider Last Rate    acetaminophen  650 mg Oral Q6H PRN Adriana Arshad MD      allopurinol  300 mg Oral Daily Adriana Arshad MD      aluminum-magnesium hydroxide-simethicone  30 mL Oral Q6H PRN Adriana Arshad MD      docusate sodium  100 mg Oral BID PRN Adriana Arshad MD      heparin (porcine)  3-30 Units/kg/hr (Order-Specific) Intravenous Titrated Adriana Arshad MD 18 Units/kg/hr (04/30/23 1149)    insulin lispro  1-6 Units Subcutaneous HS Adriana Arshad MD      insulin lispro  1-6 Units Subcutaneous TID AC Adriana Arshad MD      losartan  100 mg Oral Daily Adriana Arshad MD      ondansetron  4 mg Intravenous Q4H PRN Adriana Arshad MD      pravastatin  40 mg Oral Daily With Eva Beck MD      sodium chloride  100 mL/hr Intravenous Continuous AB RicheyNP 100 mL/hr (04/30/23 1200)    zoledronic acid (ZOMETA) IVPB  4 mg Intravenous Once AB RicheyNP           No Known Allergies      Vitals:   /85 (BP Location: Left arm)   Pulse 102   Temp 98 3 °F (36 8 °C) (Oral)   Resp 16   Ht 6' 1\" (1 854 m)   Wt 93 9 kg (207 lb)   SpO2 96%   BMI 27 31 kg/m²   Body mass index is 27 31 kg/m²    SpO2: 96 %,   SpO2 Activity: At Rest,   O2 Device: Nasal cannula      Intake/Output Summary (Last 24 hours) at 4/30/2023 1544  Last data filed at 4/30/2023 1533  Gross per 24 hour   Intake 1197 5 ml   Output 2225 ml   Net -1027 5 " "ml     Invasive Devices     Peripheral Intravenous Line  Duration           Peripheral IV 04/28/23 Distal;Dorsal (posterior); Left Forearm 2 days    Peripheral IV 04/28/23 Left Antecubital 2 days                Physical Exam  General: conscious, cooperative, in no acute distress  Eyes: conjunctivae pink, anicteric sclerae  ENT: lips and mucous membranes moist  Neck: supple, no JVD, no masses  Chest: Diminished breath sounds bilaterally, no crackles, ronchus or wheezings  CVS: S1 & S2, normal rate, regular rhythm  Abdomen: soft, non-tender, non-distended, normoactive bowel sounds  Extremities: no edema of both legs  Skin: no rash  Neuro: awake, alert, oriented      Diagnostic Data:  Lab: I have personally reviewed pertinent lab results  ,   CBC:  Results from last 7 days   Lab Units 04/30/23  0524   WBC Thousand/uL 13 79*   HEMOGLOBIN g/dL 11 9*   HEMATOCRIT % 36 8   PLATELETS Thousands/uL 544*      CMP:   Lab Results   Component Value Date    SODIUM 136 04/30/2023    K 3 4 (L) 04/30/2023     04/30/2023    CO2 27 04/30/2023    BUN 10 04/30/2023    CREATININE 0 70 04/30/2023    CALCIUM 11 6 (H) 04/30/2023    AST 32 04/30/2023    ALT 71 (H) 04/30/2023    ALKPHOS 124 (H) 04/30/2023    EGFR 101 04/30/2023   ,   PT/INR: No results found for: PT, INR,   Magnesium: No components found for: MAG,  Phosphorous: No results found for: PHOS    Microbiology:  @LABNT,(urinecx:7)@        [facility]    Portions of the record may have been created with voice recognition software  Occasional wrong word or \"sound a like\" substitutions may have occurred due to the inherent limitations of voice recognition software  Read the chart carefully and recognize, using context, where substitutions have occurred    "

## 2023-04-30 NOTE — ASSESSMENT & PLAN NOTE
Mild hyponatremia (128) at time of presentation, down from 132 on April 21  Likely due to underlying malignancy and recent poor oral intake  Unlikely this contributes significantly to patient's acute encephalopathy based on duration and mild nature of hyponatremia  Patient appears euvolemic on clinical exam, but has some pulmonary edema on CT and is mildly hypoxic  Will give small NS bolus and reassess clinical condition and labs    Resolved

## 2023-04-30 NOTE — ASSESSMENT & PLAN NOTE
MRI:Tiny focus of acute or subacute infarct in the left cerebellum  Initial NIHSS score was 4-repeat NIHSS score is 1  Neurology consult appreciated-suspect most likely hypercoagulable state secondary to underlying lung mass  Recommends to continue current heparin drip as per PE protocol  Once patient off of heparin drip, may start aspirin  CTA head neck shows atherosclerosis  Echocardiogram shows no PFO    Speech and swallow evaluation done, patient passed the swallow test  Follow PT/OT  Follow  A1c  Lab Results   Component Value Date    CHOLESTEROL 110 04/29/2023     Lab Results   Component Value Date    HDL 34 (L) 04/29/2023     Lab Results   Component Value Date    TRIG 88 04/29/2023     Lab Results   Component Value Date    [place] [address] 04/29/2023

## 2023-04-30 NOTE — ASSESSMENT & PLAN NOTE
Segmental PE noted in bilateral lower lobes, new from April 20  Patient also has previously diagnosed right subclavian thrombus  Presumably due to underlying malignancy  -140s/60-70s in ER, slight tachycardia 100s, no heart strain on EKG, troponin stable in 40s x3 overnight  Patient has mild hypoxia, but stable on 2 LNC  Started eliquis 1 week ago, switched to heparin infusion in ER  Will continue heparin for now pending further evaluation      Considering patient's underlying lung mass, suspect malignancy, may consider to adjust anticoagulation upon discharge, may consider Lovenox  Hold heparin drip after midnight for possible IR guided biopsy

## 2023-04-30 NOTE — ASSESSMENT & PLAN NOTE
Recently diagnosed right lung and neck masses, likely malignant  Patient also diagnosed last week with subclavian vein thrombus, and now with bilateral segmental PE, likely due to same  Currently pending appointment for IR biopsy of neck mass to confirm diagnosis, followed by PET-CT  Patient then needs Pulmonary, Oncology, and Palliative Care appointments (referrals already in place)  Pulmonary consult appreciated, recommends inpatient biopsy since patient developed bilateral PE and remained on heparin drip  As per IR, will try to proceed on Monday      Hold heparin after midnight

## 2023-04-30 NOTE — ASSESSMENT & PLAN NOTE
Small right pleural effusion noted on CT  Similar in appearance to April 20, unlikely etiology for acute hypoxia  Likely reactive in setting of lung mass  No emergent treatment at this time     Echocardiogram reviewed

## 2023-04-30 NOTE — ASSESSMENT & PLAN NOTE
No results found for: HGBA1C    Recent Labs     04/29/23  1107 04/29/23  1613 04/29/23  2129 04/30/23  0738   POCGLU 117 121 123 130     Blood Sugar Average: Last 72 hrs:  (P) 130 [id number]   Check A1c level  Last hemoglobin A1c 6 2 in May 2022; Treated as outpatient with glipizide 5 mg daily  Hold oral diabetes medications and initiate insulin protocol during hospitalization

## 2023-05-01 ENCOUNTER — APPOINTMENT (INPATIENT)
Dept: INTERVENTIONAL RADIOLOGY/VASCULAR | Facility: HOSPITAL | Age: 63
End: 2023-05-01
Attending: RADIOLOGY

## 2023-05-01 ENCOUNTER — TELEPHONE (OUTPATIENT)
Dept: INTERVENTIONAL RADIOLOGY/VASCULAR | Facility: HOSPITAL | Age: 63
End: 2023-05-01

## 2023-05-01 LAB
1,25(OH)2D3 SERPL-MCNC: 50.3 PG/ML (ref 24.8–81.5)
ALBUMIN SERPL BCP-MCNC: 3.4 G/DL (ref 3.5–5)
ALBUMIN UR ELPH-MCNC: 100 %
ALP SERPL-CCNC: 129 U/L (ref 34–104)
ALPHA1 GLOB MFR UR ELPH: 0 %
ALPHA2 GLOB MFR UR ELPH: 0 %
ALT SERPL W P-5'-P-CCNC: 72 U/L (ref 7–52)
ANION GAP SERPL CALCULATED.3IONS-SCNC: 6 MMOL/L (ref 4–13)
AST SERPL W P-5'-P-CCNC: 45 U/L (ref 13–39)
ATRIAL RATE: 103 BPM
B-GLOBULIN MFR UR ELPH: 0 %
BASOPHILS # BLD AUTO: 0.08 THOUSANDS/ΜL (ref 0–0.1)
BASOPHILS NFR BLD AUTO: 1 % (ref 0–1)
BILIRUB SERPL-MCNC: 0.45 MG/DL (ref 0.2–1)
BUN SERPL-MCNC: 14 MG/DL (ref 5–25)
CA-I BLD-SCNC: 1.55 MMOL/L (ref 1.12–1.32)
CALCIUM ALBUM COR SERPL-MCNC: 13.5 MG/DL (ref 8.3–10.1)
CALCIUM SERPL-MCNC: 13 MG/DL (ref 8.4–10.2)
CHLORIDE SERPL-SCNC: 105 MMOL/L (ref 96–108)
CO2 SERPL-SCNC: 27 MMOL/L (ref 21–32)
CREAT SERPL-MCNC: 0.85 MG/DL (ref 0.6–1.3)
EOSINOPHIL # BLD AUTO: 0.03 THOUSAND/ΜL (ref 0–0.61)
EOSINOPHIL NFR BLD AUTO: 0 % (ref 0–6)
ERYTHROCYTE [DISTWIDTH] IN BLOOD BY AUTOMATED COUNT: 12.8 % (ref 11.6–15.1)
EST. AVERAGE GLUCOSE BLD GHB EST-MCNC: 128 MG/DL
GAMMA GLOB MFR UR ELPH: 0 %
GFR SERPL CREATININE-BSD FRML MDRD: 94 ML/MIN/1.73SQ M
GLUCOSE SERPL-MCNC: 120 MG/DL (ref 65–140)
GLUCOSE SERPL-MCNC: 124 MG/DL (ref 65–140)
GLUCOSE SERPL-MCNC: 131 MG/DL (ref 65–140)
GLUCOSE SERPL-MCNC: 132 MG/DL (ref 65–140)
GLUCOSE SERPL-MCNC: 139 MG/DL (ref 65–140)
HBA1C MFR BLD: 6.1 %
HCT VFR BLD AUTO: 37.5 % (ref 36.5–49.3)
HGB BLD-MCNC: 12.3 G/DL (ref 12–17)
IMM GRANULOCYTES # BLD AUTO: 0.05 THOUSAND/UL (ref 0–0.2)
IMM GRANULOCYTES NFR BLD AUTO: 0 % (ref 0–2)
INR PPP: 1.36 (ref 0.84–1.19)
LYMPHOCYTES # BLD AUTO: 0.68 THOUSANDS/ΜL (ref 0.6–4.47)
LYMPHOCYTES NFR BLD AUTO: 4 % (ref 14–44)
MAGNESIUM SERPL-MCNC: 1.5 MG/DL (ref 1.9–2.7)
MCH RBC QN AUTO: 28.7 PG (ref 26.8–34.3)
MCHC RBC AUTO-ENTMCNC: 32.8 G/DL (ref 31.4–37.4)
MCV RBC AUTO: 87 FL (ref 82–98)
MONOCYTES # BLD AUTO: 1.29 THOUSAND/ΜL (ref 0.17–1.22)
MONOCYTES NFR BLD AUTO: 8 % (ref 4–12)
NEUTROPHILS # BLD AUTO: 13.98 THOUSANDS/ΜL (ref 1.85–7.62)
NEUTS SEG NFR BLD AUTO: 87 % (ref 43–75)
NRBC BLD AUTO-RTO: 0 /100 WBCS
P AXIS: 70 DEGREES
PHOSPHATE SERPL-MCNC: 2.9 MG/DL (ref 2.3–4.1)
PLATELET # BLD AUTO: 453 THOUSANDS/UL (ref 149–390)
PMV BLD AUTO: 8.8 FL (ref 8.9–12.7)
POTASSIUM SERPL-SCNC: 3.5 MMOL/L (ref 3.5–5.3)
PR INTERVAL: 200 MS
PROT PATTERN UR ELPH-IMP: NORMAL
PROT SERPL-MCNC: 7.1 G/DL (ref 6.4–8.4)
PROT UR-MCNC: 17 MG/DL
PROTHROMBIN TIME: 16.9 SECONDS (ref 11.6–14.5)
QRS AXIS: 26 DEGREES
QRSD INTERVAL: 120 MS
QT INTERVAL: 344 MS
QTC INTERVAL: 450 MS
RBC # BLD AUTO: 4.29 MILLION/UL (ref 3.88–5.62)
SODIUM SERPL-SCNC: 138 MMOL/L (ref 135–147)
T WAVE AXIS: 51 DEGREES
VENTRICULAR RATE: 103 BPM
WBC # BLD AUTO: 16.11 THOUSAND/UL (ref 4.31–10.16)

## 2023-05-01 PROCEDURE — 07B23ZX EXCISION OF LEFT NECK LYMPHATIC, PERCUTANEOUS APPROACH, DIAGNOSTIC: ICD-10-PCS | Performed by: RADIOLOGY

## 2023-05-01 RX ORDER — LORAZEPAM 2 MG/ML
1 INJECTION INTRAMUSCULAR ONCE
Status: COMPLETED | OUTPATIENT
Start: 2023-05-01 | End: 2023-05-01

## 2023-05-01 RX ORDER — OLANZAPINE 2.5 MG/1
5 TABLET ORAL ONCE
Status: COMPLETED | OUTPATIENT
Start: 2023-05-01 | End: 2023-05-01

## 2023-05-01 RX ORDER — CALCITONIN SALMON 200 [USP'U]/ML
4 INJECTION, SOLUTION INTRAMUSCULAR; SUBCUTANEOUS EVERY 12 HOURS SCHEDULED
Status: DISCONTINUED | OUTPATIENT
Start: 2023-05-01 | End: 2023-05-02 | Stop reason: HOSPADM

## 2023-05-01 RX ORDER — LIDOCAINE HYDROCHLORIDE 10 MG/ML
INJECTION, SOLUTION EPIDURAL; INFILTRATION; INTRACAUDAL; PERINEURAL AS NEEDED
Status: DISCONTINUED | OUTPATIENT
Start: 2023-05-01 | End: 2023-05-02 | Stop reason: HOSPADM

## 2023-05-01 RX ORDER — SODIUM CHLORIDE 9 MG/ML
125 INJECTION, SOLUTION INTRAVENOUS CONTINUOUS
Status: DISCONTINUED | OUTPATIENT
Start: 2023-05-01 | End: 2023-05-02

## 2023-05-01 RX ORDER — MAGNESIUM SULFATE HEPTAHYDRATE 40 MG/ML
2 INJECTION, SOLUTION INTRAVENOUS ONCE
Status: COMPLETED | OUTPATIENT
Start: 2023-05-01 | End: 2023-05-02

## 2023-05-01 RX ORDER — ASPIRIN 81 MG/1
81 TABLET ORAL DAILY
Status: DISCONTINUED | OUTPATIENT
Start: 2023-05-01 | End: 2023-05-02 | Stop reason: HOSPADM

## 2023-05-01 RX ORDER — OLANZAPINE 10 MG/1
5 TABLET ORAL
Status: DISCONTINUED | OUTPATIENT
Start: 2023-05-01 | End: 2023-05-02 | Stop reason: HOSPADM

## 2023-05-01 RX ORDER — ENOXAPARIN SODIUM 100 MG/ML
1 INJECTION SUBCUTANEOUS EVERY 12 HOURS SCHEDULED
Status: DISCONTINUED | OUTPATIENT
Start: 2023-05-01 | End: 2023-05-02

## 2023-05-01 RX ADMIN — CALCITONIN SALMON 376 UNITS: 200 INJECTION, SOLUTION INTRAMUSCULAR; SUBCUTANEOUS at 18:32

## 2023-05-01 RX ADMIN — LORAZEPAM 1 MG: 2 INJECTION INTRAMUSCULAR; INTRAVENOUS at 19:24

## 2023-05-01 RX ADMIN — SODIUM CHLORIDE 125 ML/HR: 0.9 INJECTION, SOLUTION INTRAVENOUS at 20:05

## 2023-05-01 RX ADMIN — LIDOCAINE HYDROCHLORIDE 5 ML: 10 INJECTION, SOLUTION EPIDURAL; INFILTRATION; INTRACAUDAL; PERINEURAL at 11:50

## 2023-05-01 RX ADMIN — PRAVASTATIN SODIUM 40 MG: 40 TABLET ORAL at 17:07

## 2023-05-01 RX ADMIN — ALLOPURINOL 300 MG: 300 TABLET ORAL at 08:09

## 2023-05-01 RX ADMIN — LORAZEPAM 1 MG: 2 INJECTION INTRAMUSCULAR; INTRAVENOUS at 23:50

## 2023-05-01 RX ADMIN — ASPIRIN 81 MG: 81 TABLET, COATED ORAL at 14:57

## 2023-05-01 RX ADMIN — LORAZEPAM 1 MG: 2 INJECTION INTRAMUSCULAR; INTRAVENOUS at 18:31

## 2023-05-01 RX ADMIN — MAGNESIUM SULFATE HEPTAHYDRATE 2 G: 40 INJECTION, SOLUTION INTRAVENOUS at 09:34

## 2023-05-01 RX ADMIN — LOSARTAN POTASSIUM 100 MG: 50 TABLET, FILM COATED ORAL at 08:09

## 2023-05-01 RX ADMIN — OLANZAPINE 5 MG: 2.5 TABLET, FILM COATED ORAL at 18:03

## 2023-05-01 RX ADMIN — SODIUM CHLORIDE 125 ML/HR: 0.9 INJECTION, SOLUTION INTRAVENOUS at 09:35

## 2023-05-01 RX ADMIN — ENOXAPARIN SODIUM 90 MG: 100 INJECTION SUBCUTANEOUS at 21:26

## 2023-05-01 RX ADMIN — ENOXAPARIN SODIUM 90 MG: 100 INJECTION SUBCUTANEOUS at 13:18

## 2023-05-01 NOTE — DISCHARGE INSTRUCTIONS
POST BIOPSY    Care after your procedure:    1  Limit your activities for 24 hours after your biopsy  2  No driving day of biopsy  3  Return to your normal diet  Small sips of flat soda will help with mild nausea  4  Remove band-aid or dressing 24 hours after procedure  Contact Interventional Radiology at [phone] Ritesh PATIENTS: Contact Interventional Radiology at [phone]) Brittaney Lamar PATIENTS: Contact Interventional Radiology at [phone]) if:    1  Difficulty breathing, nausea or vomiting  2  Chills or fever above 101 degrees F      3  Pain at biopsy site not relieved by medication  4  Develop any redness, swelling, heat, unusual drainage, heavy bruising or bleeding from biopsy site

## 2023-05-01 NOTE — BRIEF OP NOTE (RAD/CATH)
INTERVENTIONAL RADIOLOGY PROCEDURE NOTE    Date: 5/1/2023    Procedure: Left neck mass biopsy  Procedure Summary     Date:  Room / Location:     Anesthesia Start:  Anesthesia Stop:     Procedure:  Diagnosis:     Scheduled Providers:  Responsible Provider:     Anesthesia Type: Not recorded ASA Status: Not recorded          Preoperative diagnosis:   1  Acute encephalopathy    2  AMS (altered mental status)    3  Confusion    4  Bilateral pulmonary embolism ([address] )    5  Hypercalcemia    6  Hyponatremia    7  Hypoxia    8  Acute respiratory failure with hypoxia (HCC)    9  Lung mass    10  Pleural effusion, right    11  Neck mass         Postoperative diagnosis: Same  Surgeon: Carmine Landaverde MD     Assistant: None  No qualified resident was available  Blood loss: Minimal    Specimens: Left neck mass/LN     Findings: 18 G core biopsy x 5 of left neck LN obtained and placed in Formalin and RPMI  Hemostasis obtained  Increased vascularity surrounding the LN area  Watch for bruising in left neck  Complications: None immediate      Anesthesia: local

## 2023-05-01 NOTE — PROGRESS NOTES
Progress Note - Nephrology   Nyla Castanon 66 y o  male MRN: [id number]  Unit/Bed#: -01 Encounter: [id number]    A/P:  1   Malignancy associated hypercalcemia   Patient's status post Zometa 4 mg IV, he was also on volume expansion initially, currently no longer on saline  Corrected serum calcium is up to 13 5 mg/dL from 12 2 mg/dL yesterday  We will reinitiate isotonic saline for now, allow Zometa a chance work  Consider subcutaneous calcitonin depending on the patient progresses from the clinical standpoint  2   Hypovolemic hyponatremia-resolved  3  Volume depletion   Appears to be resolved, however will reinitiate normal saline at 125 mL/hr for the next 24 hours  The patient's volume versus rapidly and he becomes volume overloaded, please provide loop diuretics to manage volume and continue to provide saline  4   Hypomagnesemia   Agree that the patient receives 2 g of IV magnesium sulfate  Continue to monitor  5   Bilateral pulmonary emboli   Continue care according to hospitalist   6   Lung and neck mass   Patient for potential biopsy later today      Follow up reason for today's visit: Hypercalcemia/volume management    Acute respiratory failure with hypoxia [facility])    Patient Active Problem List   Diagnosis    Acute pneumonia    Lung mass    Neck mass    Hypercalcemia    Hyponatremia    Subclavian vein thrombosis (HCC)    Type 2 diabetes mellitus without complication, without long-term current use of insulin (HCC)    SIRS (systemic inflammatory response syndrome) (HCC)    Hypercoagulopathy (HCC)    Tobacco abuse    Essential hypertension    Mixed dyslipidemia    Gout    Bilateral pulmonary embolism (HCC)    Severe systemic inflammatory response syndrome (SIRS) (HCC)    Acute respiratory failure with hypoxia (HCC)    Pleural effusion, right    Acute encephalopathy    New infarction of cerebellum (HCC)    Electrolyte abnormality         Subjective:   No acute distress, patient "denies pain at this time  Objective:     Vitals: Blood pressure 151/83, pulse 102, temperature 98 1 °F (36 7 °C), resp  rate 18, height 6' 1\" (1 854 m), weight 93 9 kg (207 lb), SpO2 97 %  ,Body mass index is 27 31 kg/m²  Weight (last 2 days)     None            Intake/Output Summary (Last 24 hours) at 5/1/2023 0901  Last data filed at 5/1/2023 0438  Gross per 24 hour   Intake 1800 4 ml   Output 2750 ml   Net -949 6 ml     I/O last 3 completed shifts: In: 2742 4 [P O :960; I V :1632 4; IV Piggyback:150]  Out: 0969 [Urine:3775]         Physical Exam: /83   Pulse 102   Temp 98 1 °F (36 7 °C)   Resp 18   Ht 6' 1\" (1 854 m)   Wt 93 9 kg (207 lb)   SpO2 97%   BMI 27 31 kg/m²     General Appearance:    Alert, cooperative, no distress, appears stated age   Head:    Normocephalic, without obvious abnormality, atraumatic   Eyes:    Conjunctiva/corneas clear   Ears:    Normal external ears   Nose:   Nares normal, septum midline, mucosa normal, no drainage    or sinus tenderness   Throat:   Lips, mucosa, and tongue normal; teeth and gums normal   Neck:   Supple   Back:     Symmetric, no curvature, ROM normal, no CVA tenderness   Lungs:     Clear to auscultation bilaterally, respirations unlabored   Chest wall:    No tenderness or deformity   Heart:    Regular rate and rhythm, S1 and S2 normal, no murmur, rub   or gallop   Abdomen:     Soft, non-tender, bowel sounds active   Extremities:   Extremities normal, atraumatic, no cyanosis or edema   Skin:   Skin color, texture, turgor normal, no rashes or lesions   Lymph nodes:   Cervical normal   Neurologic:   CNII-XII intact            Lab, Imaging and other studies: I have personally reviewed pertinent labs    CBC:   Lab Results   Component Value Date    WBC 16 11 (H) 05/01/2023    HGB 12 3 05/01/2023    HCT 37 5 05/01/2023    MCV 87 05/01/2023     (H) 05/01/2023    MCH 28 7 05/01/2023    MCHC 32 8 05/01/2023    RDW 12 8 05/01/2023    MPV 8 8 (L) " 05/01/2023    NRBC 0 05/01/2023     CMP:   Lab Results   Component Value Date    K 3 5 05/01/2023     05/01/2023    CO2 27 05/01/2023    BUN 14 05/01/2023    CREATININE 0 85 05/01/2023    CALCIUM 13 0 (HH) 05/01/2023    AST 45 (H) 05/01/2023    ALT 72 (H) 05/01/2023    ALKPHOS 129 (H) 05/01/2023    EGFR 94 05/01/2023         Results from last 7 days   Lab Units 05/01/23  0438 04/30/23  0524 04/29/23  1159 04/29/23  0834 04/29/23  0459   POTASSIUM mmol/L 3 5 3 4* 3 6   < > 3 5   CHLORIDE mmol/L 105 101 100   < > 99   CO2 mmol/L 27 27 27   < > 28   BUN mg/dL 14 10 9   < > 10   CREATININE mg/dL 0 85 0 70 0 67   < > 0 65   CALCIUM mg/dL 13 0* 11 6* 10 5*   < > 10 9*   ALK PHOS U/L 129* 124*  --   --  123*   ALT U/L 72* 71*  --   --  90*   AST U/L 45* 32  --   --  40*    < > = values in this interval not displayed  Phosphorus:   Lab Results   Component Value Date    PHOS 2 9 05/01/2023     Magnesium:   Lab Results   Component Value Date    MG 1 5 (L) 05/01/2023     Urinalysis: No results found for: James Haroon, SPECGRAV, PHUR, LEUKOCYTESUR, NITRITE, PROTEINUA, GLUCOSEU, KETONESU, BILIRUBINUR, BLOODU  Ionized Calcium: No results found for: CAION  Coagulation: No results found for: PT, INR, APTT  Troponin: No results found for: TROPONINI  ABG: No results found for: PHART, GWA3GCL, PO2ART, CLB9BYP, G2UFZROL, BEART, SOURCE  Radiology review:     IMAGING  Procedure: CTA head and neck w wo contrast    Result Date: 4/28/2023  Narrative: CTA NECK AND BRAIN WITH AND WITHOUT CONTRAST INDICATION: Stroke/TIA, determine embolic source stroke COMPARISON:   MRI and routine CT of the brain dated 4/28/2023  No previous vascular imaging  TECHNIQUE:  Routine CT imaging of the Brain without contrast   Post contrast imaging was performed after administration of iodinated contrast through the neck and brain  Post contrast axial 0 625 mm images timed to opacify the arterial system   3D rendering was performed on an independent workstation  MIP reconstructions performed  Coronal reconstructions were performed of the noncontrast portion of the brain  Radiation dose length product (DLP) for this visit:  1311 mGy-cm   This examination, like all CT scans performed in the [facility], was performed utilizing techniques to minimize radiation dose exposure, including the use of iterative reconstruction and automated exposure control  IV Contrast:  85 mL of iohexol (OMNIPAQUE) IMAGE QUALITY:   Diagnostic FINDINGS: NONCONTRAST BRAIN PARENCHYMA:  No intracranial mass, mass effect or midline shift  No CT signs of acute infarction  No acute parenchymal hemorrhage  VENTRICLES AND EXTRA-AXIAL SPACES:  Normal for the patient's age  VISUALIZED ORBITS: Normal visualized orbits  PARANASAL SINUSES: Normal visualized paranasal sinuses  CERVICAL VASCULATURE AORTIC ARCH AND GREAT VESSELS: No stenosis of the aortic arch  Mild atherosclerotic change of the origins of the great vessels without focal stenosis  The proximal great vessels are displaced by the extensive mediastinal adenopathy seen on recent CT of the chest  No focal stenosis is identified  RIGHT VERTEBRAL ARTERY CERVICAL SEGMENT:  Normal origin  The vessel is normal in caliber throughout the neck  LEFT VERTEBRAL ARTERY CERVICAL SEGMENT:  Normal origin  The vessel is normal in caliber throughout the neck  RIGHT EXTRACRANIAL CAROTID SEGMENT: Moderate calcification of the distal common carotid artery and proximal cervical internal carotid artery  Moderate stenosis of the distal common carotid artery and mild narrowing of the ICA origin which is less than 50%  The remainder of the cervical internal carotid artery is normal in caliber  LEFT EXTRACRANIAL CAROTID SEGMENT: Mild atherosclerotic disease of the distal common carotid artery and proximal internal carotid artery without stenosis   NASCET criteria was used to determine the degree of internal carotid artery diameter stenosis  INTRACRANIAL VASCULATURE INTERNAL CAROTID ARTERIES:  Normal enhancement of the intracranial portions of the internal carotid arteries  Normal ophthalmic artery origins  Normal ICA terminus  ANTERIOR CIRCULATION:  Symmetric A1 segments and anterior cerebral arteries with normal enhancement  Normal anterior communicating artery  MIDDLE CEREBRAL ARTERY CIRCULATION:  M1 segment and middle cerebral artery branches demonstrate normal enhancement bilaterally  DISTAL VERTEBRAL ARTERIES:  Normal distal vertebral arteries  Posterior inferior cerebellar artery origins are normal  Normal vertebral basilar junction  BASILAR ARTERY:  Basilar artery is normal in caliber  Normal superior cerebellar arteries  POSTERIOR CEREBRAL ARTERIES: Both posterior cerebral arteries arises from the basilar tip  Both arteries demonstrate normal enhancement  Normal posterior communicating arteries  VENOUS STRUCTURES: The intracranial venous structures are unremarkable  Within the upper mediastinum there is a filling defect present within the innominate vein extending into the proximal aspect of the SVC  Below this the SVC is enhancing normally  NON VASCULAR ANATOMY BONY STRUCTURES: Diffuse osteopenia and degenerative change of the cervical and thoracic spine  Slight anterior subluxation of the C4 in relation to C5  SOFT TISSUES OF THE NECK: Extensive adenopathy is seen within the inferior neck involving level 5 as well as supraclavicular region bilaterally  Similar to recent CT of the chest  THORACIC INLET: Extensive mediastinal adenopathy as seen on recent CT of the chest  The extensive mediastinal adenopathy results in significant narrowing of the trachea in the transverse diameter beginning immediately below the level of the thyroid gland  and extending to the tracheal bifurcation  See recent CT chest report  Once again identified is extensive mediastinal adenopathy and     Impression:  Atherosclerotic change of the carotid "bifurcations with moderate stenosis of the distal common carotid artery and mild narrowing of the proximal internal carotid artery on the right  No significant left-sided stenosis  Unremarkable intracranial vasculature  Extensive lower cervical adenopathy as seen on recent CT of the chest  This results in significant narrowing of the trachea in the transverse dimension, unchanged  There is also a filling defect present within the right innominate vein within the inferior neck extending into the superior aspect of the SVC  This is consistent with thrombosis  There is only partial thrombosis of the most superior aspect of the SVC  Below this the vessel enhances appropriately  This examination was marked \"immediate notification\" in Epic in order to begin the standard process by which the radiology reading room liaison alerts the referring practitioner  Workstation performed: [id number]     Procedure: Echo complete w/ contrast if indicated    Result Date: 4/28/2023  Narrative: Stefano Jennings  Left Ventricle: Left ventricular cavity size is normal  Wall thickness is increased  There is mild to moderate concentric hypertrophy  The left ventricular ejection fraction is >70%  Systolic function is vigorous  Although no diagnostic regional wall motion abnormality was identified, this possibility cannot be completely excluded on the basis of this study  Diastolic function is mildly abnormal, consistent with grade I (abnormal) relaxation  Left atrial filling pressure is normal    Atrial Septum: No patent foramen ovale detected using saline contrast injection with provocation by abdominal compression    Aortic Valve: The leaflets are moderately thickened  The leaflets are moderately calcified  There is mildly reduced mobility  There is no evidence of regurgitation  There is mild stenosis  The aortic valve peak velocity is 2 57 m/s  The aortic valve mean gradient is 15 mmHg  The dimensionless velocity index is 0 41   The aortic valve area " is 1 48 cm2    Mitral Valve: There is mild calcification  The leaflets exhibit normal mobility  There is trace regurgitation  There is no evidence of stenosis    Tricuspid Valve: Tricuspid valve structure is normal  There is trace regurgitation  There is no evidence of stenosis    Aorta: The aortic root is mildly dilated  The aortic root is 3 90 cm    Pericardium: There is a trivial pericardial effusion    No prior study available for comparison       Current Facility-Administered Medications   Medication Dose Route Frequency    acetaminophen (TYLENOL) tablet 650 mg  650 mg Oral Q6H PRN    allopurinol (ZYLOPRIM) tablet 300 mg  300 mg Oral Daily    aluminum-magnesium hydroxide-simethicone (MYLANTA) oral suspension 30 mL  30 mL Oral Q6H PRN    docusate sodium (COLACE) capsule 100 mg  100 mg Oral BID PRN    insulin lispro (HumaLOG) 100 units/mL subcutaneous injection 1-6 Units  1-6 Units Subcutaneous HS    insulin lispro (HumaLOG) 100 units/mL subcutaneous injection 1-6 Units  1-6 Units Subcutaneous TID AC    losartan (COZAAR) tablet 100 mg  100 mg Oral Daily    magnesium sulfate 2 g/50 mL IVPB (premix) 2 g  2 g Intravenous Once    ondansetron (ZOFRAN) injection 4 mg  4 mg Intravenous Q4H PRN    pravastatin (PRAVACHOL) tablet 40 mg  40 mg Oral Daily With Dinner     Medications Discontinued During This Encounter   Medication Reason    heparin (VTE/PE) high     insulin lispro (HumaLOG) 100 units/mL subcutaneous injection 1-6 Units     sodium chloride 0 9 % infusion     sodium chloride 0 9 % infusion     calcitonin (salmon) (MIACALCIN) injection 396 Units     heparin (porcine) 25,000 units in 0 45% NaCl 250 mL infusion (premix)        Drew Hernandez, DO      This progress note was produced in part using a dictation device which may document imprecise wording from author's original intent

## 2023-05-01 NOTE — ASSESSMENT & PLAN NOTE
Moderate-severe hypercalcemia (13 4) with acute encephalopathy  Likely due to underlying malignancy  PTH (13 8) low on April 21  Vitamin D level not previously checked, but patient is not on any known vitamin D supplementation  Recheck calcium, along with ionized calcium and vitamin D   IV crystalloids limited by pulmonary edema; will give small NS bolus at this time, along with calcitonin 4 units/kg Q12 hours  Consider addition of zoledronic acid pending labs  Nephrology consult appreciated, patient is status post Zometa expect effect in 2 to 3 days    As per nephrology, patient placed on IV hydration

## 2023-05-01 NOTE — PROGRESS NOTES
[address]  Progress Note  Name: Chu Olivier I  MRN: [id number]  Unit/Bed#: -01 I Date of Admission: 4/28/2023   Date of Service: 5/1/2023 I Hospital Day: 3    Assessment/Plan   New infarction of cerebellum [facility])  Assessment & Plan  MRI:Tiny focus of acute or subacute infarct in the left cerebellum  Initial NIHSS score was 4-repeat NIHSS score is 1  Neurology consult appreciated-suspect most likely hypercoagulable state secondary to underlying lung mass  Recommends to continue current heparin drip as per PE protocol  Once patient off of heparin drip, may start aspirin  CTA head neck shows atherosclerosis  Echocardiogram shows no PFO  Speech and swallow evaluation done, patient passed the swallow test  Follow PT/OT  Follow  A1c  Lab Results   Component Value Date    CHOLESTEROL 110 04/29/2023     Lab Results   Component Value Date    HDL 34 (L) 04/29/2023     Lab Results   Component Value Date    TRIG 88 04/29/2023     Lab Results   Component Value Date    NONHDLC 76 04/29/2023         Acute encephalopathy  Assessment & Plan  Patient presenting for acute encephalopathy for several hours  Recent diagnosis of lung/neck masses (likely malignant) and subclavian thrombus  Patient mildly hypoxic at presentation (2 LNC), but encephalopathy not resolving with treatment of hypoxia in ER  Patient not taking any known psychoactive medications  Most likely remaining etiologies on differential diagnosis include:    Multifactorial in nature-electrolyte imbalance, underlying malignancy,  MRI shows infection of the cerebellum which can also contribute  Patient is alert - but requiring frequent reorientation  Placed on virtual continue observation  ABG ordered-patient refusing  Patient placed on aspirin      Patient continued to remain encephalopathic, consider EEG/lumbar puncture      Bilateral pulmonary embolism [facility])  Assessment & Plan  Segmental PE noted in bilateral lower lobes, new from April 20  Patient also has previously diagnosed right subclavian thrombus  Presumably due to underlying malignancy  -140s/60-70s in ER, slight tachycardia 100s, no heart strain on EKG, troponin stable in 40s x3 overnight  Patient has mild hypoxia, but stable on 2 LNC  Started eliquis 1 week ago, switched to heparin infusion in ER  Will continue heparin for now pending further evaluation  Considering patient's underlying lung mass, suspect malignancy, may consider to adjust anticoagulation upon discharge, may consider Lovenox  Patient was on heparin drip, now transition to therapeutic Lovenox considering patient's underlying suspected malignancy  Patient also was recently on Eliquis, consider to discharge patient with Lovenox till follow-up with oncologist outpatient basis      Hypercalcemia  Assessment & Plan  Moderate-severe hypercalcemia (13 4) with acute encephalopathy  Likely due to underlying malignancy  PTH (13 8) low on April 21  Vitamin D level not previously checked, but patient is not on any known vitamin D supplementation  Recheck calcium, along with ionized calcium and vitamin D   IV crystalloids limited by pulmonary edema; will give small NS bolus at this time, along with calcitonin 4 units/kg Q12 hours  Consider addition of zoledronic acid pending labs  Nephrology consult appreciated, patient is status post Zometa expect effect in 2 to 3 days  As per nephrology, patient placed on IV hydration    Lung mass  Assessment & Plan  Recently diagnosed right lung and neck masses, likely malignant  Patient also diagnosed last week with subclavian vein thrombus, and now with bilateral segmental PE, likely due to same  Currently pending appointment for IR biopsy of neck mass to confirm diagnosis, followed by PET-CT  Patient then needs Pulmonary, Oncology, and Palliative Care appointments (referrals already in place)       Pulmonary consult appreciated, recommends inpatient biopsy since patient developed bilateral PE and remained on heparin drip  S/P IR guided Left Cervical Lymphnode biopsy on 5/1/23-follow results      * Acute respiratory failure with hypoxia [facility])  Assessment & Plan  Patient requiring 2 LNC at time of presentation  Likely due to combination of recently discovered lung mass, bilateral PE, and right pleural effusion  See associated diagnoses for plan of care  Echocardiogram showed:  Left Ventricle: Left ventricular cavity size is normal  Wall thickness is increased  There is mild to moderate concentric hypertrophy  The left ventricular ejection fraction is >70%  Systolic function is vigorous  Although no diagnostic regional wall motion abnormality was identified, this possibility cannot be completely excluded on the basis of this study  Diastolic function is mildly abnormal, consistent with grade I (abnormal) relaxation  Left atrial filling pressure is normal     Atrial Septum: No patent foramen ovale detected using saline contrast injection with provocation by abdominal compression    Aortic Valve: The leaflets are moderately thickened  The leaflets are moderately calcified  There is mildly reduced mobility  There is no evidence of regurgitation  There is mild stenosis  The aortic valve peak velocity is 2 57 m/s  The aortic valve mean gradient is 15 mmHg  The dimensionless velocity index is 0 41  The aortic valve area is 1 48 cm2    Mitral Valve: There is mild calcification  The leaflets exhibit normal mobility  There is trace regurgitation  There is no evidence of stenosis    Tricuspid Valve: Tricuspid valve structure is normal  There is trace regurgitation  There is no evidence of stenosis    Aorta: The aortic root is mildly dilated  The aortic root is 3 90 cm    Pericardium: There is a trivial pericardial effusion  Oxygen requirement is titrating down        Electrolyte abnormality  Assessment & Plan  Continue to supplement and monitor    Pleural effusion, right  Assessment & Plan  Small right pleural effusion noted on CT  Similar in appearance to April 20, unlikely etiology for acute hypoxia  Likely reactive in setting of lung mass  No emergent treatment at this time  Echocardiogram reviewed    Severe systemic inflammatory response syndrome (SIRS) (HCC)  Assessment & Plan  Patient meets 3 of 4 SIRS criteria with leukocytosis, tachycardia, and tachypnea  Associated acute organ dysfunction manifesting as acute encephalopathy and acute hypoxic respiratory failure  Suspect SIRS due to combination of pulmonary emboli, underlying malignancy, and metabolic abnormalities (ie, hypercalcemia, hyponatremia)  COVID-19, RSV, flu negative  Will check procalcitonin, lactic acid, and blood cultures  Defer antibiotics based on more likely non-infectious etiology; monitor clinical condition and serial labs at this time  Gout  Assessment & Plan  Chronic stable condition  Treated as outpatient with allopurinol 100 mg daily  Continue outpatient medication regimen  Essential hypertension  Assessment & Plan  Chronic stable condition  Treated as outpatient with valsartan 160 mg daily  Substitute losartan based on hospital formulary  Tobacco abuse  Assessment & Plan  Chronic condition  Encouraged cessation; patient declines assistance at this time  Offer nicotine patch during admission  Type 2 diabetes mellitus without complication, without long-term current use of insulin [facility])  Assessment & Plan  Lab Results   Component Value Date    HGBA1C 6 1 (H) 04/30/2023       Recent Labs     04/30/23  1531 04/30/23  2132 04/30/23  2231 05/01/23  0716   POCGLU 85 126 120 124     Blood Sugar Average: Last 72 hrs:  (P) 127 3125   Last hemoglobin A1c 6 2 in May 2022; Treated as outpatient with glipizide 5 mg daily  Hold oral diabetes medications and initiate insulin protocol during hospitalization        Subclavian vein thrombosis (HCC)  Assessment & Plan  Recently diagnosed condition, presumably due to underlying malignancy  Treated with eliquis since last week; switched to heparin infusion in ER due to new bilateral PE and decline in clinical condition  Continue heparin at this time  May consider to switch to different anticoagulation, may be Lovenox considering patient's underlying lung mass  Hyponatremia  Assessment & Plan  Mild hyponatremia (128) at time of presentation, down from 132 on April 21  Likely due to underlying malignancy and recent poor oral intake  Unlikely this contributes significantly to patient's acute encephalopathy based on duration and mild nature of hyponatremia  Patient appears euvolemic on clinical exam, but has some pulmonary edema on CT and is mildly hypoxic  Will give small NS bolus and reassess clinical condition and labs  Resolved      Neck mass  Assessment & Plan  Recently discovered lung and neck masses, likely malignant  Patient denies any shortness of breath and has no overt dyspnea despite mild acute hypoxia  CT April 20 did not indicate any airway obstruction at that time, and patient has no stridor or other overt indication of airway obstruction now  Neck mass is unlikely etiology of acute hypoxia and does not require urgent treatment  See diagnosis of lung mass for further detail  Status post IR guided left cervical lymph node biopsy on 5/1/2023-pending report               VTE Pharmacologic Prophylaxis: VTE Score: 10 High Risk (Score >/= 5) - Pharmacological DVT Prophylaxis Ordered: enoxaparin (Lovenox)  Sequential Compression Devices Ordered  Patient Centered Rounds: I performed bedside rounds with nursing staff today  Discussions with Specialists or Other Care Team Provider: IR, nephrology    Education and Discussions with Family / Patient: Updated  (wife) at bedside      Total Time Spent on Date of Encounter in care of patient: 10 minutes This time was spent on one or more of the following: performing physical exam; counseling and coordination of care; obtaining or reviewing history; documenting in the medical record; reviewing/ordering tests, medications or procedures; communicating with other healthcare professionals and discussing with patient's family/caregivers  Current Length of Stay: 3 day(s)  Current Patient Status: Inpatient   Certification Statement: The patient will continue to require additional inpatient hospital stay due to To monitor above condition  Discharge Plan: Anticipate discharge in 24-48 hrs to rehab facility  Code Status: Level 1 - Full Code    Subjective:   Seen and evaluated and examined  Resting comfortably  Patient is alert but requiring more frequent reorientation    Objective:     Vitals:   Temp (24hrs), Av 3 °F (36 8 °C), Min:98 1 °F (36 7 °C), Max:98 8 °F (37 1 °C)    Temp:  [98 1 °F (36 7 °C)-98 8 °F (37 1 °C)] 98 1 °F (36 7 °C)  HR:  [] 104  Resp:  [16-22] 18  BP: (148-156)/(83-91) 151/91  SpO2:  [92 %-97 %] 92 %  Body mass index is 27 31 kg/m²  Input and Output Summary (last 24 hours): Intake/Output Summary (Last 24 hours) at 2023 1447  Last data filed at 2023 1101  Gross per 24 hour   Intake 1594 91 ml   Output 3200 ml   Net -1605 09 ml       Physical Exam:   Physical Exam  Vitals and nursing note reviewed  Constitutional:       Appearance: He is not ill-appearing  HENT:      Mouth/Throat:      Mouth: Mucous membranes are moist       Pharynx: Oropharynx is clear  Eyes:      General: No scleral icterus  Left eye: No discharge  Extraocular Movements: Extraocular movements intact  Pupils: Pupils are equal, round, and reactive to light  Cardiovascular:      Rate and Rhythm: Normal rate  Heart sounds: Normal heart sounds  No murmur heard  No friction rub  No gallop  Pulmonary:      Effort: Pulmonary effort is normal  No respiratory distress  Breath sounds: No stridor   No wheezing or rhonchi  Abdominal:      General: Abdomen is flat  Bowel sounds are normal  There is no distension  Palpations: There is no mass  Tenderness: There is no abdominal tenderness  Hernia: No hernia is present  Musculoskeletal:         General: No swelling or deformity  Cervical back: Normal range of motion  Neurological:      Mental Status: He is alert  Cranial Nerves: No cranial nerve deficit  Sensory: No sensory deficit  Motor: No weakness  Coordination: Coordination normal       Comments: Oriented to person, and month  Unable to recall place  Able to recall his wife's name, daughter's name, month, year, current and immediate past president's name           Additional Data:     Labs:  Results from last 7 days   Lab Units 05/01/23  0438   WBC Thousand/uL 16 11*   HEMOGLOBIN g/dL 12 3   HEMATOCRIT % 37 5   PLATELETS Thousands/uL 453*   NEUTROS PCT % 87*   LYMPHS PCT % 4*   MONOS PCT % 8   EOS PCT % 0     Results from last 7 days   Lab Units 05/01/23  0438   SODIUM mmol/L 138   POTASSIUM mmol/L 3 5   CHLORIDE mmol/L 105   CO2 mmol/L 27   BUN mg/dL 14   CREATININE mg/dL 0 85   ANION GAP mmol/L 6   CALCIUM mg/dL 13 0*   ALBUMIN g/dL 3 4*   TOTAL BILIRUBIN mg/dL 0 45   ALK PHOS U/L 129*   ALT U/L 72*   AST U/L 45*   GLUCOSE RANDOM mg/dL 131     Results from last 7 days   Lab Units 05/01/23  1015   INR  1 36*     Results from last 7 days   Lab Units 05/01/23  0716 04/30/23  2231 04/30/23  2132 04/30/23  1531 04/30/23  1119 04/30/23  0738 04/29/23  2129 04/29/23  1613 04/29/23  1107 04/29/23  0738 04/28/23  2045 04/28/23  1739   POC GLUCOSE mg/dl 124 120 126 85 144* 130 123 121 117 107 130 164*     Results from last 7 days   Lab Units 04/30/23  0524   HEMOGLOBIN A1C % 6 1*     Results from last 7 days   Lab Units 04/28/23  0629   LACTIC ACID mmol/L 0 9   PROCALCITONIN ng/ml 0 15       Lines/Drains:  Invasive Devices     Peripheral Intravenous Line  Duration           Peripheral IV 04/28/23 Distal;Dorsal (posterior); Left Forearm 3 days    Peripheral IV 04/28/23 Left Antecubital 3 days                      Imaging: No pertinent imaging reviewed  Recent Cultures (last 7 days):   Results from last 7 days   Lab Units 04/28/23  0631 04/28/23  0629   BLOOD CULTURE  No Growth at 72 hrs  No Growth at 72 hrs  Last 24 Hours Medication List:   Current Facility-Administered Medications   Medication Dose Route Frequency Provider Last Rate    acetaminophen  650 mg Oral Q6H PRN Marcio Costello MD      allopurinol  300 mg Oral Daily Marcio Costello MD      aluminum-magnesium hydroxide-simethicone  30 mL Oral Q6H PRN Marcio Costello MD      aspirin  81 mg Oral Daily Radu Love MD      docusate sodium  100 mg Oral BID PRN Marcio Costello MD      enoxaparin  1 mg/kg Subcutaneous Q12H [facility] Radu Love MD      insulin lispro  1-6 Units Subcutaneous HS Marcio Costello MD      insulin lispro  1-6 Units Subcutaneous TID [facility] Marcio Costello MD      lidocaine (PF)    PRN Dre Wilson MD      losartan  100 mg Oral Daily Marcio Costello MD      ondansetron  4 mg Intravenous Q4H PRN Marcio Costello MD      pravastatin  40 mg Oral Daily With Danika Webb MD      sodium chloride  125 mL/hr Intravenous Continuous Doc Roam,  mL/hr (05/01/23 0977)        Today, Patient Was Seen By: Radu Love MD    **Please Note: This note may have been constructed using a voice recognition system  **

## 2023-05-01 NOTE — ASSESSMENT & PLAN NOTE
[id number] [facility]        Patient Name:  Florence Graham   YOB: 1940   Gender:  Male   Appt Date:  8/23/2021   Provider:  Jonh Cortez MD     PATIENT PROVIDERS  Referring Provider: Jennifer Saavedra MD    [facility] Mild hyponatremia (128) at time of presentation, down from 132 on April 21  Likely due to underlying malignancy and recent poor oral intake  Unlikely this contributes significantly to patient's acute encephalopathy based on duration and mild nature of hyponatremia  Patient appears euvolemic on clinical exam, but has some pulmonary edema on CT and is mildly hypoxic  Will give small NS bolus and reassess clinical condition and labs    Resolved increase in size of enlarged lymph node near the inferior vena cava with stability of all other lymphadenopathy. 7/6/2021: EUS with FNB by Dr. Linda Whitaker. Pathology with atypical lymphoid infiltrate, worrisome for involvement by lymphoma.    7/22/2021: PET/ state    • Arthritis    • Asthma     in good control   • Belching     Occassionally   • Calculus of kidney    • Cataract    • Constipation    • Disorder of prostate Cancer - removed 2008   • Disorder of thyroid    • Diverticulosis of large intestine    • D Types: Cigarettes        Start date: 1953        Quit date: 1967        Years since quittin.7      Smokeless tobacco: Never Used      Tobacco comment: quit over 50 years ago    Vaping Use      Vaping Use: Never used    Alcohol use: Not Antione Gala pathologically enlarged.      CT c/a/p 6/11/2021:      There is now a considerably enlarged lymph node in the retroperitoneum anterior to the inferior vena cava measuring up to 5.5 cm, showing considerable increase in volume since the prior CT scan from Oct

## 2023-05-01 NOTE — PHYSICAL THERAPY NOTE
"   PHYSICAL THERAPY TREATMENT NOTE  NAME:  Debra Lopez  DATE: 05/01/23    Length Of Stay: 3  Performed at least 2 patient identifiers during session: Name and ID bracelet    TREATMENT FLOWSHEET:    05/01/23 0950   PT Last Visit   PT Visit Date 05/01/23   Note Type   Note Type Treatment   Pain Assessment   Pain Assessment Tool 0-10   Pain Score No Pain   Restrictions/Precautions   Weight Bearing Precautions Per Order No   Other Precautions Impulsive;1:1;Chair Alarm; Bed Alarm; Fall Risk;O2;Multiple lines;Cognitive;Telemetry  (virtual 1:1 3LO2)   General   Chart Reviewed Yes   Response to Previous Treatment Patient unable to report, no changes reported from family or staff   Family/Caregiver Present No   Cognition   Overall Cognitive Status Impaired   Arousal/Participation Alert; Responsive   Attention Difficulty attending to directions   Orientation Level Oriented to person   Memory Decreased recall of precautions;Decreased recall of recent events   Following Commands Follows one step commands inconsistently   Subjective   Subjective \"How long does this party here last?\"   Bed Mobility   Supine to Sit 5  Supervision   Additional items Assist x 1; Increased time required; Impulsive;Verbal cues   Sit to Supine 5  Supervision   Additional items Assist x 1; Increased time required;Verbal cues; Impulsive   Additional Comments Pt  tolerated sitting at EOB x 3 mins while NSG set up IV medication without LOB  Pt  impatient wanting to get up before NSG through completing set up of IV meds  Pt  pulling at IV and Dontrell leads  Pt  requiring redirection but seems to be get aggitated easily due to wanting to find keys and his vehicle to leave  Transfers   Sit to Stand   (steadying assist)   Additional items Assist x 1; Increased time required;Verbal cues; Impulsive   Stand to Sit   (steadying assist)   Additional items Assist x 1; Increased time required;Verbal cues; Impulsive   Stand pivot   (steadying assist)   Additional items " Assist x 1; Increased time required; Impulsive;Verbal cues   Additional Comments No AD used for transfers  Ambulation/Elevation   Gait pattern Improper Weight shift;Decreased foot clearance; Inconsistent pham;Narrow TANISHA; Excessively slow; Short stride; Ataxia; Decreased heel strike;Decreased toe off  (lateral path deviation)   Gait Assistance 4  Minimal assist   Additional items Assist x 1;Verbal cues; Tactile cues   Assistive Device None   Distance 30ft   Ambulation/Elevation Additional Comments Upon ambulating pt  suddenly getting agitated stating that therapist was bothering him  Attempted redirection but unable  Pt  assisted back to bedside for safety and session ended to prevent further agitation  Balance   Static Sitting Good   Dynamic Sitting Fair +   Static Standing Fair   Dynamic Standing Fair -   Ambulatory Poor +   Endurance Deficit   Endurance Deficit Yes   Activity Tolerance   Activity Tolerance Patient limited by fatigue  (limited due to cognitive deficits)   Nurse Made Aware RN aware   Assessment   Prognosis Good   Problem List Decreased strength;Decreased endurance; Impaired balance;Decreased mobility; Decreased coordination;Decreased cognition; Impaired judgement;Decreased safety awareness   Goals   Patient Goals to find car   PT Treatment Day 2   Plan   Treatment/Interventions Functional transfer training;LE strengthening/ROM; Elevations; Therapeutic exercise; Endurance training;Cognitive reorientation;Patient/family training;Equipment eval/education; Bed mobility;Gait training; Compensatory technique education;Spoke to nursing   Progress Slow progress, cognitive deficits   PT Frequency 3-5x/wk   Recommendation   PT Discharge Recommendation Post acute rehabilitation services   AM-PAC Basic Mobility Inpatient   Turning in Flat Bed Without Bedrails 3   Lying on Back to Sitting on Edge of Flat Bed Without Bedrails 3   Moving Bed to Chair 3   Standing Up From Chair Using Arms 3   Walk in Room 3   Climb 3-5 Stairs With Railing 1   Basic Mobility Inpatient Raw Score 16   Basic Mobility Standardized Score 38 32   Highest Level Of Mobility   -HL Goal 5: Stand one or more mins   -HLM Achieved 7: Walk 25 feet or more         The patient's AM-PAC Basic Mobility Inpatient Short Form Raw Score is 16  A raw score 16 suggests the patient may benefit from discharge to post-acute rehabilitation services  Please also refer to the recommendation of the Physical Therapist for safe discharge planning  Pt seen for PT treatment session this date with interventions consisting of bed mobility tasks, transfer training, gait training, and education provided as needed for safety and direction to improve functional mobility, safety awareness, and activity tolerance  Pt agreeable to PT treatment session upon arrival, pt found resting in bed  At end of session, pt left in bed with bed alarm activated with virtual 1:1  and RN present with patient, and with all needs in reach  In comparison to previous session, pt with improvements in ambulation distance   Continue to recommend STR at time of d/c in order to maximize pt's functional independence and safety w/ mobility  Pt continues to be functioning below baseline level  PT will continue to see pt while here in order to address the deficits listed above and provide interventions consistent w/ POC in effort to achieve STGs      David Michelle [place], [place]

## 2023-05-01 NOTE — ASSESSMENT & PLAN NOTE
Segmental PE noted in bilateral lower lobes, new from April 20  Patient also has previously diagnosed right subclavian thrombus  Presumably due to underlying malignancy  -140s/60-70s in ER, slight tachycardia 100s, no heart strain on EKG, troponin stable in 40s x3 overnight  Patient has mild hypoxia, but stable on 2 LNC  Started eliquis 1 week ago, switched to heparin infusion in ER  Will continue heparin for now pending further evaluation  Considering patient's underlying lung mass, suspect malignancy, may consider to adjust anticoagulation upon discharge, may consider Lovenox  Patient was on heparin drip, now transition to therapeutic Lovenox considering patient's underlying suspected malignancy    Patient also was recently on Eliquis, consider to discharge patient with Lovenox till follow-up with oncologist outpatient basis

## 2023-05-01 NOTE — ASSESSMENT & PLAN NOTE
Patient presenting for acute encephalopathy for several hours  Recent diagnosis of lung/neck masses (likely malignant) and subclavian thrombus  Patient mildly hypoxic at presentation (2 LNC), but encephalopathy not resolving with treatment of hypoxia in ER  Patient not taking any known psychoactive medications  Most likely remaining etiologies on differential diagnosis include:    Multifactorial in nature-electrolyte imbalance, underlying malignancy,  MRI shows infection of the cerebellum which can also contribute  Patient is alert - but requiring frequent reorientation  Placed on virtual continue observation  ABG ordered-patient refusing  Patient placed on aspirin      Patient continued to remain encephalopathic, consider EEG/lumbar puncture

## 2023-05-01 NOTE — CASE MANAGEMENT
Consuelo Davis 52 received request for authorization from Care Manager    Authorization request for: SNF  Facility Name: [facility]  NPI: [id number]  Facility MD:  Dr Allyson Taylor   NPI: [id number]  Authorization initiated by contacting insurance: CBC Via: [employer]  Pending Reference #: [id number]   Clinicals submitted via: [employer]

## 2023-05-01 NOTE — PROGRESS NOTES
-- Patient:  -- MRN: [id number]  -- Aidin Request ID: [id number]  -- Level of care reserved: [facility]  -- Partner Reserved: [facility] [place] [phone]  -- Clinical needs requested: physical therapy, occupational therapy, transfer, toileting, mobility  -- Geography searched: [place] miles around [facility], [facility]  -- Start of Service:  -- Request sent: 3:35pm EDT on 4/29/2023 by Keenan Abreu  -- Partner reserved: 2:08pm EDT on 5/1/2023 by Keenan Abreu  -- Choice list shared:

## 2023-05-01 NOTE — ASSESSMENT & PLAN NOTE
Patient requiring 2 LNC at time of presentation  Likely due to combination of recently discovered lung mass, bilateral PE, and right pleural effusion  See associated diagnoses for plan of care  Echocardiogram showed:  Left Ventricle: Left ventricular cavity size is normal  Wall thickness is increased  There is mild to moderate concentric hypertrophy  The left ventricular ejection fraction is >70%  Systolic function is vigorous  Although no diagnostic regional wall motion abnormality was identified, this possibility cannot be completely excluded on the basis of this study  Diastolic function is mildly abnormal, consistent with grade I (abnormal) relaxation  Left atrial filling pressure is normal     Atrial Septum: No patent foramen ovale detected using saline contrast injection with provocation by abdominal compression    Aortic Valve: The leaflets are moderately thickened  The leaflets are moderately calcified  There is mildly reduced mobility  There is no evidence of regurgitation  There is mild stenosis  The aortic valve peak velocity is 2 57 m/s  The aortic valve mean gradient is 15 mmHg  The dimensionless velocity index is 0 41  The aortic valve area is 1 48 cm2    Mitral Valve: There is mild calcification  The leaflets exhibit normal mobility  There is trace regurgitation  There is no evidence of stenosis    Tricuspid Valve: Tricuspid valve structure is normal  There is trace regurgitation  There is no evidence of stenosis    Aorta: The aortic root is mildly dilated  The aortic root is 3 90 cm    Pericardium: There is a trivial pericardial effusion  Oxygen requirement is titrating down

## 2023-05-01 NOTE — PLAN OF CARE
Problem: PHYSICAL THERAPY ADULT  Goal: Performs mobility at highest level of function for planned discharge setting  See evaluation for individualized goals  Description: Treatment/Interventions: ADL retraining, Functional transfer training, LE strengthening/ROM, Elevations, Therapeutic exercise, Endurance training, Patient/family training, Equipment eval/education, Cognitive reorientation, Bed mobility, Gait training, Compensatory technique education, Spoke to nursing, Spoke to case management, OT  Equipment Recommended:  (TBD by rehab)       See flowsheet documentation for full assessment, interventions and recommendations  Outcome: Progressing  Note: Prognosis: Good  Problem List: Decreased strength, Decreased endurance, Impaired balance, Decreased mobility, Decreased coordination, Decreased cognition, Impaired judgement, Decreased safety awareness  Assessment: Pt seen for PT treatment session this date with interventions consisting of bed mobility tasks, transfer training, gait training, and education provided as needed for safety and direction to improve functional mobility, safety awareness, and activity tolerance  Pt agreeable to PT treatment session upon arrival, pt found resting in bed  At end of session, pt left in bed with bed alarm activated with virtual 1:1  and RN present with patient, and with all needs in reach  In comparison to previous session, pt with improvements in ambulation distance   Continue to recommend STR at time of d/c in order to maximize pt's functional independence and safety w/ mobility  Pt continues to be functioning below baseline level  PT will continue to see pt while here in order to address the deficits listed above and provide interventions consistent w/ POC in effort to achieve STGs    Barriers to Discharge: Decreased caregiver support, Inaccessible home environment  Barriers to Discharge Comments: requires assistance to complete mobility, lives with spouse who works during the day  PT Discharge Recommendation: Post acute rehabilitation services    See flowsheet documentation for full assessment

## 2023-05-01 NOTE — ASSESSMENT & PLAN NOTE
Recently discovered lung and neck masses, likely malignant  Patient denies any shortness of breath and has no overt dyspnea despite mild acute hypoxia  CT April 20 did not indicate any airway obstruction at that time, and patient has no stridor or other overt indication of airway obstruction now  Neck mass is unlikely etiology of acute hypoxia and does not require urgent treatment  See diagnosis of lung mass for further detail      Status post IR guided left cervical lymph node biopsy on 5/1/2023-pending report

## 2023-05-01 NOTE — ASSESSMENT & PLAN NOTE
Recently diagnosed right lung and neck masses, likely malignant  Patient also diagnosed last week with subclavian vein thrombus, and now with bilateral segmental PE, likely due to same  Currently pending appointment for IR biopsy of neck mass to confirm diagnosis, followed by PET-CT  Patient then needs Pulmonary, Oncology, and Palliative Care appointments (referrals already in place)  Pulmonary consult appreciated, recommends inpatient biopsy since patient developed bilateral PE and remained on heparin drip      S/P IR guided Left Cervical Lymphnode biopsy on 5/1/23-follow results

## 2023-05-01 NOTE — CONSULTS
Interventional Radiology  Consultation 5/1/2023     Consults  Aneesh Smith   1960   [phone]      Assessment/Plan:  79-year-old male with acute hypoxic respiratory failure  CT of the neck, chest abdomen pelvis of 4/28/2023 demonstrates cervical and mediastinal lymphadenopathy  Right middle lobe lung mass  Request for biopsy    Patient currently on heparin for thrombus in the right jugular vein extending into the SVC  Recent INR of 1 71 on 4/28/2023 with a PT of 20  Stat INR ordered  Patient is scheduled for ultrasound-guided biopsy today once INR is below 1 5  Patient to be kept n p o  and hold heparin 2 hours prior to procedure  Please coordinate with IR nurse  As pathology is not available on site specimen will be placed in formalin  Medical Problems     Problem List     * (Principal) Acute respiratory failure with hypoxia (HCC)    Acute pneumonia    Lung mass    Neck mass    Hypercalcemia    Hyponatremia    Subclavian vein thrombosis (HCC)    Type 2 diabetes mellitus without complication, without long-term current use of insulin (HCC) (Chronic)    Lab Results   Component Value Date    HGBA1C 6 1 (H) 04/30/2023       Recent Labs     04/30/23  1531 04/30/23  2132 04/30/23  2231 05/01/23  0716   POCGLU 85 126 120 124       Blood Sugar Average: Last 72 hrs:  (P) 127 3125        SIRS (systemic inflammatory response syndrome) (HCC)    Hypercoagulopathy (HCC)    Tobacco abuse    Essential hypertension    Mixed dyslipidemia    Gout    Bilateral pulmonary embolism (HCC)    Severe systemic inflammatory response syndrome (SIRS) (HCC)    Pleural effusion, right    Acute encephalopathy    New infarction of cerebellum (HCC)    Electrolyte abnormality            Subjective:     Patient ID: Aneesh Smith is a 66 y o  male  History of Present Illness  Metastatic process with cervical and mediastinal lymphadenopathy with right middle lobe mass for biopsy      Review of Systems      Past Medical History: Diagnosis Date    Bilateral pulmonary embolism ([place] [id number] ) 04/28/2023    Current smoker     Diabetes mellitus ([place] [address] )     GERD (gastroesophageal reflux disease)     Gout     Hypertension     Lung cancer ([place] [address] )     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)     Mixed dyslipidemia     Pleural effusion, right 04/28/2023    Subclavian vein thrombosis (HCC)     Type 2 diabetes mellitus without complication, without long-term current use of insulin ([place] [address] ) 04/20/2023        Past Surgical History:   Procedure Laterality Date    ANAL FISSURECTOMY      TONSILLECTOMY          Social History     Tobacco Use   Smoking Status Every Day    Packs/day: 1 00    Types: Cigarettes   Smokeless Tobacco Never        Social History     Substance and Sexual Activity   Alcohol Use Yes        Social History     Substance and Sexual Activity   Drug Use Not Currently        No Known Allergies    Current Facility-Administered Medications   Medication Dose Route Frequency Provider Last Rate Last Admin    acetaminophen (TYLENOL) tablet 650 mg  650 mg Oral Q6H PRN Julianna Tatum MD        allopurinol (ZYLOPRIM) tablet 300 mg  300 mg Oral Daily Julianna Tatum MD   300 mg at 05/01/23 0809    aluminum-magnesium hydroxide-simethicone (MYLANTA) oral suspension 30 mL  30 mL Oral Q6H PRN Julianna Tatum MD        docusate sodium (COLACE) capsule 100 mg  100 mg Oral BID PRN Julianna Tatum MD        insulin lispro (HumaLOG) 100 units/mL subcutaneous injection 1-6 Units  1-6 Units Subcutaneous HS Julianna Tatum MD        insulin lispro (HumaLOG) 100 units/mL subcutaneous injection 1-6 Units  1-6 Units Subcutaneous TID [facility] Julianna Tatum MD   1 Units at 04/28/23 1744    losartan (COZAAR) tablet 100 mg  100 mg Oral Daily Julianna Tatum MD   100 mg at 05/01/23 0809    magnesium sulfate 2 g/50 mL IVPB (premix) 2 g  2 g Intravenous Once Ana Gonzales MD 25 mL/hr at 05/01/23 0934 2 g at 05/01/23 0934    ondansetron [facility]) injection 4 mg  4 mg Intravenous Q4H PRN Tito Bruno MD        pravastatin (PRAVACHOL) tablet 40 mg  40 mg Oral Daily With Sean Lopez MD   40 mg at 04/30/23 1730    sodium chloride 0 9 % infusion  125 mL/hr Intravenous Continuous Fer Mirza,  125 mL/hr at 05/01/23 0935 125 mL/hr at 05/01/23 0935          Objective:    Vitals:    04/30/23 1900 04/30/23 1925 05/01/23 0715 05/01/23 0717   BP: 156/83 156/83 151/83 151/83   BP Location: Left arm      Pulse: 96 93 96 102   Resp: 22  18 18   Temp: 98 8 °F (37 1 °C)  98 1 °F (36 7 °C) 98 1 °F (36 7 °C)   TempSrc: Oral      SpO2: 96% 94% 95% 97%   Weight:       Height:            Physical Exam      Lab Results   Component Value Date    BNP 30 04/28/2023      Lab Results   Component Value Date    WBC 16 11 (H) 05/01/2023    HGB 12 3 05/01/2023    HCT 37 5 05/01/2023    MCV 87 05/01/2023     (H) 05/01/2023     Lab Results   Component Value Date    INR 1 71 (H) 04/28/2023    INR 1 16 04/20/2023    PROTIME 20 1 (H) 04/28/2023    PROTIME 14 9 (H) 04/20/2023     Lab Results   Component Value Date    PTT 63 (H) 04/30/2023         I have personally reviewed pertinent imaging and laboratory results  Code Status: Level 1 - Full Code  Advance Directive and Living Will:      Power of :    POLST:      IR has been consulted to evaluate the patient, determine the appropriate procedure, and whether or not a procedure can and should be performed  Thank you for allowing me to participate in the care of Hinton Semen  Please don't hesitate to call, text, email, or TigerText with any questions  This text is generated with voice recognition software  There may be translation, syntax,  or grammatical errors  If you have any questions, please contact the dictating provider

## 2023-05-02 ENCOUNTER — APPOINTMENT (INPATIENT)
Dept: CT IMAGING | Facility: HOSPITAL | Age: 63
End: 2023-05-02

## 2023-05-02 ENCOUNTER — APPOINTMENT (INPATIENT)
Dept: RADIOLOGY | Facility: HOSPITAL | Age: 63
End: 2023-05-02

## 2023-05-02 VITALS
HEIGHT: 73 IN | OXYGEN SATURATION: 94 % | DIASTOLIC BLOOD PRESSURE: 101 MMHG | TEMPERATURE: 98.3 F | HEART RATE: 119 BPM | SYSTOLIC BLOOD PRESSURE: 160 MMHG | BODY MASS INDEX: 27.43 KG/M2 | WEIGHT: 207 LBS | RESPIRATION RATE: 39 BRPM

## 2023-05-02 PROBLEM — F10.10 ETOH ABUSE: Status: ACTIVE | Noted: 2023-05-02

## 2023-05-02 PROBLEM — F10.931 DELIRIUM TREMENS (HCC): Status: ACTIVE | Noted: 2023-01-01

## 2023-05-02 LAB
ALBUMIN SERPL BCP-MCNC: 3.3 G/DL (ref 3.5–5)
ALP SERPL-CCNC: 107 U/L (ref 34–104)
ALT SERPL W P-5'-P-CCNC: 57 U/L (ref 7–52)
AMMONIA PLAS-SCNC: 50 UMOL/L (ref 18–72)
ANION GAP SERPL CALCULATED.3IONS-SCNC: 7 MMOL/L (ref 4–13)
ARTERIAL PATENCY WRIST A: YES
AST SERPL W P-5'-P-CCNC: 36 U/L (ref 13–39)
BASE EXCESS BLDA CALC-SCNC: 2.6 MMOL/L
BASOPHILS # BLD AUTO: 0.07 THOUSANDS/ΜL (ref 0–0.1)
BASOPHILS NFR BLD AUTO: 1 % (ref 0–1)
BILIRUB SERPL-MCNC: 0.39 MG/DL (ref 0.2–1)
BUN SERPL-MCNC: 16 MG/DL (ref 5–25)
CALCIUM ALBUM COR SERPL-MCNC: 11.6 MG/DL (ref 8.3–10.1)
CALCIUM SERPL-MCNC: 11 MG/DL (ref 8.4–10.2)
CHLORIDE SERPL-SCNC: 106 MMOL/L (ref 96–108)
CO2 SERPL-SCNC: 27 MMOL/L (ref 21–32)
CREAT SERPL-MCNC: 0.87 MG/DL (ref 0.6–1.3)
EOSINOPHIL # BLD AUTO: 0.06 THOUSAND/ΜL (ref 0–0.61)
EOSINOPHIL NFR BLD AUTO: 0 % (ref 0–6)
ERYTHROCYTE [DISTWIDTH] IN BLOOD BY AUTOMATED COUNT: 12.8 % (ref 11.6–15.1)
EST. AVERAGE GLUCOSE BLD GHB EST-MCNC: 128 MG/DL
GFR SERPL CREATININE-BSD FRML MDRD: 93 ML/MIN/1.73SQ M
GLUCOSE SERPL-MCNC: 114 MG/DL (ref 65–140)
GLUCOSE SERPL-MCNC: 116 MG/DL (ref 65–140)
GLUCOSE SERPL-MCNC: 127 MG/DL (ref 65–140)
GLUCOSE SERPL-MCNC: 96 MG/DL (ref 65–140)
HBA1C MFR BLD: 6.1 %
HCO3 BLDA-SCNC: 26.6 MMOL/L (ref 22–28)
HCT VFR BLD AUTO: 38.2 % (ref 36.5–49.3)
HGB BLD-MCNC: 12.4 G/DL (ref 12–17)
IMM GRANULOCYTES # BLD AUTO: 0.06 THOUSAND/UL (ref 0–0.2)
IMM GRANULOCYTES NFR BLD AUTO: 0 % (ref 0–2)
LYMPHOCYTES # BLD AUTO: 0.89 THOUSANDS/ΜL (ref 0.6–4.47)
LYMPHOCYTES NFR BLD AUTO: 6 % (ref 14–44)
MCH RBC QN AUTO: 28.4 PG (ref 26.8–34.3)
MCHC RBC AUTO-ENTMCNC: 32.5 G/DL (ref 31.4–37.4)
MCV RBC AUTO: 88 FL (ref 82–98)
MONOCYTES # BLD AUTO: 1.28 THOUSAND/ΜL (ref 0.17–1.22)
MONOCYTES NFR BLD AUTO: 8 % (ref 4–12)
NASAL CANNULA: 3
NEUTROPHILS # BLD AUTO: 13 THOUSANDS/ΜL (ref 1.85–7.62)
NEUTS SEG NFR BLD AUTO: 85 % (ref 43–75)
NRBC BLD AUTO-RTO: 0 /100 WBCS
O2 CT BLDA-SCNC: 17.3 ML/DL (ref 16–23)
OXYHGB MFR BLDA: 91.6 % (ref 94–97)
PCO2 BLDA: 39.3 MM HG (ref 36–44)
PH BLDA: 7.45 [PH] (ref 7.35–7.45)
PLATELET # BLD AUTO: 350 THOUSANDS/UL (ref 149–390)
PMV BLD AUTO: 8.7 FL (ref 8.9–12.7)
PO2 BLDA: 63.2 MM HG (ref 75–129)
POTASSIUM SERPL-SCNC: 3.4 MMOL/L (ref 3.5–5.3)
PROCALCITONIN SERPL-MCNC: 0.23 NG/ML
PROT SERPL-MCNC: 6.9 G/DL (ref 6.4–8.4)
RBC # BLD AUTO: 4.36 MILLION/UL (ref 3.88–5.62)
SODIUM SERPL-SCNC: 140 MMOL/L (ref 135–147)
SPECIMEN SOURCE: ABNORMAL
WBC # BLD AUTO: 15.36 THOUSAND/UL (ref 4.31–10.16)

## 2023-05-02 RX ORDER — CEFTRIAXONE 1 G/50ML
1000 INJECTION, SOLUTION INTRAVENOUS EVERY 24 HOURS
Status: CANCELLED | OUTPATIENT
Start: 2023-05-03 | End: 2023-05-09

## 2023-05-02 RX ORDER — VANCOMYCIN HYDROCHLORIDE 1 G/200ML
1000 INJECTION, SOLUTION INTRAVENOUS EVERY 12 HOURS
Status: CANCELLED | OUTPATIENT
Start: 2023-05-02

## 2023-05-02 RX ORDER — ACETAMINOPHEN 325 MG/1
650 TABLET ORAL EVERY 6 HOURS PRN
Status: CANCELLED | OUTPATIENT
Start: 2023-05-02

## 2023-05-02 RX ORDER — LEVALBUTEROL INHALATION SOLUTION 1.25 MG/3ML
1.25 SOLUTION RESPIRATORY (INHALATION)
Status: DISCONTINUED | OUTPATIENT
Start: 2023-05-02 | End: 2023-05-02 | Stop reason: HOSPADM

## 2023-05-02 RX ORDER — LEVALBUTEROL INHALATION SOLUTION 1.25 MG/3ML
1.25 SOLUTION RESPIRATORY (INHALATION)
Status: CANCELLED | OUTPATIENT
Start: 2023-05-02

## 2023-05-02 RX ORDER — LORAZEPAM 2 MG/ML
1 INJECTION INTRAMUSCULAR ONCE
Status: COMPLETED | OUTPATIENT
Start: 2023-05-02 | End: 2023-05-02

## 2023-05-02 RX ORDER — PHENOBARBITAL SODIUM 65 MG/ML
130 INJECTION INTRAMUSCULAR ONCE
Status: COMPLETED | OUTPATIENT
Start: 2023-05-02 | End: 2023-05-02

## 2023-05-02 RX ORDER — CALCITONIN SALMON 200 [USP'U]/ML
4 INJECTION, SOLUTION INTRAMUSCULAR; SUBCUTANEOUS EVERY 12 HOURS SCHEDULED
Status: CANCELLED | OUTPATIENT
Start: 2023-05-02 | End: 2023-05-03

## 2023-05-02 RX ORDER — INSULIN LISPRO 100 [IU]/ML
1-6 INJECTION, SOLUTION INTRAVENOUS; SUBCUTANEOUS
Status: CANCELLED | OUTPATIENT
Start: 2023-05-03

## 2023-05-02 RX ORDER — SODIUM CHLORIDE 9 MG/ML
125 INJECTION, SOLUTION INTRAVENOUS CONTINUOUS
Status: CANCELLED | OUTPATIENT
Start: 2023-05-02

## 2023-05-02 RX ORDER — PRAVASTATIN SODIUM 40 MG
40 TABLET ORAL
Status: CANCELLED | OUTPATIENT
Start: 2023-05-03

## 2023-05-02 RX ORDER — POTASSIUM CHLORIDE 20 MEQ/1
20 TABLET, EXTENDED RELEASE ORAL
Status: DISCONTINUED | OUTPATIENT
Start: 2023-05-02 | End: 2023-05-02

## 2023-05-02 RX ORDER — VANCOMYCIN HYDROCHLORIDE 1 G/200ML
1000 INJECTION, SOLUTION INTRAVENOUS EVERY 12 HOURS
Status: DISCONTINUED | OUTPATIENT
Start: 2023-05-02 | End: 2023-05-02 | Stop reason: HOSPADM

## 2023-05-02 RX ORDER — LEVALBUTEROL INHALATION SOLUTION 1.25 MG/3ML
1.25 SOLUTION RESPIRATORY (INHALATION)
Status: DISCONTINUED | OUTPATIENT
Start: 2023-05-02 | End: 2023-05-02

## 2023-05-02 RX ORDER — LOSARTAN POTASSIUM 50 MG/1
100 TABLET ORAL DAILY
Status: CANCELLED | OUTPATIENT
Start: 2023-05-03

## 2023-05-02 RX ORDER — IPRATROPIUM BROMIDE AND ALBUTEROL SULFATE 2.5; .5 MG/3ML; MG/3ML
3 SOLUTION RESPIRATORY (INHALATION) ONCE
Status: COMPLETED | OUTPATIENT
Start: 2023-05-02 | End: 2023-05-02

## 2023-05-02 RX ORDER — LEVALBUTEROL INHALATION SOLUTION 1.25 MG/3ML
1.25 SOLUTION RESPIRATORY (INHALATION) EVERY 6 HOURS PRN
Status: DISCONTINUED | OUTPATIENT
Start: 2023-05-02 | End: 2023-05-02

## 2023-05-02 RX ORDER — DOCUSATE SODIUM 100 MG/1
100 CAPSULE, LIQUID FILLED ORAL 2 TIMES DAILY PRN
Status: CANCELLED | OUTPATIENT
Start: 2023-05-02

## 2023-05-02 RX ORDER — INSULIN LISPRO 100 [IU]/ML
1-6 INJECTION, SOLUTION INTRAVENOUS; SUBCUTANEOUS
Status: CANCELLED | OUTPATIENT
Start: 2023-05-02

## 2023-05-02 RX ORDER — ASPIRIN 81 MG/1
81 TABLET ORAL DAILY
Status: CANCELLED | OUTPATIENT
Start: 2023-05-03

## 2023-05-02 RX ORDER — ONDANSETRON 2 MG/ML
4 INJECTION INTRAMUSCULAR; INTRAVENOUS EVERY 4 HOURS PRN
Status: CANCELLED | OUTPATIENT
Start: 2023-05-02

## 2023-05-02 RX ORDER — MAGNESIUM HYDROXIDE/ALUMINUM HYDROXICE/SIMETHICONE 120; 1200; 1200 MG/30ML; MG/30ML; MG/30ML
30 SUSPENSION ORAL EVERY 6 HOURS PRN
Status: CANCELLED | OUTPATIENT
Start: 2023-05-02

## 2023-05-02 RX ORDER — CEFTRIAXONE 1 G/50ML
1000 INJECTION, SOLUTION INTRAVENOUS EVERY 24 HOURS
Status: DISCONTINUED | OUTPATIENT
Start: 2023-05-02 | End: 2023-05-02 | Stop reason: HOSPADM

## 2023-05-02 RX ORDER — ALLOPURINOL 300 MG/1
300 TABLET ORAL DAILY
Status: CANCELLED | OUTPATIENT
Start: 2023-05-03

## 2023-05-02 RX ORDER — OLANZAPINE 10 MG/1
5 TABLET ORAL
Status: CANCELLED | OUTPATIENT
Start: 2023-05-02

## 2023-05-02 RX ORDER — SODIUM CHLORIDE 9 MG/ML
125 INJECTION, SOLUTION INTRAVENOUS CONTINUOUS
Status: DISCONTINUED | OUTPATIENT
Start: 2023-05-02 | End: 2023-05-02 | Stop reason: HOSPADM

## 2023-05-02 RX ADMIN — POTASSIUM CHLORIDE 20 MEQ: 1500 TABLET, EXTENDED RELEASE ORAL at 09:18

## 2023-05-02 RX ADMIN — ENOXAPARIN SODIUM 90 MG: 100 INJECTION SUBCUTANEOUS at 08:19

## 2023-05-02 RX ADMIN — SODIUM CHLORIDE 125 ML/HR: 0.9 INJECTION, SOLUTION INTRAVENOUS at 11:03

## 2023-05-02 RX ADMIN — PHENOBARBITAL SODIUM 130 MG: 65 INJECTION INTRAMUSCULAR; INTRAVENOUS at 11:04

## 2023-05-02 RX ADMIN — ALLOPURINOL 300 MG: 300 TABLET ORAL at 08:19

## 2023-05-02 RX ADMIN — PHENOBARBITAL SODIUM 130 MG: 65 INJECTION INTRAMUSCULAR; INTRAVENOUS at 12:23

## 2023-05-02 RX ADMIN — CALCITONIN SALMON 376 UNITS: 200 INJECTION, SOLUTION INTRAMUSCULAR; SUBCUTANEOUS at 09:18

## 2023-05-02 RX ADMIN — ASPIRIN 81 MG: 81 TABLET, COATED ORAL at 08:19

## 2023-05-02 RX ADMIN — LOSARTAN POTASSIUM 100 MG: 50 TABLET, FILM COATED ORAL at 08:19

## 2023-05-02 RX ADMIN — CEFTRIAXONE 1000 MG: 1 INJECTION, SOLUTION INTRAVENOUS at 16:59

## 2023-05-02 RX ADMIN — LORAZEPAM 1 MG: 2 INJECTION INTRAMUSCULAR; INTRAVENOUS at 02:13

## 2023-05-02 RX ADMIN — LORAZEPAM 1 MG: 2 INJECTION INTRAMUSCULAR; INTRAVENOUS at 07:05

## 2023-05-02 RX ADMIN — IPRATROPIUM BROMIDE AND ALBUTEROL SULFATE 3 ML: 2.5; .5 SOLUTION RESPIRATORY (INHALATION) at 14:19

## 2023-05-02 RX ADMIN — VANCOMYCIN HYDROCHLORIDE 2000 MG: 1 INJECTION, POWDER, LYOPHILIZED, FOR SOLUTION INTRAVENOUS at 15:54

## 2023-05-02 RX ADMIN — SODIUM CHLORIDE 125 ML/HR: 0.9 INJECTION, SOLUTION INTRAVENOUS at 03:54

## 2023-05-02 NOTE — PROGRESS NOTES
"PATIENT TRANSFER TO CRITICAL CARE SERVICE    Attending Note Attestation: I was the attending of record on May 2, 2023 and I have personally seen and examined the patient   - Non-critical care time: 28 minutes      Deanna Victoria MD PhD  [phone]  Anesthesiology and Critical Care  Staff Physician, [facility] [address] [place] [address] Way  May 2, 2023    --------------------------------------------------------  ***SUBJECTIVE / HISTORY / 24H EVENTS  - ***    --------------------------------------------------------  ASSESSMENT & PLAN  This is a 66 y o  male, with a has a past medical history of Bilateral pulmonary embolism, Current smoker, Diabetes mellitus ([address] ), GERD, Gout, HTN, Lung cancer, 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, HLD, Pleural effusion, right, Subclavian vein thrombosis, and T2DM on oral medications,  who presented for shortness of breath, found to have lung cancer    who *** -- and is now requiring ICU or step down care for acute on chronic AMS, possible symptomatic EtOH withdrawal   - Non-con Head CT  - Switch to Heparin gtt starting 10 hours after last Lovenox dose from lovenox given possible need for LP  - Initiate standing phenobarb taper for potential EtOH withdrawal  - Delirium precautions (optimize sleep cycle)            Physical Exam  General: Elderly man sitting in bed picking at his sheets and pulling at his nasal canula  Neurologic: Answers simple questions with nonsensical words  HEENT: ***Normocephalic and ***atraumatic  Sclera are ***anicteric  Mucous membranes are ***moist   Lungs & Thorax: Work of breathing shows {yesno:635552::\"unlabored ventilation\",\"no dyssynchrony with ventilator\",\"***\"}  Cardiovascular: The JVP is grossly {yesno:595453::\"distended\",\"flat\",\"unable to be assessed\"}  Lower extremities show ***no edema  Cap refill is ***<2sec  Abdomen: {yesno:598161::\"Non-tender\",\"Non-distended\"}    Extremities / MSK: Musculature is grossly " "{yesno:396432::\"diminished\",\"intact\"}     Skin: The skin is ***warm and dry  ***  N - GCS: E 4 Spon 3 Com, 2 Pain, 1 / V 5, 4 Confu, 3 Inappr, 2 Incomp, 1 / M 6 Com, 5 Purp, 4 WithD, 3 Flex, 2 Ext, 1       // - ICU-CAM/Sleep:       // - NChecks:       // - EEG / Imaging:  C - BP/MAP goal:       // - EKG:                 //////               - CXR:       // - SAT/SBT/Vent Weaning?:  F - GI PPx:       // - Maint  Fluids:       // - IO Goal:       // - Bowel Reg:  H - DVT PPx / AC Plan:       // - Micro:       // - Procal:               /////             - HOB/Spine clearance:       // PTOT?:       // - Skin:       // - DC lines?: Chest tube / Drains / CVC / Leming / Alonzo / NGT / Wires       // - Home Meds?:  ***  Neuro:  # Encephalopathy: Likely {pick one:34326::\"Cardiopulmonary\",\"Sedation-related\",\"Overdose or Toxin-related\",\"Primary neurological\",\"Metabolic (uremia, hyperammonemia, thyroid/adrenal, nutrition/thiamine)\",\"Infectious\",\"Autoimmune\",\"Neoplastic\",\"Mixed ***\"}  - w/u: Vitals (***), Basic labs/Blood glucose (***), Blood gas (***), UDS (***), Possible iatrogenic agents (***), CT head (***), Coma panel (***), ammonia lvl (***), TSH (***), thiamine lvl (***), B12 lvl (***), Procalcitonin (***), EEG (***), LP (***), Neuro CS (***), Psych CS (***)  CV:  # Shock state: Likely {pick one:26515::\"Cardiogenic\",\"Hemorrhagic/Hypovolemic\",\"Distributive/Septic\",\"Anaphylactic\",\"Neurogenic\",\"Sedation-related\",\"Endocrine crisis\",\"Undifferentiated\",\"Mixed ***\"}  - Requiring active vasoactive agent titration with goal parameters below, invasive hemodynamic monitoring, and Central access  - Goals: MAP >65 mmHg, CI >2 0-2 2 (Mixed SvO2 >60%), PCWP (LVEDP surrogate) 4-12, CVP 0-6, PASP 15-30, Uriel >0 9-2, -1600      Pulm:  # {pick one:28303::\"Acute on Chronic\",\"Acute\"} {pick one:01977::\"Hypoxic\",\"Hypercarbic\",\"Mixed hypoxic and hypercarbic\",\"***\"} Respiratory Failure: requiring {pick one:81378::\"non-invasive " "ventilation  \",\"invasive ventilation  \"}    GI/Nutrition:  - Diet: Diet NPO  # ***    Renal/:  # {pick one:55610::\"Acute on Chronic\",\"Acute\"} Kidney Injury (b/l Cr ***): Likely {pick KDO:67740::\"IILZLANOSES\",\"OMQTZVLSZJU\",\"OSJJZDMWA\",\"AYABJHSPYN\",\"HOHNGWEF\"}  # Metabolic {pick HIP:11339::\"RVUCQQUEK\",\"DJLYFETB\"} w/ compensatory ***: Simplified Delta-Delta gap (Na - Cl - 36):  < -6 NAGMA, -6 to -3 NAGMA + HAGMA, -3 to +6 HAGMA, >+6 HAGMA + Compensatory metabolic alkalosis    Endo/Metabolic:  # {pick RSW:43176::\"H1TY\",\"N5JK\",\"TVMPOLI-ZXTJZLXHI T2DM\",\"Critical illness associated hyperglycemia\"}    Heme/ID:  # Sepsis/SIRS physiology: DDX Infx sources (pulmonary, urinary/renal, endocarditis, wound/soft tissue), Sepsis mimics (Acute bleed, PE/DVT, Drug fever, DKA)  - w/u: Cultures (Blood: ***, Urine: ***, Sputum: ***), CXR: ***, MRSA: ***, COVID/RSV/Flu Panel: ***, Legionella & Streptococcal urine antigens: ***, Procalcitonin: ***, Lactate: ***   - Rx: Antibiotics (***)    MSK/Skin/Prophylaxis:  # PPX: Amiodarone, Statin, Fondaparinux, Aspirin, Pantoprazole, Lines/Devices in place (Central line, PAC, A-line, Alonzo)    # Devices to remove?: ***  # Dispo: {pick GIS:06693::\"HIHI Down\",\"M/S\",\"M/S Tele\",\"SD w/ CT Surgery\",\"n/a\"}      ***Principal Problem:    Acute respiratory failure with hypoxia (HCC)  Active Problems:    Lung mass    Neck mass    Hypercalcemia    Hyponatremia    Subclavian vein thrombosis (HCC)    Type 2 diabetes mellitus without complication, without long-term current use of insulin (HCC)    Tobacco abuse    Essential hypertension    Gout    Bilateral pulmonary embolism (HCC)    Severe systemic inflammatory response syndrome (SIRS) (HCC)    Pleural effusion, right    Acute encephalopathy    New infarction of cerebellum (HCC)    Electrolyte abnormality      ***    --------------------------------------------------------  OBJECTIVE EVALUATION & DATA  Visit Vitals  /91   Pulse (!) 111   Temp 97 5 °F (36 4 " "°C)   Resp 20   Ht 6' 1\" (1 854 m)   Wt 93 9 kg (207 lb)   SpO2 90%   BMI 27 31 kg/m²   Smoking Status Every Day   BSA 2 18 m²     Temp (24hrs), Av 8 °F (36 6 °C), Min:97 5 °F (36 4 °C), Max:97 9 °F (36 6 °C)        Intake/Output Summary (Last 24 hours) at 2023 0950  Last data filed at 2023 7580  Gross per 24 hour   Intake --   Output 690 ml   Net -690 ml     - Diet: Diet NPO    ***Physical Exam  General: ***  Neurologic: {yesno:771019::\"Localizes to deep stimulation\",\"AO x 3\"}  HEENT: ***Normocephalic and ***atraumatic  Sclera are ***anicteric  Mucous membranes are ***moist   Lungs & Thorax: Work of breathing shows {yesno:189092::\"unlabored ventilation\",\"no dyssynchrony with ventilator\",\"***\"}  Cardiovascular: The JVP is grossly {yesno:365184::\"distended\",\"flat\",\"unable to be assessed\"}  Lower extremities show ***no edema  Cap refill is ***<2sec  Abdomen: {yesno:891893::\"Non-tender\",\"Non-distended\"}  Extremities / MSK: Musculature is grossly {yesno:728221::\"diminished\",\"intact\"}     Skin: The skin is ***warm and dry      DATA  ***  - Sedation: Prop:   // Fent:   // Myrna Garre:   // Dil:   // Dex:    - Pressor needs: Epi:   // Levo:   // Vaso:   // Phenyl:FLOW(656898:last:1)@ // Mil:    - Cardiac data: CI:   // SVRI:   // CVP:   // PAP:    - Vent and Respiratory data: Nasal Cannula O2 Flow Rate (L/min): 2 L/min //   //   //   //   //   //     ***  Results from last 7 days   Lab Units 23  0629   LACTIC ACID mmol/L 0 9     Results from last 7 days   Lab Units 23  0443 23  0438   SODIUM mmol/L 140 138   POTASSIUM mmol/L 3 4* 3 5   CHLORIDE mmol/L 106 105   CO2 mmol/L 27 27   ANION GAP mmol/L 7 6   BUN mg/dL 16 14   CREATININE mg/dL 0 87 0 85   GLUCOSE RANDOM mg/dL 127 131     Results from last 7 days   Lab Units 23  0443 23  0438 23  0524 23  1837 23  0629   CALCIUM mg/dL 11 0* 13 0* 11 6*   < > 13 3*   MAGNESIUM mg/dL  --  1 5* 1 5*   < > 1 4*   PHOSPHORUS mg/dL " --  2 9  --   --  2 4    < > = values in this interval not displayed  Results from last 7 days   Lab Units 05/02/23  0443 05/01/23  0438   AST U/L 36 45*   ALT U/L 57* 72*   ALK PHOS U/L 107* 129*   TOTAL BILIRUBIN mg/dL 0 39 0 45   ALBUMIN g/dL 3 3* 3 4*     Results from last 7 days   Lab Units 05/02/23  0443 05/01/23  0438 04/30/23  0524   WBC Thousand/uL 15 36* 16 11* 13 79*   HEMOGLOBIN g/dL 12 4 12 3 11 9*   HEMATOCRIT % 38 2 37 5 36 8   PLATELETS Thousands/uL 350 453* 544*     Results from last 7 days   Lab Units 05/01/23  1015 04/30/23  0524 04/29/23  0459 04/28/23  1628 04/28/23  1012 04/28/23  0047   INR  1 36*  --   --   --   --  1 71*   PTT seconds  --  63* 63* 71*   < > 38*    < > = values in this interval not displayed       Results from last 7 days   Lab Units 04/28/23  0629   PROCALCITONIN ng/ml 0 15

## 2023-05-02 NOTE — ASSESSMENT & PLAN NOTE
· Likely secondary to ETOH w/d  · Trend WBC  · Check Blood cultures  · Check PCT  · If elevated, start broad spectrum antibiotics

## 2023-05-02 NOTE — PROGRESS NOTES
Progress Note - Nephrology   Reagan Preston 66 y o  male MRN: [id number]  Unit/Bed#: -01 Encounter: [id number]    A/P:  1   Malignancy associated hypercalcemia              Significant improved, now down to 11 6 mg/dL from 13 5 mg/dL  As of IV Zometa on   Patient also status post biopsy yesterday, May 1   2   Hypovolemic hyponatremia-resolved  3  Volume depletion             We will allow the patient's normal saline which is currently running at 125 mL/hr to   Reinitiation as indicated depending on the patient progresses from a clinical standpoint  4  Hypomagnesemia              Continue to monitor magnesium levels and offer supplementation as indicated  5   Bilateral pulmonary emboli              Care according to our hospitalist colleagues  6   Lung and neck mass   Status post biopsy yesterday, follow-up results  7   Hypokalemia   Will provide potassium supplementation  8  Encephalopathy    Potentially due to delirium tremens, appreciate hospitalist discussion regarding this, initiation of protocols and care according to her recommendations      Follow up reason for today's visit: Hypercalcemia/volume depletion/hypokalemia    Acute respiratory failure with hypoxia [facility])    Patient Active Problem List   Diagnosis    Acute pneumonia    Lung mass    Neck mass    Hypercalcemia    Hyponatremia    Subclavian vein thrombosis (HCC)    Type 2 diabetes mellitus without complication, without long-term current use of insulin (HCC)    SIRS (systemic inflammatory response syndrome) (HCC)    Hypercoagulopathy (HCC)    Tobacco abuse    Essential hypertension    Mixed dyslipidemia    Gout    Bilateral pulmonary embolism (HCC)    Severe systemic inflammatory response syndrome (SIRS) (HCC)    Acute respiratory failure with hypoxia (HCC)    Pleural effusion, right    Acute encephalopathy    New infarction of cerebellum (HCC)    Electrolyte abnormality         Subjective:   Encephalopathic "this morning    Objective:     Vitals: Blood pressure (!) 140/107, pulse (!) 107, temperature 97 5 °F (36 4 °C), resp  rate 20, height 6' 1\" (1 854 m), weight 93 9 kg (207 lb), SpO2 95 %  ,Body mass index is 27 31 kg/m²  Weight (last 2 days)     None            Intake/Output Summary (Last 24 hours) at 5/2/2023 0851  Last data filed at 5/2/2023 0824  Gross per 24 hour   Intake 50 ml   Output 1240 ml   Net -1190 ml     I/O last 3 completed shifts: In: 1254 9 [P O :50; I V :1204 9]  Out: 2325 [Urine:2325]         Physical Exam: BP (!) 140/107   Pulse (!) 107   Temp 97 5 °F (36 4 °C)   Resp 20   Ht 6' 1\" (1 854 m)   Wt 93 9 kg (207 lb)   SpO2 95%   BMI 27 31 kg/m²     General Appearance:    Alert, confused, no distress, appears stated age   Head:    Normocephalic, without obvious abnormality, atraumatic   Eyes:    Conjunctiva/corneas clear   Ears:    Normal external ears   Nose:   Nares normal, septum midline, mucosa normal, no drainage    or sinus tenderness   Throat:   Lips, mucosa, and tongue normal; teeth and gums normal   Neck:   Supple   Back:     Symmetric, no curvature, ROM normal, no CVA tenderness   Lungs:     Clear to auscultation bilaterally, respirations unlabored   Chest wall:    No tenderness or deformity   Heart:    Regular rate and rhythm, S1 and S2 normal, no murmur, rub   or gallop   Abdomen:     Soft, non-tender, bowel sounds active   Extremities:   Extremities normal, atraumatic, no cyanosis or edema   Skin:   Skin color, texture, turgor normal, no rashes or lesions   Lymph nodes:   Cervical normal   Neurologic:   CNII-XII intact            Lab, Imaging and other studies: I have personally reviewed pertinent labs    CBC:   Lab Results   Component Value Date    WBC 15 36 (H) 05/02/2023    HGB 12 4 05/02/2023    HCT 38 2 05/02/2023    MCV 88 05/02/2023     05/02/2023    MCH 28 4 05/02/2023    MCHC 32 5 05/02/2023    RDW 12 8 05/02/2023    MPV 8 7 (L) 05/02/2023    NRBC 0 05/02/2023 " CMP:   Lab Results   Component Value Date    K 3 4 (L) 05/02/2023     05/02/2023    CO2 27 05/02/2023    BUN 16 05/02/2023    CREATININE 0 87 05/02/2023    CALCIUM 11 0 (H) 05/02/2023    AST 36 05/02/2023    ALT 57 (H) 05/02/2023    ALKPHOS 107 (H) 05/02/2023    EGFR 93 05/02/2023         Results from last 7 days   Lab Units 05/02/23  0443 05/01/23  0438 04/30/23  0524   POTASSIUM mmol/L 3 4* 3 5 3 4*   CHLORIDE mmol/L 106 105 101   CO2 mmol/L 27 27 27   BUN mg/dL 16 14 10   CREATININE mg/dL 0 87 0 85 0 70   CALCIUM mg/dL 11 0* 13 0* 11 6*   ALK PHOS U/L 107* 129* 124*   ALT U/L 57* 72* 71*   AST U/L 36 45* 32         Phosphorus: No results found for: PHOS  Magnesium: No results found for: MG  Urinalysis: No results found for: Brendon Merl, SPECGRAV, PHUR, LEUKOCYTESUR, NITRITE, PROTEINUA, GLUCOSEU, KETONESU, BILIRUBINUR, BLOODU  Ionized Calcium: No results found for: CAION  Coagulation:   Lab Results   Component Value Date    INR 1 36 (H) 05/01/2023     Troponin: No results found for: TROPONINI  ABG: No results found for: PHART, LXS7OFB, PO2ART, STV5CLA, N1NAFBZL, BEART, SOURCE  Radiology review:     IMAGING  Procedure: IR biopsy neck    Result Date: 5/1/2023  Narrative: Ultrasound-guided fine needle aspiration biopsy of the left neck lymph node Clinical History: Cervical and mediastinal lymphadenopathy  Right lung mass    Technique: The patient was brought to the ultrasound area and placed supine on the table  After a brief ultrasound examination was performed of the left neck, and correlated with prior imaging of enlarged lymph nodes with increased vascularity  A site on the left neck was prepped and draped in usual sterile fashion  Lidocaine was administered to the skin and a 18-gauge Temno needle was advanced into the vascular lymph node under direct ultrasound guidance  5 specimens obtained the specimen was placed in RPMI solution and formalin   The patient tolerated the procedure well and suffered no complications  Hemostasis obtained  Impression: Impression: Successful core biopsy of left neck lymph node   Workstation performed: [id number]       Current Facility-Administered Medications   Medication Dose Route Frequency    acetaminophen (TYLENOL) tablet 650 mg  650 mg Oral Q6H PRN    allopurinol (ZYLOPRIM) tablet 300 mg  300 mg Oral Daily    aluminum-magnesium hydroxide-simethicone (MYLANTA) oral suspension 30 mL  30 mL Oral Q6H PRN    aspirin (ECOTRIN LOW STRENGTH) EC tablet 81 mg  81 mg Oral Daily    calcitonin (salmon) (MIACALCIN) injection 376 Units  4 Units/kg Subcutaneous Q12H GAVIN    docusate sodium (COLACE) capsule 100 mg  100 mg Oral BID PRN    enoxaparin (LOVENOX) subcutaneous injection 90 mg  1 mg/kg Subcutaneous Q12H GAVIN    insulin lispro (HumaLOG) 100 units/mL subcutaneous injection 1-6 Units  1-6 Units Subcutaneous HS    insulin lispro (HumaLOG) 100 units/mL subcutaneous injection 1-6 Units  1-6 Units Subcutaneous TID AC    lidocaine (PF) (XYLOCAINE-MPF) 1 % injection    PRN    losartan (COZAAR) tablet 100 mg  100 mg Oral Daily    OLANZapine (ZyPREXA) tablet 5 mg  5 mg Oral HS PRN    ondansetron (ZOFRAN) injection 4 mg  4 mg Intravenous Q4H PRN    pravastatin (PRAVACHOL) tablet 40 mg  40 mg Oral Daily With Dinner    sodium chloride 0 9 % infusion  125 mL/hr Intravenous Continuous     Medications Discontinued During This Encounter   Medication Reason    heparin (VTE/PE) high     insulin lispro (HumaLOG) 100 units/mL subcutaneous injection 1-6 Units     sodium chloride 0 9 % infusion     sodium chloride 0 9 % infusion     calcitonin (salmon) (MIACALCIN) injection 396 Units     heparin (porcine) 25,000 units in 0 45% NaCl 250 mL infusion (premix)        Glenys Valencia, DO      This progress note was produced in part using a dictation device which may document imprecise wording from author's original intent

## 2023-05-02 NOTE — ASSESSMENT & PLAN NOTE
· Status post IR guided left cervical lymph node biopsy on 5/1/2023: Pathology pending   · Pulmonary consulted  · 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 due to extensive adenopathy  Recommend transfer to B for rad/onc consult/consideration of radiation to the mediastinum and also consideration for tracheal stent placement if indicated     · Case discussed with Dr Madyson Jackson from Thoracic surgery   · Plan to transfer to [facility] AND [facility] for radiation therapy

## 2023-05-02 NOTE — CASE MANAGEMENT
Case Management Discharge Planning Note    Patient name Santos Semen  Location [address] 332/-[id number] MRN [id number]  : 1960 Date 2023       Current Admission Date: 2023  Current Admission Diagnosis:Acute respiratory failure with hypoxia [facility])   Patient Active Problem List    Diagnosis Date Noted    Electrolyte abnormality 2023    New infarction of cerebellum ([facility] [address] ) 2023    Gout 2023    Bilateral pulmonary embolism ([facility] [address] ) 2023    Severe systemic inflammatory response syndrome (SIRS) ([facility] [address] ) 2023    Acute respiratory failure with hypoxia ([facility] [address] ) 2023    Pleural effusion, right 2023    Acute encephalopathy 2023    SIRS (systemic inflammatory response syndrome) ([facility] [address] ) 2023    Hypercoagulopathy ([facility] [address] ) 2023    Acute pneumonia 2023    Lung mass 2023    Neck mass 2023    Hypercalcemia 2023    Hyponatremia 2023    Subclavian vein thrombosis ([facility] [address] ) 2023    Type 2 diabetes mellitus without complication, without long-term current use of insulin ([facility] [address] ) 2023    Tobacco abuse 2019    Essential hypertension 2019    Mixed dyslipidemia 2019      LOS (days): 4  Geometric Mean LOS (GMLOS) (days): 3 90  Days to GMLOS:-0 3     OBJECTIVE:  Risk of Unplanned Readmission Score: 12 85         Current admission status: Inpatient   Preferred Pharmacy:   [address] [address]  Phone: [phone] Fax: [phone]    Primary Care Provider: Carmella Remy DO    Primary Insurance: [address]:     [address] [facility] Number: [id number]

## 2023-05-02 NOTE — ASSESSMENT & PLAN NOTE
· Likely secondary to underlying lung CA, pleural effusion, possible aspiration pneumonia  · CXR shows R effusion, atelectasis vs consolidation   · Check PCT  · Started broad spectrum antibiotics for possible aspiration pneumonia   · Continue supplemental 02  · Goal Sp02 >92%

## 2023-05-02 NOTE — ASSESSMENT & PLAN NOTE
· Secondary to chronic ETOH abuse  · Patient on CIWA- received multiple doses of ativan overnight  · Will give 130mg of phenobarbital now and start Q3D taper

## 2023-05-02 NOTE — PROGRESS NOTES
"Progress Note - Pulmonary   Walt Benton 66 y o  male MRN: [id number]  Unit/Bed#: -01 Encounter: [id number]    Assessment/Plan:    1  Abnormal CT chest secondary to right middle lobe lung mass with extensive mediastinal, axillary and cervical adenopathy  2  Acute encephalopathy   3  Bilateral pulmonary embolism with subclavian vein thrombosis  4  Acute hypoxic respiratory failure  5  Nicotine dependence    Pulmonary recommendations:    · Seen on 5 L nasal cannula with SPO2 88%  Maintain saturations 88% and above  · Pulmonary toilet:  Increase activity as tolerated, out of bed as tolerated, cough and deep breathing exercises encourage, incentive spirometry q 1 hour if able  · Agree with the addition of realizes but will make both supine/Atrovent nebs scheduled every 6 hours  · Add Mucinex 1200 mg p o  every 12 hours  · Agree with antibiotics given Lynn infiltrate on chest x-ray and uptrending procalcitonin  · Sputum culture pending  · Cervical lymph node biopsy pending  · Discussed case with Dr Texie Jeans who in turn discussed with Dr Lorie Teran  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 due to extensive adenopathy  Recommend transfer to [facility] for rad/onc consult/consideration of radiation to the mediastinum and also consideration for tracheal stent placement if indicated  · Discussed plan of care with critical care team, patient's wife and patient's daughter who are all in agreement  CC will work on transfer  Chief Complaint:    Unable to obtain    Subjective:    Patient was seen and examined today  Patient has had worsening mental status per report  Currently seen in the ICU  Initially on my evaluation found to be sleeping but easily arousable to voice  It patient is markedly confused and unable to participate in other history taking  Objective:    Vitals: Blood pressure 146/86, pulse (!) 117, temperature 98 °F (36 7 °C), temperature source Temporal, resp   rate 20, height 6' 1\" (1 854 m), weight " 93 9 kg (207 lb), SpO2 90 %  5L NC,Body mass index is 27 31 kg/m²  Intake/Output Summary (Last 24 hours) at 5/2/2023 1648  Last data filed at 5/2/2023 1322  Gross per 24 hour   Intake 3216 66 ml   Output 1040 ml   Net 2176 66 ml       Invasive Devices     Peripheral Intravenous Line  Duration           Peripheral IV 04/28/23 Left Antecubital 4 days    Peripheral IV 05/02/23 Dorsal (posterior); Left Forearm <1 day          Drain  Duration           External Urinary Catheter Small <1 day                Physical Exam:     Physical Exam  Vitals and nursing note reviewed  Constitutional:       General: He is not in acute distress  Appearance: Normal appearance  HENT:      Head: Normocephalic and atraumatic  Nose: Nose normal       Mouth/Throat:      Mouth: Mucous membranes are moist       Pharynx: Oropharynx is clear  Eyes:      Extraocular Movements: Extraocular movements intact  Conjunctiva/sclera: Conjunctivae normal    Cardiovascular:      Rate and Rhythm: Normal rate and regular rhythm  Heart sounds: No murmur heard  Pulmonary:      Effort: Pulmonary effort is normal  No respiratory distress  Comments: Harsh cough noted on exam  Musculoskeletal:      Cervical back: Normal range of motion and neck supple  No muscular tenderness  Right lower leg: No edema  Left lower leg: No edema  Skin:     General: Skin is warm and dry  Neurological:      Mental Status: He is alert  He is disoriented  Labs: I have personally reviewed pertinent lab results  , ABG:   Lab Results   Component Value Date    PHART 7 449 05/02/2023    AFB2THL 39 3 05/02/2023    PO2ART 63 2 (L) 05/02/2023    WDD3HJB 26 6 05/02/2023    BEART 2 6 05/02/2023    SOURCE Radial, Left 05/02/2023   , BNP: No results found for: BNP, CBC:   Lab Results   Component Value Date    WBC 15 36 (H) 05/02/2023    HGB 12 4 05/02/2023    HCT 38 2 05/02/2023    MCV 88 05/02/2023     05/02/2023    MCH 28 4 05/02/2023    MCHC 32 5 05/02/2023    RDW 12 8 05/02/2023    MPV 8 7 (L) 05/02/2023    NRBC 0 05/02/2023   , CMP:   Lab Results   Component Value Date    SODIUM 140 05/02/2023    K 3 4 (L) 05/02/2023     05/02/2023    CO2 27 05/02/2023    BUN 16 05/02/2023    CREATININE 0 87 05/02/2023    CALCIUM 11 0 (H) 05/02/2023    AST 36 05/02/2023    ALT 57 (H) 05/02/2023    ALKPHOS 107 (H) 05/02/2023    EGFR 93 05/02/2023   , PT/INR: No results found for: PT, INR, Troponin: No results found for: TROPONINI    Imaging and other studies: I have personally reviewed pertinent reports     and I have personally reviewed pertinent films in PACS

## 2023-05-02 NOTE — ASSESSMENT & PLAN NOTE
· Recently diagnosed condition, presumably due to underlying malignancy    · Treated with eliquis since last week; switched to heparin infusion on admission due to new bilateral PE and decline in clinical condition  · Changed to therapeutic lovenox BID dosing 5/1/23  · Will hold lovenox at this time and plan to transition to heparin gtt in case patient requires interventions or procedures   · Hold on heparin gtt until following CT brain  · Last dose of BID Lovenox was this AM- Plan to start heparin gtt around 2000 if CT negative for ICH

## 2023-05-02 NOTE — ASSESSMENT & PLAN NOTE
· Moderate-severe hypercalcemia (13 4) with acute encephalopathy  Likely due to underlying malignancy  PTH (13 8) low on April 21    Vitamin D level not previously checked  · Nephrology consult appreciated  · S/p Zometa 4/30/23- expect effect in 2 to 3 days  · Continue IVF  · Trend calcium

## 2023-05-02 NOTE — ASSESSMENT & PLAN NOTE
· According to family, patient drinks frequently   · Started on CIWA- will hold on further benzodiazepines and continue phenobarbital bolus and taper

## 2023-05-02 NOTE — PLAN OF CARE
Problem: Potential for Falls  Goal: Patient will remain free of falls  Description: INTERVENTIONS:  - Educate patient/family on patient safety including physical limitations  - Instruct patient to call for assistance with activity   - Consult OT/PT to assist with strengthening/mobility   - Keep Call bell within reach  - Keep bed low and locked with side rails adjusted as appropriate  - Keep care items and personal belongings within reach  - Initiate and maintain comfort rounds  - Make Fall Risk Sign visible to staff  - Offer Toileting every 2 Hours, in advance of need  - Initiate/Maintain bed/chair alarm  - Apply yellow socks and bracelet for high fall risk patients  - Consider moving patient to room near nurses station  Outcome: Progressing     Problem: PAIN - ADULT  Goal: Verbalizes/displays adequate comfort level or baseline comfort level  Description: Interventions:  - Encourage patient to monitor pain and request assistance  - Assess pain using appropriate pain scale  - Administer analgesics based on type and severity of pain and evaluate response  - Implement non-pharmacological measures as appropriate and evaluate response  - Consider cultural and social influences on pain and pain management  - Notify physician/advanced practitioner if interventions unsuccessful or patient reports new pain  Outcome: Progressing     Problem: INFECTION - ADULT  Goal: Absence or prevention of progression during hospitalization  Description: INTERVENTIONS:  - Assess and monitor for signs and symptoms of infection  - Monitor lab/diagnostic results  - Monitor all insertion sites, i e  indwelling lines, tubes, and drains  - Monitor endotracheal if appropriate and nasal secretions for changes in amount and color  - [place] appropriate cooling/warming therapies per order  - Administer medications as ordered  - Instruct and encourage patient and family to use good hand hygiene technique  - Identify and instruct in appropriate isolation precautions for identified infection/condition  Outcome: Progressing

## 2023-05-02 NOTE — ASSESSMENT & PLAN NOTE
· Patient presenting for acute encephalopathy for several hours   Encephalopathy worsening during this hospitalization  · Likely multifactorial in the setting of ETOH w/d, stroke, underlying malignancy, hypercalcemia, possible infection   · MRI shows subacute infarct of the cerebellum  · Neurology consulted- should not cause encephalopathy  · No evidence of brain mets on MRI with contrast  · Repeat CT brain today without ICH  · ABG did not show evidence of hypercarbia   · Continue supportive care  · CAM ICU  · Encourage sleep-wake cycle  · Treating ETOH w/d with phenobarbital   · Continue to treat electrolyte abnormalities

## 2023-05-02 NOTE — ASSESSMENT & PLAN NOTE
· Recently diagnosed right lung and neck masses, likely malignant  Patient also diagnosed last week with subclavian vein thrombus, and now with bilateral segmental PE  · Outpatient PET-CT pending  · Patient then needs Pulmonary, Oncology, and Palliative Care appointments (referrals already in place)  · Pulmonary consulted inpatient    Right middle lobe lung mass with extensive mediastinal, axillary and cervical adenopathy -highly suggestive of metastatic disease    Would recommend obtaining tissue diagnosis via biopsy of either the axillary or cervical lymph nodes by interventional radiology  · S/P IR guided Left Cervical Lymphnode biopsy on 5/1/23: Pathology pending   · Case discussed with Dr Perri Oseguera from Thoracic surgery- he recommended radiation therapy  · Plan to transfer to [facility] AND [facility]

## 2023-05-02 NOTE — ASSESSMENT & PLAN NOTE
· MRI:Tiny focus of acute or subacute infarct in the left cerebellum  · Initial NIHSS score was 4-repeat NIHSS score is 1  · Neurology consult appreciated-suspect most likely hypercoagulable state secondary to underlying lung mass  · Recommends to continue heparin drip as per PE protocol  · CTA head neck shows atherosclerosis  · Echocardiogram shows no PFO   EF70%, G1DD  · Stroke pathway   · Statin therapy  · ASA  · PT/OT/SLP consulted

## 2023-05-02 NOTE — CASE MANAGEMENT
Case Management Discharge Planning Note    Patient name Hortensia Galicia  Location /-01 MRN [id number]  : 1960 Date 2023       Current Admission Date: 2023  Current Admission Diagnosis:Acute respiratory failure with hypoxia [facility])   Patient Active Problem List    Diagnosis Date Noted    Electrolyte abnormality 2023    New infarction of cerebellum ([facility] [address] ) 2023    Gout 2023    Bilateral pulmonary embolism ([facility] [address] ) 2023    Severe systemic inflammatory response syndrome (SIRS) ([facility] [address] ) 2023    Acute respiratory failure with hypoxia ([facility] [address] ) 2023    Pleural effusion, right 2023    Acute encephalopathy 2023    SIRS (systemic inflammatory response syndrome) ([facility] [address] ) 2023    Hypercoagulopathy ([facility] [address] ) 2023    Acute pneumonia 2023    Lung mass 2023    Neck mass 2023    Hypercalcemia 2023    Hyponatremia 2023    Subclavian vein thrombosis ([facility] [address] ) 2023    Type 2 diabetes mellitus without complication, without long-term current use of insulin ([facility] [address] ) 2023    Tobacco abuse 2019    Essential hypertension 2019    Mixed dyslipidemia 2019      LOS (days): 3  Geometric Mean LOS (GMLOS) (days): 3 90  Days to GMLOS:0 2     OBJECTIVE:  Risk of Unplanned Readmission Score: 12 76         Current admission status: Inpatient   Preferred Pharmacy:   [address] [place] [address]  Phone: [phone] Fax: [phone]    Primary Care Provider: Sim Pena DO    Primary Insurance: BLUE CROSS  Secondary Insurance:     DISCHARGE DETAILS:     STR options provided to patient wife  Her preference is [employer]  secured in aidin        CM to follow

## 2023-05-02 NOTE — ASSESSMENT & PLAN NOTE
Patient presenting for acute encephalopathy for several hours  Recent diagnosis of lung/neck masses (likely malignant) and subclavian thrombus  Patient mildly hypoxic at presentation (2 LNC), but encephalopathy not resolving with treatment of hypoxia in ER  Patient not taking any known psychoactive medications  Most likely remaining etiologies on differential diagnosis include:    Multifactorial in nature-electrolyte imbalance, underlying malignancy,  MRI shows infection of the cerebellum which can also contribute  Patient is alert - but requiring frequent reorientation  Placed on virtual continue observation  ABG ordered-patient refusing  Patient placed on aspirin    No infection so far found- ua neg cta neg for pneumonia     Patient continued to remain encephalopathic, consider EEG/lumbar puncture    Apparently patient drinks a sixpack of beer seems like day spoken to the wife she states that he drinks last time Wednesday but everything is possible started on CIWA he has been scoring 12 he is agitated delirium not alert today climbing out of bed ativan zyprexa not helping   Discussed with icu team of transfer to level 2 would benefit from precedex drip   Accepted by icu team under dr chapin   Wife updated   Check ammonia

## 2023-05-02 NOTE — ASSESSMENT & PLAN NOTE
Lab Results   Component Value Date    HGBA1C 6 1 (H) 04/30/2023       Recent Labs     05/01/23  2109 05/02/23  0715 05/02/23  1104 05/02/23  1649   POCGLU 120 96 114 116       Blood Sugar Average: Last 72 hrs:  (P) 119 625     · Continue ISS  · Goal -180

## 2023-05-02 NOTE — ASSESSMENT & PLAN NOTE
· MRI:Tiny focus of acute or subacute infarct in the left cerebellum  · Initial NIHSS score was 4-repeat NIHSS score is 1  · Neurology consult appreciated-suspect most likely hypercoagulable state secondary to underlying lung mass  · Recommends to continue heparin drip as per PE protocol  · CTA head neck shows atherosclerosis  · Echocardiogram shows no PFO   EF70%, G1DD  · Stroke pathway   · Statin therapy  · ASA  · PT/OT/SLP consulted Hypopituitarism

## 2023-05-02 NOTE — CONSULTS
[address]  Consult  Name: Aneesh Smith 66 y o  male I MRN: [id number]  Unit/Bed#: -01 I Date of Admission: 4/28/2023   Date of Service: 5/2/2023 I Hospital Day: 4    Inpatient consult to [facility] performed by: Ana Cristina Scott PA-C  Consult ordered by: Kendall Santos MD          Assessment/Plan   Acute encephalopathy  Assessment & Plan  · Patient presenting for acute encephalopathy for several hours  Encephalopathy worsening during this hospitalization  · Likely multifactorial in the setting of ETOH w/d, stroke, underlying malignancy, hypercalcemia, possible infection   · MRI shows subacute infarct of the cerebellum  · Neurology consulted- should not cause encephalopathy  · No evidence of brain mets on MRI with contrast  · Repeat CT brain today without ICH  · ABG did not show evidence of hypercarbia   · Continue supportive care  · CAM ICU  · Encourage sleep-wake cycle  · Treating ETOH w/d with phenobarbital   · Continue to treat electrolyte abnormalities     Delirium tremens ([address] )  Assessment & Plan  · Secondary to chronic ETOH abuse  · Patient on CIWA- received multiple doses of ativan overnight  · Will give 130mg of phenobarbital now and start Q3D taper     ETOH abuse  Assessment & Plan  · According to family, patient drinks frequently   · Started on CIWA- will hold on further benzodiazepines and start phenobarbital     New infarction of cerebellum ([facility] [address] )  Assessment & Plan  · MRI:Tiny focus of acute or subacute infarct in the left cerebellum  · Initial NIHSS score was 4-repeat NIHSS score is 1  · Neurology consult appreciated-suspect most likely hypercoagulable state secondary to underlying lung mass  · Recommends to continue heparin drip as per PE protocol  · CTA head neck shows atherosclerosis  · Echocardiogram shows no PFO   EF70%, G1DD  · Stroke pathway   · Statin therapy  · ASA  · PT/OT/SLP consulted    Severe systemic inflammatory response syndrome (SIRS) (HCC)  Assessment & Plan  · Likely secondary to ETOH w/d  · Trend WBC  · Check Blood cultures  · Check PCT  · If elevated, start broad spectrum antibiotics     Bilateral pulmonary embolism (HCC)  Assessment & Plan  · Segmental PE noted in bilateral lower lobes, new from 4/20/23  Patient also has previously diagnosed right subclavian thrombus  Presumably due to underlying malignancy  Started eliquis 1 week ago, switched to heparin infusion on admission  · Transitioned on Lovenox 5/1/23  · Discontinued  · Transition back to heparin gtt this evening   · Considering patient's underlying lung mass, suspect malignancy, may consider to adjust anticoagulation upon discharge, may consider Lovenox       Essential hypertension  Assessment & Plan  · Holding outpatient losartan secondary to NPO status  · Hydralazine and Labetalol PRN     Tobacco abuse  Assessment & Plan  · Nicotine patch  · Will need smoking cessation education     Type 2 diabetes mellitus without complication, without long-term current use of insulin [facility])  Assessment & Plan  Lab Results   Component Value Date    HGBA1C 6 1 (H) 04/30/2023       Recent Labs     05/01/23  1302 05/01/23  1542 05/01/23  2109 05/02/23  0715   POCGLU 132 139 120 96       Blood Sugar Average: Last 72 hrs:  (P) 120 [id number]     · Continue ISS  · Goal -180    Subclavian vein thrombosis (HCC)  Assessment & Plan  · Recently diagnosed condition, presumably due to underlying malignancy    · Treated with eliquis since last week; switched to heparin infusion on admission due to new bilateral PE and decline in clinical condition  · Changed to therapeutic lovenox BID dosing 5/1/23  · Will hold lovenox at this time and plan to transition to heparin gtt in case patient requires interventions or procedures   · Hold on heparin gtt until following CT brain  · Last dose of BID Lovenox was this AM- Plan to start heparin gtt around 2000 if CT negative for ICH Hypercalcemia  Assessment & Plan  · Moderate-severe hypercalcemia (13 4) with acute encephalopathy  Likely due to underlying malignancy  PTH (13 8) low on April 21  Vitamin D level not previously checked  · Nephrology consult appreciated  · S/p Zometa 4/30/23- expect effect in 2 to 3 days  · Continue IVF  · Trend calcium    Neck mass  Assessment & Plan  · Status post IR guided left cervical lymph node biopsy on 5/1/2023: Pathology pending        Lung mass  Assessment & Plan  · Recently diagnosed right lung and neck masses, likely malignant  Patient also diagnosed last week with subclavian vein thrombus, and now with bilateral segmental PE  · Outpatient PET-CT pending  · Patient then needs Pulmonary, Oncology, and Palliative Care appointments (referrals already in place)  · Pulmonary consulted inpatient    Right middle lobe lung mass with extensive mediastinal, axillary and cervical adenopathy -highly suggestive of metastatic disease  Would recommend obtaining tissue diagnosis via biopsy of either the axillary or cervical lymph nodes by interventional radiology  · S/P IR guided Left Cervical Lymphnode biopsy on 5/1/23: Pathology pending     * Acute respiratory failure with hypoxia ([address] )  Assessment & Plan  · Likely secondary to underlying lung CA, pleural effusion, possible aspiration pneumonia  · CXR shows R effusion, atelectasis vs consolidation   · Check PCT  · If elevated, start broad spectrum antibiotics   · Continue supplemental 02  · Goal Sp02 >92%           History of Present Illness     HPI: Priscilla Benito is a 66 y o  who presented on 4/28/23 with acute encephalopathy  He was found to have a cerebellar stroke and new PE's  He was started on a heparin gtt and transitioned to lovenox  During the hospitalization, his encephalopathy has continued to worsen  He was started on CIWA protocol yesterday and received multiple doses of ativan overnight     Transferred to the ICU this AM for close monitoring secondary to altered mental status  CT brain with new pathology or evidence of ICH  ABG with acid/base disturbance of evidence of C02 retention  Patient unable to give me any history or answer ROS questions       History obtained from spouse and unobtainable from patient due to mental status  Review of Systems   Unable to perform ROS: Mental status change         Historical Information   Past Medical History:  04/28/2023: Bilateral pulmonary embolism (HCC)  No date: Current smoker  No date: Diabetes mellitus (HCC)  No date: GERD (gastroesophageal reflux disease)  No date: Gout  No date: Hypertension  No date: Lung cancer ([facility] )  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[facility])  No date: Mixed dyslipidemia  04/28/2023: Pleural effusion, right  No date: Subclavian vein thrombosis ([place] [id number] )  04/20/2023: Type 2 diabetes mellitus without complication, without   long-term current use of insulin (HCC) Past Surgical History:  No date: ANAL FISSURECTOMY  5/1/2023: IR BIOPSY NECK  No date: TONSILLECTOMY   Current Outpatient Medications   Medication Instructions    allopurinol (ZYLOPRIM) 300 mg tablet 1 tablet, Oral, Daily    apixaban (Eliquis) 5 mg Take 2 tablets (10 mg total) by mouth 2 (two) times a day for 7 days, THEN 1 tablet (5 mg total) 2 (two) times a day for 23 days   glipiZIDE (GLUCOTROL XL) 5 mg, Oral, Daily    ibuprofen (MOTRIN) 400 mg, Oral, Every 6 hours PRN    Multiple Vitamins-Minerals (MENS MULTIVITAMIN PO) 1 tablet, Oral, Daily    simvastatin (ZOCOR) 20 mg tablet 1 tablet, Oral, Daily    valsartan (DIOVAN) 160 mg tablet 1 tablet, Oral, Daily    No Known Allergies   Social History     Tobacco Use    Smoking status: Every Day     Packs/day: 1 00     Types: Cigarettes    Smokeless tobacco: Never   Vaping Use    Vaping Use: Never used   Substance Use Topics    Alcohol use: Yes    Drug use: Not Currently    History reviewed  No pertinent family history       Objective                            Vitals I/O Most Recent Min/Max in 24hrs   Temp 98 °F (36 7 °C) Temp  Min: 97 5 °F (36 4 °C)  Max: 98 °F (36 7 °C)   Pulse (!) 113 Pulse  Min: 100  Max: 117   Resp (!) 25 Resp  Min: 18  Max: 25   /77 BP  Min: 119/77  Max: 162/84   O2 Sat 94 % SpO2  Min: 88 %  Max: 96 %      Intake/Output Summary (Last 24 hours) at 5/2/2023 1135  Last data filed at 5/2/2023 9828  Gross per 24 hour   Intake --   Output 390 ml   Net -390 ml         Diet NPO     Invasive Monitoring Physical exam    Physical Exam  Constitutional:       General: He is not in acute distress  Appearance: He is ill-appearing  HENT:      Head: Normocephalic and atraumatic  Mouth/Throat:      Mouth: Mucous membranes are moist    Eyes:      Pupils: Pupils are equal, round, and reactive to light  Neck:      Comments: L neck mass  Dressing dry and intact  Cardiovascular:      Rate and Rhythm: Tachycardia present  Pulses: Normal pulses  Pulmonary:      Effort: Tachypnea present  Abdominal:      General: There is no distension  Palpations: Abdomen is soft  Tenderness: There is no abdominal tenderness  Musculoskeletal:         General: Normal range of motion  Skin:     General: Skin is warm  Capillary Refill: Capillary refill takes less than 2 seconds  Neurological:      Comments: Patient obtunded  He will open eyes but does not follow any further commands  Is unable to answer questions   When he attempts to speak, his speech is garbled   No focal weakness but difficult exam   He is moving extremities purposeful                Diagnostic Studies      EKG: ST  Imaging: I have personally reviewed pertinent reports     and I have personally reviewed pertinent films in PACS     Medications:  Scheduled PRN   allopurinol, 300 mg, Daily  aspirin, 81 mg, Daily  calcitonin (salmon), 4 Units/kg, Q12H GAVIN  insulin lispro, 1-6 Units, HS  insulin lispro, 1-6 Units, TID AC  losartan, 100 mg, Daily  pravastatin, 40 mg, Daily With [employer] Plans acetaminophen, 650 mg, Q6H PRN  aluminum-magnesium hydroxide-simethicone, 30 mL, Q6H PRN  docusate sodium, 100 mg, BID PRN  lidocaine (PF), , PRN  OLANZapine, 5 mg, HS PRN  ondansetron, 4 mg, Q4H PRN       Continuous    sodium chloride, 125 mL/hr, Last Rate: 125 mL/hr (05/02/23 1103)         Labs:    CBC    Recent Labs     05/01/23  0438 05/02/23  0443   WBC 16 11* 15 36*   HGB 12 3 12 4   HCT 37 5 38 2   * 350     BMP    Recent Labs     05/01/23  0438 05/02/23  0443   SODIUM 138 140   K 3 5 3 4*    106   CO2 27 27   AGAP 6 7   BUN 14 16   CREATININE 0 85 0 87   CALCIUM 13 0* 11 0*       Coags    Recent Labs     05/01/23  1015   INR 1 36*        Additional Electrolytes  Recent Labs     05/01/23  0438   MG 1 5*   PHOS 2 9   CAIONIZED 1 55*          Blood Gas    Recent Labs     05/02/23  0959   PHART 7 449   XGM7TPU 39 3   PO2ART 63 2*   [id number] 26 6   BEART 2 6   SOURCE Radial, Left     Recent Labs     05/02/23  0959   SOURCE Radial, Left    LFTs  Recent Labs     05/01/23  0438 05/02/23  0443   ALT 72* 57*   AST 45* 36   ALKPHOS 129* 107*   ALB 3 4* 3 3*   TBILI 0 45 0 39       Infectious  No recent results  Glucose  Recent Labs     05/01/23 0438 05/02/23  0443   GLUC 131 127           Yousif Dela Cruz, [place]

## 2023-05-02 NOTE — CASE MANAGEMENT
Received message from Crystal Boeck ([phone] ) @ [facility] requesting to verify servicing facility  Called Marybel back to confirm facility

## 2023-05-02 NOTE — ASSESSMENT & PLAN NOTE
· Recently diagnosed right lung and neck masses, likely malignant  Patient also diagnosed last week with subclavian vein thrombus, and now with bilateral segmental PE  · Outpatient PET-CT pending  · Patient then needs Pulmonary, Oncology, and Palliative Care appointments (referrals already in place)  · Pulmonary consulted inpatient    Right middle lobe lung mass with extensive mediastinal, axillary and cervical adenopathy -highly suggestive of metastatic disease    Would recommend obtaining tissue diagnosis via biopsy of either the axillary or cervical lymph nodes by interventional radiology  · S/P IR guided Left Cervical Lymphnode biopsy on 5/1/23: Pathology pending

## 2023-05-02 NOTE — PROGRESS NOTES
[address]  Progress Note  Name: Dre Horton I  MRN: [id number]  Unit/Bed#: [facility] I Date of Admission: 4/28/2023   Date of Service: 5/2/2023 I Hospital Day: 4    Assessment/Plan   Electrolyte abnormality  Assessment & Plan  Continue to supplement and monitor    New infarction of cerebellum [facility])  Assessment & Plan  MRI:Tiny focus of acute or subacute infarct in the left cerebellum  Initial NIHSS score was 4-repeat NIHSS score is 1  Neurology consult appreciated-suspect most likely hypercoagulable state secondary to underlying lung mass  Recommends to continue current heparin drip as per PE protocol  Once patient off of heparin drip, may start aspirin  CTA head neck shows atherosclerosis  Echocardiogram shows no PFO  Speech and swallow evaluation done, patient passed the swallow test  Follow PT/OT  Follow  A1c  Lab Results   Component Value Date    CHOLESTEROL 110 04/29/2023     Lab Results   Component Value Date    HDL 34 (L) 04/29/2023     Lab Results   Component Value Date    TRIG 88 04/29/2023     Lab Results   Component Value Date    NONHDLC 76 04/29/2023         Acute encephalopathy  Assessment & Plan  Patient presenting for acute encephalopathy for several hours  Recent diagnosis of lung/neck masses (likely malignant) and subclavian thrombus  Patient mildly hypoxic at presentation (2 LNC), but encephalopathy not resolving with treatment of hypoxia in ER  Patient not taking any known psychoactive medications  Most likely remaining etiologies on differential diagnosis include:    Multifactorial in nature-electrolyte imbalance, underlying malignancy,  MRI shows infection of the cerebellum which can also contribute  Patient is alert - but requiring frequent reorientation  Placed on virtual continue observation  ABG ordered-patient refusing  Patient placed on aspirin    No infection so far found- ua neg cta neg for pneumonia     Patient continued to remain encephalopathic, consider EEG/lumbar puncture    Apparently patient drinks a sixpack of beer seems like day spoken to the wife she states that he drinks last time Wednesday but everything is possible started on CIWA he has been scoring 12 he is agitated delirium not alert today climbing out of bed ativan zyprexa not helping   Discussed with icu team of transfer to level 2 would benefit from precedex drip   Accepted by icu team under dr chapin   Wife updated   Check ammonia       Pleural effusion, right  Assessment & Plan  Small right pleural effusion noted on CT  Similar in appearance to April 20, unlikely etiology for acute hypoxia  Likely reactive in setting of lung mass  No emergent treatment at this time  Echocardiogram reviewed    Severe systemic inflammatory response syndrome (SIRS) ([facility])  Assessment & Plan  Patient meets 3 of 4 SIRS criteria with leukocytosis, tachycardia, and tachypnea  Associated acute organ dysfunction manifesting as acute encephalopathy and acute hypoxic respiratory failure  Suspect SIRS due to combination of pulmonary emboli, underlying malignancy, and metabolic abnormalities (ie, hypercalcemia, hyponatremia)  COVID-19, RSV, flu negative  Will check procalcitonin, lactic acid, and blood cultures  Defer antibiotics based on more likely non-infectious etiology; monitor clinical condition and serial labs at this time  Bilateral pulmonary embolism [facility])  Assessment & Plan  Segmental PE noted in bilateral lower lobes, new from April 20  Patient also has previously diagnosed right subclavian thrombus  Presumably due to underlying malignancy  -140s/60-70s in ER, slight tachycardia 100s, no heart strain on EKG, troponin stable in 40s x3 overnight  Patient has mild hypoxia, but stable on 2 LNC  Started eliquis 1 week ago, switched to heparin infusion in ER  Will continue heparin for now pending further evaluation      Considering patient's underlying lung mass, suspect malignancy, may consider to adjust anticoagulation upon discharge, may consider Lovenox  Patient was on heparin drip, now transition to therapeutic Lovenox considering patient's underlying suspected malignancy  Patient also was recently on Eliquis, consider to discharge patient with Lovenox till follow-up with oncologist outpatient basis      Gout  Assessment & Plan  Chronic stable condition  Treated as outpatient with allopurinol 100 mg daily  Continue outpatient medication regimen  Essential hypertension  Assessment & Plan  Chronic stable condition  Treated as outpatient with valsartan 160 mg daily  Substitute losartan based on hospital formulary  Tobacco abuse  Assessment & Plan  Chronic condition  Encouraged cessation; patient declines assistance at this time  Offer nicotine patch during admission  Type 2 diabetes mellitus without complication, without long-term current use of insulin [facility])  Assessment & Plan  Lab Results   Component Value Date    HGBA1C 6 1 (H) 04/30/2023       Recent Labs     05/01/23  1302 05/01/23  1542 05/01/23  2109 05/02/23  0715   POCGLU 132 139 120 96     Blood Sugar Average: Last 72 hrs:  (P) 120 [id number]   Last hemoglobin A1c 6 2 in May 2022; Treated as outpatient with glipizide 5 mg daily  Hold oral diabetes medications and initiate insulin protocol during hospitalization  Subclavian vein thrombosis (HCC)  Assessment & Plan  Recently diagnosed condition, presumably due to underlying malignancy  Treated with eliquis since last week; switched to heparin infusion in ER due to new bilateral PE and decline in clinical condition  Continue heparin at this time  May consider to switch to different anticoagulation, may be Lovenox considering patient's underlying lung mass  Hyponatremia  Assessment & Plan  Mild hyponatremia (128) at time of presentation, down from 132 on April 21  Likely due to underlying malignancy and recent poor oral intake    Unlikely this contributes significantly to patient's acute encephalopathy based on duration and mild nature of hyponatremia  Patient appears euvolemic on clinical exam, but has some pulmonary edema on CT and is mildly hypoxic  Will give small NS bolus and reassess clinical condition and labs  Resolved      Hypercalcemia  Assessment & Plan  Moderate-severe hypercalcemia (13 4) with acute encephalopathy  Likely due to underlying malignancy  PTH (13 8) low on April 21  Vitamin D level not previously checked, but patient is not on any known vitamin D supplementation  Recheck calcium, along with ionized calcium and vitamin D   IV crystalloids limited by pulmonary edema; will give small NS bolus at this time, along with calcitonin 4 units/kg Q12 hours  Consider addition of zoledronic acid pending labs  Nephrology consult appreciated, patient is status post Zometa expect effect in 2 to 3 days  As per nephrology, patient placed on IV hydration also calcitonin started yesterday calcium down to 11 6      Neck mass  Assessment & Plan  Recently discovered lung and neck masses, likely malignant  Patient denies any shortness of breath and has no overt dyspnea despite mild acute hypoxia  CT April 20 did not indicate any airway obstruction at that time, and patient has no stridor or other overt indication of airway obstruction now  Neck mass is unlikely etiology of acute hypoxia and does not require urgent treatment  See diagnosis of lung mass for further detail  Status post IR guided left cervical lymph node biopsy on 5/1/2023-pending report      Lung mass  Assessment & Plan  Recently diagnosed right lung and neck masses, likely malignant  Patient also diagnosed last week with subclavian vein thrombus, and now with bilateral segmental PE, likely due to same  Currently pending appointment for IR biopsy of neck mass to confirm diagnosis, followed by PET-CT    Patient then needs Pulmonary, Oncology, and Palliative Care appointments (referrals already in place)  Pulmonary consult appreciated, recommends inpatient biopsy since patient developed bilateral PE and remained on heparin drip  S/P IR guided Left Cervical Lymphnode biopsy on 5/1/23-follow results      * Acute respiratory failure with hypoxia [facility])  Assessment & Plan  Patient requiring 2 LNC at time of presentation  Likely due to combination of recently discovered lung mass, bilateral PE, and right pleural effusion  See associated diagnoses for plan of care  Echocardiogram showed:  Left Ventricle: Left ventricular cavity size is normal  Wall thickness is increased  There is mild to moderate concentric hypertrophy  The left ventricular ejection fraction is >70%  Systolic function is vigorous  Although no diagnostic regional wall motion abnormality was identified, this possibility cannot be completely excluded on the basis of this study  Diastolic function is mildly abnormal, consistent with grade I (abnormal) relaxation  Left atrial filling pressure is normal     Atrial Septum: No patent foramen ovale detected using saline contrast injection with provocation by abdominal compression    Aortic Valve: The leaflets are moderately thickened  The leaflets are moderately calcified  There is mildly reduced mobility  There is no evidence of regurgitation  There is mild stenosis  The aortic valve peak velocity is 2 57 m/s  The aortic valve mean gradient is 15 mmHg  The dimensionless velocity index is 0 41  The aortic valve area is 1 48 cm2    Mitral Valve: There is mild calcification  The leaflets exhibit normal mobility  There is trace regurgitation  There is no evidence of stenosis    Tricuspid Valve: Tricuspid valve structure is normal  There is trace regurgitation  There is no evidence of stenosis    Aorta: The aortic root is mildly dilated  The aortic root is 3 90 cm    Pericardium: There is a trivial pericardial effusion    Oxygen requirement is titrating down                VTE Pharmacologic Prophylaxis: VTE Score: 10 High Risk (Score >/= 5) - Pharmacological DVT Prophylaxis Ordered: enoxaparin (Lovenox)  Sequential Compression Devices Ordered  Patient Centered Rounds: I performed bedside rounds with nursing staff today  Discussions with Specialists or Other Care Team Provider: discussed with icu    Education and Discussions with Family / Patient: Updated  (wife) at bedside  Total Time Spent on Date of Encounter in care of patient: 35 minutes This time was spent on one or more of the following: performing physical exam; counseling and coordination of care; obtaining or reviewing history; documenting in the medical record; reviewing/ordering tests, medications or procedures; communicating with other healthcare professionals and discussing with patient's family/caregivers  Current Length of Stay: 4 day(s)  Current Patient Status: Inpatient   Certification Statement: The patient will continue to require additional inpatient hospital stay due to agitated delirium  Discharge Plan: Anticipate discharge in >72 hrs to rehab facility  Code Status: Level 1 - Full Code    Subjective:   Seen and examined agitated and all night    Objective:     Vitals:   Temp (24hrs), Av 8 °F (36 6 °C), Min:97 5 °F (36 4 °C), Max:97 9 °F (36 6 °C)    Temp:  [97 5 °F (36 4 °C)-97 9 °F (36 6 °C)] 97 5 °F (36 4 °C)  HR:  [100-117] 111  Resp:  [18-22] 20  BP: (125-162)/() 135/91  SpO2:  [88 %-96 %] 90 %  Body mass index is 27 31 kg/m²  Input and Output Summary (last 24 hours): Intake/Output Summary (Last 24 hours) at 2023 0945  Last data filed at 2023 2240  Gross per 24 hour   Intake --   Output 690 ml   Net -690 ml       Physical Exam:   Physical Exam  Vitals and nursing note reviewed  Constitutional:       General: He is not in acute distress  Appearance: He is well-developed  HENT:      Head: Normocephalic and atraumatic     Eyes: Conjunctiva/sclera: Conjunctivae normal    Cardiovascular:      Rate and Rhythm: Normal rate  Rhythm irregular  Heart sounds: No murmur heard  Pulmonary:      Effort: Pulmonary effort is normal  No respiratory distress  Breath sounds: Normal breath sounds  No wheezing or rales  Abdominal:      General: Bowel sounds are normal  There is no distension  Palpations: Abdomen is soft  Tenderness: There is no abdominal tenderness  Musculoskeletal:         General: No swelling  Cervical back: Neck supple  Skin:     General: Skin is warm and dry  Capillary Refill: Capillary refill takes less than 2 seconds  Neurological:      Mental Status: He is alert        Comments: Awake does not know situation he is not oriented to me    Psychiatric:         Mood and Affect: Mood normal          Additional Data:     Labs:  Results from last 7 days   Lab Units 05/02/23  0443   WBC Thousand/uL 15 36*   HEMOGLOBIN g/dL 12 4   HEMATOCRIT % 38 2   PLATELETS Thousands/uL 350   NEUTROS PCT % 85*   LYMPHS PCT % 6*   MONOS PCT % 8   EOS PCT % 0     Results from last 7 days   Lab Units 05/02/23  0443   SODIUM mmol/L 140   POTASSIUM mmol/L 3 4*   CHLORIDE mmol/L 106   CO2 mmol/L 27   BUN mg/dL 16   CREATININE mg/dL 0 87   ANION GAP mmol/L 7   CALCIUM mg/dL 11 0*   ALBUMIN g/dL 3 3*   TOTAL BILIRUBIN mg/dL 0 39   ALK PHOS U/L 107*   ALT U/L 57*   AST U/L 36   GLUCOSE RANDOM mg/dL 127     Results from last 7 days   Lab Units 05/01/23  1015   INR  1 36*     Results from last 7 days   Lab Units 05/02/23  0715 05/01/23  2109 05/01/23  1542 05/01/23  1302 05/01/23  0716 04/30/23  2231 04/30/23  2132 04/30/23  1531 04/30/23  1119 04/30/23  0738 04/29/23  2129 04/29/23  1613   POC GLUCOSE mg/dl 96 120 139 132 124 120 126 85 144* 130 123 121     Results from last 7 days   Lab Units 04/30/23  0524 04/28/23  0629 04/28/23  0047   HEMOGLOBIN A1C % 6 1* 6 1* 6 1*     Results from last 7 days   Lab Units 04/28/23  9754 LACTIC ACID mmol/L 0 9   PROCALCITONIN ng/ml 0 15       Lines/Drains:  Invasive Devices     Peripheral Intravenous Line  Duration           Peripheral IV 04/28/23 Distal;Dorsal (posterior); Left Forearm 4 days    Peripheral IV 04/28/23 Left Antecubital 4 days                      Imaging: Reviewed radiology reports from this admission including: MRI brain    Recent Cultures (last 7 days):   Results from last 7 days   Lab Units 04/28/23  0631 04/28/23  0629   BLOOD CULTURE  No Growth at 72 hrs  No Growth at 72 hrs  Last 24 Hours Medication List:   Current Facility-Administered Medications   Medication Dose Route Frequency Provider Last Rate    acetaminophen  650 mg Oral Q6H PRN Minerva Mckenna MD      allopurinol  300 mg Oral Daily Minerva Mckenna MD      aluminum-magnesium hydroxide-simethicone  30 mL Oral Q6H PRN Minerva Mckenna MD      aspirin  81 mg Oral Daily Tucker Lobo MD      calcitonin (salmon)  4 Units/kg Subcutaneous Q12H [facility] Gino Arredondo MD      docusate sodium  100 mg Oral BID PRN Minerva Mckenna MD      insulin lispro  1-6 Units Subcutaneous HS Minerva Mckenna MD      insulin lispro  1-6 Units Subcutaneous TID [facility] Minerva Mckenna MD      lidocaine (PF)    PRN Nikhil Preston MD      losartan  100 mg Oral Daily Minerva Mckenna MD      OLANZapine  5 mg Oral HS PRN Marissa Gutierrez PA-C      ondansetron  4 mg Intravenous Q4H PRN Minerva Mckenna MD      potassium chloride  20 mEq Oral Q3H Meagan Bah DO      pravastatin  40 mg Oral Daily With Myra Grande MD      sodium chloride  125 mL/hr Intravenous Continuous Fer Brewster PA-C          Today, Patient Was Seen By: Lorie Baker MD    **Please Note: This note may have been constructed using a voice recognition system  **

## 2023-05-02 NOTE — NURSING NOTE
Throughout shift, patient having increased confusion, agitation  Patient urinating himself, ripping gown off, ripping clothes off  Lorazepam given x 2 doses throughout the night for CIWA of 12  Wife concerned with patient's confusion, scans not showing cause for the confusion  Halima Lyons made aware of wife's concerns  Wife also requesting urine sample to be done to r/o UTI

## 2023-05-02 NOTE — ASSESSMENT & PLAN NOTE
Moderate-severe hypercalcemia (13 4) with acute encephalopathy  Likely due to underlying malignancy  PTH (13 8) low on April 21  Vitamin D level not previously checked, but patient is not on any known vitamin D supplementation  Recheck calcium, along with ionized calcium and vitamin D   IV crystalloids limited by pulmonary edema; will give small NS bolus at this time, along with calcitonin 4 units/kg Q12 hours  Consider addition of zoledronic acid pending labs  Nephrology consult appreciated, patient is status post Zometa expect effect in 2 to 3 days    As per nephrology, patient placed on IV hydration also calcitonin started yesterday calcium down to 11 6

## 2023-05-02 NOTE — NURSING NOTE
Patient, along with his belongings and paperwork, transferred to [facility] for transport to [facility] via [facility]

## 2023-05-02 NOTE — ASSESSMENT & PLAN NOTE
· Segmental PE noted in bilateral lower lobes, new from 4/20/23  Patient also has previously diagnosed right subclavian thrombus  Presumably due to underlying malignancy  Started eliquis 1 week ago, switched to heparin infusion on admission     · Transitioned on Lovenox 5/1/23  · Discontinued  · Transition back to heparin gtt this evening   · Considering patient's underlying lung mass, suspect malignancy, may consider to adjust anticoagulation upon discharge, may consider Lovenox

## 2023-05-02 NOTE — ASSESSMENT & PLAN NOTE
Lab Results   Component Value Date    HGBA1C 6 1 (H) 04/30/2023       Recent Labs     05/01/23  1302 05/01/23  1542 05/01/23  2109 05/02/23  0715   POCGLU 132 139 120 96       Blood Sugar Average: Last 72 hrs:  (P) 120 [id number]     · Continue ISS  · Goal -180

## 2023-05-02 NOTE — PROGRESS NOTES
Caryle Herter is a 66 y o  male who is currently ordered Vancomycin IV with management by the Pharmacy Consult service  Relevant clinical data and objective / subjective history reviewed  Vancomycin Assessment:  Indication and Goal AUC/Trough: Pneumonia (goal -600, trough >10), -600, trough >10  Clinical Status: stable  Micro:   pending  Renal Function:  SCr: 0 87 mg/dL  CrCl: 100 8 mL/min  Renal replacement: Not on dialysis  Days of Therapy: 1  Current Dose: 2000mg iv once  Vancomycin Plan:  New Dosinmg iv q12h  Estimated AUC: 446 mcg*hr/mL  Estimated Trough: 14 1 mcg/mL  Next Level: 23 0600  Renal Function Monitoring: Daily BMP and [place] will continue to follow closely for s/sx of nephrotoxicity, infusion reactions and appropriateness of therapy  BMP and CBC will be ordered per protocol  We will continue to follow the patients culture results and clinical progress daily      Parul Barger, Pharmacist

## 2023-05-02 NOTE — ASSESSMENT & PLAN NOTE
· Likely secondary to underlying lung CA, pleural effusion, possible aspiration pneumonia  · CXR shows R effusion, atelectasis vs consolidation   · Check PCT  · If elevated, start broad spectrum antibiotics   · Continue supplemental 02  · Goal Sp02 >92%

## 2023-05-02 NOTE — ASSESSMENT & PLAN NOTE
· Segmental PE noted in bilateral lower lobes, new from 4/20/23  Patient also has previously diagnosed right subclavian thrombus  Presumably due to underlying malignancy  Started eliquis 1 week ago, switched to heparin infusion on admission     · Transitioned on Lovenox 5/1/23  · Discontinued  · Transition back to heparin gtt this evening 5/2/23  · Considering patient's underlying lung mass, suspect malignancy, may consider to adjust anticoagulation upon discharge, may consider Lovenox

## 2023-05-02 NOTE — DISCHARGE SUMMARY
[address]  Discharge- Amada Gay 1960, 66 y o  male MRN: [id number]  Unit/Bed#: -01 Encounter: [id number]  Primary Care Provider: Priscila Peace DO   Date and time admitted to hospital: 4/28/2023 12:26 AM    Acute encephalopathy  Assessment & Plan  · Patient presenting for acute encephalopathy for several hours  Encephalopathy worsening during this hospitalization  · Likely multifactorial in the setting of ETOH w/d, stroke, underlying malignancy, hypercalcemia, possible infection   · MRI shows subacute infarct of the cerebellum  · Neurology consulted- should not cause encephalopathy  · No evidence of brain mets on MRI with contrast  · Repeat CT brain today without ICH  · ABG did not show evidence of hypercarbia   · Continue supportive care  · CAM ICU  · Encourage sleep-wake cycle  · Treating ETOH w/d with phenobarbital   · Continue to treat electrolyte abnormalities     Delirium tremens ([facility] [address] )  Assessment & Plan  · Secondary to chronic ETOH abuse  · Patient on CIWA- received multiple doses of ativan overnight  · Patient received phenobarbital 130mg x 2 and started on phenobarbital taper    ETOH abuse  Assessment & Plan  · According to family, patient drinks frequently   · Started on CIWA- will hold on further benzodiazepines and continue phenobarbital bolus and taper     New infarction of cerebellum ([facility] [address] )  Assessment & Plan  · MRI:Tiny focus of acute or subacute infarct in the left cerebellum  · Initial NIHSS score was 4-repeat NIHSS score is 1  · Neurology consult appreciated-suspect most likely hypercoagulable state secondary to underlying lung mass  · Recommends to continue heparin drip as per PE protocol  · CTA head neck shows atherosclerosis  · Echocardiogram shows no PFO   EF70%, G1DD  · Stroke pathway   · Statin therapy  · ASA  · PT/OT/SLP consulted    Pleural effusion, right  Assessment & Plan  · Persistent R sided effusion on the R  Likely secondary to underlying malignancy     Severe systemic inflammatory response syndrome (SIRS) (HCC)  Assessment & Plan  · Likely secondary to ETOH w/d  · Trend WBC  · Check Blood cultures  · PCT 0 23  · Start broad spectrum antibiotics for concern of possible aspiration pneumonia     Bilateral pulmonary embolism (HCC)  Assessment & Plan  · Segmental PE noted in bilateral lower lobes, new from 4/20/23  Patient also has previously diagnosed right subclavian thrombus  Presumably due to underlying malignancy  Started eliquis 1 week ago, switched to heparin infusion on admission  · Transitioned on Lovenox 5/1/23  · Discontinued  · Transition back to heparin gtt this evening 5/2/23  · Considering patient's underlying lung mass, suspect malignancy, may consider to adjust anticoagulation upon discharge, may consider Lovenox       Essential hypertension  Assessment & Plan  · Holding outpatient losartan secondary to NPO status  · Hydralazine and Labetalol PRN     Tobacco abuse  Assessment & Plan  · Nicotine patch  · Will need smoking cessation education     Type 2 diabetes mellitus without complication, without long-term current use of insulin [facility])  Assessment & Plan  Lab Results   Component Value Date    HGBA1C 6 1 (H) 04/30/2023       Recent Labs     05/01/23  2109 05/02/23  0715 05/02/23  1104 05/02/23  1649   POCGLU 120 96 114 116       Blood Sugar Average: Last 72 hrs:  (P) 119 625     · Continue ISS  · Goal -180    Subclavian vein thrombosis (HCC)  Assessment & Plan  · Recently diagnosed condition, presumably due to underlying malignancy    · Treated with eliquis since last week; switched to heparin infusion on admission due to new bilateral PE and decline in clinical condition  · Changed to therapeutic lovenox BID dosing 5/1/23  · Holding lovenox at this time and plan to transition to heparin gtt in case patient requires interventions or procedures   · Hold on heparin gtt until following CT brain  · Last dose of BID Lovenox was this AM- Plan to start heparin gtt around 2000 5/2/23 now that CT brain is negative for ICH     Hypercalcemia  Assessment & Plan  · Moderate-severe hypercalcemia (13 4) with acute encephalopathy  Likely due to underlying malignancy  PTH (13 8) low on April 21  Vitamin D level not previously checked  · Nephrology consult appreciated  · S/p Zometa 4/30/23- expect effect in 2 to 3 days  · Continue IVF  · Trend calcium    Neck mass  Assessment & Plan  · Status post IR guided left cervical lymph node biopsy on 5/1/2023: Pathology pending   · Pulmonary consulted  · 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 due to extensive adenopathy  Recommend transfer to [facility] for rad/onc consult/consideration of radiation to the mediastinum and also consideration for tracheal stent placement if indicated  · Case discussed with Dr Panchito Calvo from Thoracic surgery   · Plan to transfer to [facility] for radiation therapy        Lung mass  Assessment & Plan  · Recently diagnosed right lung and neck masses, likely malignant  Patient also diagnosed last week with subclavian vein thrombus, and now with bilateral segmental PE  · Outpatient PET-CT pending  · Patient then needs Pulmonary, Oncology, and Palliative Care appointments (referrals already in place)  · Pulmonary consulted inpatient    Right middle lobe lung mass with extensive mediastinal, axillary and cervical adenopathy -highly suggestive of metastatic disease    Would recommend obtaining tissue diagnosis via biopsy of either the axillary or cervical lymph nodes by interventional radiology  · S/P IR guided Left Cervical Lymphnode biopsy on 5/1/23: Pathology pending   · Case discussed with Dr Panchito Calvo from Thoracic surgery- he recommended radiation therapy  · Plan to transfer to [facility]    * Acute respiratory failure with hypoxia ([address] )  Assessment & Plan  · Likely secondary to underlying lung CA, pleural effusion, possible aspiration pneumonia  · CXR shows R effusion, atelectasis vs consolidation · Check PCT  · Started broad spectrum antibiotics for possible aspiration pneumonia   · Continue supplemental 02  · Goal Sp02 >92%              Medical Problems     Resolved Problems  Date Reviewed: 5/2/2023   None         Admission Date:   Admission Orders (From admission, onward)     Ordered        04/28/23 0355  INPATIENT ADMISSION  Once                        Admitting Diagnosis: Hypercalcemia [E83 52]  Confusion [R41 0]  Hyponatremia [E87 1]  Lung mass [R91 8]  Hypoxia [R09 02]  Pleural effusion, right [J90]  Bilateral pulmonary embolism (HCC) [I26 99]  Acute respiratory failure with hypoxia (HCC) [J96 01]  Acute encephalopathy [G93 40]  AMS (altered mental status) [R41 82]    HPI: 66 y o  who presented on 4/28/23 with acute encephalopathy  He was found to have a cerebellar stroke and new PE's  He was started on a heparin gtt and transitioned to lovenox  During the hospitalization, his encephalopathy has continued to worsen  He was started on CIWA protocol yesterday and received multiple doses of ativan overnight  Transferred to the ICU this AM for close monitoring secondary to altered mental status  CT brain with new pathology or evidence of ICH  ABG with acid/base disturbance of evidence of C02 retention    Procedures Performed:   Orders Placed This Encounter   Procedures    ED ECG Documentation Only       Summary of Hospital Course: Patient admitted to [place] for encephalopathy, stroke, new PE  Started on heparin gtt for cerebellar stroke and subclavian vein thrombosis  Patient with worsening encephalopathy  Upgraded to ICU on 5/2   Received phenobarbital 130mg x 2 secondary to etoh w/d  Patient had L neck IR biopsy on 5/3  CT imaging reviewed by pulmonary who spoke with [place] who recommended transfer to [facility] for radiation therapy secondary to tracheal stenosis         Significant Findings, Care, Treatment and Services Provided: L neck biopsy     Complications: DT 2/2 to ETOH w/d     Condition at Discharge: serious         Discharge instructions/Information to patient and family:   See after visit summary for information provided to patient and family  Provisions for Follow-Up Care:  See after visit summary for information related to follow-up care and any pertinent home health orders  PCP: Vinnie Story DO    Disposition: [facility] MICU    Planned Readmission: No    Discharge Statement   I spent 35 minutes discharging the patient  This time was spent on the day of discharge  I had direct contact with the patient on the day of discharge  Additional documentation is required if more than 30 minutes were spent on discharge  Discharge Medications:  See after visit summary for reconciled discharge medications provided to patient and family        Jerry Jimenez PA-C

## 2023-05-02 NOTE — ASSESSMENT & PLAN NOTE
· Recently diagnosed condition, presumably due to underlying malignancy    · Treated with eliquis since last week; switched to heparin infusion on admission due to new bilateral PE and decline in clinical condition  · Changed to therapeutic lovenox BID dosing 5/1/23  · Holding lovenox at this time and plan to transition to heparin gtt in case patient requires interventions or procedures   · Hold on heparin gtt until following CT brain  · Last dose of BID Lovenox was this AM- Plan to start heparin gtt around 2000 5/2/23 now that CT brain is negative for ICH

## 2023-05-02 NOTE — ASSESSMENT & PLAN NOTE
Lab Results   Component Value Date    HGBA1C 6 1 (H) 04/30/2023       Recent Labs     05/01/23  1302 05/01/23  1542 05/01/23  2109 05/02/23  0715   POCGLU 132 139 120 96     Blood Sugar Average: Last 72 hrs:  (P) 120 [id number]   Last hemoglobin A1c 6 2 in May 2022; Treated as outpatient with glipizide 5 mg daily  Hold oral diabetes medications and initiate insulin protocol during hospitalization

## 2023-05-02 NOTE — ASSESSMENT & PLAN NOTE
· According to family, patient drinks frequently   · Started on CIWA- will hold on further benzodiazepines and start phenobarbital

## 2023-05-02 NOTE — OCCUPATIONAL THERAPY NOTE
Occupational Therapy Cancel Note    Patient Name: Nisreen Tolliver  Today's Date: 5/2/2023 05/02/23 0920   Note Type   Note Type Cancelled Session   Cancel Reasons Medical status     Chart reviewed  Attempted to see pt this AM for OT tx session  Spoke with nursing staff prior who reporting pt being transferred to ICU for higher level of care and has been increasingly agitated over night  Will continue to follow case and address as appropriate and as time allows      Cristina Wright OTR/L

## 2023-05-02 NOTE — PHYSICAL THERAPY NOTE
Physical Therapy Cancellation Note    PT treatment held today due to patient being transferred to ICU to be placed on drip per RN  Patient is not appropriate to participate in treatment at this time  Will continue to offer PT treatment as ordered and progress as tolerated when patient is able to safely participate                                                        Emily Harmon [place], [place]

## 2023-05-02 NOTE — ASSESSMENT & PLAN NOTE
· Likely secondary to ETOH w/d  · Trend WBC  · Check Blood cultures  · PCT 0 23  · Start broad spectrum antibiotics for concern of possible aspiration pneumonia

## 2023-05-03 ENCOUNTER — TRANSCRIBE ORDERS (OUTPATIENT)
Dept: RADIATION ONCOLOGY | Facility: HOSPITAL | Age: 63
End: 2023-05-03

## 2023-05-03 DIAGNOSIS — C76.0 MALIGNANT NEOPLASM OF HEAD, FACE, AND NECK (HCC): Primary | ICD-10-CM

## 2023-05-03 LAB
BACTERIA BLD CULT: NORMAL
BACTERIA BLD CULT: NORMAL
SCAN RESULT: NORMAL

## 2023-05-03 NOTE — CONSULTS
Consultation - Neurology   Norma Lopez 66 y o  male MRN: [id number]  Unit/Bed#: MICU 07 Encounter: [id number]    Assessment/Plan   Acute encephalopathy  Assessment & Plan  45-year-old male with past medical history as listed below, presented to [address] with acute confusion, he has a complicated past medical history of diabetes, hypertension, smoking history, recent diagnosis of lung/neck mass, subclavian vein thrombosis on Eliquis  MRI was completed at [facility] which showed a subacute infarct in left cerebellum  Neurology was consulted in regards to this finding       MRI of brain - subacute infarct in the left cerebellum      CTA of head and neck -  Atherosclerotic change of the carotid bifurcations with moderate stenosis of the distal common carotid artery and mild narrowing of the proximal internal carotid artery on the right  No significant left-sided stenosis  Unremarkable intracranial vasculature  Extensive lower cervical adenopathy as seen on recent CT of the chest  This results in significant narrowing of the trachea in the transverse dimension, unchanged  There is also a filling defect present within the right innominate vein within the inferior neck extending into the superior aspect of the SVC  This is consistent with thrombosis  There is only partial thrombosis of the most superior aspect of the SVC  Below this the vessel enhances appropriately      Echo ef greater than 21%, systolic function is vigorous  No pfo  Atrium size are normal       Hemoglobin A1c - 6 1H  LDL 58    · 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  Suspect etiology is secondary to hypercoagulable state from malignancy  · Encephalopathy, suspect multifactorial toxic metabolic / infectious  No evidence to suspect seizure activity  -  Continued stroke care  • The patient is on Aspirin 81 mg   • Heparin gtt, small infarct low risk for hemorrhagic conversion     • Atorvastatin 40 mg, instead of pravachol  • Smoking cessation  • Continue telemetry  • PT/OT/ST  • CIWA protocol, IV thiamine  • Frequent neuro checks  Continue to monitor and notify neurology with any changes  • Serial neurological examination  • Consider repeat of MRI of brain, to see if he has any further stroke burden  • Consider EEG, will hold at this time  - Medical management and supportive care per primary team  Correction of any metabolic or infectious disturbances  - Please see attending attestation for further details      Lizett Dose will need follow up in in 6 weeks with neurovascular attending  He will not require outpatient neurological testing, at this time  History of Present Illness     Reason for Consult / Principal Problem:   Stroke transfer from [address] [place] [address]  HPI: Lizett Dose is a 66 y o  the patient is a transfer from [address] [place] [address]  Please see IPC note, the patient was seen by neurology, for subacute left cerebellum infarct  Please see Dr Angella Bernal  MRI of brain with tiny focus of acute or subacute infarct in the left cerebellum, no intracranial mass  CTA of head and neck was completed-which showed atherotic changes in the carotid bifurcations with moderate stenosis of the distal common carotid artery and mild narrowing of the proximal internal carotid artery on the right  There is also extensive lower cervical adenopathy on CT of the chest, there was also a filling deficit noted in the right intermittent vein within the inferior neck extending into the superior aspect of the SVC, which is consistent with thrombosis  There is also a partially thrombosis of the most superior aspect of the SVC  CT of head was repeated on 5/2 -  which showed mild periventricular white mater hypodensity seen related to chronic small vessel ischemic changes       Per ICU acceptance notes the patient was note to have B/L PE and medistinal and hilar adenopathy with mass effect of trachea, the patient was transferred from [address] [place] [address] for urgent radiation therapy of her cervical masses  The patient is in the ICU, he is being treated for DTs as well, he is on thiamine and CIWA protocol  The patient is being treated for sepsis/sirs - ceftriaxone to zosyn, continue vanco    The patient had an IR guided left cervical node biopsy on 5/1/23, pathology is pending  He also has a subclavian vein thrombosis on the right  Heparin drip, currently  Please see IPC noted completed by neurology on, neurology was asked to see and evaluate  Consults    Review of Systems   Limited  Historical Information   Past Medical History:   Diagnosis Date   • Bilateral pulmonary embolism ([facility] ) 04/28/2023   • Current smoker    • Diabetes mellitus (HCC)    • GERD (gastroesophageal reflux disease)    • Gout    • Hypertension    • Lung cancer ([facility] )    • 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)    • Mixed dyslipidemia    • Pleural effusion, right 04/28/2023   • Subclavian vein thrombosis (HCC)    • Type 2 diabetes mellitus without complication, without long-term current use of insulin ([facility] ) 04/20/2023     Past Surgical History:   Procedure Laterality Date   • ANAL FISSURECTOMY     • IR BIOPSY NECK  5/1/2023   • TONSILLECTOMY       Social History   Social History     Substance and Sexual Activity   Alcohol Use Yes     Social History     Substance and Sexual Activity   Drug Use Not Currently     E-Cigarette/Vaping   • E-Cigarette Use Never User      E-Cigarette/Vaping Substances     Social History     Tobacco Use   Smoking Status Every Day   • Packs/day: 1 00   • Types: Cigarettes   Smokeless Tobacco Never     Family History: No family history on file      Meds/Allergies   all current active meds have been reviewed, current meds:   Current Facility-Administered Medications   Medication Dose Route Frequency   • acetaminophen (TYLENOL) tablet 650 mg  650 mg Oral Q6H PRN   • albuterol inhalation solution 2 5 mg  2 5 mg Nebulization Q4H PRN   • aspirin (ECOTRIN LOW STRENGTH) EC tablet 81 mg  81 mg Oral Daily   • bisacodyl (DULCOLAX) rectal suppository 10 mg  10 mg Rectal Daily PRN   • chlorhexidine (PERIDEX) 0 12 % oral rinse 15 mL  15 mL Mouth/Throat Q12H [address]   • dexmedeTOMIDine (Precedex) 400 mcg in sodium chloride 0 9% 100 mL  0 1-0 7 mcg/kg/hr Intravenous Titrated   • folic acid 1 mg in sodium chloride 0 9 % 50 mL IVPB  1 mg Intravenous Daily   • heparin (porcine) 25,000 units in 0 45% NaCl 250 mL infusion (premix)  3-30 Units/kg/hr (Order-Specific) Intravenous Titrated   • insulin lispro (HumaLOG) 100 units/mL subcutaneous injection 1-6 Units  1-6 Units Subcutaneous Q6H [address]   • ipratropium (ATROVENT) 0 02 % inhalation solution 0 5 mg  0 5 mg Nebulization TID   • levalbuterol (XOPENEX) inhalation solution 1 25 mg  1 25 mg Nebulization TID   • multi-electrolyte (PLASMALYTE-A/ISOLYTE-S PH 7 4) IV solution  50 mL/hr Intravenous Continuous   • nicotine (NICODERM CQ) 14 mg/24hr TD 24 hr patch 14 mg  14 mg Transdermal Daily   • piperacillin-tazobactam (ZOSYN) 3 375 g in sodium chloride 0 9 % 100 mL IVPB (EXTENDED-INFUSION)  3 375 g Intravenous Q8H   • pravastatin (PRAVACHOL) tablet 40 mg  40 mg Oral Daily With Dinner   • thiamine (VITAMIN B1) 500 mg in sodium chloride 0 9 % 50 mL IVPB  500 mg Intravenous Daily   • vancomycin (VANCOCIN) 1,250 mg in sodium chloride 0 9 % 250 mL IVPB  1,250 mg Intravenous Q12H    and PTA meds:   Prior to Admission Medications   Prescriptions Last Dose Informant Patient Reported? Taking? Multiple Vitamins-Minerals (MENS MULTIVITAMIN PO)   Yes No   Sig: Take 1 tablet by mouth in the morning   allopurinol (ZYLOPRIM) 300 mg tablet   Yes No   Sig: Take 1 tablet by mouth in the morning   apixaban (Eliquis) 5 mg   No No   Sig: Take 2 tablets (10 mg total) by mouth 2 (two) times a day for 7 days, THEN 1 tablet (5 mg total) 2 (two) times a day for 23 days     glipiZIDE (GLUCOTROL XL) 5 mg 24 hr tablet   Yes No   Sig: Take 5 mg by mouth daily   ibuprofen (MOTRIN) 400 mg tablet   Yes No   Sig: Take 400 mg by mouth every 6 (six) hours as needed for mild pain   simvastatin (ZOCOR) 20 mg tablet   Yes No   Sig: Take 1 tablet by mouth in the morning   valsartan (DIOVAN) 160 mg tablet   Yes No   Sig: Take 1 tablet by mouth daily      Facility-Administered Medications: None     No Known Allergies    Objective   Vitals:Blood pressure 117/76, pulse 90, temperature 98 3 °F (36 8 °C), temperature source Oral, resp  rate 21, weight 97 2 kg (214 lb 4 6 oz), SpO2 96 %  ,Body mass index is 28 27 kg/m²  Intake/Output Summary (Last 24 hours) at 5/3/2023 1303  Last data filed at 5/3/2023 0835  Gross per 24 hour   Intake 1468 99 ml   Output 1944 ml   Net -475 01 ml     Invasive Devices: Invasive Devices     Peripheral Intravenous Line  Duration           Peripheral IV 05/02/23 Dorsal (posterior); Left Forearm 1 day    Peripheral IV 05/02/23 Left;Ventral (anterior) Forearm <1 day          Drain  Duration           External Urinary Catheter Small 1 day              Physical Exam  Vitals reviewed  Constitutional:       Appearance: He is ill-appearing  HENT:      Head: Normocephalic and atraumatic  Eyes:      General:         Right eye: No discharge  Left eye: No discharge  Extraocular Movements: Extraocular movements intact and EOM normal       Pupils: Pupils are equal, round, and reactive to light  Cardiovascular:      Rate and Rhythm: Normal rate  Pulmonary:      Effort: No respiratory distress  Comments: Breathing treatment currently, while in room via mask  Abdominal:      General: There is no distension  Musculoskeletal:         General: Swelling present  Comments: + edema in the bilateral uppers and lowers, more on the right upper  Skin:     Findings: Bruising present  Neurological:      Mental Status: He is alert and oriented to person, place, and time  Neurologic Exam     Mental Status   Oriented to person, place, and time  Oriented to year, month and day     Follows 2 step commands  Attention: decreased  Concentration: decreased  Speech: (No apparent aphasia  Limited with breathing treatments ongoing at time of examination  Able to follow one step and multiple step commands  )  Level of consciousness: alert  Able to name object  Able to repeat  Cranial Nerves     CN II   Visual fields full to confrontation  CN III, IV, VI   Pupils are equal, round, and reactive to light  Extraocular motions are normal    Nystagmus: none   Diplopia: none  Ophthalmoparesis: none    CN V   Facial sensation intact  CN VII   Facial expression full, symmetric  CN VIII   CN VIII normal      CN IX, X   CN IX normal    CN X normal    Unable to assess for facial droop or check for tongue or uvula deviation with breathing treatments ongoing  Motor Exam Moved all extremities equally off bed, non focal, non lateralizing at least 4/5 power x 4  Sensory Exam   Light touch normal    No neglect noted  Gait, Coordination, and Reflexes     Tremor   Resting tremor: absent  Intention tremor: absent    Reflexes   Right plantar: normal  Left plantar: normalNo seizure like activity seen, no myoclonus noted        Lab Results:     Recent Results (from the past 24 hour(s))   Sputum culture and Gram stain    Collection Time: 05/02/23  2:12 PM    Specimen: Expectorated Sputum   Result Value Ref Range    Gram Stain Result 2+ Epithelial cells per low power field (A)     Gram Stain Result Rare Polys (A)     Gram Stain Result 1+ Gram positive cocci in pairs and chains (A)     Gram Stain Result 1+ Gram positive rods (A)     Gram Stain Result Rare Budding yeast (A)    Fingerstick Glucose (POCT)    Collection Time: 05/02/23  4:49 PM   Result Value Ref Range    POC Glucose 116 65 - 140 mg/dl   CBC and differential    Collection Time: 05/02/23  9:36 PM   Result Value Ref Range    WBC 17 38 (H) 4 31 - 10 16 Thousand/uL    RBC 4 30 3 88 - 5 62 Million/uL    Hemoglobin 12 3 12 0 - 17 0 g/dL Hematocrit 39 1 36 5 - 49 3 %    MCV 91 82 - 98 fL    MCH 28 6 26 8 - 34 3 pg    MCHC 31 5 31 4 - 37 4 g/dL    RDW 13 2 11 6 - 15 1 %    MPV 8 9 8 9 - 12 7 fL    Platelets 103 863 - 558 Thousands/uL    nRBC 0 /100 WBCs    Neutrophils Relative 86 (H) 43 - 75 %    Immat GRANS % 1 0 - 2 %    Lymphocytes Relative 5 (L) 14 - 44 %    Monocytes Relative 8 4 - 12 %    Eosinophils Relative 0 0 - 6 %    Basophils Relative 0 0 - 1 %    Neutrophils Absolute 15 02 (H) 1 85 - 7 62 Thousands/µL    Immature Grans Absolute 0 09 0 00 - 0 20 Thousand/uL    Lymphocytes Absolute 0 79 0 60 - 4 47 Thousands/µL    Monocytes Absolute 1 40 (H) 0 17 - 1 22 Thousand/µL    Eosinophils Absolute 0 02 0 00 - 0 61 Thousand/µL    Basophils Absolute 0 06 0 00 - 0 10 Thousands/µL   Basic metabolic panel    Collection Time: 05/02/23  9:36 PM   Result Value Ref Range    Sodium 144 135 - 147 mmol/L    Potassium 3 2 (L) 3 5 - 5 3 mmol/L    Chloride 111 (H) 96 - 108 mmol/L    CO2 28 21 - 32 mmol/L    ANION GAP 5 4 - 13 mmol/L    BUN 15 5 - 25 mg/dL    Creatinine 0 83 0 60 - 1 30 mg/dL    Glucose 133 65 - 140 mg/dL    Calcium 10 3 (H) 8 3 - 10 1 mg/dL    eGFR 94 ml/min/1 73sq m   Magnesium    Collection Time: 05/02/23  9:36 PM   Result Value Ref Range    Magnesium 1 6 1 6 - 2 6 mg/dL   Phosphorus    Collection Time: 05/02/23  9:36 PM   Result Value Ref Range    Phosphorus 1 7 (L) 2 3 - 4 1 mg/dL   APTT    Collection Time: 05/02/23  9:36 PM   Result Value Ref Range     (HH) 23 - 37 seconds   Protime-INR    Collection Time: 05/02/23  9:36 PM   Result Value Ref Range    Protime 17 9 (H) 11 6 - 14 5 seconds    INR 1 45 (H) 0 84 - 1 19   Blood gas, venous    Collection Time: 05/02/23  9:36 PM   Result Value Ref Range    pH, Red 7 380 7 300 - 7 400    pCO2, Red 45 0 42 0 - 50 0 mm Hg    pO2, Red 92 0 (H) 35 0 - 45 0 mm Hg    HCO3, Red 26 0 24 - 30 mmol/L    Base Excess, Red 0 6 mmol/L    O2 Content, Red 17 6 ml/dL    O2 HGB, VENOUS 95 2 (H) 60 0 - 80 0 % Nasal Cannula 8    Fingerstick Glucose (POCT)    Collection Time: 05/03/23 12:36 AM   Result Value Ref Range    POC Glucose 128 65 - 140 mg/dl   CBC and differential    Collection Time: 05/03/23  5:09 AM   Result Value Ref Range    WBC 17 71 (H) 4 31 - 10 16 Thousand/uL    RBC 4 10 3 88 - 5 62 Million/uL    Hemoglobin 11 8 (L) 12 0 - 17 0 g/dL    Hematocrit 37 3 36 5 - 49 3 %    MCV 91 82 - 98 fL    MCH 28 8 26 8 - 34 3 pg    MCHC 31 6 31 4 - 37 4 g/dL    RDW 13 1 11 6 - 15 1 %    MPV 9 0 8 9 - 12 7 fL    Platelets 779 396 - 629 Thousands/uL    nRBC 0 /100 WBCs    Neutrophils Relative 87 (H) 43 - 75 %    Immat GRANS % 0 0 - 2 %    Lymphocytes Relative 5 (L) 14 - 44 %    Monocytes Relative 8 4 - 12 %    Eosinophils Relative 0 0 - 6 %    Basophils Relative 0 0 - 1 %    Neutrophils Absolute 15 37 (H) 1 85 - 7 62 Thousands/µL    Immature Grans Absolute 0 07 0 00 - 0 20 Thousand/uL    Lymphocytes Absolute 0 84 0 60 - 4 47 Thousands/µL    Monocytes Absolute 1 36 (H) 0 17 - 1 22 Thousand/µL    Eosinophils Absolute 0 01 0 00 - 0 61 Thousand/µL    Basophils Absolute 0 06 0 00 - 0 10 Thousands/µL   Basic metabolic panel    Collection Time: 05/03/23  5:09 AM   Result Value Ref Range    Sodium 146 135 - 147 mmol/L    Potassium 3 5 3 5 - 5 3 mmol/L    Chloride 114 (H) 96 - 108 mmol/L    CO2 29 21 - 32 mmol/L    ANION GAP 3 (L) 4 - 13 mmol/L    BUN 16 5 - 25 mg/dL    Creatinine 0 84 0 60 - 1 30 mg/dL    Glucose 137 65 - 140 mg/dL    Calcium 9 8 8 3 - 10 1 mg/dL    eGFR 94 ml/min/1 73sq m   Vancomycin, random    Collection Time: 05/03/23  5:09 AM   Result Value Ref Range    Vancomycin Rm 6 8 (L) 10 0 - 20 0 ug/mL   Magnesium    Collection Time: 05/03/23  5:09 AM   Result Value Ref Range    Magnesium 2 1 1 6 - 2 6 mg/dL   Phosphorus    Collection Time: 05/03/23  5:09 AM   Result Value Ref Range    Phosphorus 2 9 2 3 - 4 1 mg/dL   APTT    Collection Time: 05/03/23  5:09 AM   Result Value Ref Range    PTT 52 (H) 23 - 37 seconds Fingerstick Glucose (POCT)    Collection Time: 05/03/23  5:10 AM   Result Value Ref Range    POC Glucose 127 65 - 140 mg/dl   Fingerstick Glucose (POCT)    Collection Time: 05/03/23 11:43 AM   Result Value Ref Range    POC Glucose 124 65 - 140 mg/dl   APTT    Collection Time: 05/03/23 11:45 AM   Result Value Ref Range     (H) 23 - 37 seconds     Procedure: XR chest portable    Result Date: 5/2/2023  Narrative: CHEST INDICATION:   hypoxia  COMPARISON: Chest radiograph April 20, 2023  Correlation chest CT April 28, 2023 EXAM PERFORMED/VIEWS:  XR CHEST PORTABLE FINDINGS: Heart shadow is obscured by adjacent opacity  Right lower lung opacity along with blunting of the costophrenic angle, has increased since April 20, 2023  No pneumothorax  Osseous structures appear within normal limits for patient age  Impression: Since April 20, 2023, increased right lower lung opacity  Given the findings on April 28, 2023 CT, differential considerations include a combination of atelectasis, infarction, effusion and known adenopathy  Infection is to be determined on clinical grounds  Workstation performed: [id number]     Procedure: CT head wo contrast    Result Date: 5/2/2023  Narrative: CT BRAIN - WITHOUT CONTRAST INDICATION:   Acute encephalopathy  COMPARISON: April 28, 2023 TECHNIQUE:  CT examination of the brain was performed  Multiplanar 2D reformatted images were created from the source data  Radiation dose length product (DLP) for this visit:  942 67 mGy-cm   This examination, like all CT scans performed in the [facility], was performed utilizing techniques to minimize radiation dose exposure, including the use of iterative  reconstruction and automated exposure control  IMAGE QUALITY:  Diagnostic  FINDINGS: PARENCHYMA:  No intracranial mass, mass effect or midline shift  No CT signs of acute infarction  No acute parenchymal hemorrhage   VENTRICLES AND EXTRA-AXIAL SPACES:  Normal for the patient's age  Coreyabara Casey ORBITS: Normal visualized orbits  PARANASAL SINUSES: Normal visualized paranasal sinuses  CALVARIUM AND EXTRACRANIAL SOFT TISSUES:  Normal      Impression: Mild periventricular white matter hypodensity seen related to chronic small vessel ischemic changes Workstation performed: [id number]     Procedure: IR biopsy neck    Result Date: 5/1/2023  Narrative: Ultrasound-guided fine needle aspiration biopsy of the left neck lymph node Clinical History: Cervical and mediastinal lymphadenopathy  Right lung mass    Technique: The patient was brought to the ultrasound area and placed supine on the table  After a brief ultrasound examination was performed of the left neck, and correlated with prior imaging of enlarged lymph nodes with increased vascularity  A site on the left neck was prepped and draped in usual sterile fashion  Lidocaine was administered to the skin and a 18-gauge Temno needle was advanced into the vascular lymph node under direct ultrasound guidance  5 specimens obtained the specimen was placed in RPMI solution and formalin  The patient tolerated the procedure well and suffered no complications  Hemostasis obtained  Impression: Impression: Successful core biopsy of left neck lymph node   Workstation performed: [id number]     Imaging Studies: I have personally reviewed pertinent reports  EKG, Pathology, and Other Studies: I have personally reviewed pertinent reports  Code Status: Level 1 - Full Code    Counseling / Coordination of Care  Reviewed case with neurology attending, history and physical examination, labs and imaging completed, plan of care as per attending physician  Please see attestation for further details  Examined alongside attending physician

## 2023-05-03 NOTE — CASE MANAGEMENT
Case Management Discharge Planning Note    Patient name Imtiaz Part  [facility] [id number]  : 1960 Date 5/3/2023       Current Admission Date: 2023  Current Admission Diagnosis:Acute encephalopathy  Patient Active Problem List    Diagnosis Date Noted   • ETOH abuse 2023   • Delirium tremens ([address] ) 2023   • Electrolyte abnormality 2023   • New infarction of cerebellum ([address] ) 2023   • Gout 2023   • Bilateral pulmonary embolism ([address] ) 2023   • Severe systemic inflammatory response syndrome (SIRS) ([facility] ) 2023   • Acute respiratory failure with hypoxia ([address] ) 2023   • Pleural effusion, right 2023   • Acute encephalopathy 2023   • SIRS (systemic inflammatory response syndrome) ([facility] ) 2023   • Hypercoagulopathy ([facility] ) 2023   • Acute pneumonia 2023   • Lung mass 2023   • Neck mass 2023   • Hypercalcemia 2023   • Hyponatremia 2023   • Subclavian vein thrombosis ([facility] ) 2023   • Type 2 diabetes mellitus without complication, without long-term current use of insulin ([facility] ) 2023   • Tobacco abuse 2019   • Essential hypertension 2019   • Mixed dyslipidemia 2019      LOS (days): 1  Geometric Mean LOS (GMLOS) (days): 4 40  Days to GMLOS:3 6     OBJECTIVE:  Risk of Unplanned Readmission Score: 17 36         Current admission status: Inpatient   Preferred Pharmacy:   [address] [place] [address]  Phone: [phone] Fax: [phone]    Primary Care Provider: Beltran Scott DO    Primary Insurance: BLUE CROSS  Secondary Insurance:     DISCHARGE DETAILS:                  Additional Comments: Received all from [facility], [phone], , return call and left message

## 2023-05-03 NOTE — PLAN OF CARE
Problem: OCCUPATIONAL THERAPY ADULT  Goal: Performs self-care activities at highest level of function for planned discharge setting  See evaluation for individualized goals  Description: Treatment Interventions: ADL retraining, Functional transfer training, Visual perceptual retraining, UE strengthening/ROM, Endurance training, Cognitive reorientation, Patient/family training, Equipment evaluation/education, Compensatory technique education, Continued evaluation, Energy conservation, Activityengagement, Fine motor coordination activities          See flowsheet documentation for full assessment, interventions and recommendations  Note: Limitation: Decreased UE strength, Decreased UE ROM, Decreased ADL status, Decreased Safe judgement during ADL, Decreased cognition, Decreased endurance, Decreased self-care trans, Decreased high-level ADLs, Visual deficit  Prognosis: Fair  Assessment: Pt is a 65 y/o male seen for OT eval s/p adm to [facility] as a transfer from [address] [place] [address] where he presented w/ altered mental status on 4/28  Pt found to have B/L pulmonary emboli and cervical masses  Pt MRI revealed new cerebellar infarct  Pt transferred to [facility] for radiation therapy of cervical masses  Pt  has a past medical history of Bilateral pulmonary embolism (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[facility]), Mixed dyslipidemia, Pleural effusion, right (04/28/2023), Subclavian vein thrombosis ([facility] ), and Type 2 diabetes mellitus without complication, without long-term current use of insulin ([facility] ) (04/20/2023)  Pt with active OT orders and up with assistance  orders  Pt lives with his spouse in 1 , 5 SAM  Pt was I w/  ADLS and IADLS, drove, & required no use of DME PTA  Pt is currently demonstrating the following occupational deficits:  Max A UB/LB ADLS, Mod A x2 bed mobility, transfers and functional mobility w/ B/L [facility]  These deficits that are impacting pt's baseline areas of occupation are a result of the following impairments: endurance, activity tolerance, functional mobility, forward functional reach, balance, trunk control, functional standing tolerance, decreased I w/ ADLS/IADLS, strength, ROM, visual deficits, cognitive impairments, decreased safety awareness, decreased insight into deficits and coordination deficits  The following Occupational Performance Areas to address include: eating, grooming, bathing/shower, toilet hygiene, dressing, medication management, socialization, health maintenance, functional mobility, community mobility, clothing management, household maintenance and job performance/volunteering  Recommend inpatient rehab  upon D/C   Pt to continue to benefit from acute immediate OT services to address the following goals 2-3x/week to  w/in 10-14 days:     OT Discharge Recommendation: Post acute rehabilitation services        Carmenza Salinas MS, OTR/L

## 2023-05-03 NOTE — QUICK NOTE
Spoke with wife, Jessica Benitez and daughter Percy Little, and updated them on patient's current status and plan of care  Family was very appreciative and questions were answered sufficiently  Will continue to keep family updated  Jessica Benitez and Percy Little request that Percy Little (daughter) be main point of contact as she is a nurse and has a high medical IQ and will update mom

## 2023-05-03 NOTE — PROGRESS NOTES
Daily Progress Note - Critical Care   Harmeet Cervantes 66 y o  male MRN: [id number]  Unit/Bed#: MICU 07 Encounter: [id number]        ----------------------------------------------------------------------------------------  HPI: Harmeet Cervantes is a 66 y o  male with a significant past medical history of diabetes, HTN, smoking, who presented to [facility] with altered mental status on 4/28/2023  He was found to have bilateral pulmonary emboli and mediastinal and hilar adenopathy with mass effect on the trachea on imaging  He had an MRI that was consistent with a new cerebellar infarct  He was evaluated by pulmonology and thoracics at [facility] who recommended transfer for urgent radiation therapy of cervical masses  24hr events: No acute events overnight  Remained on midflow oxygen   Precedex gtt overnight with intermittent agitation easily redirectable     ---------------------------------------------------------------------------------------  SUBJECTIVE  No complaints    Review of Systems  Review of systems was reviewed and negative unless stated above in HPI/24-hour events   ---------------------------------------------------------------------------------------  Assessment and Plan:    Neuro:   • Diagnosis: Acute encephalopathy  o Likely multifactorial given cerebellar infarction, severe illness, sedating medications, recent concern for infection  o Plan:   - CTH 5/2 unremarkable  - No evidence of hypercarbia on repeat VBG  - Paraneoplastic panel ordered, will follow  - Consider EEG  - CAM ICU  - Delirium precautions  - Precedex gtt, continue  • Diagnosis: Delirium tremens  o Plan:   - Previously on CIWA and s/p 130mg phenobarb 2x  - High dose thiamine, folate initiated 5/2, continue  • Diagnosis: Cerebellar infarction  o Plan:   - Noted on MRI 4/28  - Continue heparin gtt  - Echo complete, EF 70% without PFO  - Continue statin  - Continue aspirin  • Diagnosis: History of ETOH abuse with concern for withdrawal  o Plan: - Previously on CIWA as above, discontinued  - Likely past acute withdrawal phase, last drink reportedly 4/26 with no overt s/s of withdrawal  - Outpatient followup, encourage cessation      CV:   • Diagnosis: Essential hypertension  o Plan:   - Holding home losartan  - Has been largely normotensive, consider prn medications if develops hypertension      Pulm:  • Diagnosis: Acute hypoxic respiratory failure  o Multifactorial with lung mass, bilateral Pes, right sided lung obacity  o Plan:   - Midflow nasal cannula, continue, wean as able  - Continuous pulse oximetry  - Goal SpO2 >92%  • Diagnosis: Bilateral pulmonary emboli  o Plan:   - Heparin gtt VTE high, continue  • Diagnosis: Right lower lung opacity  o On CXR 5/2/2023  o Plan:   - Possibly 2/2 malignant effusion versus pulmonary infarct versus aspiration  • Diagnosis: Tobacco abuse  o Plan:   - Nicotine patch  - Encourage cessation        GI:   • Diagnosis: No active issues  o Plan:   - Bowel regimen ordered, continue      :   • Diagnosis: No active issues  o Plan:   - Monitor I&Os  - Maintain urinary catheter      F/E/N:   • F- isolyte @ 100/hr  • E- monitor and replete as needed, goal Mag >2, Phos >3, K >4  • N- NPO      Heme/Onc:   • Diagnosis: Lung mass  o Likely malignant, s/p IR guided left cervical node biopsy on 5/1  o Plan:   - F/u pathology results, pending  - Likely urgent radiation therapy as per rad/onc  - Rad/onc consulted, awaiting recs  • Diagnosis: Neck mass with concern for tracheal stenosis  o Likely malignant, awaiting biopsy as above  o Plan:    - As per thoracics may need stent placement  - Thoracics consulted, awaiting recs  • Diagnosis: Subclavian vein thrombosis, right  o Likely in the setting of underlying malignancy  o Extension into the superior aspect of the SVC  o Plan:   - Heparin as above  - Monitor for SVC syndrome      Endo:   • Diagnosis: Type 2 diabetes mellitus  o Plan:   - Sliding scale insulin, continue  - Goal BG 140-180      ID:   • Diagnosis: SIRS  o Plan:   - Started on ceftriaxone and vanco on , ceftriaxone changed to cefepime, continue  - Pharmacy consult for vanco dosing  - Sputum and MRSA cultures sent, will follow  - Blood cultures sent, will follow  - Trend WBC, fever curve      MSK/Skin:   • Diagnosis: No active issues  o Plan:   - Routine skin care  - PT/OT when able    Patient appropriate for transfer out of the ICU today?: No  Disposition: Continue Critical Care   Code Status: Level 1 - Full Code  ---------------------------------------------------------------------------------------  ICU CORE MEASURES    Prophylaxis   VTE Pharmacologic Prophylaxis: Heparin Drip  VTE Mechanical Prophylaxis: sequential compression device  Stress Ulcer Prophylaxis: Prophylaxis Not Indicated     ABCDE Protocol (if indicated)  Plan to perform spontaneous awakening trial today? Not applicable  Plan to perform spontaneous breathing trial today? Not applicable  Obvious barriers to extubation? Not applicable    Invasive Devices Review  Invasive Devices     Peripheral Intravenous Line  Duration           Peripheral IV 23 Left Antecubital 4 days    Peripheral IV 23 Dorsal (posterior); Left Forearm <1 day          Drain  Duration           External Urinary Catheter Small <1 day              Can any invasive devices be discontinued today?  No  ---------------------------------------------------------------------------------------  OBJECTIVE    Vitals   Vitals:    23   SpO2:  98%   Weight: 99 3 kg (218 lb 14 7 oz)      Temp (24hrs), Av 7 °F (36 5 °C), Min:96 8 °F (36 °C), Max:98 3 °F (36 8 °C)  Current:      Respiratory:  SpO2: SpO2: 98 %       Invasive/non-invasive ventilation settings   Respiratory    Lab Data (Last 4 hours)    None         O2/Vent Data (Last 4 hours)      2118          Non-Invasive Ventilation Mode [address] [facility] (Mid flow)                   Physical Exam  Constitutional: Appearance: He is ill-appearing  HENT:      Head: Normocephalic and atraumatic  Right Ear: External ear normal       Left Ear: External ear normal       Nose: Nose normal       Mouth/Throat:      Mouth: Mucous membranes are moist    Eyes:      Extraocular Movements: Extraocular movements intact  Cardiovascular:      Rate and Rhythm: Regular rhythm  Tachycardia present  Pulmonary:      Effort: Pulmonary effort is normal  No respiratory distress  Breath sounds: Normal breath sounds  Abdominal:      General: Abdomen is flat  Palpations: Abdomen is soft  Tenderness: There is no abdominal tenderness  Musculoskeletal:      Cervical back: Normal range of motion  Right lower leg: No edema  Left lower leg: No edema  Skin:     General: Skin is warm and dry  Neurological:      Mental Status: He is alert and oriented to person, place, and time  Comments: Intermittently agitated, falling asleep during exam  4+/5 strength in LUE with 5/5 strength in RUE and b/l LE similar to previous                 Laboratory and Diagnostics:  Results from last 7 days   Lab Units 05/02/23 2136 05/02/23 0443 05/01/23  0438 04/30/23  0524 04/28/23  0629 04/28/23  0047   WBC Thousand/uL 17 38* 15 36* 16 11* 13 79* 14 89* 16 47*   HEMOGLOBIN g/dL 12 3 12 4 12 3 11 9* 12 7 12 9   HEMATOCRIT % 39 1 38 2 37 5 36 8 37 7 37 9   PLATELETS Thousands/uL 366 350 453* 544* 516* 490*   NEUTROS PCT % 86* 85* 87* 80* 79*  --    MONOS PCT % 8 8 8 10 12  --      Results from last 7 days   Lab Units 05/02/23 2136 05/02/23 0443 05/01/23  0438 04/30/23  0524 04/29/23  1159 04/29/23  0834 04/29/23  0459 04/28/23  1837 04/28/23  0629   SODIUM mmol/L 144 140 138 136 134* 134* 134*   < > 128*   POTASSIUM mmol/L 3 2* 3 4* 3 5 3 4* 3 6 3 5 3 5   < > 3 6   CHLORIDE mmol/L 111* 106 105 101 100 100 99   < > 92*   CO2 mmol/L 28 27 27 27 27 28 28   < > 30   ANION GAP mmol/L 5 7 6 8 7 6 7   < > 6   BUN mg/dL 15 16 14 10 9 10 10 < > 12   CREATININE mg/dL 0 83 0 87 0 85 0 70 0 67 0 68 0 65   < > 0 81   CALCIUM mg/dL 10 3* 11 0* 13 0* 11 6* 10 5* 10 5* 10 9*   < > 13 3*   GLUCOSE RANDOM mg/dL 133 127 131 115 113 116 113   < > 98   ALT U/L  --  57* 72* 71*  --   --  90*  --  137*   AST U/L  --  36 45* 32  --   --  40*  --  75*   ALK PHOS U/L  --  107* 129* 124*  --   --  123*  --  151*   ALBUMIN g/dL  --  3 3* 3 4* 3 2*  --   --  3 2*  --  3 5   TOTAL BILIRUBIN mg/dL  --  0 39 0 45 0 40  --   --  0 38  --  0 66    < > = values in this interval not displayed  Results from last 7 days   Lab Units 05/02/23 2136 05/01/23  0438 04/30/23  0524 04/28/23  1837 04/28/23  0629   MAGNESIUM mg/dL 1 6 1 5* 1 5* 1 8* 1 4*   PHOSPHORUS mg/dL 1 7* 2 9  --   --  2 4      Results from last 7 days   Lab Units 05/02/23 2136 05/01/23  1015 04/30/23  0524 04/29/23  0459 04/28/23  1628 04/28/23  1012 04/28/23  0047   INR  1 45* 1 36*  --   --   --   --  1 71*   PTT seconds 159*  --  63* 63* 71* 60* 38*          Results from last 7 days   Lab Units 04/28/23  0629   LACTIC ACID mmol/L 0 9     ABG:  Results from last 7 days   Lab Units 05/02/23  0959   PH ART  7 449   PCO2 ART mm Hg 39 3   PO2 ART mm Hg 63 2*   HCO3 ART mmol/L 26 6   BASE EXC ART mmol/L 2 6   ABG SOURCE  Radial, Left     VBG:  Results from last 7 days   Lab Units 05/02/23 2136 05/02/23  0959   PH JENNIFER  7 380  --    PCO2 JENNIFER mm Hg 45 0  --    PO2 JENNIFER mm Hg 92 0*  --    HCO3 JENNIFER mmol/L 26 0  --    BASE EXC JENNIFER mmol/L 0 6  --    ABG SOURCE   --  Radial, Left     Results from last 7 days   Lab Units 05/02/23  1051 04/28/23  0629   PROCALCITONIN ng/ml 0 23 0 15       Micro  Results from last 7 days   Lab Units 04/28/23  0631 04/28/23  0629   BLOOD CULTURE  No Growth After 4 Days  No Growth After 4 Days  Imaging:  I have personally reviewed pertinent reports  Intake and Output  I/O     None          Height and Weights         Body mass index is 28 88 kg/m²    Weight (last 2 days) Date/Time Weight    05/02/23 2039 99 3 (218 92)            Nutrition       Diet Orders   (From admission, onward)             Start     Ordered    05/02/23 2035  Diet NPO  Diet effective now        References:    Nutrtion Support Algorithm Enteral vs  Parenteral   Question Answer Comment   Diet Type NPO    RD to adjust diet per protocol?  Yes        05/02/23 2038                Active Medications  Scheduled Meds:  Current Facility-Administered Medications   Medication Dose Route Frequency Provider Last Rate   • acetaminophen  650 mg Oral Q6H PRN Rheba Piano, DO     • albuterol  2 5 mg Nebulization Q4H PRN King Martha MD     • aspirin  81 mg Oral Daily [facility], [facility]     • bisacodyl  10 mg Rectal Daily PRN Rheba Piano, DO     • chlorhexidine  15 mL Mouth/Throat Q12H [address], DO     • dexmedetomidine  0 1-0 7 mcg/kg/hr Intravenous Titrated Nkechi Little MD 0 2 mcg/kg/hr (41/17/03 8840)   • folic acid IVPB  1 mg Intravenous Daily Rheba Piano, DO     • heparin (porcine)  3-30 Units/kg/hr (Order-Specific) Intravenous Titrated Rheba Piano, DO 18 Units/kg/hr (05/02/23 2105)   • insulin lispro  1-6 Units Subcutaneous Q6H [address] [place], DO     • ipratropium  0 5 mg Nebulization TID King Martha MD     • levalbuterol  1 25 mg Nebulization TID King Martha MD     • magnesium sulfate  2 g Intravenous Once Rheba Piano, DO 2 g (05/02/23 2321)   • multi-electrolyte  100 mL/hr Intravenous Continuous Rheba Piano,  mL/hr (05/02/23 2213)   • piperacillin-tazobactam  3 375 g Intravenous [address], DO     • potassium phosphate  30 mmol Intravenous Once Rheba Piano, DO     • pravastatin  40 mg Oral Daily With [employer], DO     • thiamine  500 mg Intravenous Daily Rheba Piano, DO     • vancomycin  2,000 mg Intravenous Q24H [address] [place], DO       Continuous "Infusions:  dexmedetomidine, 0 1-0 7 mcg/kg/hr, Last Rate: 0 2 mcg/kg/hr (05/02/23 2232)  heparin (porcine), 3-30 Units/kg/hr (Order-Specific), Last Rate: 18 Units/kg/hr (05/02/23 2105)  multi-electrolyte, 100 mL/hr, Last Rate: 100 mL/hr (05/02/23 2213)      PRN Meds:   acetaminophen, 650 mg, Q6H PRN  albuterol, 2 5 mg, Q4H PRN  bisacodyl, 10 mg, Daily PRN        Allergies   No Known Allergies  ---------------------------------------------------------------------------------------  Advance Directive and Living Will:      Power of :    POLST:    ---------------------------------------------------------------------------------------  Care Time Delivered:   No Critical Care time spent     Michael, DO      Portions of the record may have been created with voice recognition software  Occasional wrong word or \"sound a like\" substitutions may have occurred due to the inherent limitations of voice recognition software    Read the chart carefully and recognize, using context, where substitutions have occurred  "

## 2023-05-03 NOTE — CONSULTS
Consultation - Radiation Oncology   Fely Hernandez 66 y o  male MRN: [id number]  Unit/Bed#: MICU 07 Encounter: [id number]        History of Present Illness   Physician Requesting Consult: Brennon Mitchell MD  Reason for Consult / Principal Problem: Shortness breath and confusion  Hx and PE limited by: Patient medicated    HPI: Fely Hernandez is a 66y o  year old male who presents with clinical and radiographic evidence of acute superior vena cava obstruction and impending tracheal obstruction  CT chest 4/20 showed right middle lobe 5 7 cm necrotic/cavitary mass with extensive mediastinal adenopathy narrowing the tracheal airway  No significant adenopathy is seen in the abdomen and pelvis the exception of some small periceliac nodes  Prominent right axillary   node    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 more likely than lymphoma    Edema the right axilla related to 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  There is compression of the SVC  No obvious filling defect in the pulmonary arterial system although this is poorly evaluated delayed phase examination  The patient would be at risk for pulmonary embolism    CT soft tissue neck showed extensive necrotic lymphadenopathy involving partially imaged mediastinum, bilateral hilum, and left greater than right bilateral cervical levels III and IV suspicious for malignancy  Retropharyngeal edema from C1-C6  CTA chest PE study revealed left lower lobe segmental pulmonary embolism  Right lower lobe segmental pulmonary embolism        Also under treatment for hypercalcemia  Inpatient consult to Radiation Oncology  Consult performed by: Jeff Montano MD  Consult ordered by: Keegan Ventura DO          Review of Systems   Constitutional: Positive for activity change  Negative for fever  HENT: Positive for facial swelling   Negative for congestion, nosebleeds, rhinorrhea, sneezing, sore throat, trouble swallowing and voice change  Eyes: Negative for photophobia and visual disturbance  Respiratory: Positive for cough, shortness of breath and wheezing  Cardiovascular: Negative for chest pain, palpitations and leg swelling  Gastrointestinal: Negative for abdominal pain, blood in stool, nausea and vomiting  Genitourinary: Negative for difficulty urinating, dysuria and hematuria  Musculoskeletal: Negative for arthralgias, back pain, gait problem and joint swelling  Skin: Negative  Allergic/Immunologic: Negative  Neurological: Positive for dizziness  Negative for syncope, speech difficulty, weakness and numbness  Hematological: Positive for adenopathy  Psychiatric/Behavioral: Positive for agitation and confusion  Historical Information   Previous Oncology History: None  Past Medical History:   Diagnosis Date   • Bilateral pulmonary embolism ([place] [id number] ) 04/28/2023   • Current smoker    • Diabetes mellitus ([place] [address] )    • GERD (gastroesophageal reflux disease)    • Gout    • Hypertension    • Lung cancer ([place] [address] )    • 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[facility])    • Mixed dyslipidemia    • Pleural effusion, right 04/28/2023   • Subclavian vein thrombosis (HCC)    • Type 2 diabetes mellitus without complication, without long-term current use of insulin ([place] [address] ) 04/20/2023     Past Surgical History:   Procedure Laterality Date   • ANAL FISSURECTOMY     • IR BIOPSY NECK  5/1/2023   • TONSILLECTOMY       OB/GYN History: Not applicable  No family history on file    Social History   Social History     Substance and Sexual Activity   Alcohol Use Yes     Social History     Substance and Sexual Activity   Drug Use Not Currently     Social History     Tobacco Use   Smoking Status Every Day   • Packs/day: 1 00   • Types: Cigarettes   Smokeless Tobacco Never       Meds/Allergies   all current active meds have been reviewed    No Known Allergies    Objective     Intake/Output Summary (Last 24 hours) at 5/3/2023 0905  Last data filed at 5/3/2023 0835  Gross per 24 hour   Intake 1468 99 ml   Output 1944 ml   Net -475 01 ml     Invasive Devices     Peripheral Intravenous Line  Duration           Peripheral IV 05/02/23 Dorsal (posterior); Left Forearm <1 day    Peripheral IV 05/02/23 Left;Ventral (anterior) Forearm <1 day          Drain  Duration           External Urinary Catheter Small <1 day              Physical Exam  Constitutional:       General: He is in acute distress  HENT:      Head:      Comments: Facial swelling  Nose: Congestion present  Mouth/Throat:      Pharynx: Oropharynx is clear  Eyes:      Extraocular Movements: Extraocular movements intact  Conjunctiva/sclera: Conjunctivae normal       Pupils: Pupils are equal, round, and reactive to light  Neck:      Comments: Bilateral neck swelling  Cardiovascular:      Rate and Rhythm: Normal rate and regular rhythm  Heart sounds: Normal heart sounds  Pulmonary:      Breath sounds: Normal breath sounds  No stridor  Chest:      Chest wall: No tenderness  Abdominal:      General: There is no distension  Palpations: Abdomen is soft  There is no mass  Tenderness: There is no abdominal tenderness  Musculoskeletal:         General: Swelling present  No tenderness  Cervical back: Neck supple  Comments: Arm swelling more prominent right  Lymphadenopathy:      Cervical: Cervical adenopathy present  Skin:     General: Skin is dry  Findings: No bruising, erythema, lesion or rash  Neurological:      General: No focal deficit present  Mental Status: He is disoriented  Sensory: No sensory deficit  Motor: No weakness  Psychiatric:         Mood and Affect: Mood normal          Behavior: Behavior normal           Lab Results: I have personally reviewed pertinent reports  Imaging Studies: I have personally reviewed pertinent films in PACS  EKG, Pathology, and Other Studies: I have personally reviewed pertinent reports  Assessment/Plan     Assessment and Plan:  Acute SVC syndrome with impending tracheal compression by extensive masses mediastinum as well as bilateral neck  Biopsy performed yesterday pathology report pending  Could be lymphoma or small cell cancer       We intend to start radiation therapy today after CT simulation at noon time  and begin treatment later this afternoon  Final dose depends on the final diagnosis  Code Status: Level 1 - Full Code  Advance Directive and Living Will:      Power of :    POLST:      Counseling / Coordination of Care  Total floor / unit time spent today 15 minutes  Greater than 50% of total time was spent with the patient and / or family counseling and / or coordination of care  A description of the counseling / coordination of care: We plan to start radiation therapy today

## 2023-05-03 NOTE — QUICK NOTE
Radiation Oncology Treatment Note    A treatment dose of 200 cGy was administered today to the involved tumor site(s) lung, mediastinum, neck using 1-10 MV photons   Present total dose at this time is 200 cGy    Approximately 9 more treatments critical review

## 2023-05-03 NOTE — PHYSICAL THERAPY NOTE
Physical Therapy Evaluation     Patient's Name: Georgiana Sesay    Admitting Diagnosis  Lung cancer [facility]) [C34 [id number]]    Problem List  Patient Active Problem List   Diagnosis    Acute pneumonia    Lung mass    Neck mass    Hypercalcemia    Hyponatremia    Subclavian vein thrombosis (HCC)    Type 2 diabetes mellitus without complication, without long-term current use of insulin (HCC)    SIRS (systemic inflammatory response syndrome) (HCC)    Hypercoagulopathy (HCC)    Tobacco abuse    Essential hypertension    Mixed dyslipidemia    Gout    Bilateral pulmonary embolism (HCC)    Severe systemic inflammatory response syndrome (SIRS) (HCC)    Acute respiratory failure with hypoxia (HCC)    Pleural effusion, right    Acute encephalopathy    New infarction of cerebellum (HCC)    Electrolyte abnormality    ETOH abuse    Delirium tremens ([facility] )       Past Medical History  Past Medical History:   Diagnosis Date    Bilateral pulmonary embolism ([facility] ) 04/28/2023    Current smoker     Diabetes mellitus (HCC)     GERD (gastroesophageal reflux disease)     Gout     Hypertension     Lung cancer (HCC)     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)     Mixed dyslipidemia     Pleural effusion, right 04/28/2023    Subclavian vein thrombosis (HCC)     Type 2 diabetes mellitus without complication, without long-term current use of insulin ([facility] ) 04/20/2023       Past Surgical History  Past Surgical History:   Procedure Laterality Date    ANAL FISSURECTOMY      IR BIOPSY NECK  5/1/2023    TONSILLECTOMY            05/03/23 0913   PT Last Visit   PT Visit Date 05/03/23   Note Type   Note type Evaluation   Pain Assessment   Pain Assessment Tool 0-10   Pain Score No Pain   Restrictions/Precautions   Weight Bearing Precautions Per Order No   Braces or Orthoses   (none)   Other Precautions Fall Risk;O2;Telemetry;Multiple lines; Bed Alarm; Chair Alarm; Restraints;Cognitive   Home Living   Type of [address] [place] [address] One level;Stairs to enter with rails  ([address]) Bathroom Shower/Tub Walk-in shower   Bathroom Toilet Raised   Bathroom Equipment Shower chair;Grab bars in shower   Additional Comments Per PT evaluation from 04/28: resides with spouse in a 1 level home (5 SAM)  Walk in shower  Raised toilet  Shower chair  Grab bars  FT employment (engineering and sales)  On 5/1 during PT treatment session patient ambulated 30 feet min-AX1 w/o AD   Prior Function   Level of [place] Independent with ADLs; Independent with functional mobility; Independent with IADLS   Lives With Spouse   Receives Help From Family   IADLs Independent with driving; Independent with meal prep; Independent with medication management   Falls in the last 6 months 0   Vocational Full time employment  ( and sales)   General   Additional Pertinent History 66year old male admitted to -B on 5/2/2023 as transfer from [address] [place] [address] where he presented with altered mental status on 4/28  Patient was found to have b/l PE and mediastinal and hilar adenopathy with mass effect on the trachea  MRI of brain was consistent with new cerebellar infarct (left)  Thoracic surgery consulted and no emergent surgical intervention is planned  Patient is pending radiation therapy     Family/Caregiver Present No   Cognition   Overall Cognitive Status Impaired   Arousal/Participation Alert   Attention Attends with cues to redirect   Orientation Level Oriented to person;Disoriented to situation;Disoriented to time  (knew hospital)   Memory Decreased recall of precautions;Decreased recall of recent events;Decreased short term memory   Following Commands Follows one step commands with increased time or repetition   Comments cooperative, poor historian   Subjective   Subjective agreeable to getting out of bed to chair   RUE Assessment   RUE Assessment   (+ edema, see OT eval)   LUE Assessment   LUE Assessment   (see OT eval)   RLE Assessment   RLE Assessment WFL  (4/5 grossly)   LLE Assessment   LLE Assessment WFL  (4/5 grossly)   Bed Mobility   Supine to Sit 3  Moderate assistance   Additional items Assist x 2; Increased time required;Verbal cues   Sit to Supine Unable to assess   Additional Comments post evaluation patient OOB in chair w/ alarm active   Transfers   Sit to Stand 3  Moderate assistance   Additional items Assist x 2; Increased time required;Verbal cues   Stand to Sit 3  Moderate assistance   Additional items Assist x 2; Increased time required;Verbal cues   Stand pivot 3  Moderate assistance   Additional items Assist x 2; Increased time required;Verbal cues   Additional Comments transferred with hand held assist   Ambulation/Elevation   Gait pattern Excessively slow;Decreased foot clearance   Gait Assistance 3  Moderate assist   Additional items Assist x 2   Assistive Device None  (HHA)   Distance 2 feet (bed to chair)   Balance   Static Sitting Fair +   Static Standing Poor   Ambulatory Poor   Endurance Deficit   Endurance Deficit Yes   Endurance Deficit Description 6 L O2 sats 91%, lethargy   Activity Tolerance   Activity Tolerance Patient limited by fatigue   Medical Staff Made Aware This high complexity evaluation was performed with an occupational therapist due to the patient's co-morbidities, clinically unstable presentation, and present impairments which are a regression from the patient's baseline  Nurse Made Aware finn to see per RN   Assessment   Prognosis Good   Problem List Decreased strength;Decreased endurance; Impaired balance;Decreased mobility; Decreased cognition   Assessment PT completed evaluation of 66year old male admitted to [facility] on 5/2/2023 as transfer from [address] [place] [address] where he presented with altered mental status on 4/28  Patient was found to have b/l PE and mediastinal and hilar adenopathy with mass effect on the trachea  MRI of brain was consistent with new cerebellar infarct (left)  Thoracic surgery consulted and no emergent surgical intervention is planned  Patient is pending radiation therapy       Current status instabilities include ongoing admission to medical ICU, 6 L O2, continuous O2/HR monitoring, R LE edema, falls risk, and bed/chair alarms  PMH is significant for DM, HTN, and smoking  Per PT evaluation from 04/28: resides with spouse in a 1 level home (5 SAM)  Walk in shower  Raised toilet  Shower chair  Grab bars  FT employment (engineering and sales)  On 5/1 during PT treatment session patient ambulated 30 feet min-AX1 w/o AD  Current impairments include decreased activity tolerance, balance, coordination deficits and gait deficits  During PT evaluation patient required mod-AX2 for supine-->sit transfer, sit<-->stand transfer, and ambulation  With hand held assistance patient ambulated 2 feet (bed to chair) presenting with unsteady gait  PT d/c recommendation is for rehab  Patient will continue to benefit from continued skilled PT this admission to achieve maximal function and safety  Goals   Patient Goals to be independent   STG Expiration Date 05/17/23   Short Term Goal #1 1) Perform bed mobility S level to participate in frequent repositioning and improve skin integrity; 2) Perform functional transfers S level to promote I with toileting and OOB mobility; 3) Ambulate 200 feet S level with least restrictive device to participate in household and community level mobility; 4) Improve b/l LE strength by 1/2 grade in order to improve efficiency of tranfers; 5) Improve balance by 1 grade to reduce risk for falls; 6) Improve overall activity tolerance to 60 minutes in order to increase patient's ability to engage in mobility tasks; 7) Navigate 5 steps S level in order to safely navigate multiple floors at home   PT Treatment Day 0   Plan   Treatment/Interventions Functional transfer training;LE strengthening/ROM; Therapeutic exercise; Endurance training;Patient/family training;Equipment eval/education;Gait training;Bed mobility;OT;Spoke to nursing;Elevations   PT Frequency 2-3x/wk   Recommendation   PT Discharge Recommendation Post acute rehabilitation services   AM-PAC Basic Mobility Inpatient   Turning in Flat Bed Without Bedrails 2   Lying on Back to Sitting on Edge of Flat Bed Without Bedrails 2   Moving Bed to Chair 1   Standing Up From Chair Using Arms 1   Walk in Room 1   Climb 3-5 Stairs With Railing 1   Basic Mobility Inpatient Raw Score 8   Turning Head Towards Sound 4   Follow Simple Instructions 3   Low Function Basic Mobility Raw Score  15   Low Function Basic Mobility Standardized Score  23 9   Highest Level Of Mobility   -HLM Goal 3: Sit at edge of bed   JH-HLM Achieved 4: Move to chair/commode     The patient's AM-PAC Basic Mobility Inpatient Standardized Score is less than 42 9, suggesting this patient may benefit from discharge to post-acute rehabilitation services  Please also refer to the recommendation of the Physical Therapist for safe discharge planning         Trang Ramirez, PT, DPT

## 2023-05-03 NOTE — PLAN OF CARE
Problem: PHYSICAL THERAPY ADULT  Goal: Performs mobility at highest level of function for planned discharge setting  See evaluation for individualized goals  Description: Treatment/Interventions: Functional transfer training, LE strengthening/ROM, Therapeutic exercise, Endurance training, Patient/family training, Equipment eval/education, Gait training, Bed mobility, OT, Spoke to nursing, Elevations          See flowsheet documentation for full assessment, interventions and recommendations  Note: Prognosis: Good  Problem List: Decreased strength, Decreased endurance, Impaired balance, Decreased mobility, Decreased cognition  Assessment: PT completed evaluation of 66year old male admitted to hospitals on 5/2/2023 as transfer from [address] [place] [address] where he presented with altered mental status on 4/28  Patient was found to have b/l PE and mediastinal and hilar adenopathy with mass effect on the trachea  MRI of brain was consistent with new cerebellar infarct (left)  Thoracic surgery consulted and no emergent surgical intervention is planned  Patient is pending radiation therapy  Current status instabilities include ongoing admission to medical ICU, 6 L O2, continuous O2/HR monitoring, R LE edema, falls risk, and bed/chair alarms  PMH is significant for DM, HTN, and smoking  Per PT evaluation from 04/28: resides with spouse in a 1 level home (5 SAM)  Walk in shower  Raised toilet  Shower chair  Grab bars  FT employment ([employer])  On 5/1 during PT treatment session patient ambulated 30 feet min-AX1 w/o AD  Current impairments include decreased activity tolerance, balance, coordination deficits and gait deficits  During PT evaluation patient required mod-AX2 for supine-->sit transfer, sit<-->stand transfer, and ambulation  With hand held assistance patient ambulated 2 feet (bed to chair) presenting with unsteady gait  PT d/c recommendation is for rehab  Patient will continue to benefit from continued skilled PT this admission to achieve maximal function and safety  PT Discharge Recommendation: Post acute rehabilitation services    See flowsheet documentation for full assessment

## 2023-05-03 NOTE — UTILIZATION REVIEW
Initial Clinical Review    Admission: Date/Time/Statement:   Admission Orders (From admission, onward)     Ordered        05/02/23 2038  Inpatient Admission  Once                      Orders Placed This Encounter   Procedures   • Inpatient Admission     Standing Status:   Standing     Number of Occurrences:   1     Order Specific Question:   Level of Care     Answer:   Critical Care [15]     Order Specific Question:   Estimated length of stay     Answer:   More than 2 Midnights     Order Specific Question:   Certification     Answer:   I certify that inpatient services are medically necessary for this patient for a duration of greater than two midnights  See H&P and MD Progress Notes for additional information about the patient's course of treatment  ED Arrival Information     Patient not seen in ED                     No chief complaint on file  Initial Presentation: 66 y o  male with PMH of DM, HTN, smoking, alc abuse was transferred from [facility] to [facility] for a higher level of care  Pt initially presented to [address] [place] [address] w/ AMS on 04/28  He was found to have bilateral pulmonary emboli and mediastinal and hilar adenopathy with mass effect on the trachea on imaging  He had an MRI that was consistent with a new cerebellar infarct  He was evaluated by pulmonology and thoracics at [address] [place] [address] who recommended transfer to [facility] AND [facility] for urgent radiation therapy of cervical masses  On arrival to [facility], pt w/ no focal deficit, ill appearing, tachycardia, rhonchi, on 12L [address], urinary cath in place, Strength of b/l LE 5/5, RUE 5/5, LUE 4+/5  Admitted as Inpatient for acute encephalopathy, lung mass, neck mass, b/l pulmonary emboli  Acute hypoxic resp failure, cerebellar infarct, R subclavian vein thrombosis  Plan: Cont on [address], Goal SpO2 >92%  Continuous pulse oximetry  Heparin VTE high, continue  Precedex gtt  repeat VBG  Continue statin  Aspirin  isolyte @ 100/hr  monitor and replete as needed, goal Mag >2, Phos >3, K >4  NPO  Rad/onc consulted  Thoracics consulted  Monitor for SVC syndrome   change ceftriaxone to zosyn, continue vanco  Follow Sputum and MRSA cultures, bld cxs  Trend WBC, fever curve  Date: 05/03 Day 2:   Critical Care Notes: No acute events overnight  Pt remained on midflow oxygen  Precedex gtt overnight with intermittent agitation easily redirectable  No evidence of hypercarbia on repeat VBG  Paraneoplastic panel ordered  Cont Precedex gtt  Consider EEG  Cont High dose thiamine, folate  Cont hep gtt  Cont statin, ASA  Cont MFNC, wean as able, SpO2 goal >92%  IVF, NPO  Cont IV Abx  PE: aaox3  Intermittently agitated, falling asleep during exam  4+/5 strength in LUE with 5/5 strength in RUE and b/l LE similar to previous  tachycardia present  Thoracic Surg Consult; Pt w/ cavitary mass of the right middle lobe measuring 5 7cm with significant mediastinal adenopathy causing tracheal narrowing  Concerning for malignancy  Plan: no emergent surgical intervention planned  radiation therapy per rad onc   f/u IR left neck LN biopsy  further recs to follow pending clinical progress and work up  Radiation Onc Consult: Acute SVC syndrome with impending tracheal compression by extensive masses mediastinum as well as bilateral neck  Biopsy performed yesterday pathology report pending  Could be lymphoma or small cell cancer  start radiation therapy today after CT simulation at noon time  and begin treatment later this afternoon  Final dose depends on the final diagnosis  Neurology Consult: Acute encephalopathy, suspect multifactorial toxic metabolic / infectious  No evidence to suspect seizure activity  MRI brain showed subacute infarct in the left cerebellum- Suspect etiology is secondary to hypercoagulable state from malignancy  Plan: Cont stroke care: ASA, Hep gtt, statin  Smoking cessation  Cont telemetry  CIWA protocol, IV thiamine  Frequent neuro checks  Serial neurological examination   Consider repeat of MRI of brain  Consider EEG, will hold at this time       ED Triage Vitals   Temperature Pulse Respirations Blood Pressure SpO2   05/03/23 0045 05/02/23 2045 05/02/23 2045 05/02/23 2045 05/02/23 2045   98 5 °F (36 9 °C) (!) 120 (!) 30 144/91 96 %      Temp Source Heart Rate Source Patient Position - Orthostatic VS BP Location FiO2 (%)   05/03/23 0045 05/03/23 0045 -- -- --   Oral Monitor         Pain Score       05/02/23 2100       2          Wt Readings from Last 1 Encounters:   05/03/23 97 2 kg (214 lb 4 6 oz)     Additional Vital Signs:   Date/Time Temp Pulse Resp BP MAP (mmHg) SpO2 Calculated FIO2 (%) - Nasal Cannula O2 Flow Rate (L/min) Nasal Cannula O2 Flow Rate (L/min) O2 Device O2 Interface Device   05/03/23 1406 -- 92 20 136/73 104 99 % -- -- -- -- --   05/03/23 1402 -- -- -- -- -- 97 % -- -- -- -- --   05/03/23 1306 -- 92 23 Abnormal  136/69 103 96 % -- -- -- -- --   05/03/23 0945 -- 92 18 142/74 103 95 % -- -- -- -- --   05/03/23 0845 -- 96 26 Abnormal  116/66 88 94 % -- -- -- -- --   05/03/23 0811 -- -- -- -- -- 96 % 44 -- 6 L/min Nasal cannula --   05/03/23 0743 -- 90 21 117/76 92 96 % -- -- -- -- --   05/03/23 0740 -- -- -- -- -- 96 % 44 -- 6 L/min Mid flow nasal cannula --   05/03/23 0645 -- 86 17 119/65 86 96 % -- -- -- -- --   05/03/23 0545 -- 94 19 126/70 102 95 % -- -- -- -- --   05/03/23 0445 98 3 °F (36 8 °C) 90 17 131/75 97 95 % -- -- -- -- --   05/03/23 0345 -- 68 36 Abnormal  149/74 110 95 % -- -- -- -- --   05/03/23 0308 -- -- -- -- -- 95 % -- 8 L/min -- Mid flow nasal cannula MFNC prongs   05/03/23 0245 -- 90 16 129/76 95 95 % -- -- -- -- --   05/03/23 0145 -- 96 18 121/74 108 96 % -- -- -- -- --   05/03/23 0045 98 5 °F (36 9 °C) 90 21 119/74 88 96 % -- -- -- -- --   05/02/23 2345 -- 104 26 Abnormal  110/71 92 97 % -- -- -- -- --   05/02/23 2245 -- 114 Abnormal  35 Abnormal  -- -- 93 % -- -- -- -- --   05/02/23 2145 -- 112 Abnormal  19 -- -- 99 % -- -- -- -- --   05/02/23 2118 -- -- -- -- -- 98 % -- 12 L/min -- Mid flow nasal cannula        Pertinent Labs/Diagnostic Test Results:   CT RADIATION THERAPY    (Results Pending)     05/02  CXR:Since April 20, 2023, increased right lower lung opacity  Given the findings on April 28, 2023 CT, differential considerations include a combination of atelectasis, infarction, effusion and known adenopathy  Infection is to be determined on clinical   grounds      CT Head: Mild periventricular white matter hypodensity seen related to chronic small vessel ischemic changes     Date and Time Eye Opening Best Verbal Response Best Motor Response Cheli Coma Scale Score   05/03/23 0500 4 4 6 14   05/03/23 0000 4 4 6 14   05/02/23 2100 4 4 6 14   05/02/23 1500 4 2 4 10   05/02/23 1100 2 2 4 8   05/02/23 0800 3 4 6 13   05/01/23 2015 4 4 6 14   05/01/23 1200 4 4 6 14   05/01/23 0800 4 4 6 14   05/01/23 0400 4 4 6 14   05/01/23 0000 4 4 6 14   04/30/23 2000 4 4 6 14   04/30/23 0800 4 4 6 14   04/30/23 0400 4 4 6 14   04/30/23 0000 4 4 6 14   04/29/23 2000 4 4 6 14   04/29/23 1600 4 4 6 14   04/29/23 1200 4 4 6 14   04/29/23 0800 4 4 6 14   04/29/23 0400 4 4 6 14   04/29/23 0000 4 5 6 15   04/28/23 1945 4 4 6 14   04/28/23 1100 4 4 6 14   04/28/23 1000 4 4 6 14   04/28/23 0900 4 4 6 14   04/28/23 0800 4 4 6 14   04/28/23 0600 4 4 6 14   04/28/23 0500 4 5 6 15   04/28/23 0400 4 5 6 15   04/28/23 0300 4 5 6 15   04/28/23 0200 4 5 6 15   04/28/23 0059 4 5 6 15   04/28/23 0035 4 5 6 15       Results from last 7 days   Lab Units 04/28/23  0057   SARS-COV-2  Negative     Results from last 7 days   Lab Units 05/03/23  0509 05/02/23  2136 05/02/23  0443 05/01/23  0438 04/30/23  0524   WBC Thousand/uL 17 71* 17 38* 15 36* 16 11* 13 79*   HEMOGLOBIN g/dL 11 8* 12 3 12 4 12 3 11 9*   HEMATOCRIT % 37 3 39 1 38 2 37 5 36 8   PLATELETS Thousands/uL 366 366 350 453* 544*   NEUTROS ABS Thousands/µL 15 37* 15 02* 13 00* 13 98* 11 24*         Results from last 7 days   Lab Units 05/03/23  1317 05/02/23  2136 05/02/23  0194 05/01/23  2844 04/30/23  0524 04/29/23  9918 04/29/23  0459 04/28/23  1958 04/28/23  1837 04/28/23  0629   SODIUM mmol/L 146 144 140 138 136   < > 134*   < > 131* 128*   POTASSIUM mmol/L 3 5 3 2* 3 4* 3 5 3 4*   < > 3 5   < > 3 5 3 6   CHLORIDE mmol/L 114* 111* 106 105 101   < > 99   < > 96 92*   CO2 mmol/L 29 28 27 27 27   < > 28   < > 29 30   ANION GAP mmol/L 3* 5 7 6 8   < > 7   < > 6 6   BUN mg/dL 16 15 16 14 10   < > 10   < > 11 12   CREATININE mg/dL 0 84 0 83 0 87 0 85 0 70   < > 0 65   < > 0 75 0 81   EGFR ml/min/1 73sq m 94 94 93 94 101   < > 105   < > 99 95   CALCIUM mg/dL 9 8 10 3* 11 0* 13 0* 11 6*   < > 10 9*   < > 11 4* 13 3*   CALCIUM, IONIZED mmol/L  --   --   --  1 55*  --   --  1 34*  --   --  1 57*   MAGNESIUM mg/dL 2 1 1 6  --  1 5* 1 5*  --   --   --  1 8* 1 4*   PHOSPHORUS mg/dL 2 9 1 7*  --  2 9  --   --   --   --   --  2 4    < > = values in this interval not displayed       Results from last 7 days   Lab Units 05/02/23  1051 05/02/23  0443 05/01/23  0438 04/30/23  0524 04/29/23  0459 04/28/23  0629   AST U/L  --  36 45* 32 40* 75*   ALT U/L  --  57* 72* 71* 90* 137*   ALK PHOS U/L  --  107* 129* 124* 123* 151*   TOTAL PROTEIN g/dL  --  6 9 7 1 6 9 6 5 7 0   ALBUMIN g/dL  --  3 3* 3 4* 3 2* 3 2* 3 5   TOTAL BILIRUBIN mg/dL  --  0 39 0 45 0 40 0 38 0 66   BILIRUBIN DIRECT mg/dL  --   --   --   --  0 05  --    AMMONIA umol/L 50  --   --   --   --   --      Results from last 7 days   Lab Units 05/03/23  1143 05/03/23  0510 05/03/23  0036 05/02/23  1649 05/02/23  1104 05/02/23  0715 05/01/23  2109 05/01/23  1542 05/01/23  1302 05/01/23  0716 04/30/23  2231 04/30/23  2132   POC GLUCOSE mg/dl 124 127 128 116 114 96 120 139 132 124 120 126     Results from last 7 days   Lab Units 05/03/23  0509 05/02/23  2136 05/02/23  0966 05/01/23  4476 04/30/23  0524 04/29/23  1159 04/29/23  8049 04/29/23  0459 04/29/23  0018 04/28/23  1958 04/28/23  1837 04/28/23  8157 GLUCOSE RANDOM mg/dL 137 133 127 131 115 113 116 113 123 149* 166* 98         Results from last 7 days   Lab Units 04/30/23  0524 04/28/23  0629 04/28/23  0047   HEMOGLOBIN A1C % 6 1* 6 1* 6 1*   EAG mg/dl 128 128 128     No results found for: BETA-HYDROXYBUTYRATE   Results from last 7 days   Lab Units 05/02/23  0959   PH ART  7 449   PCO2 ART mm Hg 39 3   PO2 ART mm Hg 63 2*   HCO3 ART mmol/L 26 6   BASE EXC ART mmol/L 2 6   O2 CONTENT ART mL/dL 17 3   O2 HGB, ARTERIAL % 91 6*   ABG SOURCE  Radial, Left     Results from last 7 days   Lab Units 05/02/23  2136   PH JENNIFER  7 380   PCO2 JENNIFER mm Hg 45 0   PO2 JENNIFER mm Hg 92 0*   HCO3 JENNIFER mmol/L 26 0   BASE EXC JENNIFER mmol/L 0 6   O2 CONTENT JENNIFER ml/dL 17 6   O2 HGB, VENOUS % 95 2*             Results from last 7 days   Lab Units 04/28/23  0432 04/28/23  0304 04/28/23  0047   HS TNI 0HR ng/L  --   --  44   HS TNI 2HR ng/L  --  36  --    HSTNI D2 ng/L  --  -8  --    HS TNI 4HR ng/L 43  --   --    HSTNI D4 ng/L -1  --   --          Results from last 7 days   Lab Units 05/03/23  1145 05/03/23  0509 05/02/23  2136 05/01/23  1015 04/28/23  1012 04/28/23  0047   PROTIME seconds  --   --  17 9* 16 9*  --  20 1*   INR   --   --  1 45* 1 36*  --  1 71*   PTT seconds 112* 52* 159*  --    < > 38*    < > = values in this interval not displayed           Results from last 7 days   Lab Units 05/02/23  1051 04/28/23  0629   PROCALCITONIN ng/ml 0 23 0 15     Results from last 7 days   Lab Units 04/28/23  0629   LACTIC ACID mmol/L 0 9             Results from last 7 days   Lab Units 04/28/23  0630   BNP pg/mL 30                             Results from last 7 days   Lab Units 04/28/23  0641   CLARITY UA  Clear   COLOR UA  Yellow   SPEC GRAV UA  1 020   PH UA  6 0   GLUCOSE UA mg/dl Negative   KETONES UA mg/dl Negative   BLOOD UA  Moderate*   PROTEIN UA mg/dl Negative   NITRITE UA  Negative   BILIRUBIN UA  Negative   UROBILINOGEN UA E U /dl 0 2   LEUKOCYTES UA  Negative   WBC UA /hpf 1-2   RBC UA /hpf 10-20*   BACTERIA UA /hpf Occasional   EPITHELIAL CELLS WET PREP /hpf Occasional     Results from last 7 days   Lab Units 04/28/23  0057   INFLUENZA A PCR  Negative   INFLUENZA B PCR  Negative   RSV PCR  Negative                             Results from last 7 days   Lab Units 05/02/23  1412 04/28/23  0631 04/28/23  8711   BLOOD CULTURE   --  No Growth After 5 Days  No Growth After 5 Days     SPUTUM CULTURE  Culture too young- will reincubate  --   --    GRAM STAIN RESULT  2+ Epithelial cells per low power field*  Rare Polys*  1+ Gram positive cocci in pairs and chains*  1+ Gram positive rods*  Rare Budding yeast*  --   --                ED Treatment:   Medication Administration - No Administrations Displayed (No Start Event Found)     None        Past Medical History:   Diagnosis Date   • Bilateral pulmonary embolism (HCC) 04/28/2023   • Current smoker    • Diabetes mellitus ([facility] )    • GERD (gastroesophageal reflux disease)    • Gout    • Hypertension    • Lung cancer (HCC)    • 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)    • Mixed dyslipidemia    • Pleural effusion, right 04/28/2023   • Subclavian vein thrombosis (HCC)    • Type 2 diabetes mellitus without complication, without long-term current use of insulin ([facility] ) 04/20/2023     Present on Admission:  • Acute encephalopathy      Admitting Diagnosis: Lung cancer ([place] [address] ) [C34 90]  Age/Sex: 66 y o  male  Admission Orders:  SCD  PT/OT  Cardio-Pulmonary Monitoring, Neuro Checks, Nursing dysphagia assesment, I/O, Daily weights, Vital signs  NPO  Restraints non violent    Scheduled Medications:  aspirin, 81 mg, Oral, Daily  chlorhexidine, 15 mL, Mouth/Throat, K09L [facility]  folic acid IVPB, 1 mg, Intravenous, Daily  insulin lispro, 1-6 Units, Subcutaneous, Q6H GAVIN  ipratropium, 0 5 mg, Nebulization, TID  levalbuterol, 1 25 mg, Nebulization, TID  nicotine, 14 mg, Transdermal, Daily  piperacillin-tazobactam, 3 375 g, Intravenous, Q8H  pravastatin, 40 mg, Oral, Daily With Dinner  thiamine, 500 mg, Intravenous, Daily  vancomycin, 1,250 mg, Intravenous, Q12H      Continuous IV Infusions:  dexmedetomidine, 0 1-0 7 mcg/kg/hr, Intravenous, Titrated  heparin (porcine), 3-30 Units/kg/hr (Order-Specific), Intravenous, Titrated  multi-electrolyte, 50 mL/hr, Intravenous, Continuous      PRN Meds:  acetaminophen, 650 mg, Oral, Q6H PRN  albuterol, 2 5 mg, Nebulization, Q4H PRN  bisacodyl, 10 mg, Rectal, Daily PRN        IP CONSULT TO THORACIC SURGERY  IP CONSULT TO RADIATION ONCOLOGY  IP CONSULT TO CASE MANAGEMENT  IP CONSULT TO PHARMACY  IP CONSULT TO NEUROLOGY    Network Utilization Review Department  ATTENTION: Please call with any questions or concerns to [phone] and carefully listen to the prompts so that you are directed to the right person  All voicemails are confidential   Holly Frees all requests for admission clinical reviews, approved or denied determinations and any other requests to dedicated fax number below belonging to the campus where the patient is receiving treatment   List of dedicated fax numbers for the Facilities:  [address] DENIALS (Administrative/Medical Necessity) [phone]   [address] (Maternity/NICU/Pediatrics) [phone]   4 Jen Stock [phone]   [address] [phone]   [address] [place] [address] [address] S[address] 28 [phone]   [address] [place] [facility] 134 815 [facility] [phone]

## 2023-05-03 NOTE — PROGRESS NOTES
The patient’s standard-infusion Piperacillin-Tazobactam / Zosyn (infused over 30-60 minutes) has been converted to extended-infusion (infused over 4 hours) per [facility] Extended-Infusion Piperacillin-Tazobactam Protocol for Adults as approved by the Pharmacy and Therapeutics Committee (accessible here on MyNET)       The patient met ALL eligible criteria:    Age >= 25years old   Critical Care patient    And did NOT have ANY exclusions:     Emergency Department or Operating Room patient  Drug incompatibilities that could NOT be avoided with timing or separate line administration    The following are reminders for Nursing regarding administration:  Infuse the first dose of Zosyn over 30min as a load (if new start), and then all subsequent doses will be given as an extended-infusion over 4 hours (see dosing below)  Use primary tubing as an intermittent infusion; change out primary tubing every 24 hours   Ensure full dose of the medication is given at the appropriate rate  Most incompatible drugs can be scheduled during times when the Zosyn is not being infused; however, if one requires administration during the same time, a separate site or lumen MUST be used  If access is limited and an incompatible medication urgently needs to be given, the Zosyn extended-infusion can be held for up to 30min (remember to flush line before/after)  Extended-infusion Zosyn does NOT require special timing around hemodialysis (it can even be given simultaneously)  If a patient needs an urgent MRI while Zosyn is infusing and there is not a MRI-compatible pump available for use, finish the infusion over the traditional length (30min) and ask Pharmacy to reschedule the next doses so that they start a few hours earlier  Pharmacy will assist nursing in troubleshooting other administration issues as they arise  Dosing for Piperacillin-Tazobactam  CrCl (mL/min) Traditional Dosing Extended-Infusion Dosing #   CrCl > 40 High-Dose  CrCl > 40 Low-Dose 4 5g Q6H (over 30min)  3 375g IV Q6H (over 30min) 3 375g IV Q8H (over 4hr)*    *1st dose loaded over 30min, then start extended-infusion dosing 4hr later   CrCl 20-40 High-Dose  CrCl 20-40 Low-Dose 3 375g IV Q6H (over 30min)  2 25g IV Q6H (over 30min)    CrCl < 20 High-Dose  CrCl < 20 Low-Dose 2 25g IV Q6H (over 30min)  2 25g IV Q8H (over 30min) 3 375g IV Q12H (over 4hr)*    *1st dose loaded over 30min, then start extended-infusion dosing 6hr later   Hemo/Peritoneal Dialysis High-Dose  Hemo/Peritoneal Dialysis Low-Dose 2 25g IV Q6H (over 30min)  2 25g IV Q8H (over 30min)    CVVH/D High-Dose  CVVH/D Low-Dose 3 375g IV Q6H (over 30min)  2 25 IV Q6H (over 30min) 3 375g IV Q8H (over 4hr)*    *1st dose loaded over 30min, then start extended-infusion dosing 4hr later   # = Use 4 5g dosing (same interval) if morbidly obese (BMI ?40)    Please call the Pharmacy with any questions or concerns

## 2023-05-03 NOTE — CONSULTS
Consultation - Thoracic Surgery   Oracio Blackburn 66 y o  male MRN: [id number]  Unit/Bed#: MICU 07 Encounter: [id number]    Assessment/Plan     Assessment:  63yo M with cavitary mass of the right middle lobe measuring 5 7cm with significant mediastinal adenopathy causing tracheal narrowing  Concerning for malignancy  4/28 presented to [address] [place] [address] with AMS --> cerebellar infarct, bilateral PE, acute respiratory failure, lung mass with adenopathy  5/1 IR biopsy of left neck LN  5/2 transfer to [facility] for radiation therapy    Afebrile, vitals stable on 12L mid flow    Plan:  - no emergent surgical intervention planned  - radiation therapy per rad onc  - f/u IR left neck LN biopsy  - further recs to follow pending clinical progress and work up   - remainder of care per primary service  - Please TigerText the surgery resident or AP role with any questions  History of Present Illness   HPI:  Oracio Blackburn is a 66 y o  male who presents as a transfer from [address] [place] [address]  He was taken to [address] [place] [address] on 4/28 by his wife for having altered mentation  Further work up was notable for cerebellar infarct and bilateral PE  He was started on a heparin gtt and monitored in the ICU  Prior to this admission, he was started on eliquis for subclavian vein thrombosis and was recommended follow up with pulm for evaluation of a large lung mass with mediastinal adenopathy concerning for malignancy  He was a smoker for over 30 years and smoked almost 1 pack per day  History was difficult to obtain as the patient was encephalopathic  On arrival to [facility], the patient had appropriate oxygen saturation on 12L mid flow  He was oriented with repeated prompting, but was somnolent and couldn't hold his attention  He was off pressors and only required precedex for a short time       PMHx T2DM, htn, gout, HLD    Inpatient consult to Thoracic Surgery  Consult performed by: Rich Patrikc MD  Consult ordered by: Alonso Long DO          Review of Systems   Constitutional: Negative  HENT: Negative  Respiratory: Positive for shortness of breath  Cardiovascular: Negative  Gastrointestinal: Negative  Genitourinary: Negative  Musculoskeletal: Negative  Skin: Negative  Neurological: Negative  Psychiatric/Behavioral: Negative  Historical Information   Past Medical History:   Diagnosis Date   • Bilateral pulmonary embolism ([place] [address] ) 04/28/2023   • Current smoker    • Diabetes mellitus ([place] [address] )    • GERD (gastroesophageal reflux disease)    • Gout    • Hypertension    • Lung cancer ([place] [address] )    • 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[facility])    • Mixed dyslipidemia    • Pleural effusion, right 04/28/2023   • Subclavian vein thrombosis (HCC)    • Type 2 diabetes mellitus without complication, without long-term current use of insulin ([place] [address] ) 04/20/2023     Past Surgical History:   Procedure Laterality Date   • ANAL FISSURECTOMY     • IR BIOPSY NECK  5/1/2023   • TONSILLECTOMY       Social History   Social History     Substance and Sexual Activity   Alcohol Use Yes     Social History     Substance and Sexual Activity   Drug Use Not Currently     E-Cigarette/Vaping   • E-Cigarette Use Never User      E-Cigarette/Vaping Substances     Social History     Tobacco Use   Smoking Status Every Day   • Packs/day: 1 00   • Types: Cigarettes   Smokeless Tobacco Never     Family History: non-contributory    Meds/Allergies   all current active meds have been reviewed  No Known Allergies    Objective   First Vitals:   Weight - Scale: 99 3 kg (218 lb 14 7 oz) (05/02/23 2039)  SpO2: 98 % (05/02/23 2118)    Current Vitals:   Weight - Scale: 99 3 kg (218 lb 14 7 oz) (05/02/23 2039)  SpO2: 98 % (05/02/23 2118)    No intake or output data in the 24 hours ending 05/02/23 2126    Invasive Devices     Peripheral Intravenous Line  Duration           Peripheral IV 04/28/23 Left Antecubital 4 days    Peripheral IV 05/02/23 Dorsal (posterior); Left Forearm <1 day          Drain  Duration           External Urinary Catheter Small <1 day                Physical Exam:  General: No acute distress  Neuro: alert and oriented  HEENT: moist mucous membranes  Neck: neck is soft, no crepitus, no tenderness, but enlarged bilaterally  CV: Well perfused, regular rate and rhythm  Lungs: Normal work of breathing, no increased respiratory effort  Abdomen: Soft, non-tender, non-distended  Extremities: No edema, clubbing or cyanosis  Skin: Warm, dry    Lab Results:   CBC:   Lab Results   Component Value Date    WBC 17 38 (H) 05/02/2023    HGB 12 3 05/02/2023    HCT 39 1 05/02/2023    MCV 91 05/02/2023     05/02/2023    MCH 28 6 05/02/2023    MCHC 31 5 05/02/2023    RDW 13 2 05/02/2023    MPV 8 9 05/02/2023    NRBC 0 05/02/2023   , CMP:   Lab Results   Component Value Date    SODIUM 144 05/02/2023    K 3 2 (L) 05/02/2023     (H) 05/02/2023    CO2 28 05/02/2023    BUN 15 05/02/2023    CREATININE 0 83 05/02/2023    CALCIUM 10 3 (H) 05/02/2023    AST 36 05/02/2023    ALT 57 (H) 05/02/2023    ALKPHOS 107 (H) 05/02/2023    EGFR 94 05/02/2023     Imaging: I have personally reviewed pertinent films in PACS  EKG, Pathology, and Other Studies: I have personally reviewed pertinent films in PACS    Counseling / Coordination of Care  Total floor / unit time spent today 20 minutes  Greater than 50% of total time was spent with the patient and / or family counseling and / or coordination of care         Angely Ly MD  General Surgery PGY1

## 2023-05-03 NOTE — TELEPHONE ENCOUNTER
Patients spouse is calling, she said she received a voicemail yesterday for Xavier Joseph about needing to reschedule something  She isn't really sure what the voicemail was about   Please advise    Chelsi Mitchell

## 2023-05-03 NOTE — CASE MANAGEMENT
Case Management Assessment & Discharge Planning Note    Patient name Norma Lopez  Location MICU 07/MICU 07 MRN [id number]  : 1960 Date 5/3/2023       Current Admission Date: 2023  Current Admission Diagnosis:Lung cancer [facility])   Patient Active Problem List    Diagnosis Date Noted   • ETOH abuse 2023   • Delirium tremens ([facility] [address] ) 2023   • Electrolyte abnormality 2023   • New infarction of cerebellum ([address] ) 2023   • Gout 2023   • Bilateral pulmonary embolism ([address] ) 2023   • Severe systemic inflammatory response syndrome (SIRS) ([facility] [address] ) 2023   • Acute respiratory failure with hypoxia ([facility] [address] ) 2023   • Pleural effusion, right 2023   • Acute encephalopathy 2023   • SIRS (systemic inflammatory response syndrome) ([facility] [address] ) 2023   • Hypercoagulopathy ([address] ) 2023   • Acute pneumonia 2023   • Lung mass 2023   • Neck mass 2023   • Hypercalcemia 2023   • Hyponatremia 2023   • Subclavian vein thrombosis ([facility] [address] ) 2023   • Type 2 diabetes mellitus without complication, without long-term current use of insulin ([facility] [address] ) 2023   • Tobacco abuse 2019   • Essential hypertension 2019   • Mixed dyslipidemia 2019      LOS (days): 1  Geometric Mean LOS (GMLOS) (days):   Days to GMLOS:     OBJECTIVE:  PATIENT READMITTED TO HOSPITAL  Risk of Unplanned Readmission Score: 17 32         Current admission status: Inpatient       Preferred Pharmacy:   [address] [place] [address]  Phone: [phone] Fax: [phone]    Primary Care Provider: Debra Szymanski DO    Primary Insurance: BLUE CROSS  Secondary Insurance:     ASSESSMENT:  Swati Villanueva Proxies    There are no active Health Care Proxies on file                        Patient Information  Admitted from[de-identified] Other (comment) (transfer from [facility])  Mental Status: Alert DISCHARGE DETAILS:                             Additional Comments: Patient is a transfer from [facility], please see CM Assessment dated 4/29/23  Patient independent at baseline, lives in ranch-style home with ramp  No history of STR/HHC  STR recommended while patient at [facility], referrals placed, and [facility] reserved  CM will continue to follow for d/c needs

## 2023-05-03 NOTE — H&P
ICU Acceptance Note - Critical Care   Fely Hernandez 66 y o  male MRN: [id number]  Unit/Bed#: MICU 07 Encounter: [id number]      -------------------------------------------------------------------------------------------------------------  Chief Complaint: altered mental status    History of Present Illness   HX and PE limited by: none   Fely Hernandez is a 66 y o  male with a significant past medical history of diabetes, HTN, smoking, who presented to [address] [place] [address] with altered mental status on 4/28/2023  He was found to have bilateral pulmonary emboli and mediastinal and hilar adenopathy with mass effect on the trachea on imaging  He had an MRI that was consistent with a new cerebellar infarct  He was evaluated by pulmonology and thoracics at [address] [place] [address] who recommended transfer for urgent radiation therapy of cervical masses      History obtained from chart review and the patient   -------------------------------------------------------------------------------------------------------------  Assessment and Plan:    Neuro:   • Diagnosis: Acute encephalopathy  o Likely multifactorial given cerebellar infarction, possible infection, questionable ETOH withdrawal  o Plan:  - Repeat CTH 5/2 with no acute changes  - Will repeat VBG now to see if there is a component of hypercarbia contributing  - CAM ICU  - Delirium precautions  - Precedex gtt  • Diagnosis: Delirium tremens  o Plan:   - History of chronic ETOH abuse  - Previously on CIWA and received phenobarbital 130mg 2x at [address] [place] [address]  - Will initiate high dose thiamine, folate  • Diagnosis: Cerebellar infarction  o Plan:  - Noted on MRI on 4/28  - Will continue heparin gtt  - Echo complete during admit, EF70% with no PFO  - Continue statin  - Aspirin  • Diagnosis: ETOH abuse with concern for withdrawal  o Plan:  - Previously on CIWA as above, will discontinue  - Likely past point of acute withdrawal as last drink was reportedly 4/26 as per wife  - Further management as above      CV:   • Diagnosis: Essential hypertension  o Plan:  - Holding home losartan      Pulm:  • Diagnosis: Acute hypoxic respiratory failure  o Multifactorial in setting of lung mass, bilateral pulmonary emboli, pleural effusion, and questionable aspiration pneumonitis/pneumonia  o Plan:   - Mid flow NC, continue  - Continuous pulse oximetry  - Goal SpO2 >92%  • Diagnosis: Bilateral pulmonary emboli  o Plan:  - Heparin VTE high, continue  • Diagnosis:  Right lower lung opacity  o Plan:  - Worsening on CXR 5/2/2023  - Possibly secondary to malignant effusion versus pulmonary infarct versus aspiration  • Diagnosis:  Tobacco abuse  o Plan:   - Encourage cessation upon discharge  - Nicotine patch      GI:   • Diagnosis: No active issues  o Plan:   - Bowel regimen ordered      :   • Diagnosis: No active issues  o Plan:   - Monitor I&Os  - Condom catheter in place, maintain      F/E/N:   • F- isolyte @ 100/hr  • E- monitor and replete as needed, goal Mag >2, Phos >3, K >4  • N- NPO      Heme/Onc:   • Diagnosis: Lung mass  o Likely malignant, s/p IR guided left cervical node biopsy on 5/1/23, pathology pending  o Plan:  - Urgent radiation therapy as per rad/onc  - Rad/onc consulted, appreciate recommendations  • Diagnosis: Neck mass  o Plan:  - Concern for trachael stenosis  - As per thoracics, possible stent placement needed  - Thoracics consulted, appreciate recs  • Diagnosis: Subclavian vein thrombosis, right  o Likely secondary to underlying malignancy  o Extension into the superior aspect of the SVC  o Plan:   - Heparin as above, continue  - Monitor for SVC syndrome      Endo:   • Diagnosis: Type 2 diabetes mellitus  o Plan:   - Sliding scale insulin, continue  - Goal -180      ID:   • Diagnosis: SIRS  o Plan:  - Given uptrending procalcitonin, continued encephalopathy, increased sputum production, antibiotics initiated at [address] [place] [address] today  - Will change ceftriaxone to zosyn, continue vanco  - Sputum and MRSA cultures sent, will follow  - Blood cultures sent, will follow  - Trend WBC, fever curve      MSK/Skin:   • Diagnosis: No active issues  o Plan:   - Routine skin care  - PT/OT when able      Disposition: Admit to Critical Care   Code Status: Level 1 - Full Code  --------------------------------------------------------------------------------------------------------------  Review of Systems   Respiratory: Positive for cough  Negative for shortness of breath  Cardiovascular: Negative for chest pain  Neurological: Negative for headaches  A 12-point, complete review of systems was reviewed and negative except as stated above     Physical Exam  Constitutional:       Appearance: He is ill-appearing  HENT:      Head: Normocephalic and atraumatic  Right Ear: External ear normal       Left Ear: External ear normal       Nose: Nose normal       Mouth/Throat:      Mouth: Mucous membranes are moist    Eyes:      Extraocular Movements: Extraocular movements intact  Cardiovascular:      Rate and Rhythm: Tachycardia present  Pulmonary:      Effort: Pulmonary effort is normal       Breath sounds: Rhonchi present  Comments: Mid flow in place  Abdominal:      General: Abdomen is flat  Palpations: Abdomen is soft  Tenderness: There is no abdominal tenderness  Genitourinary:     Comments: Urinary catheter in place  Neurological:      General: No focal deficit present  Comments: Strength of b/l LE 5/5, RUE 5/5, LUE 4+/5       --------------------------------------------------------------------------------------------------------------  Vitals: There were no vitals filed for this visit  Temp  Min: 96 8 °F (36 °C)  Max: 98 8 °F (37 1 °C)        There is no height or weight on file to calculate BMI    N/A    Laboratory and Diagnostics:  Results from last 7 days   Lab Units 05/02/23  0443 05/01/23  0438 04/30/23  0524 04/28/23  0629 04/28/23  0047   WBC Thousand/uL 15 36* 16 11* 13 79* 14 89* 16 47*   HEMOGLOBIN g/dL 12 4 12 3 11 9* 12 7 12 9   HEMATOCRIT % 38 2 37 5 36 8 37 7 37 9   PLATELETS Thousands/uL 350 453* 544* 516* 490*   NEUTROS PCT % 85* 87* 80* 79*  --    MONOS PCT % 8 8 10 12  --      Results from last 7 days   Lab Units 05/02/23  0443 05/01/23  0438 04/30/23  0524 04/29/23  1159 04/29/23  0834 04/29/23 0459 04/29/23  0018 04/28/23  1837 04/28/23  0629   SODIUM mmol/L 140 138 136 134* 134* 134* 133*   < > 128*   POTASSIUM mmol/L 3 4* 3 5 3 4* 3 6 3 5 3 5 3 5   < > 3 6   CHLORIDE mmol/L 106 105 101 100 100 99 98   < > 92*   CO2 mmol/L 27 27 27 27 28 28 29   < > 30   ANION GAP mmol/L 7 6 8 7 6 7 6   < > 6   BUN mg/dL 16 14 10 9 10 10 10   < > 12   CREATININE mg/dL 0 87 0 85 0 70 0 67 0 68 0 65 0 70   < > 0 81   CALCIUM mg/dL 11 0* 13 0* 11 6* 10 5* 10 5* 10 9* 11 0*   < > 13 3*   GLUCOSE RANDOM mg/dL 127 131 115 113 116 113 123   < > 98   ALT U/L 57* 72* 71*  --   --  90*  --   --  137*   AST U/L 36 45* 32  --   --  40*  --   --  75*   ALK PHOS U/L 107* 129* 124*  --   --  123*  --   --  151*   ALBUMIN g/dL 3 3* 3 4* 3 2*  --   --  3 2*  --   --  3 5   TOTAL BILIRUBIN mg/dL 0 39 0 45 0 40  --   --  0 38  --   --  0 66    < > = values in this interval not displayed       Results from last 7 days   Lab Units 05/01/23 0438 04/30/23  0524 04/28/23  1837 04/28/23  0629   MAGNESIUM mg/dL 1 5* 1 5* 1 8* 1 4*   PHOSPHORUS mg/dL 2 9  --   --  2 4      Results from last 7 days   Lab Units 05/01/23  1015 04/30/23  0524 04/29/23  0459 04/28/23  1628 04/28/23  1012 04/28/23  0047   INR  1 36*  --   --   --   --  1 71*   PTT seconds  --  63* 63* 71* 60* 38*          Results from last 7 days   Lab Units 04/28/23  0629   LACTIC ACID mmol/L 0 9     ABG:  Results from last 7 days   Lab Units 05/02/23  0959   PH ART  7 449   PCO2 ART mm Hg 39 3   PO2 ART mm Hg 63 2*   HCO3 ART mmol/L 26 6   BASE EXC ART mmol/L 2 6   ABG SOURCE  Radial, Left     VBG:  Results from last 7 days   Lab Units 05/02/23  0959   ABG SOURCE  Radial, Left Results from last 7 days   Lab Units 05/02/23  1051 04/28/23  0629   PROCALCITONIN ng/ml 0 23 0 15       Micro:  Results from last 7 days   Lab Units 04/28/23  0631 04/28/23  0629   BLOOD CULTURE  No Growth After 4 Days  No Growth After 4 Days  Imaging: I have personally reviewed pertinent reports  Historical Information   Past Medical History:   Diagnosis Date   • Bilateral pulmonary embolism ([place] [address] ) 04/28/2023   • Current smoker    • Diabetes mellitus ([place] [address] )    • GERD (gastroesophageal reflux disease)    • Gout    • Hypertension    • Lung cancer ([place] [address] )    • 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[facility])    • Mixed dyslipidemia    • Pleural effusion, right 04/28/2023   • Subclavian vein thrombosis (HCC)    • Type 2 diabetes mellitus without complication, without long-term current use of insulin ([place] [address] ) 04/20/2023     Past Surgical History:   Procedure Laterality Date   • ANAL FISSURECTOMY     • IR BIOPSY NECK  5/1/2023   • TONSILLECTOMY       Social History   Social History     Substance and Sexual Activity   Alcohol Use Yes     Social History     Substance and Sexual Activity   Drug Use Not Currently     Social History     Tobacco Use   Smoking Status Every Day   • Packs/day: 1 00   • Types: Cigarettes   Smokeless Tobacco Never     Family History:   No family history on file    Family history unknown      Medications:  Current Facility-Administered Medications   Medication Dose Route Frequency   • acetaminophen (TYLENOL) tablet 650 mg  650 mg Oral Q6H PRN   • bisacodyl (DULCOLAX) rectal suppository 10 mg  10 mg Rectal Daily PRN   • chlorhexidine (PERIDEX) 0 12 % oral rinse 15 mL  15 mL Mouth/Throat Q12H [facility]   • dexmedeTOMIDine (Precedex) 400 mcg in sodium chloride 0 9% 100 mL  0 1-0 7 mcg/kg/hr Intravenous Titrated   • heparin (porcine) 25,000 units in 0 45% NaCl 250 mL infusion (premix)  3-30 Units/kg/hr (Order-Specific) Intravenous Titrated   • insulin lispro (HumaLOG) 100 units/mL subcutaneous injection 1-6 Units  1-6 Units Subcutaneous HS   • [START ON 5/3/2023] insulin lispro (HumaLOG) 100 units/mL subcutaneous injection 1-6 Units  1-6 Units Subcutaneous TID AC   • ipratropium (ATROVENT) 0 02 % inhalation solution 0 5 mg  0 5 mg Nebulization Q6H   • levalbuterol (XOPENEX) inhalation solution 1 25 mg  1 25 mg Nebulization Q6H   • piperacillin-tazobactam (ZOSYN) 3 375 g in sodium chloride 0 9 % 100 mL IVPB  3 375 g Intravenous Q6H   • [START ON 5/3/2023] pravastatin (PRAVACHOL) tablet 40 mg  40 mg Oral Daily With Dinner   • [START ON 5/3/2023] vancomycin (VANCOCIN) 1500 mg in sodium chloride 0 9% 250 mL IVPB  15 mg/kg Intravenous Q12H     Home medications:  Prior to Admission Medications   Prescriptions Last Dose Informant Patient Reported? Taking? Multiple Vitamins-Minerals (MENS MULTIVITAMIN PO)   Yes No   Sig: Take 1 tablet by mouth in the morning   allopurinol (ZYLOPRIM) 300 mg tablet   Yes No   Sig: Take 1 tablet by mouth in the morning   apixaban (Eliquis) 5 mg   No No   Sig: Take 2 tablets (10 mg total) by mouth 2 (two) times a day for 7 days, THEN 1 tablet (5 mg total) 2 (two) times a day for 23 days     glipiZIDE (GLUCOTROL XL) 5 mg 24 hr tablet   Yes No   Sig: Take 5 mg by mouth daily   ibuprofen (MOTRIN) 400 mg tablet   Yes No   Sig: Take 400 mg by mouth every 6 (six) hours as needed for mild pain   simvastatin (ZOCOR) 20 mg tablet   Yes No   Sig: Take 1 tablet by mouth in the morning   valsartan (DIOVAN) 160 mg tablet   Yes No   Sig: Take 1 tablet by mouth daily      Facility-Administered Medications: None     Allergies:  No Known Allergies  ------------------------------------------------------------------------------------------------------------  Advance Directive and Living Will:      Power of :    POLST:    ------------------------------------------------------------------------------------------------------------  Anticipated Length of Stay is > 2 midnights    Care Time Delivered:   No Critical Care "time spent       [place], DO        Portions of the record may have been created with voice recognition software  Occasional wrong word or \"sound a like\" substitutions may have occurred due to the inherent limitations of voice recognition software    Read the chart carefully and recognize, using context, where substitutions have occurred  "

## 2023-05-03 NOTE — PROGRESS NOTES
Liam Gray is a 66 y o  male who is currently ordered Vancomycin IV with management by the Pharmacy Consult service  Relevant clinical data and objective / subjective history reviewed  Vancomycin Assessment:  Indication and Goal AUC/Trough: Pneumonia (goal -600, trough >10), -600, trough >10  Micro:     Renal Function:  SCr: 0 87 mg/dL  CrCl: 111 mL/min  Days of Therapy: 1  Current Dose: vancomycin loading dose 2000 mg IV x1  Vancomycin Plan:  New Dosing: vancomycin 2000 mg IV q24h  Estimated AUC: 429 mg/L*hr  Estimated Trough: 10 7 mg/L  Next Level: 5/3/23 @ 0600  Renal Function Monitoring: Daily BMP and [place] will continue to follow closely for s/sx of nephrotoxicity, infusion reactions and appropriateness of therapy  BMP and CBC will be ordered per protocol  We will continue to follow the patient’s culture results and clinical progress daily      Connie Siegel, Pharmacist

## 2023-05-03 NOTE — OCCUPATIONAL THERAPY NOTE
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[facility])     Mixed dyslipidemia     Pleural effusion, right 04/28/2023    Subclavian vein thrombosis (HCC)     Type 2 diabetes mellitus without complication, without long-term current use of insulin ([place] [address] ) 04/20/2023     Past Surgical History  Past Surgical History:   Procedure Laterality Date    ANAL FISSURECTOMY      IR BIOPSY NECK  5/1/2023    TONSILLECTOMY               05/03/23 0914   OT Last Visit   OT Visit Date 05/03/23   Note Type   Note type Evaluation   Pain Assessment   Pain Assessment Tool 0-10   Pain Score No Pain   Restrictions/Precautions   Weight Bearing Precautions Per Order No   Other Precautions Cognitive; Chair Alarm; Bed Alarm; Restraints;Multiple lines;Telemetry; Fall Risk;Pain;O2  (6L O2; B/L wrist restraints; R hand mitt)   Home Living   Type of [address] [place] [address] One level; Able to live on main level with bedroom/bathroom; Performs ADLs on one level;Stairs to enter with rails  (5 SAM)   Bathroom Shower/Tub Walk-in shower   Bathroom Toilet Raised   Bathroom Equipment Shower chair;Grab bars in [facility] Other (Comment)  (denies any)   Additional Comments Pt is a poor historian; hx obtained from EMR  Resides in 1 SH, w/ 5 SAM w/ HR   Prior Function   Level of [place] Independent with ADLs; Independent with functional mobility; Independent with IADLS   Lives With Spouse   Receives Help From Family   IADLs Independent with driving; Independent with medication management; Independent with meal prep   Falls in the last 6 months 0   Vocational Full time employment   Comments (+)    Lifestyle   Autonomy I w/ ADLS/IADLS, transfers and functional mobility PTA   Reciprocal Relationships Pt lives w/ his spouse   Service to Others Works full time in engineering and sales   Intrinsic Gratification TV   ADL   [address] 3  Moderate Assistance   Grooming Assistance 3  Moderate Assistance   [address] 2  Maximal Assistance   LB [address] 2  Maximal [address] 2  Maximal Assistance    [facility] [address] 2  Maximal [address]  2  Maximal Assistance   Functional Assistance 3  Moderate Assistance   Functional Deficit Steadying;Verbal cueing;Supervision/safety; Increased time to complete  (Ax2)   Bed Mobility   Supine to Sit 3  Moderate assistance   Additional items Assist x 2;HOB elevated; Increased time required;Verbal cues;LE management   Sit to Supine Unable to assess   Additional Comments Pt sat EOB w/ Min A for sitting balance/trunk control  Transfers   Sit to Stand 3  Moderate assistance   Additional items Assist x 2; Increased time required;Verbal cues   Stand to Sit 3  Moderate assistance   Additional items Assist x 2; Increased time required;Verbal cues   Additional Comments B/L HHA used  VC for safety   Functional Mobility   Functional Mobility 3  Moderate assistance   Additional Comments Pt took few small steps from EOB to chair w/ Mod A x2; B/L HHA used  VC for proper technique/safety  Assist for line management  O2 @ 91% on 6L throughout; /82     Additional items Hand hold assistance   Balance   Static Sitting Fair +   Dynamic Sitting Fair   Static Standing Poor   Dynamic Standing Poor   Ambulatory Poor   Activity Tolerance   Activity Tolerance Patient limited by fatigue   Medical Staff Made Aware PTBreann   Nurse Made Aware yes, Raji Graham   RUE Assessment   RUE Assessment X  (grossly 3-/5)   LUE Assessment   LUE Assessment X  (grossly 3-/5)   Hand Function   Gross Motor Coordination "Impaired  (dysmetria B/L)   Fine Motor Coordination Impaired   Sensation   Light Touch No apparent deficits   Vision - Complex Assessment   Ocular Range of Motion Impaired   Tracking Impaired   Acuity Able to read employee name badge without difficulty  (unable to read correct time)   Cognition   Overall Cognitive Status Impaired   Arousal/Participation Responsive; Cooperative   Attention Attends with cues to redirect   Orientation Level Oriented to person; Other (Comment); Disoriented to situation;Disoriented to time  (oriented to general place; \"Hospital\" only)   Memory Decreased recall of precautions;Decreased recall of recent events;Decreased short term memory   Following Commands Follows one step commands with increased time or repetition   Comments Pt is cooperative and pleasant; overall lethargic  Needs cues for safety/initiation of tasks  Is a poor historian  Assessment   Limitation Decreased UE strength;Decreased UE ROM; Decreased ADL status; Decreased Safe judgement during ADL;Decreased cognition;Decreased endurance;Decreased self-care trans;Decreased high-level ADLs; Visual deficit   Prognosis Fair   Assessment Pt is a 65 y/o male seen for OT eval s/p adm to [facility] as a transfer from [address] [place] [address] where he presented w/ altered mental status on 4/28  Pt found to have B/L pulmonary emboli and cervical masses  Pt MRI revealed new cerebellar infarct  Pt transferred to [facility] for radiation therapy of cervical masses  Pt  has a past medical history of Bilateral pulmonary embolism ([facility] ) (04/28/2023), Current smoker, Diabetes mellitus ([facility] ), GERD (gastroesophageal reflux disease), Gout, Hypertension, Lung cancer ([facility] ), 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), Mixed dyslipidemia, Pleural effusion, right (04/28/2023), Subclavian vein thrombosis ([facility] ), and Type 2 diabetes mellitus without complication, without long-term current use of insulin ([facility] ) (04/20/2023)  Pt with active OT orders and up with assistance  orders   Pt lives with his spouse " in 1 SH, 5 SAM  Pt was I w/  ADLS and IADLS, drove, & required no use of DME PTA  Pt is currently demonstrating the following occupational deficits: Max A UB/LB ADLS, Mod A x2 bed mobility, transfers and functional mobility w/ B/L HHA  These deficits that are impacting pt's baseline areas of occupation are a result of the following impairments: endurance, activity tolerance, functional mobility, forward functional reach, balance, trunk control, functional standing tolerance, decreased I w/ ADLS/IADLS, strength, ROM, visual deficits, cognitive impairments, decreased safety awareness, decreased insight into deficits and coordination deficits  The following Occupational Performance Areas to address include: eating, grooming, bathing/shower, toilet hygiene, dressing, medication management, socialization, health maintenance, functional mobility, community mobility, clothing management, household maintenance and job performance/volunteering  Recommend inpatient rehab  upon D/C  Pt to continue to benefit from acute immediate OT services to address the following goals 2-3x/week to  w/in 10-14 days:   Goals   Patient Goals to be independent   LTG Time Frame 10-   Long Term Goal #1 see below listed goals   Plan   Treatment Interventions ADL retraining;Functional transfer training;Visual perceptual retraining;UE strengthening/ROM; Endurance training;Cognitive reorientation;Patient/family training;Equipment evaluation/education; Compensatory technique education;Continued evaluation; Energy conservation; Activityengagement; Fine motor coordination activities   Goal Expiration Date 23   OT Frequency 2-3x/wk   Recommendation   OT Discharge Recommendation Post acute rehabilitation services   Additional Comments  The patient's raw score on the AM-PAC Daily Activity Inpatient Short Form is 12  A raw score of less than 19 suggests the patient may benefit from discharge to post-acute rehabilitation services   Please refer to the recommendation of the Occupational Therapist for safe discharge planning  Additional Comments 2 Pt seen as a co-session due to the patient's co-morbidities, clinically unstable presentation, and present impairments which are a regression from the patient's baseline  AM-PAC Daily Activity Inpatient   Lower Body Dressing 2   Bathing 2   Toileting 2   Upper Body Dressing 2   Grooming 2   Eating 2   Daily Activity Raw Score 12   Daily Activity Standardized Score (Calc for Raw Score >=11) 30 6   AM-PAC Applied Cognition Inpatient   Following a Speech/Presentation 2   Understanding Ordinary Conversation 3   Taking Medications 2   Remembering Where Things Are Placed or Put Away 2   Remembering List of 4-5 Errands 2   Taking Care of Complicated Tasks 1   Applied Cognition Raw Score 12   Applied Cognition Standardized Score 28 82   Barthel Index   Feeding 5   Bathing 0   Grooming Score 0   Dressing Score 0   Bladder Score 5   Bowels Score 5   Toilet Use Score 5   Transfers (Bed/Chair) Score 5   Mobility (Level Surface) Score 0   Stairs Score 0   Barthel Index Score 25   End of Consult   Education Provided Yes   Patient Position at End of Consult Bedside chair; All needs within reach;Bed/Chair alarm activated   Nurse Communication Nurse aware of consult     GOALS  1) Pt will improve activity tolerance to G for 30 min txment sessions  2) Pt will complete ADLs/self care w/ Min A w/ use of AD/DME as needed to increase independence in functional tasks  3) Pt will complete toileting w/ Min A w/ G hygiene/thoroughness using DME PRN  4) Pt will improve fx'l tfers on/off all surfaces using DME PRN w/ G balance/safety including toileting w/ Min A  5) Pt will improve fx'l mobility during I/ADl/leisure tasks using DME PRN w/ g balance/safety w/ Min A  6) Pt will be attentive 100% of the time during ongoing cognitive assessment w/ G participation to A w/ safe d/c planning/recommendations  7) Pt will demonstrate G carryover of pt/caregiver education and training as appropriate w/o cues w/ G tolerance to increase safety during functional tasks  8) Pt will improve UB fx'l use/ROM to [facility] and strength 1/2 MMT via AROM/AAROM/PROM in all planes as tolerated s/p skilled education of HEP w/o cues for carryover  9) Pt will engage in ongoing  assessments, screens, and activities t/o fx'l I/ADL/leisure tasks w/ G participation to A w/ adaptation and accomodations or rule out visual perceptual impairments  10) Pt will follow 100% simple one step verbal commands and be A/Ox4 consistently t/o use of external environmental cues to increase awareness during functional tasks  11) Pt will improve bed mobility to Min A and sit EOB for 10 mins w/ Fair sitting balance as a prerequisite for further engagement in meaningful tasks    Zayda Farnsworth MS, OTR/L

## 2023-05-03 NOTE — UTILIZATION REVIEW
NOTIFICATION OF ADMISSION DISCHARGE   This is a Notification of Discharge from [address] [place] [address]  Please be advised that this patient has been discharge from our facility  Below you will find the admission and discharge date and time including the patients disposition  UTILIZATION REVIEW CONTACT:  [address] [place]  Utilization   Network Utilization Review Department  Phone: [phone] x carefully listen to the prompts  All voicemails are confidential   Email: [email or web address]     ADMISSION INFORMATION  PRESENTATION DATE: 4/28/2023 12:26 AM  OBERVATION ADMISSION DATE:   INPATIENT ADMISSION DATE: 4/28/23  3:55 AM   DISCHARGE DATE: 5/2/2023  7:27 PM   DISPOSITION:[facility]    IMPORTANT INFORMATION:  Send all requests for admission clinical reviews, approved or denied determinations and any other requests to dedicated fax number below belonging to the campus where the patient is receiving treatment   List of dedicated fax numbers:  [address] DENIALS (Administrative/Medical Necessity) [phone]   [address] (Maternity/NICU/Pediatrics) [phone]   [facility] [phone]   [address] [place] [address] [phone]   [address] [place] [address] [phone]   2000 [facility] [address] [place] [address],[address]Th Floor [address] [place] [address] [phone]   [facility]  [phone]   [address] [facility]  [facility] [facility] 1000 [address] [facility] [phone]

## 2023-05-03 NOTE — ASSESSMENT & PLAN NOTE
72-year-old male with past medical history as listed below, presented to [address] with acute confusion, he has a complicated past medical history of diabetes, hypertension, smoking history, recent diagnosis of lung/neck mass, subclavian vein thrombosis on Eliquis  MRI was completed at [facility] which showed a subacute infarct in left cerebellum  Neurology was consulted in regards to this finding       MRI of brain - subacute infarct in the left cerebellum      CTA of head and neck -  Atherosclerotic change of the carotid bifurcations with moderate stenosis of the distal common carotid artery and mild narrowing of the proximal internal carotid artery on the right  No significant left-sided stenosis  Unremarkable intracranial vasculature  Extensive lower cervical adenopathy as seen on recent CT of the chest  This results in significant narrowing of the trachea in the transverse dimension, unchanged  There is also a filling defect present within the right innominate vein within the inferior neck extending into the superior aspect of the SVC  This is consistent with thrombosis  There is only partial thrombosis of the most superior aspect of the SVC  Below this the vessel enhances appropriately      Echo ef greater than 45%, systolic function is vigorous  No pfo  Atrium size are normal       Hemoglobin A1c - 6 1H  LDL 58    · 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  Suspect etiology is secondary to hypercoagulable state from malignancy  · Encephalopathy, suspect multifactorial toxic metabolic / infectious  No evidence to suspect seizure activity  -  Continued stroke care  • The patient is on Aspirin 81 mg   • Heparin gtt, small infarct low risk for hemorrhagic conversion  • Atorvastatin 40 mg, instead of pravachol  • Smoking cessation  • Continue telemetry  • PT/OT/ST  • CIWA protocol, IV thiamine  • Frequent neuro checks  Continue to monitor and notify neurology with any changes     • Serial neurological examination  • Consider repeat of MRI of brain, to see if he has any further stroke burden  • Consider EEG, will hold at this time  - Medical management and supportive care per primary team  Correction of any metabolic or infectious disturbances     - Please see attending attestation for further details

## 2023-05-03 NOTE — PROGRESS NOTES
Just Tiger text from pathologist:    Pathology report squamous cell carcinoma consult medical oncology for combined treatment

## 2023-05-03 NOTE — PROGRESS NOTES
Vancomycin IV Pharmacy-to-Dose Consultation    Jabari Kearns is a 66 y o  male who is currently receiving Vancomycin IV with management by the Pharmacy Consult service  Relevant clinical data and objective / subjective history reviewed  Vancomycin Assessment:  Indication: Pneumonia (goal -600, trough >10)    Status: critically ill, leukocytosis, afebrile  Micro:   5/2 MRSA culture: in process  5/2 Sputum culture: in process  5/2 Blood cultures x 2: in process  4/28 Blood cultures x 2: no growth after 4 days  Procalcitonin: 0 23  Renal Function: Stable, sCr 0 84, CrCl >100 mL/min  Days of Therapy: 2  Current Dose: 2000 mg IV q24h (last dose prior to level: 5/2 at 1554)  Goal AUC(24h)/Trough: 400-600, trough >10  Last Level: 6 8 (5/3 at 0509) 13 3 hours after last dose  Model Fit: Good      Vancomycin Plan:  New Dosing: change to 1250 mg IV q12h (next dose: 5/3 at 0830)  Estimated AUC: 473 mcg*hr/mL  Estimated Trough: 15 1 mcg/mL  Next Level: Random 5/10 at 0600  Renal Function Monitoring: Daily BMP and [address] will continue to follow closely for s/sx of nephrotoxicity, infusion reactions and appropriateness of therapy  BMP and CBC will be ordered per protocol  We will continue to follow the patient’s culture results and clinical progress daily       Vasquez Carcamo, PharmD, [address] Clinical Pharmacist  [phone]

## 2023-05-03 NOTE — QUICK NOTE
Radiation Oncology Simulation Note    Patient  was accompanied by a RN to Radiation Oncology  After the consent was  signed he was taken to diagnostic CT for planning scan  Pt  tolerated well and was returned to his room after  He will be brought down after treatment planning is completed this afternoon to start RT to neck, lung, mediastinum

## 2023-05-04 LAB
BACTERIA SPT RESP CULT: ABNORMAL
GRAM STN SPEC: ABNORMAL
MRSA NOSE QL CULT: NORMAL

## 2023-05-04 NOTE — QUICK NOTE
Discussed with daughter, Vik Ochoa this evening regarding her father's tenuous breathing status  I mentioned different modalities we can try to help him and prevent him from getting worse  She does understand his prognosis and will be discussing as a family with her mother and other sister who is currently driving from [place]  I mentioned the role of palliative care for support and guidance through his hospital course and beyond and she would be grateful for that  For now, aggressive care until she is able to discuss with the rest of the family  They will all be arriving tomorrow morning

## 2023-05-04 NOTE — PROGRESS NOTES
Daily Progress Note - Critical Care   Fely Hernandez 66 y o  male MRN: [id number]  Unit/Bed#: MICU 07 Encounter: [id number]        ----------------------------------------------------------------------------------------  HPI: Fely Hernandez is a 66 y o  male with a significant past medical history of diabetes, HTN, smoking, who presented to [facility] with altered mental status on 4/28/2023  He was found to have bilateral pulmonary emboli and mediastinal and hilar adenopathy with mass effect on the trachea on imaging  He had an MRI that was consistent with a new cerebellar infarct  He was evaluated by pulmonology and thoracics at [facility] who recommended transfer for urgent radiation therapy of cervical masses  24hr events: No acute events overnight  Patient received radiation therapy yesterday without issue  Mid flow weaned from 15L to 10L     ---------------------------------------------------------------------------------------  SUBJECTIVE  No complaints although intermittently confused      Review of Systems   Unable to perform ROS: Mental status change     Review of systems was unable to be performed secondary to mental status  ---------------------------------------------------------------------------------------  Assessment and Plan:    Neuro:   • Diagnosis: Acute encephalopathy  o Multifactorial in setting of cerebellar infarction, acute illness, possible infection  o Plan:   - CAM ICU  - Delirium precautions  - Precedex gtt  - Melatonin and seroquel added qHS for sleep wake regulation  • Diagnosis: Delirium tremens  o Plan:   - History of chronic ETOH abuse  - Previously on CIWA, since discontinued  - Continue high dose thiamine, folate  • Diagnosis: Cerebellar infarction  o Plan:   - Continue heparin gtt  - Echo complete with 70%EF, no PFO  - Continue statin  - Continue aspirin  • Diagnosis: ETOH abuse  o Plan:   - No current concerns for withdrawal, past window with last drink 4/26  - Outpatient resources      CV: • Diagnosis: Essential hypertension  o Plan:  - Holding home losartan      Pulm:  • Diagnosis: Acute hypoxic respiratory failure  o Multifactorial with lung mass, bilateral PEs, pulm effusions, right lower lung opacity  o Plan:   - Continue mid flow, currently weaning  - Airway clearance, chest wall oscillation, respiratory protocol  - Continuous pulse oximetry  - Goal SpO2 >92%  • Diagnosis: Bilateral PEs  o Plan:   - Heparin VTE high, continue  • Diagnosis: Right lower lung opacity  o Plan:   - Possibly malignant effusion vs infarct vs aspiration  • Diagnosis: Tobacco abuse  o Plan:   - Nicotine patch  - Encourage cessation      GI:   • Diagnosis: No active issues  o Plan:   - Bowel regimen ordered      :   • Diagnosis: No active issues  o Plan:  - Monitor I&Os      F/E/N:   • F- isolyte @ 50/hr  • E- monitor and replete as needed, goal Mag >2, Phos >3, K >4  • N- Passed for Isidro/CHO controlled with speech, however due to fluctuating mental status has been intermittent intake, continue to monitor to optimize nutrition      Heme/Onc:   • Diagnosis: Squamous cell carcinoma of the lung  o Plan:   - Radiation therapy complete 5/3  - Ongoing therapies as per rad/onc recommendations  • Diagnosis: Cervical adenopathy with trachael narrowing  o Secondary to malignancy  o Plan:   - As per thoracics no plan for stent at present  - Monitor airway  - Consider steroids if needed for edema  • Diagnosis: Subclavian vein thrombosis, right  o Plan:   - Heparin as above, continue  - Monitor for SVC syndrome      Endo:   • Diagnosis: Type 2 diabetes mellitus  o Plan:   - SSI, continue  - Goal -180      ID:   • Diagnosis: Leukocytosis  o Plan:   - On zosyn, vanco  - Will follow bcx, MRSA, sputum  - Trend WBC, fever curve      MSK/Skin:   • Diagnosis: No active issues  o Plan:   - Routine skin care  - PT/OT when able    Patient appropriate for transfer out of the ICU today?: No  Disposition: Continue Critical Care   Code Status: Level 1 - Full Code  ---------------------------------------------------------------------------------------  ICU CORE MEASURES    Prophylaxis   VTE Pharmacologic Prophylaxis: Heparin Drip  VTE Mechanical Prophylaxis: sequential compression device  Stress Ulcer Prophylaxis: Prophylaxis Not Indicated     ABCDE Protocol (if indicated)  Plan to perform spontaneous awakening trial today? Not applicable  Plan to perform spontaneous breathing trial today? Not applicable  Obvious barriers to extubation? Not applicable    Invasive Devices Review  Invasive Devices     Peripheral Intravenous Line  Duration           Peripheral IV 23 Dorsal (posterior); Left Forearm 1 day    Peripheral IV 23 Left;Ventral (anterior) Forearm 1 day          Drain  Duration           External Urinary Catheter Small 1 day              Can any invasive devices be discontinued today? No  ---------------------------------------------------------------------------------------  OBJECTIVE    Vitals   Vitals:    23 2200 23 2300 23 0000 23 0100   BP: 142/73 130/70 134/73 138/74   Pulse: 104 98 102 90   Resp: (!) 34 15 15 14   Temp:   98 4 °F (36 9 °C)    TempSrc:   Oral    SpO2: 97% 97% 96% 93%   Weight:         Temp (24hrs), Av 5 °F (36 9 °C), Min:98 3 °F (36 8 °C), Max:98 7 °F (37 1 °C)  Current: Temperature: 98 4 °F (36 9 °C)      Respiratory:  SpO2: SpO2: 93 %  Nasal Cannula O2 Flow Rate (L/min): 15 L/min    Invasive/non-invasive ventilation settings   Respiratory    Lab Data (Last 4 hours)    None         O2/Vent Data (Last 4 hours)       0200          Non-Invasive Ventilation Mode MFNC (Mid flow)                   Physical Exam  Constitutional:       Appearance: He is ill-appearing  HENT:      Head: Normocephalic and atraumatic        Right Ear: External ear normal       Left Ear: External ear normal       Nose: Nose normal       Mouth/Throat:      Mouth: Mucous membranes are moist    Eyes: Extraocular Movements: Extraocular movements intact  Cardiovascular:      Rate and Rhythm: Normal rate and regular rhythm  Pulmonary:      Effort: Pulmonary effort is normal       Breath sounds: Rhonchi present  Abdominal:      General: Abdomen is flat  Palpations: Abdomen is soft  Tenderness: There is no abdominal tenderness  Musculoskeletal:      Cervical back: Normal range of motion  Right lower leg: No edema  Left lower leg: No edema  Skin:     General: Skin is warm and dry  Neurological:      General: No focal deficit present  Comments: AAOx1, to self only  Intermittently fluctuates in terms of mentation, consistent with past exams  Follows commands  Intermittently agitated but redirectable             Laboratory and Diagnostics:  Results from last 7 days   Lab Units 05/03/23  0509 05/02/23 2136 05/02/23  0443 05/01/23  0438 04/30/23  0524 04/28/23  0629 04/28/23  0047   WBC Thousand/uL 17 71* 17 38* 15 36* 16 11* 13 79* 14 89* 16 47*   HEMOGLOBIN g/dL 11 8* 12 3 12 4 12 3 11 9* 12 7 12 9   HEMATOCRIT % 37 3 39 1 38 2 37 5 36 8 37 7 37 9   PLATELETS Thousands/uL 366 366 350 453* 544* 516* 490*   NEUTROS PCT % 87* 86* 85* 87* 80* 79*  --    MONOS PCT % 8 8 8 8 10 12  --      Results from last 7 days   Lab Units 05/03/23  0509 05/02/23 2136 05/02/23  0443 05/01/23  0438 04/30/23  0524 04/29/23  1159 04/29/23  0834 04/29/23  0459 04/28/23  1837 04/28/23  0629   SODIUM mmol/L 146 144 140 138 136 134* 134* 134*   < > 128*   POTASSIUM mmol/L 3 5 3 2* 3 4* 3 5 3 4* 3 6 3 5 3 5   < > 3 6   CHLORIDE mmol/L 114* 111* 106 105 101 100 100 99   < > 92*   CO2 mmol/L 29 28 27 27 27 27 28 28   < > 30   ANION GAP mmol/L 3* 5 7 6 8 7 6 7   < > 6   BUN mg/dL 16 15 16 14 10 9 10 10   < > 12   CREATININE mg/dL 0 84 0 83 0 87 0 85 0 70 0 67 0 68 0 65   < > 0 81   CALCIUM mg/dL 9 8 10 3* 11 0* 13 0* 11 6* 10 5* 10 5* 10 9*   < > 13 3*   GLUCOSE RANDOM mg/dL 137 133 127 131 115 113 116 113   < > 98   ALT U/L  --   --  57* 72* 71*  --   --  90*  --  137*   AST U/L  --   --  36 45* 32  --   --  40*  --  75*   ALK PHOS U/L  --   --  107* 129* 124*  --   --  123*  --  151*   ALBUMIN g/dL  --   --  3 3* 3 4* 3 2*  --   --  3 2*  --  3 5   TOTAL BILIRUBIN mg/dL  --   --  0 39 0 45 0 40  --   --  0 38  --  0 66    < > = values in this interval not displayed  Results from last 7 days   Lab Units 05/03/23  0509 05/02/23 2136 05/01/23  0438 04/30/23  0524 04/28/23  1837 04/28/23  0629   MAGNESIUM mg/dL 2 1 1 6 1 5* 1 5* 1 8* 1 4*   PHOSPHORUS mg/dL 2 9 1 7* 2 9  --   --  2 4      Results from last 7 days   Lab Units 05/03/23 2024 05/03/23  1145 05/03/23  0509 05/02/23 2136 05/01/23  1015 04/30/23  0524 04/29/23  0459 04/28/23  1628 04/28/23  1012 04/28/23  0047   INR   --   --   --  1 45* 1 36*  --   --   --   --  1 71*   PTT seconds 45* 112* 52* 159*  --  63* 63* 71*   < > 38*    < > = values in this interval not displayed  Results from last 7 days   Lab Units 04/28/23  0629   LACTIC ACID mmol/L 0 9     ABG:  Results from last 7 days   Lab Units 05/02/23  0959   PH ART  7 449   PCO2 ART mm Hg 39 3   PO2 ART mm Hg 63 2*   HCO3 ART mmol/L 26 6   BASE EXC ART mmol/L 2 6   ABG SOURCE  Radial, Left     VBG:  Results from last 7 days   Lab Units 05/02/23 2136 05/02/23  0959   PH JENNIFER  7 380  --    PCO2 JENNIFER mm Hg 45 0  --    PO2 JENNIFER mm Hg 92 0*  --    HCO3 JENNIFER mmol/L 26 0  --    BASE EXC JENNIFER mmol/L 0 6  --    ABG SOURCE   --  Radial, Left     Results from last 7 days   Lab Units 05/02/23  1051 04/28/23  0629   PROCALCITONIN ng/ml 0 23 0 15       Micro  Results from last 7 days   Lab Units 05/02/23  1412 05/02/23  1307 05/02/23  1151 04/28/23  0631 04/28/23  0629   BLOOD CULTURE   --  Received in Microbiology Lab  Culture in Progress  Received in Microbiology Lab  Culture in Progress  No Growth After 5 Days  No Growth After 5 Days     SPUTUM CULTURE  Culture too young- will reincubate  --   --   --   -- GRAM STAIN RESULT  2+ Epithelial cells per low power field*  Rare Polys*  1+ Gram positive cocci in pairs and chains*  1+ Gram positive rods*  Rare Budding yeast*  --   --   --   --        Imaging:  I have personally reviewed pertinent reports  Intake and Output  I/O       05/02 0701 05/03 0700 05/03 0701 05/04 0700    I V  (mL/kg) 973 (10)     IV Piggyback 496     Total Intake(mL/kg) 1469 (15 1)     Urine (mL/kg/hr) 1725 219 (0 1)    Total Output 1725 219    Net -256 -219                  Height and Weights         Body mass index is 28 27 kg/m²  Weight (last 2 days)     Date/Time Weight    05/03/23 0600 97 2 (214 29)    05/02/23 2039 99 3 (218 92)            Nutrition       Diet Orders   (From admission, onward)             Start     Ordered    05/03/23 1810  Diet Isidro/CHO Controlled; Consistent Carbohydrate Diet Level 2 (5 carb servings/75 grams CHO/meal)  Diet effective now        References:    Nutrtion Support Algorithm Enteral vs  Parenteral   Question Answer Comment   Diet Type Isidro/CHO Controlled    Isidro/CHO Controlled Consistent Carbohydrate Diet Level 2 (5 carb servings/75 grams CHO/meal)    RD to adjust diet per protocol?  Yes        05/03/23 1810                    Active Medications  Scheduled Meds:  Current Facility-Administered Medications   Medication Dose Route Frequency Provider Last Rate   • acetaminophen  650 mg Oral Q6H PRN Anamaria Srinivasan DO     • albuterol  2 5 mg Nebulization Q4H PRN Haily Guerrero MD     • aspirin  81 mg Oral Daily [place] Mercedes Kam, DO     • bisacodyl  10 mg Rectal Daily PRN Anamaria Srinivasan DO     • chlorhexidine  15 mL Mouth/Throat Q12H [address] [facility]     • dexmedetomidine  0 1-0 7 mcg/kg/hr Intravenous Titrated [place] Mercedes Gambia, DO 0 4 mcg/kg/hr (85/58/32 3693)   • folic acid IVPB  1 mg Intravenous Daily Anamaria Srinivasan DO 1 mg (05/03/23 9945)   • heparin (porcine)  3-30 Units/kg/hr (Order-Specific) Intravenous Titrated "811 Mason Morrissey, DO 19 Units/kg/hr (05/03/23 2230)   • insulin lispro  1-6 Units Subcutaneous Q6H [address] [facility], [facility]     • ipratropium  0 5 mg Nebulization TID Noris Phillips MD     • levalbuterol  1 25 mg Nebulization TID Norsi Phillips MD     • melatonin  3 mg Oral HS Demond hightower, DO     • multi-electrolyte  50 mL/hr Intravenous Continuous Buddy Barclay MD 50 mL/hr (05/04/23 0246)   • nicotine  14 mg Transdermal Daily 811 Mason Morrissey, DO     • piperacillin-tazobactam  3 375 g Intravenous [address] [place], DO 3 375 g (05/04/23 7120)   • pravastatin  40 mg Oral Daily With [facility] [facility], DO     • QUEtiapine  25 mg Oral  Mason Morrissey, DO     • sodium chloride  4 mL Nebulization TID Noris Phillips MD     • thiamine  500 mg Intravenous Daily 811 Mason Morrissey,  mg (05/03/23 1124)   • vancomycin  1,250 mg Intravenous Q12H Noris Phillips MD 1,250 mg (05/03/23 2012)     Continuous Infusions:  dexmedetomidine, 0 1-0 7 mcg/kg/hr, Last Rate: 0 4 mcg/kg/hr (05/04/23 0113)  heparin (porcine), 3-30 Units/kg/hr (Order-Specific), Last Rate: 19 Units/kg/hr (05/03/23 2230)  multi-electrolyte, 50 mL/hr, Last Rate: 50 mL/hr (05/04/23 0246)      PRN Meds:   acetaminophen, 650 mg, Q6H PRN  albuterol, 2 5 mg, Q4H PRN  bisacodyl, 10 mg, Daily PRN        Allergies   No Known Allergies  ---------------------------------------------------------------------------------------  Advance Directive and Living Will:      Power of :    POLST:    ---------------------------------------------------------------------------------------  Care Time Delivered:   No Critical Care time spent     Michael,       Portions of the record may have been created with voice recognition software  Occasional wrong word or \"sound a like\" substitutions may have occurred due to the inherent limitations of voice recognition software    Read the chart carefully and recognize, using " context, where substitutions have occurred

## 2023-05-04 NOTE — QUICK NOTE
Radiation Oncology Treatment Note    A treatment dose of 200 cGy was administered today to the involved tumor site(s) lung, mediastinum, neck using 1-10 MV photons   Present total dose at this time is 400 cGy    Approximately 9 more treatments critical review

## 2023-05-04 NOTE — CONSULTS
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  Patient was [facility] with outpatient plan to follow up with Pulmonology  · Patient present to the [facility]  ED on 4/28 for with mental status change, unsteady gait, cough  Patient noted to be hypercalcemic, CT imaging showed new b/l PE  MRI brain showed subacute infarct in the left cerebellum  · Patient on heparin gtt  · Per neurology suspect stroke etiology related to underlying hypercoagulable status due to malignancy  · Patient's encephalopathy attributed to hypercalcemia, withdrawal from etoh abuse, although CIWA protocol has been discontinued  · With worsening respiratory status, concern was tracheal stenosis due to extensive adenopathy  Per Pulmonology recommendations, patient was transferred to [facility] for Radiation Oncology evaluation  · Radiation treatments started yesterday due to impending tracheal compression by extensive masses mediastinum as well as b/l neck  2  Metastatic Squamous Cell Carcinoma  · Core biopsy performed 5/1 of the left neck lymph node resulted as metastatic squamous cell carcinoma  · During bedside rounding, patient not arousable, in restraints  · From Medical Oncology standpoint, due to patient's current mental status and respiratory status, patient not a candidate to initiate inpatient chemotherapy at this time  We will reassess once stabilized         We will continue to follow patient while inpatient  Outpatient follow up plan: to be set up by inpatient Cancer Coordinator, AUGUSTO Espitia      Thank you for this consult  GILLIAN Cohen  Hematology-Oncology       History of present illness:  Yesenia Washburn is a 66 y o  male originally presented to the ED in [facility] on 4/20 for increased SOB, cough and swollen neck  Patient has a history of alcohol abuse, type 2 diabetes, tobacco use (over 30-pack-year smoking history)  CT Imaging showed a right middle lobe cavitary mass measuring 5 7cm with diffuse adenopathy in the mediastinum narrows the trachea  Edema is noted in the right axilla with some mild adenopathy  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  Patient was discharged on Eliquis with outpatient follow up with Pulmonology  Patient presented to the ED at [facility] again on 4/28 with mental status change, unsteady gait, cough  Patient noted to be hypercalcemic, CT imaging showed new b/l PE  MRI brain showed subacute infarct in the left cerebellum  Per neurology suspect stroke etiology related to underlying hypercoagulable status due to malignancy  Patient's encephalopathy attributed to hypercalcemia, withdrawal from etoh abuse  With worsening respiratory status, concern was tracheal stenosis due to extensive adenopathy  Patient had a core biopsy of the left neck lymph node, resulted as metastatic squamous cell carcinoma  Per Pulmonology recommendations, patient was transferred to [facility] for Radiation Oncology evaluation  Patient was transferred on 5/2/23  Review of Systems:   Review of Systems   Unable to perform ROS: Patient unresponsive       Oncology History:   Cancer Staging   No matching staging information was found for the patient  Oncology History    No history exists         Past Medical History:   Past Medical History:   Diagnosis Date   • Bilateral pulmonary embolism ([address] ) 04/28/2023   • Current smoker    • Diabetes mellitus ([place] [address] )    • GERD (gastroesophageal reflux disease)    • Gout    • Hypertension    • Lung cancer ([place] [address] )    • 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[facility])    • Mixed dyslipidemia    • Pleural effusion, right 04/28/2023   • Subclavian vein thrombosis (HCC)    • Type 2 diabetes mellitus without complication, without long-term current use of insulin ([place] [address] ) 04/20/2023       Past Surgical History:   Procedure Laterality Date   • ANAL FISSURECTOMY     • IR BIOPSY NECK  5/1/2023   • TONSILLECTOMY         No family history on file  Social History     Socioeconomic History   • Marital status: /Civil Union     Spouse name: Not on file   • Number of children: Not on file   • Years of education: Not on file   • Highest education level: Not on file   Occupational History   • Not on file   Tobacco Use   • Smoking status: Every Day     Packs/day: 1 00     Types: Cigarettes   • Smokeless tobacco: Never   Vaping Use   • Vaping Use: Never used   Substance and Sexual Activity   • Alcohol use: Yes   • Drug use: Not Currently   • Sexual activity: Not on file   Other Topics Concern   • Not on file   Social History Narrative   • Not on file     Social Determinants of Health     Financial Resource Strain: Not on file   Food Insecurity: No Food Insecurity   • Worried About Running Out of Food in the Last Year: Never true   • Ran Out of Food in the Last Year: Never true   Transportation Needs: No Transportation Needs   • Lack of Transportation (Medical): No   • Lack of Transportation (Non-Medical):  No   Physical Activity: Not on file   Stress: Not on file   Social Connections: Not on file   Intimate Partner Violence: Not on file   Housing Stability: Low Risk    • Unable to Pay for Housing in the Last Year: No   • Number of Places Lived in the Last Year: 1   • Unstable Housing in the Last Year: No         Current Facility-Administered Medications:   •  acetaminophen (TYLENOL) tablet 650 mg, 650 mg, Oral, Q6H PRN, De Martin, DO  • albuterol inhalation solution 2 5 mg, 2 5 mg, Nebulization, Q4H PRN, Merly Subramanian MD, 2 5 mg at 05/03/23 1820  •  aspirin (ECOTRIN LOW STRENGTH) EC tablet 81 mg, 81 mg, Oral, Daily, Amada Nome, DO  •  bisacodyl (DULCOLAX) rectal suppository 10 mg, 10 mg, Rectal, Daily PRN, Amada Nome, DO  •  chlorhexidine (PERIDEX) 0 12 % oral rinse 15 mL, 15 mL, Mouth/Throat, Q12H [facility], Amada Nome, DO, 15 mL at 05/03/23 2012  •  dexmedeTOMIDine (Precedex) 400 mcg in sodium chloride 0 9% 100 mL, 0 1-0 7 mcg/kg/hr, Intravenous, Titrated, Amada Nome, DO, Last Rate: 9 9 mL/hr at 05/04/23 0822, 0 4 mcg/kg/hr at 05/04/23 8273  •  dextrose 5 % in lactated Ringer's infusion, 100 mL/hr, Intravenous, Continuous, Amada Nome, DO, Last Rate: 100 mL/hr at 05/04/23 0608, 100 mL/hr at 74/66/97 5995  •  folic acid 1 mg in sodium chloride 0 9 % 50 mL IVPB, 1 mg, Intravenous, Daily, Amada Nome, DO, Last Rate: 100 mL/hr at 05/03/23 0904, 1 mg at 05/03/23 2906  •  heparin (porcine) 25,000 units in 0 45% NaCl 250 mL infusion (premix), 3-30 Units/kg/hr (Order-Specific), Intravenous, Titrated, Amada Nome, DO, Last Rate: 17 1 mL/hr at 05/04/23 0819, 19 Units/kg/hr at 05/04/23 0819  •  insulin lispro (HumaLOG) 100 units/mL subcutaneous injection 1-6 Units, 1-6 Units, Subcutaneous, Q6H [facility] **AND** Fingerstick Glucose (POCT), , , Q6H, Amada Nome, DO  •  ipratropium (ATROVENT) 0 02 % inhalation solution 0 5 mg, 0 5 mg, Nebulization, TID, Merly Subramanian MD, 0 5 mg at 05/04/23 0807  •  levalbuterol (Marilynne Lunch) inhalation solution 1 25 mg, 1 25 mg, Nebulization, TID, Merly Subramanian MD, 1 25 mg at 05/04/23 0807  •  magnesium sulfate 2 g/50 mL IVPB (premix) 2 g, 2 g, Intravenous, Once, Amada Nome, DO, Last Rate: 25 mL/hr at 05/04/23 0817, 2 g at 05/04/23 0817  •  melatonin tablet 3 mg, 3 mg, Oral, HS, Amada Nome, DO, 3 mg at 05/03/23 2235  •  nicotine (NICODERM CQ) 14 mg/24hr TD 24 hr patch 14 mg, 14 mg, Transdermal, Daily, Jung Calderon DO, 14 mg at 05/03/23 0903  •  piperacillin-tazobactam (ZOSYN) 3 375 g in sodium chloride 0 9 % 100 mL IVPB (EXTENDED-INFUSION), 3 375 g, Intravenous, Q8H, Jung Calderon DO, Last Rate: 25 mL/hr at 05/04/23 0204, 3 375 g at 05/04/23 0204  •  potassium chloride (K-DUR,KLOR-CON) CR tablet 40 mEq, 40 mEq, Oral, Once, Jung Calderon DO  •  [COMPLETED] potassium chloride 20 mEq IVPB (premix), 20 mEq, Intravenous, Once, Last Rate: 50 mL/hr at 05/04/23 0613, 20 mEq at 05/04/23 2185 **FOLLOWED BY** potassium chloride 20 mEq IVPB (premix), 20 mEq, Intravenous, Once, Jung Calderon DO  •  pravastatin (PRAVACHOL) tablet 40 mg, 40 mg, Oral, Daily With Dinner, Jung Calderon DO  •  QUEtiapine (SEROquel) tablet 25 mg, 25 mg, Oral, HS, Jung Calderon DO, 25 mg at 05/03/23 2235  •  sodium chloride 3 % inhalation solution 4 mL, 4 mL, Nebulization, TID, Nanci Kilgore MD, 4 mL at 05/04/23 0807  •  thiamine (VITAMIN B1) 500 mg in sodium chloride 0 9 % 50 mL IVPB, 500 mg, Intravenous, Daily, Jung Calderon DO, Last Rate: 100 mL/hr at 05/03/23 1124, 500 mg at 05/03/23 1124  •  vancomycin (VANCOCIN) 1,250 mg in sodium chloride 0 9 % 250 mL IVPB, 1,250 mg, Intravenous, Q12H, Nanci Kilgore MD, Stopped at 05/04/23 0200    Medications Prior to Admission   Medication   • allopurinol (ZYLOPRIM) 300 mg tablet   • apixaban (Eliquis) 5 mg   • glipiZIDE (GLUCOTROL XL) 5 mg 24 hr tablet   • ibuprofen (MOTRIN) 400 mg tablet   • Multiple Vitamins-Minerals (MENS MULTIVITAMIN PO)   • simvastatin (ZOCOR) 20 mg tablet   • valsartan (DIOVAN) 160 mg tablet       No Known Allergies      Physical Exam:     /69   Pulse 88   Temp 98 1 °F (36 7 °C) (Oral)   Resp 14   Wt 96 3 kg (212 lb 4 9 oz)   SpO2 96%   BMI 28 01 kg/m²     Physical Exam  Vitals reviewed: unable to assess, patient unarousable           Recent Results (from the past 48 hour(s))   Blood gas, arterial    Collection Time: 05/02/23  9:59 AM   Result Value Ref Range    pH, Arterial 7 449 7 350 - 7 450    pCO2, Arterial 39 3 36 0 - 44 0 mm Hg    pO2, Arterial 63 2 (L) 75 0 - 129 0 mm Hg    HCO3, Arterial 26 6 22 0 - 28 0 mmol/L    Base Excess, Arterial 2 6 mmol/L    O2 Content, Arterial 17 3 16 0 - 23 0 mL/dL    O2 HGB,Arterial  91 6 (L) 94 0 - 97 0 %    SOURCE Radial, Left     JACQUI TEST Yes     Nasal Cannula 3    Ammonia    Collection Time: 05/02/23 10:51 AM   Result Value Ref Range    Ammonia 50 18 - 72 umol/L   Procalcitonin    Collection Time: 05/02/23 10:51 AM   Result Value Ref Range    Procalcitonin 0 23 <=0 25 ng/ml   Fingerstick Glucose (POCT)    Collection Time: 05/02/23 11:04 AM   Result Value Ref Range    POC Glucose 114 65 - 140 mg/dl   Blood culture    Collection Time: 05/02/23 11:51 AM    Specimen: Hand, Left; Blood   Result Value Ref Range    Blood Culture Received in Microbiology Lab  Culture in Progress  Blood culture    Collection Time: 05/02/23  1:07 PM    Specimen: Arm, Left; Blood   Result Value Ref Range    Blood Culture Received in Microbiology Lab  Culture in Progress      Sputum culture and Gram stain    Collection Time: 05/02/23  2:12 PM    Specimen: Expectorated Sputum   Result Value Ref Range    Sputum Culture 3+ Growth of     Gram Stain Result 2+ Epithelial cells per low power field (A)     Gram Stain Result Rare Polys (A)     Gram Stain Result 1+ Gram positive cocci in pairs and chains (A)     Gram Stain Result 1+ Gram positive rods (A)     Gram Stain Result Rare Budding yeast (A)    Fingerstick Glucose (POCT)    Collection Time: 05/02/23  4:49 PM   Result Value Ref Range    POC Glucose 116 65 - 140 mg/dl   CBC and differential    Collection Time: 05/02/23  9:36 PM   Result Value Ref Range    WBC 17 38 (H) 4 31 - 10 16 Thousand/uL    RBC 4 30 3 88 - 5 62 Million/uL    Hemoglobin 12 3 12 0 - 17 0 g/dL    Hematocrit 39 1 36 5 - 49 3 %    MCV 91 82 - 98 fL    MCH 28 6 26 8 - 34 3 pg    MCHC 31 5 31 4 - 37 4 g/dL    RDW 13 2 11 6 - 15 1 %    MPV 8 9 8 9 - 12 7 fL    Platelets 805 485 - 680 Thousands/uL    nRBC 0 /100 WBCs    Neutrophils Relative 86 (H) 43 - 75 %    Immat GRANS % 1 0 - 2 %    Lymphocytes Relative 5 (L) 14 - 44 %    Monocytes Relative 8 4 - 12 %    Eosinophils Relative 0 0 - 6 %    Basophils Relative 0 0 - 1 %    Neutrophils Absolute 15 02 (H) 1 85 - 7 62 Thousands/µL    Immature Grans Absolute 0 09 0 00 - 0 20 Thousand/uL    Lymphocytes Absolute 0 79 0 60 - 4 47 Thousands/µL    Monocytes Absolute 1 40 (H) 0 17 - 1 22 Thousand/µL    Eosinophils Absolute 0 02 0 00 - 0 61 Thousand/µL    Basophils Absolute 0 06 0 00 - 0 10 Thousands/µL   Basic metabolic panel    Collection Time: 05/02/23  9:36 PM   Result Value Ref Range    Sodium 144 135 - 147 mmol/L    Potassium 3 2 (L) 3 5 - 5 3 mmol/L    Chloride 111 (H) 96 - 108 mmol/L    CO2 28 21 - 32 mmol/L    ANION GAP 5 4 - 13 mmol/L    BUN 15 5 - 25 mg/dL    Creatinine 0 83 0 60 - 1 30 mg/dL    Glucose 133 65 - 140 mg/dL    Calcium 10 3 (H) 8 3 - 10 1 mg/dL    eGFR 94 ml/min/1 73sq m   Magnesium    Collection Time: 05/02/23  9:36 PM   Result Value Ref Range    Magnesium 1 6 1 6 - 2 6 mg/dL   Phosphorus    Collection Time: 05/02/23  9:36 PM   Result Value Ref Range    Phosphorus 1 7 (L) 2 3 - 4 1 mg/dL   APTT    Collection Time: 05/02/23  9:36 PM   Result Value Ref Range     (HH) 23 - 37 seconds   Protime-INR    Collection Time: 05/02/23  9:36 PM   Result Value Ref Range    Protime 17 9 (H) 11 6 - 14 5 seconds    INR 1 45 (H) 0 84 - 1 19   Blood gas, venous    Collection Time: 05/02/23  9:36 PM   Result Value Ref Range    pH, Red 7 380 7 300 - 7 400    pCO2, Red 45 0 42 0 - 50 0 mm Hg    pO2, Red 92 0 (H) 35 0 - 45 0 mm Hg    HCO3, Red 26 0 24 - 30 mmol/L    Base Excess, Red 0 6 mmol/L    O2 Content, Red 17 6 ml/dL    O2 HGB, VENOUS 95 2 (H) 60 0 - 80 0 %    Nasal Cannula 8    Fingerstick Glucose (POCT)    Collection Time: 05/03/23 12:36 AM   Result Value Ref Range    POC Glucose 128 65 - 140 mg/dl   CBC and differential    Collection Time: 05/03/23  5:09 AM   Result Value Ref Range    WBC 17 71 (H) 4 31 - 10 16 Thousand/uL    RBC 4 10 3 88 - 5 62 Million/uL    Hemoglobin 11 8 (L) 12 0 - 17 0 g/dL    Hematocrit 37 3 36 5 - 49 3 %    MCV 91 82 - 98 fL    MCH 28 8 26 8 - 34 3 pg    MCHC 31 6 31 4 - 37 4 g/dL    RDW 13 1 11 6 - 15 1 %    MPV 9 0 8 9 - 12 7 fL    Platelets 515 207 - 972 Thousands/uL    nRBC 0 /100 WBCs    Neutrophils Relative 87 (H) 43 - 75 %    Immat GRANS % 0 0 - 2 %    Lymphocytes Relative 5 (L) 14 - 44 %    Monocytes Relative 8 4 - 12 %    Eosinophils Relative 0 0 - 6 %    Basophils Relative 0 0 - 1 %    Neutrophils Absolute 15 37 (H) 1 85 - 7 62 Thousands/µL    Immature Grans Absolute 0 07 0 00 - 0 20 Thousand/uL    Lymphocytes Absolute 0 84 0 60 - 4 47 Thousands/µL    Monocytes Absolute 1 36 (H) 0 17 - 1 22 Thousand/µL    Eosinophils Absolute 0 01 0 00 - 0 61 Thousand/µL    Basophils Absolute 0 06 0 00 - 0 10 Thousands/µL   Basic metabolic panel    Collection Time: 05/03/23  5:09 AM   Result Value Ref Range    Sodium 146 135 - 147 mmol/L    Potassium 3 5 3 5 - 5 3 mmol/L    Chloride 114 (H) 96 - 108 mmol/L    CO2 29 21 - 32 mmol/L    ANION GAP 3 (L) 4 - 13 mmol/L    BUN 16 5 - 25 mg/dL    Creatinine 0 84 0 60 - 1 30 mg/dL    Glucose 137 65 - 140 mg/dL    Calcium 9 8 8 3 - 10 1 mg/dL    eGFR 94 ml/min/1 73sq m   Vancomycin, random    Collection Time: 05/03/23  5:09 AM   Result Value Ref Range    Vancomycin Rm 6 8 (L) 10 0 - 20 0 ug/mL   Magnesium    Collection Time: 05/03/23  5:09 AM   Result Value Ref Range    Magnesium 2 1 1 6 - 2 6 mg/dL   Phosphorus    Collection Time: 05/03/23  5:09 AM   Result Value Ref Range    Phosphorus 2 9 2 3 - 4 1 mg/dL   APTT    Collection Time: 05/03/23  5:09 AM   Result Value Ref Range    PTT 52 (H) 23 - 37 seconds   Fingerstick Glucose (POCT)    Collection Time: 05/03/23  5:10 AM   Result Value Ref Range    POC Glucose 127 65 - 140 mg/dl   Fingerstick Glucose (POCT)    Collection Time: 05/03/23 11:43 AM   Result Value Ref Range    POC Glucose 124 65 - 140 mg/dl   APTT    Collection Time: 05/03/23 11:45 AM   Result Value Ref Range     (H) 23 - 37 seconds   Fingerstick Glucose (POCT)    Collection Time: 05/03/23  5:47 PM   Result Value Ref Range    POC Glucose 105 65 - 140 mg/dl   APTT    Collection Time: 05/03/23  8:24 PM   Result Value Ref Range    PTT 45 (H) 23 - 37 seconds   Fingerstick Glucose (POCT)    Collection Time: 05/04/23 12:00 AM   Result Value Ref Range    POC Glucose 115 65 - 140 mg/dl   APTT    Collection Time: 05/04/23  3:56 AM   Result Value Ref Range    PTT 65 (H) 23 - 37 seconds   CBC    Collection Time: 05/04/23  4:09 AM   Result Value Ref Range    WBC 17 86 (H) 4 31 - 10 16 Thousand/uL    RBC 4 16 3 88 - 5 62 Million/uL    Hemoglobin 12 1 12 0 - 17 0 g/dL    Hematocrit 38 1 36 5 - 49 3 %    MCV 92 82 - 98 fL    MCH 29 1 26 8 - 34 3 pg    MCHC 31 8 31 4 - 37 4 g/dL    RDW 13 2 11 6 - 15 1 %    Platelets 437 688 - 416 Thousands/uL    MPV 9 2 8 9 - 12 7 fL   Basic metabolic panel    Collection Time: 05/04/23  4:17 AM   Result Value Ref Range    Sodium 151 (H) 135 - 147 mmol/L    Potassium 3 2 (L) 3 5 - 5 3 mmol/L    Chloride 117 (H) 96 - 108 mmol/L    CO2 31 21 - 32 mmol/L    ANION GAP 3 (L) 4 - 13 mmol/L    BUN 17 5 - 25 mg/dL    Creatinine 0 78 0 60 - 1 30 mg/dL    Glucose 131 65 - 140 mg/dL    Calcium 8 9 8 3 - 10 1 mg/dL    eGFR 97 ml/min/1 73sq m   Magnesium    Collection Time: 05/04/23  4:17 AM   Result Value Ref Range    Magnesium 1 9 1 6 - 2 6 mg/dL   Phosphorus    Collection Time: 05/04/23  4:17 AM   Result Value Ref Range    Phosphorus 2 8 2 3 - 4 1 mg/dL   Fingerstick Glucose (POCT)    Collection Time: 05/04/23  5:55 AM   Result Value Ref Range    POC Glucose 121 65 - 140 mg/dl       CTA head and neck w wo contrast    Result Date: 4/28/2023  Narrative: CTA NECK AND BRAIN WITH AND WITHOUT CONTRAST INDICATION: Stroke/TIA, determine embolic source stroke COMPARISON:   MRI and routine CT of the brain dated 4/28/2023  No previous vascular imaging  TECHNIQUE:  Routine CT imaging of the Brain without contrast   Post contrast imaging was performed after administration of iodinated contrast through the neck and brain  Post contrast axial 0 625 mm images timed to opacify the arterial system  3D rendering was performed on an independent workstation  MIP reconstructions performed  Coronal reconstructions were performed of the noncontrast portion of the brain  Radiation dose length product (DLP) for this visit:  1311 mGy-cm   This examination, like all CT scans performed in the [place], was performed utilizing techniques to minimize radiation dose exposure, including the use of iterative reconstruction and automated exposure control  IV Contrast:  85 mL of iohexol (OMNIPAQUE) IMAGE QUALITY:   Diagnostic FINDINGS: NONCONTRAST BRAIN PARENCHYMA:  No intracranial mass, mass effect or midline shift  No CT signs of acute infarction  No acute parenchymal hemorrhage  VENTRICLES AND EXTRA-AXIAL SPACES:  Normal for the patient's age  VISUALIZED ORBITS: Normal visualized orbits  PARANASAL SINUSES: Normal visualized paranasal sinuses  CERVICAL VASCULATURE AORTIC ARCH AND GREAT VESSELS: No stenosis of the aortic arch  Mild atherosclerotic change of the origins of the great vessels without focal stenosis  The proximal great vessels are displaced by the extensive mediastinal adenopathy seen on recent CT of the chest  No focal stenosis is identified  RIGHT VERTEBRAL ARTERY CERVICAL SEGMENT:  Normal origin  The vessel is normal in caliber throughout the neck  LEFT VERTEBRAL ARTERY CERVICAL SEGMENT:  Normal origin  The vessel is normal in caliber throughout the neck   RIGHT EXTRACRANIAL CAROTID SEGMENT: Moderate calcification of the distal common carotid artery and proximal cervical internal carotid artery  Moderate stenosis of the distal common carotid artery and mild narrowing of the ICA origin which is less than 50%  The remainder of the cervical internal carotid artery is normal in caliber  LEFT EXTRACRANIAL CAROTID SEGMENT: Mild atherosclerotic disease of the distal common carotid artery and proximal internal carotid artery without stenosis  NASCET criteria was used to determine the degree of internal carotid artery diameter stenosis  INTRACRANIAL VASCULATURE INTERNAL CAROTID ARTERIES:  Normal enhancement of the intracranial portions of the internal carotid arteries  Normal ophthalmic artery origins  Normal ICA terminus  ANTERIOR CIRCULATION:  Symmetric A1 segments and anterior cerebral arteries with normal enhancement  Normal anterior communicating artery  MIDDLE CEREBRAL ARTERY CIRCULATION:  M1 segment and middle cerebral artery branches demonstrate normal enhancement bilaterally  DISTAL VERTEBRAL ARTERIES:  Normal distal vertebral arteries  Posterior inferior cerebellar artery origins are normal  Normal vertebral basilar junction  BASILAR ARTERY:  Basilar artery is normal in caliber  Normal superior cerebellar arteries  POSTERIOR CEREBRAL ARTERIES: Both posterior cerebral arteries arises from the basilar tip  Both arteries demonstrate normal enhancement  Normal posterior communicating arteries  VENOUS STRUCTURES: The intracranial venous structures are unremarkable  Within the upper mediastinum there is a filling defect present within the innominate vein extending into the proximal aspect of the SVC  Below this the SVC is enhancing normally  NON VASCULAR ANATOMY BONY STRUCTURES: Diffuse osteopenia and degenerative change of the cervical and thoracic spine  Slight anterior subluxation of the C4 in relation to C5   SOFT TISSUES OF THE NECK: Extensive adenopathy is seen within the inferior neck involving level 5 as well as supraclavicular region "bilaterally  Similar to recent CT of the chest  THORACIC INLET: Extensive mediastinal adenopathy as seen on recent CT of the chest  The extensive mediastinal adenopathy results in significant narrowing of the trachea in the transverse diameter beginning immediately below the level of the thyroid gland  and extending to the tracheal bifurcation  See recent CT chest report  Once again identified is extensive mediastinal adenopathy and     Impression: Atherosclerotic change of the carotid bifurcations with moderate stenosis of the distal common carotid artery and mild narrowing of the proximal internal carotid artery on the right  No significant left-sided stenosis  Unremarkable intracranial vasculature  Extensive lower cervical adenopathy as seen on recent CT of the chest  This results in significant narrowing of the trachea in the transverse dimension, unchanged  There is also a filling defect present within the right innominate vein within the inferior neck extending into the superior aspect of the SVC  This is consistent with thrombosis  There is only partial thrombosis of the most superior aspect of the SVC  Below this the vessel enhances appropriately  This examination was marked \"immediate notification\" in Epic in order to begin the standard process by which the radiology reading room liaison alerts the referring practitioner  Workstation performed: [id number]     CT chest abdomen pelvis wo contrast    Result Date: 4/20/2023  Narrative: CT CHEST, ABDOMEN AND PELVIS WITHOUT IV CONTRAST INDICATION:   Abnormal chest x-ray  Recommended by radiology to get a CAT scan of the chest   Difficulty breathing; lymphadenopathy noted on neck CT COMPARISON:  None  TECHNIQUE: CT examination of the chest, abdomen and pelvis was performed without intravenous contrast  Multiplanar 2D reformatted images were created from the source data  Radiation dose length product (DLP) for this visit:  1355 65 mGy-cm     This examination, like " all CT scans performed in the [facility], was performed utilizing techniques to minimize radiation dose exposure, including the use of iterative reconstruction and automated exposure control  Enteric contrast was administered  FINDINGS: CHEST LUNGS:  There is a mass in the right middle lobe with suggestion of cavitation with an air-fluid level and measures 5 7 x 4 6 x 3 4 cm  There is some groundglass opacity in the right lower lobe suggesting atelectasis or infiltrate  Evidence of bronchial thickening is noted on the right may suggest bronchitis or related to edema  Some patchy groundglass opacities also seen in the right upper lobe  Mild narrowing of the right upper lobe and right middle lobe bronchi related to adenopathy is identified  PLEURA:  Small right effusion  HEART/GREAT VESSELS: Heart is unremarkable for patient's age  No thoracic aortic aneurysm  There is compression of the SVC  There 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 which is not occlusive  MEDIASTINUM AND CARLOS:  Diffuse adenopathy in the mediastinum narrows the trachea  There are numerous nodes  One node appears necrotic superiorly measuring 2 3 cm on image 38, 2  Another necrotic paratracheal node measures 2 3 cm  There is also adenopathy in the internal mammary region / anterior mediastinum  Precarinal node measures 3 3 x 3 5 cm  Subcarinal node measures 4 cm anterior posterior  There is evidence of hilar adenopathy  CHEST WALL AND LOWER NECK:  Edema is noted in the right axilla with some mild adenopathy  There appears to be thrombosis of the right subclavian vein extending into the SVC better visualized on the soft tissue neck study  ABDOMEN LIVER/BILIARY TREE:  Contrast phases somewhat limited due to neck CT  No obvious focal lesion in the liver  GALLBLADDER:  No calcified gallstones  No pericholecystic inflammatory change  SPLEEN:  Unremarkable  PANCREAS:  Unremarkable   ADRENAL GLANDS:  Unremarkable  KIDNEYS/URETERS:  Exophytic left renal cyst is noted inferiorly  No hydronephrosis  Contrast in the renal collecting system is related to prior neck CT  STOMACH AND BOWEL:  Unremarkable  No small bowel dilatation  APPENDIX:  No findings to suggest appendicitis  ABDOMINOPELVIC CAVITY:  No ascites  No pneumoperitoneum  No bulky lymphadenopathy  Mildly prominent periceliac node measures 6 mm on image 131  More inferiorly small retroperitoneal and pelvic nodes are identified  VESSELS:  Unremarkable for patient's age  PELVIS REPRODUCTIVE ORGANS:  Unremarkable for patient's age  URINARY BLADDER:  Unremarkable  ABDOMINAL WALL/INGUINAL REGIONS:  Small periumbilical hernia of fat  OSSEOUS STRUCTURES:  No acute fracture or destructive osseous lesion  Impression: Right middle lobe 5 7 cm necrotic/cavitary mass with extensive mediastinal adenopathy narrowing the tracheal airway  No significant adenopathy is seen in the abdomen and pelvis the exception of some small periceliac nodes  Prominent right axillary node  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 more likely than lymphoma  Edema the right axilla related to 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  There is compression of the SVC  No obvious filling defect in the pulmonary arterial system although this is poorly evaluated delayed phase examination  The patient would be at risk for pulmonary embolism This was discussed with Dr Tigre Linder at 11:10 AM Workstation performed: [id number]     XR chest portable    Result Date: 5/4/2023  Narrative: CHEST INDICATION:   hypoxia  COMPARISON: 5/2/2023 and 4/28/2023  EXAM PERFORMED/VIEWS:  XR CHEST PORTABLE FINDINGS: Heart shadow is obscured by adjacent opacity  Unchanged thickening of the right paratracheal stripe reflecting underlying adenopathy   No significant change in right basilar opacity due to combination of right middle lobe consolidation and small right pleural effusion    Osseous structures appear within normal limits for patient age  Impression: No acute findings  Persistent right basilar opacity reflecting right middle lobe consolidation and small right pleural effusion  Mediastinal adenopathy reidentified  Workstation performed: [id number]     XR chest portable    Result Date: 5/2/2023  Narrative: CHEST INDICATION:   hypoxia  COMPARISON: Chest radiograph April 20, 2023  Correlation chest CT April 28, 2023 EXAM PERFORMED/VIEWS:  XR CHEST PORTABLE FINDINGS: Heart shadow is obscured by adjacent opacity  Right lower lung opacity along with blunting of the costophrenic angle, has increased since April 20, 2023  No pneumothorax  Osseous structures appear within normal limits for patient age  Impression: Since April 20, 2023, increased right lower lung opacity  Given the findings on April 28, 2023 CT, differential considerations include a combination of atelectasis, infarction, effusion and known adenopathy  Infection is to be determined on clinical grounds  Workstation performed: [id number]     XR chest 1 view portable    Result Date: 4/20/2023  Narrative: CHEST INDICATION:   Cough  COMPARISON:  None EXAM PERFORMED/VIEWS:  XR CHEST PORTABLE 1 image FINDINGS: The heart is of normal size  There is significant right paratracheal fullness with narrowing of the trachea along with the right mainstem bronchus  There is an infiltrate at the right lung base along with a right pleural effusion  The left lung is clear  Pulmonary vessels are normal   There is no pneumothorax  Calcified tendinitis on the right  Impression: 1  Infiltrate at the right lung base along with a right pleural effusion  2   Right paratracheal fullness with narrowing of the airway  CT scan of the chest recommended  The study was marked in [facility] for immediate notification   Workstation performed: [id number]     CT head wo contrast    Result Date: 5/2/2023  Narrative: CT BRAIN - WITHOUT CONTRAST INDICATION:   Acute encephalopathy  COMPARISON: April 28, 2023 TECHNIQUE:  CT examination of the brain was performed  Multiplanar 2D reformatted images were created from the source data  Radiation dose length product (DLP) for this visit:  942 67 mGy-cm   This examination, like all CT scans performed in the [facility], was performed utilizing techniques to minimize radiation dose exposure, including the use of iterative  reconstruction and automated exposure control  IMAGE QUALITY:  Diagnostic  FINDINGS: PARENCHYMA:  No intracranial mass, mass effect or midline shift  No CT signs of acute infarction  No acute parenchymal hemorrhage  VENTRICLES AND EXTRA-AXIAL SPACES:  Normal for the patient's age  VISUALIZED ORBITS: Normal visualized orbits  PARANASAL SINUSES: Normal visualized paranasal sinuses  CALVARIUM AND EXTRACRANIAL SOFT TISSUES:  Normal      Impression: Mild periventricular white matter hypodensity seen related to chronic small vessel ischemic changes Workstation performed: [id number]     CT head wo contrast    Result Date: 4/28/2023  Narrative: CT BRAIN - WITHOUT CONTRAST INDICATION:   Stroke Alert  COMPARISON:  None  TECHNIQUE:  CT examination of the brain was performed  Multiplanar 2D reformatted images were created from the source data  Radiation dose length product (DLP) for this visit:  956 mGy-cm   This examination, like all CT scans performed in the [facility], was performed utilizing techniques to minimize radiation dose exposure, including the use of iterative reconstruction and automated exposure control  IMAGE QUALITY:  Diagnostic  FINDINGS: PARENCHYMA:  No intracranial mass, mass effect or midline shift  No CT signs of acute infarction  No acute parenchymal hemorrhage  VENTRICLES AND EXTRA-AXIAL SPACES:  Normal for the patient's age  VISUALIZED ORBITS: Normal visualized orbits  PARANASAL SINUSES: Normal visualized paranasal sinuses   CALVARIUM AND EXTRACRANIAL SOFT TISSUES:  Normal      Impression: No acute intracranial abnormality  Workstation performed: [id number]     CT soft tissue neck    Result Date: 4/20/2023  Narrative: CT NECK WITH CONTRAST INDICATION:   Neck mass, nonpulsatile Neck swelling  COMPARISON:  None  TECHNIQUE:  Axial, sagittal, and coronal 2D reformatted images were created from the axial source data and submitted for interpretation  Radiation dose length product (DLP) for this visit:  780 mGy-cm   This examination, like all CT scans performed in the [facility], was performed utilizing techniques to minimize radiation dose exposure, including the use of iterative reconstruction and automated exposure control  IV Contrast:  85 mL of iohexol (OMNIPAQUE) IMAGE QUALITY:  Diagnostic  FINDINGS: VISUALIZED BRAIN PARENCHYMA:  No acute intracranial pathology of the visualized brain parenchyma  VISUALIZED ORBITS: Normal visualized orbits  PARANASAL SINUSES: Normal visualized paranasal sinuses  NASAL CAVITY AND NASOPHARYNX:  Normal  SUPRAHYOID NECK: There is retropharyngeal edema from C1 through C6  Normal oral cavity, tongue base, tonsillar fossa and epiglottis  INFRAHYOID NECK:  Aryepiglottic folds and piriform sinuses are normal   Normal glottis and subglottic airway  THYROID GLAND:  Unremarkable  PAROTID AND SUBMANDIBULAR GLANDS:  Normal  LYMPH NODES:  Extensive necrotic lymphadenopathy involving partially imaged bilateral hilum and mediastinum and left greater than right bilateral cervical levels 3 and 4  The largest cervical lymph node and left level 3/4 measures 5 x 2 9 x 2 8 cm (series 601 image 71)  VASCULAR STRUCTURES:  Right greater than left atherosclerotic change of carotid bifurcations and proximal ICAs  THORACIC INLET:  Partially imaged right pleural effusion and groundglass opacity in the right lower lobe  BONY STRUCTURES: No acute fracture or destructive osseous lesion  Impression: 1    Extensive necrotic lymphadenopathy involving partially imaged mediastinum, bilateral hilum, and left greater than right bilateral cervical levels III and IV suspicious for malignancy  2   Retropharyngeal edema from C1-C6  3   Partially imaged right pleural effusion and groundglass opacities in the right lower lobe  Please refer to the report of concurrent CT chest/abdomen/pelvis for additional findings  The study was marked in EP[employer] for significant notification  Workstation performed: [id number]     MRI brain w wo contrast    Result Date: 4/28/2023  Narrative: MRI BRAIN WITH AND WITHOUT CONTRAST INDICATION: ro mets, lung ca   COMPARISON: Same day earlier head CT TECHNIQUE: Multiplanar, multisequence imaging of the brain was performed before and after gadolinium administration  IV Contrast:  9 mL of Gadobutrol injection (SINGLE-DOSE) IMAGE QUALITY:   Diagnostic  FINDINGS: BRAIN PARENCHYMA: There is tiny focus of acute or subacute infarct in the left cerebellum (series 3 image 11)  There is no edema or hemorrhagic transformation  There is no discrete mass, mass effect or midline shift  There is no intracranial hemorrhage  Normal posterior fossa  No significant white matter signal abnormality  Suboptimal assessment of the postcontrast images due to motion distorted image quality  There is no definite enhancing lesion  VENTRICLES:  Normal for the patient's age  SELLA AND PITUITARY GLAND:  Normal  ORBITS:  Normal  PARANASAL SINUSES:  Normal  VASCULATURE:  Evaluation of the major intracranial vasculature demonstrates appropriate flow voids  CALVARIUM AND SKULL BASE:  Normal  EXTRACRANIAL SOFT TISSUES:  Normal      Impression: 1  Tiny focus of acute or subacute infarct in the left cerebellum  2   No intracranial metastasis  The study was marked in [facility] for immediate notification   Workstation performed: [id number]     CTA ED chest PE study    Result Date: 4/28/2023  Narrative: CTA - CHEST WITH IV CONTRAST - PULMONARY ANGIOGRAM INDICATION: sob  COMPARISON: 4/20/2023  TECHNIQUE: CTA examination of the chest was performed using angiographic technique according to a protocol specifically tailored to evaluate for pulmonary embolism  Multiplanar 2D reformatted images were created from the source data  In addition, coronal 3D MIP postprocessing was performed on the acquisition scanner  Radiation dose length product (DLP) for this visit:  444 mGy-cm   This examination, like all CT scans performed in the [facility], was performed utilizing techniques to minimize radiation dose exposure, including the use of iterative reconstruction and automated exposure control  IV Contrast:  100 mL of iohexol (OMNIPAQUE) FINDINGS: PULMONARY ARTERIAL TREE: Left lower lobe segmental pulmonary embolism  Right lower lobe segmental pulmonary embolism    LUNGS: Moderate diffuse edematous changes throughout the lungs  Right right lower lobe pulmonary infarction    Right middle lobe atelectasis secondary to mass effect  There is no tracheal or endobronchial lesion  PLEURA: Small right pleural effusion  Kehinde Mccracken HEART/GREAT VESSELS:  Unremarkable for patient's age  No thoracic aortic aneurysm  MEDIASTINUM AND CARLOS: Massively extensively diffuse mediastinal and hilar adenopathy resulting in mass effect on the trachea and pulmonary arteries bilaterally    CHEST WALL AND LOWER NECK:   Extensive right axillary adenopathy  VISUALIZED STRUCTURES IN THE UPPER ABDOMEN:  Cirrhotic liver morphology    OSSEOUS STRUCTURES:  No acute fracture or destructive osseous lesion  Impression: Left lower lobe segmental pulmonary embolism  Right lower lobe segmental pulmonary embolism    Massively extensively diffuse mediastinal and hilar adenopathy resulting in mass effect on the trachea and pulmonary arteries bilaterally    Moderate diffuse edematous changes throughout the lungs  Right right lower lobe pulmonary infarction    Right middle lobe atelectasis secondary to mass effect   I personally discussed this study with Radha Stevens on 4/28/2023 3:38 AM  Workstation performed: [id number]     IR biopsy neck    Result Date: 5/1/2023  Narrative: Ultrasound-guided fine needle aspiration biopsy of the left neck lymph node Clinical History: Cervical and mediastinal lymphadenopathy  Right lung mass    Technique: The patient was brought to the ultrasound area and placed supine on the table  After a brief ultrasound examination was performed of the left neck, and correlated with prior imaging of enlarged lymph nodes with increased vascularity  A site on the left neck was prepped and draped in usual sterile fashion  Lidocaine was administered to the skin and a 18-gauge Temno needle was advanced into the vascular lymph node under direct ultrasound guidance  5 specimens obtained the specimen was placed in RPMI solution and formalin  The patient tolerated the procedure well and suffered no complications  Hemostasis obtained  Impression: Impression: Successful core biopsy of left neck lymph node   Workstation performed: [id number]     Echo complete w/ contrast if indicated    Result Date: 4/28/2023  Narrative: •  Left Ventricle: Left ventricular cavity size is normal  Wall thickness is increased  There is mild to moderate concentric hypertrophy  The left ventricular ejection fraction is >70%  Systolic function is vigorous  Although no diagnostic regional wall motion abnormality was identified, this possibility cannot be completely excluded on the basis of this study  Diastolic function is mildly abnormal, consistent with grade I (abnormal) relaxation  Left atrial filling pressure is normal  •  Atrial Septum: No patent foramen ovale detected using saline contrast injection with provocation by abdominal compression  •  Aortic Valve: The leaflets are moderately thickened  The leaflets are moderately calcified  There is mildly reduced mobility  There is no evidence of regurgitation  There is mild stenosis   The aortic valve peak velocity is 2 57 m/s  The aortic valve mean gradient is 15 mmHg  The dimensionless velocity index is 0 41  The aortic valve area is 1 48 cm2  •  Mitral Valve: There is mild calcification  The leaflets exhibit normal mobility  There is trace regurgitation  There is no evidence of stenosis  •  Tricuspid Valve: Tricuspid valve structure is normal  There is trace regurgitation  There is no evidence of stenosis  •  Aorta: The aortic root is mildly dilated  The aortic root is 3 90 cm  •  Pericardium: There is a trivial pericardial effusion  •  No prior study available for comparison       Labs and pertinent reports reviewed  This note has been generated by voice recognition software system  Therefore, there may be spelling, grammar, and or syntax errors  Please contact if questions arise

## 2023-05-04 NOTE — QUICK NOTE
Spoke with daughter on phone for medical update today  Questions were answered  She is unable to visit today as she is at work until 1700 and her drive to [facility] is close to 3 hours  Reassured her that we will continue to keep her updated       Roberto , DO

## 2023-05-04 NOTE — CONSULTS
Vancomycin IV Pharmacy-to-Dose Consultation    Torrie Ortiz is a 66 y o  male who was receiving Vancomycin IV with management by the Pharmacy Consult service for treatment of Pneumonia (goal -600, trough >10)  The patient’s Vancomycin therapy has been discontinued  Thank you for allowing us to take part in this patient's care  Pharmacy will sign-off now; please call or re-consult if there are any questions          Momo Cruz, PharmD, [address] Clinical Pharmacist  [phone]

## 2023-05-05 ENCOUNTER — TELEPHONE (OUTPATIENT)
Dept: HEMATOLOGY ONCOLOGY | Facility: CLINIC | Age: 63
End: 2023-05-05

## 2023-05-05 LAB — PTH RELATED PROT SERPL-SCNC: <2 PMOL/L

## 2023-05-05 NOTE — OCCUPATIONAL THERAPY NOTE
Occupational Therapy Cx        Patient Name: Priyank Ortiz  Today's Date: 5/5/2023 05/05/23 0830   OT Last Visit   OT Visit Date 05/05/23   Note Type   Note type Cancelled Session   Cancel Reasons Intubated/sedated     Pt is currently intubated and not appropriate for therapy at this time  OT will cont to follow        AGA Márquez/MARCELO

## 2023-05-05 NOTE — TELEPHONE ENCOUNTER
"Soft Intake Form   Patient Details   David Price     1960     Reason For Appointment   Who is Calling? Cindy Ackerman   If not patient, Name? NA    DID YOU CONFIRM INSURANCE WITH PATIENT? E verified, Routed to finance   Who is the Referring Doctor? Dr Alise Alvarenga   What is the diagnosis? R Lung mass, Mediastinal Adenopathy, New Cerebellar infarct, B PE   Has this diagnosis been confirmed by a biopsy/surgery? If yes, what is the date it was done? Cervical LN Bx taken 5/11, resulted as metastatic squamous cell carcinoma   Biopsy done at [facility]? If not, where was it done? Yes-[facility]   Was imaging done, and was it done at [facility]? If not, where was it done? Yes-[facility]   Have you been seen by another Oncologist?  If so, who and where (name of facility, city and state) No   For 2nd Opinions Only: Are you currently undergoing treatment, or are you scheduled to start treatment? If yes, name of facility, city and state  NA   For \"History Of\" only: Have you completed treatment? NA   Have you had Genetic Testing done in the past?  If so, advise to bring test results to their visit Unknown   Record Gathering Information   Did you advise to have records faxed to [phone]? NA   Did you advise to have disks sent to the proper address with imaging? (\"History of\" Patients)  5 years of imaging for breast patients-Mammos, US etc NA   Scheduling Information   Did you send new patient paperwork? Email or mail? NA   What is the best call back number? (If the [facility] is calling, please use their number) NA   Miscellaneous Information    The patient has a pre existing consult that had been scheduled by the [facility] on 5/24 with Dr Vanesa Mckeon in [place]  This HFU appt has been scheduled for an earlier date, as the bx has resulted  The patient has been scheduled for a HFU appointment with Dr Daryle May on 5/16 in the [facility] office at 1120       "

## 2023-05-05 NOTE — QUICK NOTE
Daughter Josh Frias and wife Prince Bunn were called for update of need for intubation at this time for airway protection  Family was informed that saturations were dropping even on BiPAP  Family is agreeable to intubation at this time  Josh Frias tells me she is on her way to the hospital now with her mother and we will continue to keep family updated

## 2023-05-05 NOTE — CONSULTS
Consultation - Palliative and Supportive Care   Gilbert Horn 66 y o  male [id number]    Patient Active Problem List   Diagnosis   • Acute pneumonia   • Lung mass   • Neck mass   • Hypercalcemia   • Hyponatremia   • Subclavian vein thrombosis (HCC)   • Type 2 diabetes mellitus without complication, without long-term current use of insulin (HCC)   • SIRS (systemic inflammatory response syndrome) (HCC)   • Hypercoagulopathy (HCC)   • Tobacco abuse   • Essential hypertension   • Mixed dyslipidemia   • Gout   • Bilateral pulmonary embolism (HCC)   • Severe systemic inflammatory response syndrome (SIRS) (HCC)   • Acute respiratory failure with hypoxia (HCC)   • Pleural effusion, right   • Acute encephalopathy   • New infarction of cerebellum (HCC)   • Electrolyte abnormality   • ETOH abuse   • Delirium tremens ([facility] )     Active issues specifically addressed today include:   • Cerebellar infarct  • Mediastinal LAD resulting in mucus plugging, hypoxic respiratory failure requiring intubation  • Lung mass suspicious for malignancy  • Palliative care encounter  • Goals of care discussion    Plan:  1  Symptom management -   • Per ICU    2  Goals -   • Ongoing medical optimization without limits at this time  • Palliative care to continue to follow for evolving goals of care discussions  • Patient's family is clear that he would not accept tracheostomy long term  They also express understanding that any treatment is palliative but do not expect cure  • Did discuss code status  Family will consider DNAR status privately  Code Status: full - Level 1   Decisional apparatus:  Patient is not competent on my exam today  If competence is lost, patient's substitute decision maker would default to spouse by [place]  Advance Directive / Living Will / POLST:  None previously discussed or completed  Unable to explore in setting of AMS      3  Social support  • Spouse Hany Vega)  • Daughters Matilde Garcia and Lisa Tj)  • Spouse is a CNA and daughters are CRNA and radiation tech  • Provided supportive listening  • Pastoral care following    4  Follow-up  • Palliative care will return on 5/8  Page with urgent need for support or goals of care over the weekend     I have reviewed the patient's controlled substance dispensing history in the Prescription Drug Monitoring Program in compliance with the [facility] regulations before prescribing any controlled substances  We appreciate the invitation to be involved in this patient's care  We will continue to follow  Please do not hesitate to reach our on call provider through our clinic answering service at  should you have acute symptom control concerns  Elvin Quiros PA-C  Palliative and Supportive Care  Clinic/Answering Service: [phone]  You can find me on [employer]! IDENTIFICATION:  Consults  Physician Requesting Consult: King Martha MD  Reason for Consult / Principal Problem: goals of care  Hx and PE limited by: AMS, supplemented by chart review, communication with primary team and family  HISTORY OF PRESENT ILLNESS:       Deion Dent is a 66 y o  male with history of hypertension, alcohol abuse, tobacco abuse, diabetes, presented to [facility] on 5/2 as a transfer from [address] [place] [address] for consideration of emergent radiation  He initially presented on 4/28 with acute encephalopathy  Family states that he was in his usual state of health when he went for outpatient labs and never returned to work  He was admitted with acute encephalopathy  Found to have a cerebellar stroke and bilateral PEs  He was started on a heparin drip  Patient was treated with CIWA protocol due to presumed alcohol withdrawal   IR biopsy was completed on 5/1  Lung mass suspicious for malignancy  He was ultimately transferred to [facility] for radiation therapy  Thoracic surgery evaluated and determined there is no surgical interventions recommended at this time    He was also started on antibiotics for aspiration pneumonia  This morning patient developed worsening respiratory status for which she was intubated  Palliative care consulted for support  Patient is supported by his wife, Isauro, 2 daughters Suzette Mendieta and Gatito Juarez  All 3 work in the healthcare field  They expressed Tanesha Todd being a man who enjoyed the outdoors, their cat and dog, 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  He was active right up until admission  He did not express his wishes regarding end-of-life care or right living will  However, made comments during other family members medical challenges that lead them to believe that he would not accept a trach for long-term ventilation  Review of Systems   Unable to perform ROS: intubated       Past Medical History:   Diagnosis Date   • Bilateral pulmonary embolism ([facility] ) 04/28/2023   • Current smoker    • Diabetes mellitus ([facility] )    • GERD (gastroesophageal reflux disease)    • Gout    • Hypertension    • Lung cancer ([facility] )    • 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)    • Mixed dyslipidemia    • Pleural effusion, right 04/28/2023   • Subclavian vein thrombosis (HCC)    • Type 2 diabetes mellitus without complication, without long-term current use of insulin ([facility] ) 04/20/2023     Past Surgical History:   Procedure Laterality Date   • ANAL FISSURECTOMY     • IR BIOPSY NECK  5/1/2023   • TONSILLECTOMY       Social History     Socioeconomic History   • Marital status: /Civil Union     Spouse name: Not on file   • Number of children: Not on file   • Years of education: Not on file   • Highest education level: Not on file   Occupational History   • Not on file   Tobacco Use   • Smoking status: Every Day     Packs/day: 1 00     Types: Cigarettes   • Smokeless tobacco: Never   Vaping Use   • Vaping Use: Never used   Substance and Sexual Activity   • Alcohol use:  Yes   • Drug use: Not Currently   • Sexual activity: Not on file   Other Topics Concern   • Not on file Social History Narrative   • Not on file     Social Determinants of Health     Financial Resource Strain: Not on file   Food Insecurity: No Food Insecurity   • Worried About Running Out of Food in the Last Year: Never true   • Ran Out of Food in the Last Year: Never true   Transportation Needs: No Transportation Needs   • Lack of Transportation (Medical): No   • Lack of Transportation (Non-Medical): No   Physical Activity: Not on file   Stress: Not on file   Social Connections: Not on file   Intimate Partner Violence: Not on file   Housing Stability: Low Risk    • Unable to Pay for Housing in the Last Year: No   • Number of Places Lived in the Last Year: 1   • Unstable Housing in the Last Year: No     No family history on file  MEDICATIONS / ALLERGIES:    all current active meds have been reviewed    No Known Allergies    OBJECTIVE:    Physical Exam  Physical Exam  Constitutional:       Appearance: He is ill-appearing  Cardiovascular:      Rate and Rhythm: Normal rate  Pulmonary:      Comments: Ventilated via ETT  Abdominal:      Comments: OG tube in place   Musculoskeletal:         General: Swelling present  Skin:     General: Skin is warm and dry     Neurological:      Comments: sedated   Psychiatric:      Comments: No agitation, FLACC 0/10         Lab Results:  Results from last 7 days   Lab Units 05/05/23  0516 05/04/23  0409 05/03/23  0509 05/02/23  2136 05/02/23  0443   WBC Thousand/uL 18 71* 17 86* 17 71* 17 38* 15 36*   HEMOGLOBIN g/dL 12 3 12 1 11 8* 12 3 12 4   HEMATOCRIT % 37 6 38 1 37 3 39 1 38 2   PLATELETS Thousands/uL 361 353 366 366 350   NEUTROS PCT %  --   --  87* 86* 85*   MONOS PCT %  --   --  8 8 8     Results from last 7 days   Lab Units 05/05/23  0516 05/04/23  1623 05/04/23  1127 05/02/23  2136 05/02/23  0443 05/01/23  0438 04/30/23  0524   POTASSIUM mmol/L 3 3* 3 7 3 6   < > 3 4* 3 5 3 4*   CHLORIDE mmol/L 112* 118* 118*   < > 106 105 101   CO2 mmol/L 29 29 31   < > 27 27 27   BUN mg/dL 17 16 17   < > 16 14 10   CREATININE mg/dL 0 80 0 72 0 78   < > 0 87 0 85 0 70   CALCIUM mg/dL 9 0 8 8 8 9   < > 11 0* 13 0* 11 6*   ALK PHOS U/L  --   --   --   --  107* 129* 124*   ALT U/L  --   --   --   --  57* 72* 71*   AST U/L  --   --   --   --  36 45* 32    < > = values in this interval not displayed  Imaging Studies: Reviewed pertinent studies  EKG, Pathology, and Other Studies: reviewed pertinent studies    Counseling / Coordination of Care    Total floor / unit time spent today 60 minutes  Greater than 50% of total time was spent with the patient and / or family counseling and / or coordination of care  A description of the counseling / coordination of care: Time spent assessing patient, communicating with primary team, family, providing support, discussing goals of care         This note was not shared with the patient due to privacy exception: note includes other individuals

## 2023-05-05 NOTE — PROGRESS NOTES
Pastoral Care Progress Note    2023  Patient: Elisa Sanchez : 1960  Admission Date & Time: 2023  MRN: [id number] [facility]: [id number]         23 1500   Clinical Encounter Type   Visited With Patient and family together   Anabaptist Encounters   Anabaptist Needs Prayer   Sacramental Encounters   Sacrament of Sick-Anointing Patient declined anointing  (Family declined anointing)     Brenden Rios met with the pt and the pt's family  Fr administered prayers and blessings  The pt's family declined Fr's offer for anointing  No further needs were expressed at this time  Chaplains still remain available

## 2023-05-05 NOTE — PROCEDURES
Intubation    Date/Time: 5/2/2023 8:31 PM  Performed by: Whitley Bartlett MD  Authorized by: Bertha Fernando MD     Patient location:  Bedside  Other Assisting Provider: Yes (comment) (Dr Maryjane Nathan)    Consent:     Consent obtained:  Emergent situation  Universal protocol:     Patient identity confirmed:  Arm band and hospital-assigned identification number  Pre-procedure details:     Mallampati score:  4    Pretreatment medications:  Etomidate and fentanyl    Paralytics:  Succinylcholine  Indications:     Indications for intubation: respiratory failure, hypoxemia and pulmonary toilet    Procedure details:     Preoxygenation:  BiPAP    CPR in progress: no      Intubation method:  Oral    Oral intubation technique:  Glidescope    Laryngoscope blade: Mac 4    Tube size (mm):  7 5    Tube type:  Cuffed    Number of attempts:  2    Tube visualized through cords: yes    Placement assessment:     ETT to lip:  26    Tube secured with:  ETT sheridan    Breath sounds:  Equal and absent over the epigastrium    Placement verification: chest rise, condensation, colorimetric ETCO2 device, CXR verification, direct visualization and equal breath sounds      CXR findings:  ETT in proper place    Ventilator settings:  [address] 16/480/8/100  Post-procedure details:     Patient tolerance of procedure: Tolerated well, no immediate complications  Comments:      First attempt done with Glidescope, vocal chords could be visualized due to significant amount of soft tissue  Second attempt made by Dr Isabel Escamilla with Glidescope and ETT 7 5 Fr, able to visualize vocal chords and ETT successfully advanced

## 2023-05-05 NOTE — PROGRESS NOTES
Daily Progress Note - Critical Care   Shira Cheney 66 y o  male MRN: [id number]  Unit/Bed#: MICU 07 Encounter: [id number]        ----------------------------------------------------------------------------------------  HPI: Shira Cheney is a 66 y o  male with a significant past medical history of diabetes, HTN, smoking, who presented to [address] [place] [address] with altered mental status on 4/28/2023  He was found to have bilateral pulmonary emboli and mediastinal and hilar adenopathy with mass effect on the trachea on imaging  He had an MRI that was consistent with a new cerebellar infarct  He was evaluated by pulmonology and thoracics at [address] [place] [address] who recommended transfer for urgent radiation therapy of cervical masses  24hr events: Chest vest PT adding with significant sputum coughed up  New CXR significant for white out of R lung  Pending repeat CXR this morning  Discussion was had with daughter, Win Jean-Baptiste, last night about patient's increased work of breathing  Palliative was consulted for goals of care discussion and family support      ---------------------------------------------------------------------------------------  SUBJECTIVE  No complaints, AAOx3 although intermittently confused  Seen and examined seated upright  Denies pain  States that he is feeling the same as yesterday, just SOB  Review of Systems   Unable to perform ROS: Mental status change   Respiratory: Positive for cough and shortness of breath  Psychiatric/Behavioral: Positive for confusion       Review of systems was unable to be performed secondary to mental status  ---------------------------------------------------------------------------------------  Assessment and Plan:    Neuro:   • Diagnosis: Acute encephalopathy  o Multifactorial in setting of cerebellar infarction, acute illness, possible infection  o Plan:   - [facility] ICU  - Delirium precautions  - Precedex gtt  - Melatonin and seroquel added qHS for sleep wake regulation  • Diagnosis: Delirium tremens - now resolved   o Plan:   - History of chronic ETOH abuse  - Previously on CIWA, since discontinued  - Continue high dose thiamine, folate  • Diagnosis: Cerebellar infarction  o Plan:   - Continue heparin gtt  - Echo complete with 70%EF, no PFO  - Continue statin  - Continue aspirin  • Diagnosis: ETOH abuse  o Plan:   - No current concerns for withdrawal, past window with last drink 4/26  - Outpatient resources      CV:   • Diagnosis: Essential hypertension  o Plan:  - Holding home losartan      Pulm:  • Diagnosis: Acute hypoxic respiratory failure  o Multifactorial with lung mass, bilateral PEs, pulm effusions, right lower lung opacity  o Plan:   - Intubated this morning for airway protection, bronchoscopy following remarkable for extremely thick R secretions     - Airway clearance, chest wall oscillation, respiratory protocol  - Goal to wean to FiO2 40%, PEEP 6  - Continuous pulse oximetry  - Goal SpO2 >92%  - SBT when possible  • Diagnosis: Bilateral PEs  o Plan:   - Heparin VTE high, continue  • Diagnosis: Right lower lung opacity progressed to full right lung white out  o Plan:   - Possibly malignant effusion vs infarct vs aspiration  - Possible that increased secretions is causing mucous plugging and atelectasis   • Diagnosis: Tobacco abuse  o Plan:   - Nicotine patch  - Encourage cessation      GI:   • Diagnosis: No active issues  o Plan:   - Bowel regimen ordered      :   • Diagnosis: No active issues  o Plan:  - Monitor I&Os      F/E/N:   • F- isolyte @ 50/hr - will likely discontinue to avoid fluid overload  • E- monitor and replete as needed, goal Mag >2, Phos >3, K >4  • N- Passed for Isidro/CHO controlled with speech, however due to fluctuating mental status has been intermittent intake, continue to monitor to optimize nutrition      Heme/Onc:   • Diagnosis: Squamous cell carcinoma of the lung  o Plan:   - Radiation therapy complete started on 5/3 with plan for 10 total treatments   - Ongoing therapies as per rad/onc recommendations  • Diagnosis: Cervical adenopathy with trachael narrowing  o Secondary to malignancy  o Plan:   - As per thoracics no plan for stent at present  - Monitor airway  - Trial of decadron 6mg q6 for 24 hours on 5/4  • Diagnosis: Subclavian vein thrombosis, right  o Plan:   - Heparin as above, continue  - Monitor for SVC syndrome      Endo:   • Diagnosis: Type 2 diabetes mellitus  o Plan:   - SSI, continue  - Goal -180      ID:   • Diagnosis: Leukocytosis  o Plan:   - On zosyn  - Sputum culture significant for respiratory carolynn, MRSA culture negative  - Trend WBC, fever curve      MSK/Skin:   • Diagnosis: No active issues  o Plan:   - Routine skin care  - PT/OT when able    Patient appropriate for transfer out of the ICU today?: No  Disposition: Continue Critical Care   Code Status: Level 1 - Full Code  ---------------------------------------------------------------------------------------  ICU CORE MEASURES    Prophylaxis   VTE Pharmacologic Prophylaxis: Heparin Drip  VTE Mechanical Prophylaxis: sequential compression device  Stress Ulcer Prophylaxis: Prophylaxis Not Indicated     ABCDE Protocol (if indicated)  Plan to perform spontaneous awakening trial today? Not applicable  Plan to perform spontaneous breathing trial today? Not applicable  Obvious barriers to extubation? Not applicable    Invasive Devices Review  Invasive Devices     Peripheral Intravenous Line  Duration           Peripheral IV 05/02/23 Dorsal (posterior); Left Forearm 2 days    Peripheral IV 05/02/23 Left;Ventral (anterior) Forearm 2 days          Drain  Duration           External Urinary Catheter Small 2 days              Can any invasive devices be discontinued today?  No  ---------------------------------------------------------------------------------------  OBJECTIVE    Vitals   Vitals:    05/05/23 0810 05/05/23 0815 05/05/23 0820 05/05/23 0825   BP: (!) 218/114 158/80 124/84 135/77   Pulse: 92 (!) 116 (!) 112 (!) 114   Resp: 16 13 15 20   Temp:       TempSrc:       SpO2: 91% 90% 90% 90%   Weight:         Temp (24hrs), Av 6 °F (37 °C), Min:98 4 °F (36 9 °C), Max:98 7 °F (37 1 °C)  Current: Temperature: 98 7 °F (37 1 °C)      Respiratory:  SpO2: SpO2: 90 %  Nasal Cannula O2 Flow Rate (L/min): 55 L/min    Invasive/non-invasive ventilation settings   Respiratory    Lab Data (Last 4 hours)    None         O2/Vent Data (Last 4 hours)    None                Physical Exam  Constitutional:       Appearance: He is ill-appearing  HENT:      Head: Normocephalic and atraumatic  Right Ear: External ear normal       Left Ear: External ear normal       Nose: Nose normal       Mouth/Throat:      Mouth: Mucous membranes are moist    Eyes:      Extraocular Movements: Extraocular movements intact  Cardiovascular:      Rate and Rhythm: Normal rate and regular rhythm  Pulmonary:      Breath sounds: Rhonchi and rales present  Comments: Increased WOB with high flow NC in place   Abdominal:      General: Abdomen is flat  Palpations: Abdomen is soft  Tenderness: There is no abdominal tenderness  Musculoskeletal:      Cervical back: Normal range of motion  Right lower leg: No edema  Left lower leg: No edema  Comments: 3+ swelling of RUE   Skin:     General: Skin is warm and dry  Neurological:      General: No focal deficit present  Mental Status: He is oriented to person, place, and time  Comments: AAOx3, to self, month, and place  Intermittently fluctuates in terms of mentation  Follows commands              Laboratory and Diagnostics:  Results from last 7 days   Lab Units 23  0516 23  0409 23  0509 23  2136 23  0443 23  0438 23  0524   WBC Thousand/uL 18 71* 17 86* 17 71* 17 38* 15 36* 16 11* 13 79*   HEMOGLOBIN g/dL 12 3 12 1 11 8* 12 3 12 4 12 3 11 9*   HEMATOCRIT % 37 6 38 1 37 3 39 1 38 2 37 5 36 8   PLATELETS Thousands/uL 361 353 366 366 350 453* 544*   NEUTROS PCT %  --   --  87* 86* 85* 87* 80*   MONOS PCT %  --   --  8 8 8 8 10     Results from last 7 days   Lab Units 05/05/23 0516 05/04/23 1623 05/04/23 1127 05/04/23 0417 05/03/23 0509 05/02/23 2136 05/02/23 0443 05/01/23  0438 04/30/23 0524 04/29/23  0834 04/29/23  0459   SODIUM mmol/L 143 150* 149* 151* 146 144 140 138 136   < > 134*   POTASSIUM mmol/L 3 3* 3 7 3 6 3 2* 3 5 3 2* 3 4* 3 5 3 4*   < > 3 5   CHLORIDE mmol/L 112* 118* 118* 117* 114* 111* 106 105 101   < > 99   CO2 mmol/L 29 29 31 31 29 28 27 27 27   < > 28   ANION GAP mmol/L 2* 3* 0* 3* 3* 5 7 6 8   < > 7   BUN mg/dL 17 16 17 17 16 15 16 14 10   < > 10   CREATININE mg/dL 0 80 0 72 0 78 0 78 0 84 0 83 0 87 0 85 0 70   < > 0 65   CALCIUM mg/dL 9 0 8 8 8 9 8 9 9 8 10 3* 11 0* 13 0* 11 6*   < > 10 9*   GLUCOSE RANDOM mg/dL 156* 150* 163* 131 137 133 127 131 115   < > 113   ALT U/L  --   --   --   --   --   --  57* 72* 71*  --  90*   AST U/L  --   --   --   --   --   --  36 45* 32  --  40*   ALK PHOS U/L  --   --   --   --   --   --  107* 129* 124*  --  123*   ALBUMIN g/dL  --   --   --   --   --   --  3 3* 3 4* 3 2*  --  3 2*   TOTAL BILIRUBIN mg/dL  --   --   --   --   --   --  0 39 0 45 0 40  --  0 38    < > = values in this interval not displayed       Results from last 7 days   Lab Units 05/05/23 0516 05/04/23 0417 05/03/23 0509 05/02/23 2136 05/01/23 0438 04/30/23  0524 04/28/23  1837   MAGNESIUM mg/dL 1 8 1 9 2 1 1 6 1 5* 1 5* 1 8*   PHOSPHORUS mg/dL 1 2* 2 8 2 9 1 7* 2 9  --   --       Results from last 7 days   Lab Units 05/05/23  0116 05/04/23  1742 05/04/23  1034 05/04/23  0356 05/03/23 2024 05/03/23  1145 05/03/23  0509 05/02/23  2136 05/01/23  1015   INR   --   --   --   --   --   --   --  1 45* 1 36*   PTT seconds 63* 99* 57* 65* 45* 112* 52* 159*  --               ABG:  Results from last 7 days   Lab Units 05/02/23  0959   PH ART  7 449   PCO2 ART mm Hg 39 3   PO2 ART mm Hg 63 2*   HCO3 ART mmol/L 26 6   BASE EXC ART mmol/L 2 6   ABG SOURCE  Radial, Left     VBG:  Results from last 7 days   Lab Units 05/02/23  2136 05/02/23  0959   PH JENNIFER  7 380  --    PCO2 JENNIFER mm Hg 45 0  --    PO2 JENNIFER mm Hg 92 0*  --    HCO3 JENNIFER mmol/L 26 0  --    BASE EXC JENNIFER mmol/L 0 6  --    ABG SOURCE   --  Radial, Left     Results from last 7 days   Lab Units 05/02/23  1051   PROCALCITONIN ng/ml 0 23       Micro  Results from last 7 days   Lab Units 05/02/23  1704 05/02/23  1412 05/02/23  1307 05/02/23  1151   BLOOD CULTURE   --   --  No Growth at 24 hrs  No Growth at 24 hrs  SPUTUM CULTURE   --  3+ Growth of  --   --    GRAM STAIN RESULT   --  2+ Epithelial cells per low power field*  Rare Polys*  1+ Gram positive cocci in pairs and chains*  1+ Gram positive rods*  Rare Budding yeast*  --   --    MRSA CULTURE ONLY  No Methicillin Resistant Staphlyococcus aureus (MRSA) isolated  --   --   --        Imaging:  I have personally reviewed pertinent reports  Intake and Output  I/O       05/02 0701 05/03 0700 05/03 0701  05/04 0700    I V  (mL/kg) 973 (10)     IV Piggyback 496     Total Intake(mL/kg) 1469 (15 1)     Urine (mL/kg/hr) 1725 219 (0 1)    Total Output 1725 219    Net -256 -219                  Height and Weights         Body mass index is 28 01 kg/m²  Weight (last 2 days)     Date/Time Weight    05/04/23 0600 96 3 (212 3)    05/03/23 0600 97 2 (214 29)            Nutrition       Diet Orders   (From admission, onward)             Start     Ordered    05/03/23 1810  Diet Isidro/CHO Controlled; Consistent Carbohydrate Diet Level 2 (5 carb servings/75 grams CHO/meal)  Diet effective now        References:    Nutrtion Support Algorithm Enteral vs  Parenteral   Question Answer Comment   Diet Type Isidro/CHO Controlled    Isidro/CHO Controlled Consistent Carbohydrate Diet Level 2 (5 carb servings/75 grams CHO/meal)    RD to adjust diet per protocol?  Yes        05/03/23 1810                    Active Medications  Scheduled Meds:  Current Facility-Administered Medications   Medication Dose Route Frequency Provider Last Rate   • acetaminophen  650 mg Oral Q6H PRN Shahla Libel, DO     • albuterol  2 5 mg Nebulization Q4H PRN Dionicio Salas MD     • aspirin  81 mg Oral Daily Sadia Grumbles [place], DO     • bisacodyl  10 mg Rectal Daily PRN Shahla Libel, DO     • chlorhexidine  15 mL Mouth/Throat Q12H [address] [place], DO     • fentaNYL  50 mcg/hr Intravenous Continuous Marcha Colon Zack, DO 50 mcg/hr (05/05/23 0820)   • fentanyl citrate (PF)  200 mcg Intravenous Once Kirk Travis MD     • folic acid IVPB  1 mg Intravenous Daily Shahla Libel, DO Stopped (05/05/23 0730)   • heparin (porcine)  3-30 Units/kg/hr (Order-Specific) Intravenous Titrated Shahla Libel, DO 19 Units/kg/hr (05/05/23 0800)   • insulin lispro  1-6 Units Subcutaneous Q6H [address] [place], DO     • ipratropium  0 5 mg Nebulization Q6H Dionicio Salas MD     • levalbuterol  1 25 mg Nebulization Q6H Dionicio Salas MD     • melatonin  3 mg Oral HS Sadia Blant[place], DO     • midazolam          • midazolam          • midazolam  4 mg Intravenous Once Kirk Travis MD     • nicotine  14 mg Transdermal Daily Shahla Libel, DO     • norepinephrine          • piperacillin-tazobactam  3 375 g Intravenous [address] [place], DO Stopped (05/05/23 0400)   • potassium chloride  40 mEq Oral Once Marcha Colon Yext, DO     • potassium phosphate  30 mmol Intravenous Once Marcha Colon Yext, DO     • pravastatin  40 mg Oral Daily With [employer], DO     • propofol  5-50 mcg/kg/min Intravenous Titrated Kirk Travis MD     • QUEtiapine  12 5 mg Oral HS GILLIAN Brenstein     • sodium chloride  4 mL Nebulization TID Dionicio Salas MD     • thiamine  500 mg Intravenous Daily Shahla Libel, DO Stopped (05/05/23 0730)     Continuous Infusions:  fentaNYL, 50 mcg/hr, Last Rate: 50 mcg/hr (05/05/23 0820)  heparin (porcine), 3-30 Units/kg/hr "(Order-Specific), Last Rate: 19 Units/kg/hr (05/05/23 0800)  propofol, 5-50 mcg/kg/min      PRN Meds:   acetaminophen, 650 mg, Q6H PRN  albuterol, 2 5 mg, Q4H PRN  bisacodyl, 10 mg, Daily PRN        Allergies   No Known Allergies  ---------------------------------------------------------------------------------------  Advance Directive and Living Will:      Power of :    POLST:    ---------------------------------------------------------------------------------------  Care Time Delivered:   No Critical Care time spent     [place]awnee Lisa, DO      Portions of the record may have been created with voice recognition software  Occasional wrong word or \"sound a like\" substitutions may have occurred due to the inherent limitations of voice recognition software    Read the chart carefully and recognize, using context, where substitutions have occurred  "

## 2023-05-05 NOTE — QUICK NOTE
Radiation Oncology Treatment Note    A treatment dose of 200 cGy was administered today to the involved tumor site(s) lung, mediastinum, neck using 1-10 MV photons    Present total dose at this time is 600 cGy   Approximately 7 more treatments critical review

## 2023-05-05 NOTE — CONSULTS
Nutrition Recommendations:    Updated TF order with recommendations: Vital 1 5 with goal rate of 60mL/hr x24 hrs  Start at 10mL/hr and advance by 10mL/hr q8 hrs as tolerated to goal rate  At goal, provides 2160 kcals, 97g protein, 1100mL free water in TF  Continue 250mL water flushes q6 hrs per critical care for a total of 2100mL free water

## 2023-05-05 NOTE — RESPIRATORY THERAPY NOTE
RT Ventilator Management Note  Cy Harness 66 y o  male MRN: [id number]  [facility]: [id number]      Daily Screen         5/5/2023  0948             Patient safety screen outcome[de-identified] Failed (P)     Not Ready for Weaning due to[de-identified] Underline problem not resolved (P)               Physical Exam:   Assessment Type: (P) Assess only  Cough: Productive, Weak      Resp Comments: (P) Pt was placed on Bipap due to low sat and work of breathing, pt then intubated and placed on vent without issues  Pt settings started at RR 16  PEEP 8 FIO2 100%  After intubation bronchschopy performed on pt without issues  ABG drawn from left radial artery   Will continue to monitor pt per protocol

## 2023-05-05 NOTE — PROCEDURES
Arterial Line Insertion    Date/Time: 5/2/2023 8:31 PM  Performed by: Whitley Bartlett MD  Authorized by: Bertha Fernando MD     Patient location:  ICU  Consent:     Consent obtained:  Written    Consent given by:  Spouse    Risks discussed:  Bleeding, pain, repeat procedure, ischemia and infection  Universal protocol:     Patient identity confirmed:  Arm band and hospital-assigned identification number  Indications:     Indications: hemodynamic monitoring, multiple ABGs, continuous blood pressure monitoring and frequent labs / infusion    Pre-procedure details:     Skin preparation:  Chlorhexidine    Preparation: Patient was prepped and draped in sterile fashion    Anesthesia (see MAR for exact dosages): Anesthesia method:  Local infiltration    Local anesthetic:  Lidocaine 1% w/o epi  Procedure details:     Location / Tip of Catheter:  Radial    Laterality:  Left    Abdi's test performed: yes      Placement technique:  Ultrasound guided    Sterile ultrasound techniques: Sterile gel and sterile probe covers were used      Number of attempts:  1    Successful placement: yes      Transducer: waveform confirmed    Post-procedure details:     Post-procedure:  Sterile dressing applied and sutured    Patient tolerance of procedure:   Tolerated well, no immediate complications

## 2023-05-06 PROBLEM — C34.91 SQUAMOUS CELL LUNG CANCER, RIGHT (HCC): Status: ACTIVE | Noted: 2023-05-06

## 2023-05-06 NOTE — PROGRESS NOTES
[address] [facility]  Progress Note: Critical Care  Name: Shira Cheney 66 y o  male I MRN: [id number]  Unit/Bed#: MICU 07 I Date of Admission: 5/2/2023   Date of Service: 5/6/2023 I Hospital Day: 4    Assessment/Plan   Neuro:   • Diagnosis: Acute encephalopathy  ? Multifactorial in setting of cerebellar infarction, acute illness, possible infection  ? Plan:   - CAM ICU  - Delirium precautions  - Precedex gtt  - Melatonin and seroquel added qHS for sleep wake regulation    • Diagnosis: Delirium tremens - now resolved   ? Plan:   - History of chronic ETOH abuse  - Previously on CIWA, since discontinued    • Continue high dose thiamine, folate    • Diagnosis: Cerebellar infarction  ? Plan:   - Continue heparin gtt  - Echo complete with 70%EF, no PFO  - Continue statin  - Continue aspirin    • Diagnosis: ETOH abuse  ? Plan:   - No current concerns for withdrawal, past window with last drink 4/26  - Outpatient resources        CV:   • Diagnosis: Essential hypertension  ? Plan:  - Holding home losartan        Pulm:  • Diagnosis: Acute hypoxic respiratory failure requiring mechanical ventilation  ? Multifactorial with lung mass, bilateral PEs, pulm effusions, right lower lung opacity  ? Intubated this morning for airway protection, bronchoscopy following remarkable for extremely thick R secretions  Bronched with moderate amount of secretions noted  ? Current MV settings: SCMV: 20/480/8/70   ? Plan:   - Airway clearance, chest wall oscillation, respiratory protocol  - Goal to wean to FiO2 40%, PEEP 6  - Continuous pulse oximetry  - Goal SpO2 >92%  - SBT when possible    • Diagnosis: Bilateral PEs  ? Plan:   - Heparin VTE high, continue    • Diagnosis: Right lower lung opacity progressed to full right lung white out  ?  Plan:   - Possibly malignant effusion vs infarct vs aspiration  - Possible that increased secretions is causing mucous plugging and atelectasis     • Diagnosis: Tobacco abuse  ? Plan:   - Nicotine patch  - Encourage cessation        GI:   • Diagnosis: No active issues  ? Plan:   - Bowel regimen ordered        :   • Diagnosis: No active issues  ? Plan:  - Monitor I&Os        F/E/N:   • F- none  • E- monitor and replete as needed, goal Mag >2, Phos >3, K >4  • N- TF Vital 1 5 @ 60cc (goal) with FWF 250cc q6        Heme/Onc:   • Diagnosis: Squamous cell carcinoma of the lung  ? Plan:   - Radiation therapy complete started on 5/3 with plan for 10 total treatments   - Ongoing therapies as per rad/onc recommendations    • Diagnosis: Cervical adenopathy with trachael narrowing  ? Secondary to malignancy  ? 5/4 24 hours of decadron 6mg   ? 5/5 intubated in AM for hypoxia  ? Plan:   - As per thoracics no plan for stent at present  - Continue intubation (see Pulmonary assessment/plan above)    • Diagnosis: Subclavian vein thrombosis, right  • Exam: RUE firm, swollen; patient indicates some pain and numbness/tingling  ? Plan:   - Heparin as above, continue  - Monitor for SVC syndrome        Endo:   • Diagnosis: Type 2 diabetes mellitus  ? Plan:   - SSI, continue  - Goal -180        ID:   • Diagnosis: Leukocytosis  ? Plan:   - On zosyn  - Sputum culture significant for respiratory carolynn, MRSA culture negative  - Trend WBC, fever curve        MSK/Skin:   • Diagnosis: No active issues  ? Plan:   - Routine skin care  - PT/OT when able    Critical Care needs:   - Palliative Care following     Disposition: Critical care    ICU Core Measures     Vented Patient  VAP Bundle  VAP bundle ordered     A: Assess, Prevent, and Manage Pain · Has pain been assessed? Yes  · Need for changes to pain regimen? No   B: Both Spontaneous Awakening Trials (SATs) and Spontaneous Breathing Trials (SBTs) · Plan to perform spontaneous awakening trial today? Yes   · Plan to perform spontaneous breathing trial today? Yes   · Obvious barriers to extubation?  Yes   C: Choice of Sedation · RASS Goal: 0 Alert and Calm  · Need for changes to sedation or analgesia regimen? No   D: Delirium · CAM-ICU: ADAM intubation  E: Early Mobility  · Plan for early mobility? Yes   F: Family Engagement · Plan for family engagement today? Yes       Antibiotic Review: Patient on appropriate coverage based on culture data  Review of Invasive Devices: Cheri Plan: Continue for accurate I/O monitoring for 48 hours    Stamps Plan: Keep arterial line for hemodynamic monitoring and frequent labs    Prophylaxis:  VTE VTE covered by:  heparin (porcine), Intravenous, 19 Units/kg/hr at 05/06/23 0839       Stress Ulcer  covered bypantoprazole (PROTONIX) injection 40 mg [[id number]]          Subjective   HPI/24hr events:  Patient intubated yesterday with the Glidescope, 7 5 ETT due to hypoxia and work of breathing  Remained critically-ill, intubated overnight on propofol, fentanyl, heparin gtt, and levo  This morning, the patient's eyes are open and he is following commands  Indicates some pain and numbness/tingling in his RUE  Objective                            Vitals I/O      Most Recent Min/Max in 24hrs   Temp 100 4 °F (38 °C) Temp  Min: 99 3 °F (37 4 °C)  Max: 100 4 °F (38 °C)   Pulse (!) 120 Pulse  Min: 100  Max: 120   Resp 20 Resp  Min: 10  Max: 45   BP 95/66 BP  Min: 83/60  Max: 122/81   O2 Sat 95 % SpO2  Min: 92 %  Max: 99 %      Intake/Output Summary (Last 24 hours) at 5/6/2023 0853  Last data filed at 5/6/2023 0830  Gross per 24 hour   Intake 3163 94 ml   Output 1765 ml   Net 1398 94 ml         Diet Enteral/Parenteral; Tube Feeding No Oral Diet; Vital 1 5; Continuous; 60; 250; Water; Every 6 hours     Invasive Monitoring Physical exam   Arterial Line  Stamps BP 82/48  Arterial Line BP  Min: 78/46  Max: 136/60   MAP (!) 62 mmHg  Arterial Line MAP (mmHg)  Min: 56 mmHg  Max: 86 mmHg    Physical Exam  Vitals reviewed  Constitutional:       General: He is not in acute distress  Appearance: He is ill-appearing     HENT: "Head: Normocephalic and atraumatic  Mouth/Throat:      Mouth: Mucous membranes are moist       Pharynx: Oropharynx is clear  Comments: ETT, NGT in place  Eyes:      General: No scleral icterus  Extraocular Movements: Extraocular movements intact  Pupils: Pupils are equal, round, and reactive to light  Cardiovascular:      Rate and Rhythm: Regular rhythm  Tachycardia present  Pulses: Normal pulses  Heart sounds: No murmur heard  No friction rub  No gallop  Comments: +1/+1 palpable pulses   Pulmonary:      Breath sounds: Rhonchi (light) present  No wheezing or rales  Comments: Intubated on MV  Abdominal:      General: Bowel sounds are normal  There is distension  Palpations: Abdomen is soft  Tenderness: There is no abdominal tenderness  There is no rebound  Genitourinary:     Penis: Normal        Comments: Indwelling barker catheter  Musculoskeletal:         General: Swelling (RUE firm swelling noted) present  Cervical back: Normal range of motion and neck supple  Skin:     General: Skin is warm and dry  Capillary Refill: Capillary refill takes 2 to 3 seconds  Findings: Bruising and erythema present  Neurological:      Mental Status: He is alert  Comments: ADAM 2/2 intubated with continuous sedation  GCS: eyes 4, speech 1, motor 6  Psychiatric:      Comments: ADAM 2/2 intubation              Diagnostic Studies      EKG:  [id number] CXR:  \"ET tube 6 cm above the liliana      Mediastinal widening and tracheal compression due to lymphadenopathy      Patchy bilateral groundglass opacity with right lower lobe atelectasis and small right effusion  \"     I have personally reviewed pertinent reports     and I have personally reviewed pertinent films in PACS     Medications:  Scheduled PRN   aspirin, 81 mg, Daily  chlorhexidine, 15 mL, R78C [address]  folic acid IVPB, 1 mg, Daily  insulin lispro, 1-6 Units, Q6H GAVIN  ipratropium, 0 5 mg, " Q6H  levalbuterol, 1 25 mg, Q6H  melatonin, 3 mg, HS  nicotine, 14 mg, Daily  pantoprazole, 40 mg, Q24H GAVIN  piperacillin-tazobactam, 3 375 g, Q8H  pravastatin, 40 mg, Daily With Dinner  QUEtiapine, 12 5 mg, HS  sodium chloride, 4 mL, TID      acetaminophen, 650 mg, Q6H PRN  albuterol, 2 5 mg, Q4H PRN  bisacodyl, 10 mg, Daily PRN  fentanyl citrate (PF), 50 mcg, Q1H PRN  midazolam, 2 mg, Q4H PRN       Continuous    fentaNYL, 50 mcg/hr, Last Rate: 50 mcg/hr (05/05/23 2022)  heparin (porcine), 3-30 Units/kg/hr (Order-Specific), Last Rate: 19 Units/kg/hr (05/06/23 0839)  norepinephrine, 1-30 mcg/min, Last Rate: 8 mcg/min (05/06/23 0839)  propofol, 5-50 mcg/kg/min, Last Rate: 30 mcg/kg/min (05/06/23 0805)         Labs:    CBC    Recent Labs     05/05/23  0516 05/06/23  0550   WBC 18 71* 14 80*   HGB 12 3 11 7*   HCT 37 6 36 3*    300     BMP    Recent Labs     05/05/23  0516 05/06/23  0550   SODIUM 143 146   K 3 3* 3 7   * 115*   CO2 29 29   AGAP 2* 2*   BUN 17 28*   CREATININE 0 80 1 04   CALCIUM 9 0 8 3       Coags    Recent Labs     05/05/23  1336 05/06/23  0550   PTT 68* 64*        Additional Electrolytes  Recent Labs     05/05/23  0516 05/06/23  0550   MG 1 8 2 2   PHOS 1 2* 2 1*   CAIONIZED  --  1 12          Blood Gas    Recent Labs     05/05/23 2000   PHART 7 408   ZSY8JEX 43 0   PO2ART 72 4*   GQM1MCY 26 5   BEART 1 6   SOURCE Line, Arterial     Recent Labs     05/05/23 2000   SOURCE Line, Arterial    LFTs  No recent results    Infectious  No recent results  Glucose  Recent Labs     05/04/23  1127 05/04/23  1623 05/05/23  0516 05/06/23  0550   GLUC 163* 150* 156* 194*               Critical Care Time Delivered: Upon my evaluation, this patient had a high probability of imminent or life-threatening deterioration due to tracheal stenosis, groundglass lung opacities, respiratory failure requiring mechanical ventilation, sqamous cell carcinoma requiring radiation therapy, which required my direct attention, intervention, and personal management  I have personally provided 25 minutes of critical care time, exclusive of procedures, teaching, family meetings, and any prior time recorded by providers other than myself       Delia Núñez

## 2023-05-06 NOTE — RESPIRATORY THERAPY NOTE
RT Ventilator Management Note  Yves Pratt 66 y o  male MRN: [id number]  Unit/Bed#: MICU 07 Encounter: [id number]      Daily Screen         5/5/2023  0921 5/6/2023  0711          Patient safety screen outcome[de-identified] Failed Failed      Not Ready for Weaning due to[de-identified] Underline problem not resolved Underline problem not resolved;FiO2 >60%                Physical Exam:   Assessment Type: Assess only  General Appearance: Alert, Awake  Respiratory Pattern: Assisted  Chest Assessment: Chest expansion symmetrical  Bilateral Breath Sounds: Rhonchi  Cough: Productive, Strong  Suction: ET Tube  O2 Device: vent      Resp Comments: (P) Pt received on CMV settings  Tolerating well  No changes at this time  RT will cont to monitor  05/06/23 0711   Respiratory Assessment   Resp Comments Pt received on CMV settings  Tolerating well  No changes at this time  RT will cont to monitor     Vent Information   Vent ID [id number]   Vent type Frost G5   Frost C3/G5 Vent Mode (S)CMV   $ Pulse Oximetry Spot Check Charge Completed   SpO2 94 %   (S)CMV Settings   Resp Rate (BPM) 20 BPM   VT (mL) 480 mL   FIO2 (%) 70 %   PEEP (cmH2O) 8 cmH2O   I:E Ratio 1:1 7   Insp Time (%) 1 1 %   Flow Trigger (LPM) 3   Humidification Heater   Heater Temperature (Set) 98 6 °F (37 °C)   (S)CMV Actuals   Resp Rate (BPM) 20 BPM   VT (mL) 492   MV 9 7   MAP (cmH2O) 12 cmH2O   Peak Pressure (cmH2O) 21 cmH2O   I:E Ratio (Obs) 1:1 7   Insp Resistance 10   Heater Temperature (Obs) 98 6 °F (37 °C)   Static Compliance (mL/cmH20) 74 7 mL/cmH2O   Plateau Pressure (cm H2O) 18 2 cm H2O   (S)CMV Alarms   High Peak Pressure (cmH2O) 40   Low Pressure (cmH2O) 5 cm H2O   High Resp Rate (BPM) 40 BPM   Low Resp Rate (BPM) 8 BPM   High MV (L/min) 20 L/min   Low MV (L/min) 4 L/min   High VT (mL) 1000 mL   Low VT (mL) 250 mL   Apnea Time (s) 20 S   Maintenance   Alarm (pink) cable attached No   Resuscitation bag with peep valve at bedside Yes   Water bag changed No   Circuit changed No   Daily Screen   Patient safety screen outcome: Failed   Not Ready for Weaning due to:  Underline problem not resolved;FiO2 >60%   IHI Ventilator Associated Pneumonia Bundle   Daily Assessment of Readiness to Extubate Yes   Head of Bed Elevated HOB 30   ETT  Cuffed 7 5 mm   Placement Date/Time: 05/02/23 (c) 2031   Type: Cuffed  Tube Size: 7 5 mm  Location: Oral  Insertion attempts: 2  Placement Verification: Chest x-ray;Symmetrical chest wall movement  Secured at (cm): 26   Secured at (cm) 29   Measured from Lips

## 2023-05-06 NOTE — RESPIRATORY THERAPY NOTE
RT Ventilator Management Note  Pebbles Orf 66 y o  male MRN: [id number]  [facility] [id number]      Daily Screen         5/5/2023  0900             Patient safety screen outcome[de-identified] Failed    Not Ready for Weaning due to[de-identified] Underline problem not resolved              Physical Exam:   Assessment Type: (P) Assess only  General Appearance: (P) Alert, Awake  Respiratory Pattern: (P) Assisted  Chest Assessment: (P) Chest expansion symmetrical  Bilateral Breath Sounds: (P) Rhonchi  Cough: (P) Productive, Strong  Suction: (P) ET Tube  O2 Device: (P) vent      Resp Comments: (P) CPT done as ordered via bed module, pt suctioned for thick clear secretions  No changes at this time  RT will continue to monitor

## 2023-05-06 NOTE — PROCEDURES
Central Line    Date/Time: 5/2/2023 8:31 PM  Performed by: Nneka Go MD  Authorized by: Aniya Thakkar MD     Patient location:  ICU  Consent:     Consent obtained:  Verbal    Consent given by:  Spouse    Risks discussed:  Arterial puncture, incorrect placement, bleeding and infection  Universal protocol:     Patient identity confirmed:  Arm band and hospital-assigned identification number  Pre-procedure details:     Hand hygiene: Hand hygiene performed prior to insertion      Sterile barrier technique: All elements of maximal sterile technique followed      Skin preparation:  2% chlorhexidine    Skin preparation agent: Skin preparation agent completely dried prior to procedure    Indications:     Central line indications: medications requiring central line    Anesthesia (see MAR for exact dosages): Anesthesia method:  Local infiltration    Local anesthetic:  Lidocaine 1% w/o epi  Procedure details:     Location:  Right femoral    Vessel type: vein      Patient position:  Flat    Catheter type:  Triple lumen 20cm    Landmarks identified: yes      Ultrasound guidance: yes      Sterile ultrasound techniques: Sterile gel and sterile probe covers were used      Number of attempts:  1    Successful placement: yes    Post-procedure details:     Post-procedure:  Dressing applied and line sutured    Assessment:  Blood return through all ports and free fluid flow    Post-procedure complications: none      Patient tolerance of procedure:   Tolerated well, no immediate complications

## 2023-05-07 NOTE — RESPIRATORY THERAPY NOTE
RT Ventilator Management Note  Cy Harness 66 y o  male MRN: [id number]  [facility] [id number]      Daily Screen         5/6/2023 0711 5/7/2023 0725          Patient safety screen outcome[de-identified] Failed Failed      Not Ready for Weaning due to[de-identified] Underline problem not resolved;FiO2 >60% PEEP > 8cmH2O;Underline problem not resolved                Physical Exam:   Assessment Type: During-treatment  General Appearance: Sedated  Respiratory Pattern: Symmetrical, Assisted  Chest Assessment: Chest expansion symmetrical  Bilateral Breath Sounds: Diminished, Coarse, Crackles  Cough: Productive  Suction: ET Tube  O2 Device: vent      Resp Comments: Pt desat to 88%, Pt given supplimental O2, Pt given tx through aerogen

## 2023-05-07 NOTE — RESPIRATORY THERAPY NOTE
RT Ventilator Management Note  Ghanshyam Sauceda 66 y o  male MRN: [id number]  Unit/Bed#: MICU 07 Encounter: [id number]      Daily Screen         5/6/2023  0711 5/7/2023  0725          Patient safety screen outcome[de-identified] Failed Failed      Not Ready for Weaning due to[de-identified] Underline problem not resolved;FiO2 >60% PEEP > 8cmH2O;Underline problem not resolved                Physical Exam:   Assessment Type: (P) During-treatment  General Appearance: (P) Sedated  Respiratory Pattern: (P) Symmetrical, Assisted  Chest Assessment: (P) Chest expansion symmetrical  Bilateral Breath Sounds: (P) Diminished, Coarse, Crackles  Cough: (P) Productive  Suction: (P) ET Tube  O2 Device: (P) vent      Resp Comments: (P) Pt desat to 88%, Pt given supplimental O2, Pt given tx through aerogen

## 2023-05-07 NOTE — PROGRESS NOTES
Daily Progress Note - Critical Care   Mina Herrera 66 y o  male MRN: [id number]  Unit/Bed#: MICU 07 Encounter: [id number]        ----------------------------------------------------------------------------------------  HPI/24hr events:    HPI: 24-year-old male w/ hx of alcohol abuse, type 2 diabetes, tobacco use (over 30-pack-year smoking history) initially presented on 4/20 with cough and URI and was found to have a necrotic right middle lobe cavitary mass with extensive mediastinal lymphadenopathy, neck lymphadenopathy, subclavian thrombosis and bilateral pulmonary emboli   Represented on 4/28 to [facility] with acute encephalopathy and hypoxia requiring 2 L via nasal cannula   Encephalopathy attributable to hypercalcemia, possible alcohol withdrawal  [facility], last drink 4/26/2023, receive 130 mg of phenobarbital on GSL on 5/2   Pulmonary saw the patient at [facility] and due to mediastinal mass compressing the mid and distal trachea, recommended transfer to [facility] for radiation oncology and thoracic surgery evaluation   Patient was transferred to [facility] on 5/3  He underwent emergent radiation therapy on 5/3  Biopsy diagnosis is squamous cell carcinoma  Found to have R mucous plugging and was subsequently intubated on 5/5  On 5/6/23 patient become febrile with worsening hypotension requiring levo and vasopressin      ---------------------------------------------------------------------------------------  SUBJECTIVE  No overnight events  Patient intubated/sedated/ RASS 0    Review of Systems  Review of systems was unable to be performed secondary to intubated/sedated  ---------------------------------------------------------------------------------------  Assessment and Plan:    Neuro:   • Diagnosis: Acute encephalopathy  ? Multifactorial in setting of cerebellar infarction, acute illness, possible infection  ?  Plan:   - CAM ICU  - Delirium precautions  - Fentanyl and propofol   - Melatonin and seroquel added qHS for sleep wake regulation     • Diagnosis: Delirium tremens - now resolved   ? Plan:   - History of chronic ETOH abuse  - Previously on CIWA, since discontinued     • Continue high dose thiamine, folate     • Diagnosis: Cerebellar infarction  ? Plan:   - Continue heparin gtt  - Echo complete with 70%EF, no PFO  - Continue statin  - Continue aspirin     • Diagnosis: ETOH abuse  ? Plan:   - No current concerns for withdrawal, past window with last drink 4/26  - Outpatient resources        CV:   • Diagnosis: Essential hypertension  ? Plan:  - Holding home losartan        Pulm:  • Diagnosis: Acute hypoxic respiratory failure requiring mechanical ventilation  ? Multifactorial with lung mass, bilateral PEs, pulm effusions, right lower lung opacity  ? Intubated this morning for airway protection, bronchoscopy following remarkable for extremely thick R secretions  Bronched with moderate amount of secretions noted  ? Current MV settings: SCMV: 20/480/8/70   ? Plan:   - Airway clearance, chest wall oscillation, respiratory protocol  - Goal to wean to FiO2 40%, PEEP 6  - Continuous pulse oximetry  - Goal SpO2 >92%  - SBT when possible     • Diagnosis: Bilateral PEs  ? Plan:   - Heparin VTE high, continue     • Diagnosis: Right lower lung opacity progressed to full right lung white out  ? Plan:   - Possibly malignant effusion vs infarct vs aspiration  - Possible that increased secretions is causing mucous plugging and atelectasis      • Diagnosis: Tobacco abuse  ? Plan:   - Nicotine patch  - Encourage cessation  · Diagnosis: Pneumonia:  · Zosyn D5; complete 7 days        GI:   • Diagnosis: No active issues  ? Plan:   - Bowel regimen ordered  - IV PPI ppx        :   • Diagnosis: hypernatremia and hypercalcemia  • S/p zoledronic acid, calcitonin, IVFs and increasing free water flushes     ? Plan:  - Monitor I&Os, barker in place        F/E/N:   • F- none  • E- monitor and replete as needed, goal Mag >2, Phos >3, K >4  • N- TF Vital 1 5 @ 60cc (goal) with FWF 250cc q6        Heme/Onc:   • Diagnosis: Squamous cell carcinoma of the lung  ? Plan:   - Radiation therapy complete started on 5/3 with plan for 10 total treatments   - Ongoing therapies as per rad/onc recommendations     • Diagnosis: Cervical adenopathy with trachael narrowing  ? Secondary to malignancy  ? 5/4 24 hours of decadron 6mg   ? 5/5 intubated in AM for hypoxia  ? Plan:   - As per thoracics no plan for stent at present  - Continue intubation (see Pulmonary assessment/plan above)     • Diagnosis: Subclavian vein thrombosis, right  • Exam: RUE firm, swollen; patient indicates some pain and numbness/tingling  ? Plan:   - Heparin as above, continue  - Monitor for SVC syndrome        Endo:   • Diagnosis: Type 2 diabetes mellitus  ? Plan:   - SSI, continue  - Goal -180    ID  · Diagnosis: aspiration pna  · Continue Zosyn for 7 days; on D5  · MRSA negative  · Sputum cx with MRF          Patient appropriate for transfer out of the ICU today?: No  Disposition: Continue Critical Care   Code Status: Level 1 - Full Code  ---------------------------------------------------------------------------------------  ICU CORE MEASURES    Prophylaxis   VTE Pharmacologic Prophylaxis: Heparin  VTE Mechanical Prophylaxis: sequential compression device  Stress Ulcer Prophylaxis: Pantoprazole IV     Invasive Devices Review  Invasive Devices     Central Venous Catheter Line  Duration           CVC Central Lines 05/02/23 Triple 20cm 4 days          Peripheral Intravenous Line  Duration           Peripheral IV 05/06/23 Left;Upper;Ventral (anterior) Arm 1 day          Arterial Line  Duration           Arterial Line 05/02/23 Radial 4 days          Drain  Duration           NG/OG/Enteral Tube Orogastric Center mouth 1 day    Urethral Catheter Temperature probe 16 Fr  1 day          Airway  Duration           ETT  Cuffed 7 5 mm 4 days              Can any invasive devices be discontinued today? No  ---------------------------------------------------------------------------------------  OBJECTIVE    Vitals   Vitals:    23 0130 23 0145 23 0200 23 0725   BP:       BP Location:       Pulse: 90 88 92    Resp: (!) 0 (!) 0 (!) 0    Temp: 99 3 °F (37 4 °C) 99 °F (37 2 °C) 99 °F (37 2 °C)    TempSrc:       SpO2: 95% 96% 95% 94%   Weight:       Height:         Temp (24hrs), Av 1 °F (37 8 °C), Min:99 °F (37 2 °C), Max:101 1 °F (38 4 °C)  Current: Temperature: 99 °F (37 2 °C)  HR:   BP: 106-1120/50  RR: 12  SpO2: 100    Respiratory:  SpO2: SpO2: 94 %, SpO2 Activity: SpO2 Activity: At Rest, SpO2 Device: O2 Device: Ventilator  Nasal Cannula O2 Flow Rate (L/min): 55 L/min    Invasive/non-invasive ventilation settings   Respiratory    Lab Data (Last 4 hours)    None         O2/Vent Data (Last 4 hours)    None                Physical Exam  Constitutional:       General: He is not in acute distress  Comments: Intubated, sedated, RASS 0   HENT:      Head: Normocephalic and atraumatic  Mouth/Throat:      Comments: ETT and OGT in place  Eyes:      Extraocular Movements: Extraocular movements intact  Conjunctiva/sclera: Conjunctivae normal    Cardiovascular:      Rate and Rhythm: Normal rate and regular rhythm  Pulses: Normal pulses  Pulmonary:      Effort: No respiratory distress  Comments: Course breath sounds b/l  Abdominal:      General: There is no distension  Palpations: Abdomen is soft  Tenderness: There is no abdominal tenderness  Comments: Obese   Musculoskeletal:      Right lower leg: No edema  Left lower leg: No edema  Skin:     General: Skin is warm and dry  Capillary Refill: Capillary refill takes less than 2 seconds     Neurological:      Comments: RASS 0; following commands appropriately         Laboratory and Diagnostics:  Results from last 7 days   Lab Units 23  0451 23  0550 23  0516 23  0409 05/03/23  0509 05/02/23 2136 05/02/23 0443 05/01/23  0438   WBC Thousand/uL 13 78* 14 80* 18 71* 17 86* 17 71* 17 38* 15 36* 16 11*   HEMOGLOBIN g/dL 10 4* 11 7* 12 3 12 1 11 8* 12 3 12 4 12 3   HEMATOCRIT % 33 4* 36 3* 37 6 38 1 37 3 39 1 38 2 37 5   PLATELETS Thousands/uL 287 300 361 353 366 366 350 453*   NEUTROS PCT %  --   --   --   --  87* 86* 85* 87*   MONOS PCT %  --   --   --   --  8 8 8 8     Results from last 7 days   Lab Units 05/07/23  0451 05/06/23  0550 05/05/23  0516 05/04/23  1623 05/04/23  1127 05/04/23 0417 05/03/23  0509 05/02/23 2136 05/02/23 0443 05/01/23  0438   SODIUM mmol/L 145 146 143 150* 149* 151* 146   < > 140 138   POTASSIUM mmol/L 3 4* 3 7 3 3* 3 7 3 6 3 2* 3 5   < > 3 4* 3 5   CHLORIDE mmol/L 115* 115* 112* 118* 118* 117* 114*   < > 106 105   CO2 mmol/L 29 29 29 29 31 31 29   < > 27 27   ANION GAP mmol/L 1* 2* 2* 3* 0* 3* 3*   < > 7 6   BUN mg/dL 19 28* 17 16 17 17 16   < > 16 14   CREATININE mg/dL 0 86 1 04 0 80 0 72 0 78 0 78 0 84   < > 0 87 0 85   CALCIUM mg/dL 7 7* 8 3 9 0 8 8 8 9 8 9 9 8   < > 11 0* 13 0*   GLUCOSE RANDOM mg/dL 348* 194* 156* 150* 163* 131 137   < > 127 131   ALT U/L  --   --   --   --   --   --   --   --  57* 72*   AST U/L  --   --   --   --   --   --   --   --  36 45*   ALK PHOS U/L  --   --   --   --   --   --   --   --  107* 129*   ALBUMIN g/dL  --   --   --   --   --   --   --   --  3 3* 3 4*   TOTAL BILIRUBIN mg/dL  --   --   --   --   --   --   --   --  0 39 0 45    < > = values in this interval not displayed       Results from last 7 days   Lab Units 05/07/23  0451 05/06/23  0550 05/05/23  0516 05/04/23  0417 05/03/23  0509 05/02/23  2136 05/01/23  0438   MAGNESIUM mg/dL 1 9 2 2 1 8 1 9 2 1 1 6 1 5*   PHOSPHORUS mg/dL 1 5* 2 1* 1 2* 2 8 2 9 1 7* 2 9      Results from last 7 days   Lab Units 05/07/23  0452 05/06/23  0550 05/05/23  1336 05/05/23  0116 05/04/23  1742 05/04/23  1034 05/04/23  0356 05/03/23  0509 05/02/23  2136 05/01/23  1015   INR   -- --   --   --   --   --   --   --  1 45* 1 36*   PTT seconds 66* 64* 68* 63* 99* 57* 65*   < > 159*  --     < > = values in this interval not displayed  Results from last 7 days   Lab Units 05/06/23  1002   LACTIC ACID mmol/L 1 5     ABG:  Results from last 7 days   Lab Units 05/06/23  1136   PH ART  7 434   PCO2 ART mm Hg 41 6   PO2 ART mm Hg 112 9   HCO3 ART mmol/L 27 2   BASE EXC ART mmol/L 2 7   ABG SOURCE  Line, Arterial     VBG:  Results from last 7 days   Lab Units 05/06/23  1136 05/05/23  0916 05/02/23  2136   PH JENNIFER   --   --  7 380   PCO2 JENNIFER mm Hg  --   --  45 0   PO2 JENNIFER mm Hg  --   --  92 0*   HCO3 JENNIFER mmol/L  --   --  26 0   BASE EXC JENNIFER mmol/L  --   --  0 6   ABG SOURCE  Line, Arterial   < >  --     < > = values in this interval not displayed  Results from last 7 days   Lab Units 05/02/23  1051   PROCALCITONIN ng/ml 0 23       Micro  Results from last 7 days   Lab Units 05/06/23  1033 05/02/23  1704 05/02/23  1412 05/02/23  1307 05/02/23  1151   BLOOD CULTURE  Received in Microbiology Lab  Culture in Progress  Received in Microbiology Lab  Culture in Progress  --   --  No Growth at 72 hrs  No Growth at 72 hrs  SPUTUM CULTURE   --   --  3+ Growth of  --   --    GRAM STAIN RESULT   --   --  2+ Epithelial cells per low power field*  Rare Polys*  1+ Gram positive cocci in pairs and chains*  1+ Gram positive rods*  Rare Budding yeast*  --   --    MRSA CULTURE ONLY   --  No Methicillin Resistant Staphlyococcus aureus (MRSA) isolated  --   --   --        EKG: QTC of 418  Imaging:  I have personally reviewed pertinent reports  Intake and Output  I/O       05/05 0701 05/06 0700 05/06 0701 05/07 0700 05/07 0701 05/08 0700    P  O   0     I V  (mL/kg) 1579 6 (16 3) 3166 9 (32 6)     NG/ 1610     IV Piggyback 550 233 3     Feedings 268 2152     Total Intake(mL/kg) 2987 6 (30 8) 7162 2 (73 8)     Urine (mL/kg/hr) 1515 (0 7) 2695 (1 2)     Total Output 1515 2695     Net +1472 6 "+4467 2                UOP: 2600 ml/hr     Height and Weights   Height: 6' 0 99\" (185 4 cm) (per previous encounter)  IBW (Ideal Body Weight): 79 88 kg  Body mass index is 28 25 kg/m²  Weight (last 2 days)     Date/Time Weight    05/06/23 0600 97 1 (214 07)            Nutrition       Diet Orders   (From admission, onward)             Start     Ordered    05/05/23 1600  Diet Enteral/Parenteral; Tube Feeding No Oral Diet; Vital 1 5; Continuous; 60; 250; Water; Every 6 hours  Diet effective now        Comments: Start at 10mL/hr and advance by 10mL/hr q8 hrs as tolerated to goal rate   References:    Nutrtion Support Algorithm Enteral vs  Parenteral   Question Answer Comment   Diet Type Enteral/Parenteral    Enteral/Parenteral Tube Feeding No Oral Diet    Tube Feeding Formula: Vital 1 5    Bolus/Cyclic/Continuous Continuous    Tube Feeding Goal Rate (mL/hr): 60    Tube Feeding flush (mL): 250    Water Flush type: Water    Water flush frequency: Every 6 hours    RD to adjust diet per protocol? Yes        05/05/23 1602              TF currently running at 60 ml/hr with a goal of 60 ml/hr   Formula: Vital 1 5      Active Medications  Scheduled Meds:  Current Facility-Administered Medications   Medication Dose Route Frequency Provider Last Rate   • acetaminophen  650 mg Oral Q6H PRN Anson Cutler DO     • albuterol  2 5 mg Nebulization Q4H PRN Ady Huynh MD     • aspirin  81 mg Oral Daily Luis Humphrey, DO     • bisacodyl  10 mg Rectal Daily PRN Anson Cutler DO     • chlorhexidine  15 mL Mouth/Throat Q12H [address] Asad Perla DO     • fentaNYL  50 mcg/hr Intravenous Continuous [place] Bernice Zack, DO 50 mcg/hr (05/06/23 1544)   • fentanyl citrate (PF)  50 mcg Intravenous Q1H PRN Quentin Healy MD     • folic acid IVPB  1 mg Intravenous Daily Anson Cutler DO Stopped (05/06/23 1039)   • heparin (porcine)  3-30 Units/kg/hr (Order-Specific) Intravenous Titrated Anson Cutler DO 19 " Units/kg/hr (05/07/23 0010)   • hydrocortisone sodium succinate  50 mg Intravenous [facility] Petty Frost MD     • insulin lispro  1-6 Units Subcutaneous [facility] Alyson Humphrey, DO     • ipratropium  0 5 mg Nebulization TID Petty Frost MD     • levalbuterol  1 25 mg Nebulization TID Petty Frost MD     • midazolam  2 mg Intravenous Q4H PRN Ed Currie MD     • nicotine  14 mg Transdermal Daily Alyson Humphrey, DO     • norepinephrine  1-30 mcg/min Intravenous Titrated Jaycee MixerABNP 5 mcg/min (05/07/23 0541)   • pantoprazole  40 mg Intravenous Q24H [facility] GILLIAN Bernstein     • phenylephine   mcg/min Intravenous Titrated Petty Frost MD Stopped (05/06/23 1039)   • piperacillin-tazobactam  3 375 g Intravenous Q8H Alyson Sockashley Humphrey, DO Stopped (05/07/23 0401)   • pravastatin  40 mg Oral Daily With [address] [place] [address], DO     • propofol  5-50 mcg/kg/min Intravenous Titrated Ed Currie MD 40 mcg/kg/min (05/07/23 0537)   • sodium chloride  4 mL Nebulization TID Petty Frost MD     • vasopressin  0 04 Units/min Intravenous Continuous Petty Frost MD 0 04 Units/min (05/07/23 0137)     Continuous Infusions:  fentaNYL, 50 mcg/hr, Last Rate: 50 mcg/hr (05/06/23 1544)  heparin (porcine), 3-30 Units/kg/hr (Order-Specific), Last Rate: 19 Units/kg/hr (05/07/23 0010)  norepinephrine, 1-30 mcg/min, Last Rate: 5 mcg/min (05/07/23 0541)  phenylephine,  mcg/min, Last Rate: Stopped (05/06/23 1039)  propofol, 5-50 mcg/kg/min, Last Rate: 40 mcg/kg/min (05/07/23 0537)  vasopressin, 0 04 Units/min, Last Rate: 0 04 Units/min (05/07/23 0137)      PRN Meds:   acetaminophen, 650 mg, Q6H PRN  albuterol, 2 5 mg, Q4H PRN  bisacodyl, 10 mg, Daily PRN  fentanyl citrate (PF), 50 mcg, Q1H PRN  midazolam, 2 mg, Q4H PRN        Allergies   No Known Allergies  ---------------------------------------------------------------------------------------  Advance Directive and Living Will:      Power of : "  POLST:    ---------------------------------------------------------------------------------------  Care Time Delivered:   No Critical Care time spent     [facility], DO      Portions of the record may have been created with voice recognition software  Occasional wrong word or \"sound a like\" substitutions may have occurred due to the inherent limitations of voice recognition software    Read the chart carefully and recognize, using context, where substitutions have occurred    "

## 2023-05-07 NOTE — PLAN OF CARE
Problem: SAFETY,RESTRAINT: NV/NON-SELF DESTRUCTIVE BEHAVIOR  Goal: Remains free of harm/injury (restraint for non violent/non self-detsructive behavior)  Description: INTERVENTIONS:  - Instruct patient/family regarding restraint use   - Assess and monitor physiologic and psychological status   - Provide interventions and comfort measures to meet assessed patient needs   - Identify and implement measures to help patient regain control  - Assess readiness for release of restraint   Outcome: Progressing  Goal: Returns to optimal restraint-free functioning  Description: INTERVENTIONS:  - Assess the patient's behavior and symptoms that indicate continued need for restraint  - Identify and implement measures to help patient regain control  - Assess readiness for release of restraint   Outcome: Progressing     Problem: Prexisting or High Potential for Compromised Skin Integrity  Goal: Skin integrity is maintained or improved  Description: INTERVENTIONS:  - Identify patients at risk for skin breakdown  - Assess and monitor skin integrity  - Assess and monitor nutrition and hydration status  - Monitor labs   - Assess for incontinence   - Turn and reposition patient  - Assist with mobility/ambulation  - Relieve pressure over bony prominences  - Avoid friction and shearing  - Provide appropriate hygiene as needed including keeping skin clean and dry  - Evaluate need for skin moisturizer/barrier cream  - Collaborate with interdisciplinary team   - Patient/family teaching  - Consider wound care consult   Outcome: Progressing     Problem: MOBILITY - ADULT  Goal: Maintain or return to baseline ADL function  Description: INTERVENTIONS:  -  Assess patient's ability to carry out ADLs; assess patient's baseline for ADL function and identify physical deficits which impact ability to perform ADLs (bathing, care of mouth/teeth, toileting, grooming, dressing, etc )  - Assess/evaluate cause of self-care deficits   - Assess range of motion  - Assess patient's mobility; develop plan if impaired  - Assess patient's need for assistive devices and provide as appropriate  - Encourage maximum independence but intervene and supervise when necessary  - Involve family in performance of ADLs  - Assess for home care needs following discharge   - Consider OT consult to assist with ADL evaluation and planning for discharge  - Provide patient education as appropriate  Outcome: Progressing  Goal: Maintains/Returns to pre admission functional level  Description: INTERVENTIONS:  - Perform BMAT or MOVE assessment daily    - Set and communicate daily mobility goal to care team and patient/family/caregiver  - Collaborate with rehabilitation services on mobility goals if consulted  - Perform Range of Motion 2 times a day  - Reposition patient every 2 hours  - Out of bed for toileting  - Record patient progress and toleration of activity level   Outcome: Progressing     Problem: Nutrition/Hydration-ADULT  Goal: Nutrient/Hydration intake appropriate for improving, restoring or maintaining nutritional needs  Description: Monitor and assess patient's nutrition/hydration status for malnutrition  Collaborate with interdisciplinary team and initiate plan and interventions as ordered  Monitor patient's weight and dietary intake as ordered or per policy  Utilize nutrition screening tool and intervene as necessary  Determine patient's food preferences and provide high-protein, high-caloric foods as appropriate       INTERVENTIONS:  - Monitor oral intake, urinary output, labs, and treatment plans  - Assess nutrition and hydration status and recommend course of action  - Evaluate amount of meals eaten  - Assist patient with eating if necessary   - Allow adequate time for meals  - Recommend/ encourage appropriate diets, oral nutritional supplements, and vitamin/mineral supplements  - Order, calculate, and assess calorie counts as needed  - Recommend, monitor, and adjust tube feedings and TPN/PPN based on assessed needs  - Assess need for intravenous fluids  - Provide specific nutrition/hydration education as appropriate  - Include patient/family/caregiver in decisions related to nutrition  Outcome: Progressing

## 2023-05-07 NOTE — RESPIRATORY THERAPY NOTE
RT Ventilator Management Note  Marielos Ortiz 66 y o  male MRN: [id number]  Unit/Bed#: MICU 07 Encounter: [id number]      Daily Screen         5/6/2023  0711 5/7/2023  0725          Patient safety screen outcome[de-identified] Failed Failed (P)       Not Ready for Weaning due to[de-identified] Underline problem not resolved;FiO2 >60% PEEP > 8cmH2O;Underline problem not resolved (P)                 Physical Exam:   Assessment Type: (P) Assess only  General Appearance: (P) Sedated  Respiratory Pattern: (P) Assisted  Chest Assessment: (P) Chest expansion symmetrical  Bilateral Breath Sounds: Diminished, Coarse  Cough: Productive      Resp Comments: (P) Pt received on CMV settings  Tolerating well  No changes at this time  RT will cont to monitor  05/07/23 0725   Respiratory Assessment   Assessment Type Assess only   General Appearance Sedated   Respiratory Pattern Assisted   Chest Assessment Chest expansion symmetrical   Resp Comments Pt received on CMV settings  Tolerating well  No changes at this time  RT will cont to monitor     Vent Information   Vent ID [id number]   Vent type [place] G5   [place] C3/G5 Vent Mode (S)CMV   $ Pulse Oximetry Spot Check Charge Completed   SpO2 94 %   (S)CMV Settings   Resp Rate (BPM) 20 BPM   VT (mL) 480 mL   FIO2 (%) 50 %   PEEP (cmH2O) 8 cmH2O   I:E Ratio 1:2   Insp Time (%) 1 %   Flow Trigger (LPM) 3   Humidification Heater   Heater Temperature (Set) 95 °F (35 °C)   (S)CMV Actuals   Resp Rate (BPM) 20 BPM   VT (mL) 470   MV 9 4   MAP (cmH2O) 14 cmH2O   Peak Pressure (cmH2O) 26 cmH2O   I:E Ratio (Obs) 1:2   Insp Resistance 16   Heater Temperature (Obs) 95 °F (35 °C)   Static Compliance (mL/cmH20) 54 8 mL/cmH2O   Plateau Pressure (cm H2O) 18 8 cm H2O   (S)CMV Alarms   High Peak Pressure (cmH2O) 40   Low Pressure (cmH2O) 5 cm H2O   High Resp Rate (BPM) 40 BPM   Low Resp Rate (BPM) 8 BPM   High MV (L/min) 20 L/min   Low MV (L/min) 4 L/min   High VT (mL) 1000 mL   Low VT (mL) 200 mL   Apnea Time (s) 20 S Maintenance   Alarm (pink) cable attached No   Resuscitation bag with peep valve at bedside Yes   Water bag changed No   Circuit changed No   Daily Screen   Patient safety screen outcome: Failed   Not Ready for Weaning due to: PEEP > 8cmH2O;Underline problem not resolved   IHI Ventilator Associated Pneumonia Bundle   Daily Assessment of Readiness to Extubate Yes   Head of Bed Elevated HOB 30   ETT  Cuffed 7 5 mm   Placement Date/Time: 05/02/23 (c) 2031   Type: Cuffed  Tube Size: 7 5 mm  Location: Oral  Insertion attempts: 2  Placement Verification: Chest x-ray;Symmetrical chest wall movement  Secured at (cm): 26   Secured at (cm) 26   Measured from [employer]   Repositioned Left to [address] [place] [address] by Commercial tube sheridan   Site Condition Dry   Cuff Pressure (cm H2O)   (green)   HI-LO Suction  Intermittent suction   HI-LO Secretions Scant   HI-LO Intervention Patent

## 2023-05-08 LAB
BACTERIA BLD CULT: NORMAL
BACTERIA BLD CULT: NORMAL

## 2023-05-08 NOTE — PHYSICAL THERAPY NOTE
Physical Therapy Cancellation Note    PT orders received chart review completed  Pt is currently intubated/sedated and not appropriate to participate in skilled PT at this time  Discussed at provider rounds, PT will sign off please re-consult as medically appropriate       05/08/23 1130   Note Type   Note type Cancelled Session   Cancel Reasons Intubated/sedated       Eunice Contreras, PT

## 2023-05-08 NOTE — RESPIRATORY THERAPY NOTE
Resp care   05/08/23 1003   Respiratory Assessment   Resp Comments Pt transported to radiation therapy on transport vent  Tolerated well   FIO2 increased to 75% for procedure due to laying flat

## 2023-05-08 NOTE — PROGRESS NOTES
Pastoral Care Progress Note    2023  Patient: Rimma Chiu : 1960  Admission Date & Time: 2023  MRN: [id number] CSN: [id number]         23 1500   Clinical Encounter Type   Visited With Patient   Routine Visit Follow-up     Fr Major  followed-up with the pt and conducted a pastoral visit  No further needs were expressed at this time  Chaplains still remain available

## 2023-05-08 NOTE — OCCUPATIONAL THERAPY NOTE
OT CANCEL NOTE    Pt chart reviewed  Pt is currently intubated/sedated and not appropriate to engage in skilled OT services at this time  Will D/C OT; please re-consult once medically stable         05/08/23 0832   OT Last Visit   OT Visit Date 05/08/23   Note Type   Note Type Cancelled Session   Cancel Reasons Intubated/sedated       Vladislav Goldsmith MS, OTR/L

## 2023-05-08 NOTE — QUICK NOTE
Radiation Oncology Treatment Note    A treatment dose of 200 cGy was administered today to the involved tumor site(s)lung, mediastinum, neck using 1-10 MV photons revised plan  Present total dose at this time is 800 cGy    Approximately 21 more treatments before completion of treatments or critical review

## 2023-05-08 NOTE — PROGRESS NOTES
Daily Progress Note - Critical Care   Liam Gray 66 y o  male MRN: [id number]  Unit/Bed#: MICU 07 Encounter: [id number]        ----------------------------------------------------------------------------------------  HPI/24hr events:     68-year-old male w/ hx of alcohol abuse, type 2 diabetes, tobacco use (over 30-pack-year smoking history) initially presented on 4/20 with cough and URI and was found to have a necrotic right middle lobe cavitary mass with extensive mediastinal lymphadenopathy, neck lymphadenopathy, subclavian thrombosis and bilateral pulmonary emboli   Represented on 4/28 to [address] [place] [address] with acute encephalopathy and hypoxia requiring 2 L via nasal cannula   Encephalopathy attributable to hypercalcemia, possible alcohol withdrawal   However, last drink 4/26/2023, receive 130 mg of phenobarbital on GSL on 5/2   Pulmonary saw the patient at [address] [place] [address] and due to mediastinal mass compressing the mid and distal trachea, recommended transfer to [facility] for radiation oncology and thoracic surgery evaluation   Patient was transferred to [facility] on 5/3  [facility] underwent emergent radiation therapy on 5/3   Biopsy diagnosis is squamous cell carcinoma  Found to have R mucous plugging and was subsequently intubated on 5/5  On 5/6/23 patient become febrile with worsening hypotension requiring levo and vasopressin       ---------------------------------------------------------------------------------------  SUBJECTIVE  No overnight events  Patient intubated/sedated but mildly responsive and following commands  Review of Systems  Review of systems was reviewed and negative unless stated above in HPI/24-hour events   ---------------------------------------------------------------------------------------  Assessment and Plan:  1  Large middle mediastinal mass, lymphadenopathy, encasing trachea with mid to distal tracheal compression  2  Right middle lobe lung mass, biopsy right neck lymph node squamous cell carcinoma  3    Acute hypoxic respiratory failure likely secondary to lung cancer, bilateral pulmonary emboli, pneumonia, right mucous plug  4  Septic shock   5  Acute encephalopathy, multifactorial likely related to hypercalcemia, infection, questionable alcohol withdrawal  6  Bilateral segmental pulmonary emboli  7  History of chronic alcohol use, last drink 4/26/2023  8  MRI brain on 4/28/2023 with tiny focus of acute or subacute infarct in the left cerebellum  9  Hypercalcemia likely secondary to malignancy - now resolved  10  Hypertension  11  Tobacco abuse history       Neuro:   • Diagnosis: Acute encephalopathy  ? Multifactorial in setting of hypercalcemia, cerebellar infarction, acute illness, possible infection, alcohol withdrawal  Ammonia normal   ? Plan:   - CAM ICU  - Delirium precautions - folate/thiamine  - Post intubation sedation - Fentanyl and propofol gtt   - Melatonin and seroquel added qHS for sleep wake regulation -> now discontinued     • Diagnosis: Delirium tremens - now resolved   ? Plan:   - History of chronic ETOH abuse  Last alc use on 4/26    - Previously on CIWA, since discontinued     • Continue high dose thiamine, folate     • Diagnosis: Cerebellar infarction  ? Plan:   - Continue heparin gtt  - Echo complete with 70%EF, no PFO  - Continue statin  - Continue aspirin     • Diagnosis: ETOH abuse  ? Plan:   - No current concerns for withdrawal, past window with last drink 4/26  - Outpatient resources        CV:   • Diagnosis: Essential hypertension  ? Plan:  - Holding home losartan  - Hypotension post intubation likely due to sedation/septic shock  - Wean vasopressors for MAP > 65  - Hydrocortisone for septic shock - wean due to hyperglycemia        Pulm:  • Diagnosis: Acute hypoxic respiratory failure requiring mechanical ventilation  ? Multifactorial with lung mass, middle mediastinal LAD, bilateral PEs, pulm effusions, right lower lung opacity  ?  Intubated 5/5 for airway protection, bronchoscopy following remarkable for extremely thick R secretions  Bronched with moderate amount of secretions noted  ? Current MV settings: SCMV: 20/480/8/70   ? Plan:   - Airway clearance, chest wall oscillation, respiratory protocol  - Goal to wean to FiO2 40%, PEEP 6  - Continuous pulse oximetry  - Goal SpO2 >92%  - SBT when possible     • Diagnosis: Bilateral PEs  ? Plan:   - Heparin VTE high, continue     • Diagnosis: Right lower lung opacity progressed to full right lung white out  ? Plan:   - Possibly malignant effusion vs infarct vs aspiration  - Possible that increased secretions is causing mucous plugging and atelectasis      • Diagnosis: Tobacco abuse  ? Plan:   - Nicotine patch  - Encourage cessation  • Diagnosis: Pneumonia:  ? Zosyn D6; complete at least 7 days  ? Previously on vancomycin as well  Held after sputum culture with MRF         GI:   • Diagnosis: No active issues  ? Plan:   - Bowel regimen - Senna scheduled  PEG and duloclax PRN  - Tube feeds  - IV PPI ppx        :   • Diagnosis: hypernatremia and hypercalcemia  ? S/p zoledronic acid, calcitonin, IVFs and increasing free water flushes  ? Plan:  - Monitor I&Os, barker in place        F/E/N:   • F- none  • E- monitor and replete as needed, goal Mag >2, Phos >3, K >4  • N- TF Vital 1 5 @ 60cc (goal) with FWF 250cc q6        Heme/Onc:   • Diagnosis: Squamous cell carcinoma of the lung  ? Plan:   - Radiation therapy started on 5/3 with plan for 10 total treatments  One treatment so far    - Ongoing therapies as per rad/onc recommendations      • Diagnosis: Cervical adenopathy with trachael narrowing  ? Secondary to malignancy  ? 5/4 24 hours of decadron 6mg   ? 5/5 intubated in AM for hypoxia  ? Plan:   - As per thoracics no plan for stent at present  - Continue intubation (see Pulmonary assessment/plan above)     • Diagnosis: Subclavian vein thrombosis, right  • Exam: RUE firm, swollen; patient indicates some pain and numbness/tingling  ?  Plan:   - Heparin as above, continue  - Monitor for SVC syndrome        Endo:   • Diagnosis: Type 2 diabetes mellitus  ? Plan:   - SSI, continue  - Goal -180     ID  • Diagnosis: aspiration pna  ? Continue Zosyn for 7 days; on D6  ? Previously on vanc  Held after sputum Cx with MRF  ? Multiple BC with no growth  ? MRSA negative  ? Sputum cx with MRF     MSK/Skin  · Diagnosis: No active issues    Patient appropriate for transfer out of the ICU today?: No  Disposition: Continue Critical Care   Code Status: Level 1 - Full Code  ---------------------------------------------------------------------------------------  ICU CORE MEASURES    Prophylaxis   VTE Pharmacologic Prophylaxis: Heparin Drip  VTE Mechanical Prophylaxis: sequential compression device  Stress Ulcer Prophylaxis: Pantoprazole IV     ABCDE Protocol (if indicated)  Plan to perform spontaneous awakening trial today? No  Plan to perform spontaneous breathing trial today? Yes  Obvious barriers to extubation? Yes  CAM-ICU: Negative    Invasive Devices Review  Invasive Devices     Central Venous Catheter Line  Duration           CVC Central Lines 05/02/23 Triple 20cm 5 days          Peripheral Intravenous Line  Duration           Peripheral IV 05/06/23 Left;Upper;Ventral (anterior) Arm 2 days          Arterial Line  Duration           Arterial Line 05/02/23 Radial 5 days          Drain  Duration           NG/OG/Enteral Tube Orogastric Center mouth 2 days    Urethral Catheter Temperature probe 16 Fr  2 days          Airway  Duration           ETT  Cuffed 7 5 mm 5 days              Can any invasive devices be discontinued today?  No  ---------------------------------------------------------------------------------------  OBJECTIVE    Vitals   Vitals:    05/08/23 0530 05/08/23 0541 05/08/23 0545 05/08/23 0600   BP:       BP Location:       Pulse: (!) 110  (!) 108 (!) 106   Resp: 12  15 (!) 9   Temp: 99 7 °F (37 6 °C)  99 7 °F (37 6 °C) 99 3 °F (37 4 °C)   TempSrc:       SpO2: 95%  94% 95%   Weight:  99 6 kg (219 lb 9 3 oz)     Height:         Temp (24hrs), Av 5 °F (37 5 °C), Min:98 6 °F (37 °C), Max:100 4 °F (38 °C)  Current: Temperature: 99 3 °F (37 4 °C)  HR:   BP: /50-62  RR: 12  SpO2: 100    Respiratory:  SpO2: SpO2: 95 %  Nasal Cannula O2 Flow Rate (L/min): 55 L/min    Invasive/non-invasive ventilation settings   Respiratory    Lab Data (Last 4 hours)    None         O2/Vent Data (Last 4 hours)    None                Physical Exam  Vitals and nursing note reviewed  Constitutional:       Appearance: He is obese  HENT:      Head: Normocephalic and atraumatic  Eyes:      Extraocular Movements: Extraocular movements intact  Conjunctiva/sclera: Conjunctivae normal       Pupils: Pupils are equal, round, and reactive to light  Cardiovascular:      Rate and Rhythm: Regular rhythm  Tachycardia present  Pulses: Normal pulses  Heart sounds: Normal heart sounds  Pulmonary:      Comments: Intubated  Abdominal:      General: Bowel sounds are normal       Palpations: Abdomen is soft     Neurological:      Comments: Intubated and Sedated but slowly responding and following commands               Laboratory and Diagnostics:  Results from last 7 days   Lab Units 23  0537 23  0451 23  0550 23  0516 23  0409 23  0509 23  2136 23  0443   WBC Thousand/uL 16 04* 13 78* 14 80* 18 71* 17 86* 17 71* 17 38* 15 36*   HEMOGLOBIN g/dL 10 9* 10 4* 11 7* 12 3 12 1 11 8* 12 3 12 4   HEMATOCRIT % 34 4* 33 4* 36 3* 37 6 38 1 37 3 39 1 38 2   PLATELETS Thousands/uL 310 287 300 361 353 366 366 350   NEUTROS PCT % 87*  --   --   --   --  87* 86* 85*   MONOS PCT % 7  --   --   --   --  8 8 8     Results from last 7 days   Lab Units 23  1600 23  0451 23  0550 23  0516 23  1623 23  1127 23  0417 23  2136 23  0443   SODIUM mmol/L 146 145 146 143 150* 149* 151*   < > 140   POTASSIUM mmol/L 5 1 3 4* 3 7 3 3* 3 7 3 6 3 2*   < > 3 4*   CHLORIDE mmol/L 117* 115* 115* 112* 118* 118* 117*   < > 106   CO2 mmol/L 31 29 29 29 29 31 31   < > 27   ANION GAP mmol/L -2* 1* 2* 2* 3* 0* 3*   < > 7   BUN mg/dL 18 19 28* 17 16 17 17   < > 16   CREATININE mg/dL 0 81 0 86 1 04 0 80 0 72 0 78 0 78   < > 0 87   CALCIUM mg/dL 7 8* 7 7* 8 3 9 0 8 8 8 9 8 9   < > 11 0*   GLUCOSE RANDOM mg/dL 219* 348* 194* 156* 150* 163* 131   < > 127   ALT U/L  --   --   --   --   --   --   --   --  57*   AST U/L  --   --   --   --   --   --   --   --  36   ALK PHOS U/L  --   --   --   --   --   --   --   --  107*   ALBUMIN g/dL  --   --   --   --   --   --   --   --  3 3*   TOTAL BILIRUBIN mg/dL  --   --   --   --   --   --   --   --  0 39    < > = values in this interval not displayed  Results from last 7 days   Lab Units 05/07/23  0451 05/06/23  0550 05/05/23  0516 05/04/23  0417 05/03/23  0509 05/02/23  2136   MAGNESIUM mg/dL 1 9 2 2 1 8 1 9 2 1 1 6   PHOSPHORUS mg/dL 1 5* 2 1* 1 2* 2 8 2 9 1 7*      Results from last 7 days   Lab Units 05/07/23  0452 05/06/23  0550 05/05/23  1336 05/05/23  0116 05/04/23  1742 05/04/23  1034 05/04/23  0356 05/03/23  0509 05/02/23  2136 05/01/23  1015   INR   --   --   --   --   --   --   --   --  1 45* 1 36*   PTT seconds 66* 64* 68* 63* 99* 57* 65*   < > 159*  --     < > = values in this interval not displayed            Results from last 7 days   Lab Units 05/06/23  1002   LACTIC ACID mmol/L 1 5     ABG:  Results from last 7 days   Lab Units 05/06/23  1136   PH ART  7 434   PCO2 ART mm Hg 41 6   PO2 ART mm Hg 112 9   HCO3 ART mmol/L 27 2   BASE EXC ART mmol/L 2 7   ABG SOURCE  Line, Arterial     VBG:  Results from last 7 days   Lab Units 05/06/23  1136 05/05/23  0916 05/02/23  2136   PH JENNIFER   --   --  7 380   PCO2 JENNIFER mm Hg  --   --  45 0   PO2 JENNIFER mm Hg  --   --  92 0*   HCO3 JENNIFER mmol/L  --   --  26 0   BASE EXC JENNIFER mmol/L  --   --  0 6   ABG SOURCE  Line, Arterial   < >  --     < > = "values in this interval not displayed  Results from last 7 days   Lab Units 05/02/23  1051   PROCALCITONIN ng/ml 0 23       Micro  Results from last 7 days   Lab Units 05/06/23  1033 05/02/23  1704 05/02/23  1412 05/02/23  1307 05/02/23  1151   BLOOD CULTURE  No Growth at 24 hrs  No Growth at 24 hrs   --   --  No Growth After 4 Days  No Growth After 4 Days  SPUTUM CULTURE   --   --  3+ Growth of  --   --    GRAM STAIN RESULT   --   --  2+ Epithelial cells per low power field*  Rare Polys*  1+ Gram positive cocci in pairs and chains*  1+ Gram positive rods*  Rare Budding yeast*  --   --    MRSA CULTURE ONLY   --  No Methicillin Resistant Staphlyococcus aureus (MRSA) isolated  --   --   --        EKG: Sinus tachy with 1st degree AV block  QTc 418  Imaging:  I have personally reviewed pertinent reports  Intake and Output  I/O       05/06 0701 05/07 0700 05/07 0701 05/08 0700    P  O  0     I V  (mL/kg) 3166 9 (32 6) 1770 2 (17 8)    NG/GT 1610 1130    IV Piggyback 233 3 800    Feedings 2152 1232    Total Intake(mL/kg) 7162 2 (73 8) 4932 2 (49 5)    Urine (mL/kg/hr) 2695 (1 2) 2195 (0 9)    Total Output 2695 2195    Net +4467 2 +2737 2              UOP: 2200 ml/hr     Height and Weights   Height: 6' 0 99\" (185 4 cm) (per previous encounter)  IBW (Ideal Body Weight): 79 88 kg  Body mass index is 28 98 kg/m²  Weight (last 2 days)     Date/Time Weight    05/08/23 0541 99 6 (219 58)    05/06/23 0600 97 1 (214 07)            Nutrition       Diet Orders   (From admission, onward)             Start     Ordered    05/05/23 1600  Diet Enteral/Parenteral; Tube Feeding No Oral Diet; Vital 1 5; Continuous; 60; 250;  Water; Every 6 hours  Diet effective now        Comments: Start at 10mL/hr and advance by 10mL/hr q8 hrs as tolerated to goal rate   References:    Nutrtion Support Algorithm Enteral vs  Parenteral   Question Answer Comment   Diet Type Enteral/Parenteral    Enteral/Parenteral Tube Feeding No Oral Diet " Tube Feeding Formula: Vital 1 5    Bolus/Cyclic/Continuous Continuous    Tube Feeding Goal Rate (mL/hr): 60    Tube Feeding flush (mL): 250    Water Flush type: Water    Water flush frequency: Every 6 hours    RD to adjust diet per protocol? Yes        05/05/23 1602              TF currently running at 60 ml/hr with a goal of 60 ml/hr   Formula: Vital 1 5      Active Medications  Scheduled Meds:  Current Facility-Administered Medications   Medication Dose Route Frequency Provider Last Rate   • acetaminophen  650 mg Oral Q6H PRN Amada Gallia, DO     • albuterol  2 5 mg Nebulization Q4H PRN Merly Subramanian MD     • aspirin  81 mg Oral Daily Ajit Estevez MD     • bisacodyl  10 mg Rectal Daily PRN Amada Gallia, DO     • chlorhexidine  15 mL Mouth/Throat Q12H [address] [place], DO     • dexmedetomidine  0 1-0 7 mcg/kg/hr Intravenous Titrated Andrea Hahn, DO 0 7 mcg/kg/hr (05/08/23 0530)   • fentaNYL  100 mcg/hr Intravenous Continuous Andrea Hahn,  mcg/hr (05/08/23 0254)   • fentanyl citrate (PF)  50 mcg Intravenous Q1H PRN Eugenia Zaragoza MD     • folic acid IVPB  1 mg Intravenous Daily Amada Gallia, DO 1 mg (05/07/23 5205)   • heparin (porcine)  3-30 Units/kg/hr (Order-Specific) Intravenous Titrated Amada Gallia, DO 19 Units/kg/hr (05/08/23 0011)   • hydrocortisone  25 mg Per G Tube Q8H Elana Drake DO     • insulin lispro  1-6 Units Subcutaneous Q6H [address] [facility] [place], DO     • insulin regular  5 Units Subcutaneous Q6H Elana Drake DO     • ipratropium  0 5 mg Nebulization TID Merly Subramanian MD     • levalbuterol  1 25 mg Nebulization TID Merly Subramanian MD     • midazolam  2 mg Intravenous Q4H PRN Eugenia Zaragoza MD     • nicotine  14 mg Transdermal Daily Amada Gallia, DO     • norepinephrine  1-30 mcg/min Intravenous Titrated GILLIAN Bernstein Stopped (05/08/23 0254)   • pantoprazole  40 mg Intravenous Q24H [address] Kevin Spear, GILLIAN     • "phenylephine   mcg/min Intravenous Titrated Theresa Steward MD Stopped (05/06/23 1039)   • piperacillin-tazobactam  3 375 g Intravenous Q8H Lina Saima Blantonia, DO 3 375 g (05/08/23 0058)   • polyethylene glycol  17 g Oral Daily PRN Kathleen Olson, DO     • pravastatin  40 mg Oral Daily With [address] [place] [address], DO     • propofol  5-50 mcg/kg/min Intravenous Titrated Fransico Meeks MD Stopped (05/08/23 0530)   • senna-docusate sodium  1 tablet Oral HS Kathleenaubrey Olson, DO     • sodium chloride  4 mL Nebulization TID Theresa Steward MD     • vasopressin  0 04 Units/min Intravenous Continuous Theresa Steward MD Stopped (05/08/23 0022)     Continuous Infusions:  dexmedetomidine, 0 1-0 7 mcg/kg/hr, Last Rate: 0 7 mcg/kg/hr (05/08/23 0530)  fentaNYL, 100 mcg/hr, Last Rate: 100 mcg/hr (05/08/23 0254)  heparin (porcine), 3-30 Units/kg/hr (Order-Specific), Last Rate: 19 Units/kg/hr (05/08/23 0011)  norepinephrine, 1-30 mcg/min, Last Rate: Stopped (05/08/23 0254)  phenylephine,  mcg/min, Last Rate: Stopped (05/06/23 1039)  propofol, 5-50 mcg/kg/min, Last Rate: Stopped (05/08/23 0530)  vasopressin, 0 04 Units/min, Last Rate: Stopped (05/08/23 0022)      PRN Meds:   acetaminophen, 650 mg, Q6H PRN  albuterol, 2 5 mg, Q4H PRN  bisacodyl, 10 mg, Daily PRN  fentanyl citrate (PF), 50 mcg, Q1H PRN  midazolam, 2 mg, Q4H PRN  polyethylene glycol, 17 g, Daily PRN        Allergies   No Known Allergies  ---------------------------------------------------------------------------------------  Advance Directive and Living Will:      Power of :    POLST:    ---------------------------------------------------------------------------------------  Care Time Delivered:   [address] [facility], DO      Portions of the record may have been created with voice recognition software  Occasional wrong word or \"sound a like\" substitutions may have occurred due to the inherent limitations of voice recognition software    " Read the chart carefully and recognize, using context, where substitutions have occurred

## 2023-05-08 NOTE — PROGRESS NOTES
Progress note - Palliative and Supportive Care   Shira Noni 66 y o  male [id number]    Patient Active Problem List   Diagnosis   • Acute pneumonia   • Lung mass   • Neck mass   • Hypercalcemia   • Hyponatremia   • Subclavian vein thrombosis (HCC)   • Type 2 diabetes mellitus without complication, without long-term current use of insulin (HCC)   • SIRS (systemic inflammatory response syndrome) (HCC)   • Hypercoagulopathy (HCC)   • Tobacco abuse   • Essential hypertension   • Mixed dyslipidemia   • Gout   • Bilateral pulmonary embolism (HCC)   • Severe systemic inflammatory response syndrome (SIRS) (HCC)   • Acute respiratory failure with hypoxia (HCC)   • Pleural effusion, right   • Acute encephalopathy   • New infarction of cerebellum (HCC)   • Electrolyte abnormality   • ETOH abuse   • Delirium tremens ([facility] [address] )   • Squamous cell lung cancer, right ([facility] [address] )     Active issues specifically addressed today include:   Cerebellar infarct  Mediastinal LAD resulting in mucus plugging, hypoxic respiratory failure requiring intubation  Lung mass suspicious for malignancy  Palliative care encounter  Goals of care discussion    Plan:  1  Symptom management  -per ICU team    2  Goals - Ongoing medical optimization aimed towards efforts of functional recovery as able, without limits at this time  We discussed code status at bedside briefly with family, they want to continue to have conversation about this amongst themselves  Code Status: FULL - Level 1   Decisional apparatus:  Patient is not competent on my exam today  If competence is lost, patient's substitute decision maker would default to spouse by [place]  Advance Directive / Living Will / POLST:  None on file    Interval history:  Met with patient spouse and daughters at bedside this afternoon after MICU team rounding was completed    They reflected on how many challenges have arised for Juan Manuel medically in such a short time period for Juan Manuel and that has also been a struggle for them as well  They shared that Mauri Jung enjoys camping and recently 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  Him and Juan Diego Valles have been  for at least 39 years  They shared Mauri Jung also enjoys fishing, and loves his cat and dog  Juan Diego Valles also requested the  to come visit time for prayers daily  They are open to  visits as well and appreciated of this  Mauri Jung had radiation today  Had some agitation issues earlier in the day in light of lightening sedation  He is going to be diuresed with IV Lasix today  MEDICATIONS / ALLERGIES:     all current active meds have been reviewed    No Known Allergies    OBJECTIVE:    Physical Exam  Physical Exam  Vitals and nursing note reviewed  Constitutional:       Appearance: He is ill-appearing  He is not diaphoretic  Interventions: He is sedated and intubated  HENT:      Head: Normocephalic and atraumatic  Cardiovascular:      Rate and Rhythm: Tachycardia present  Pulmonary:      Effort: No respiratory distress  He is intubated  Comments: intubated  Abdominal:      General: There is no distension  Musculoskeletal:      Cervical back: Neck supple  Lab Results: I have personally reviewed pertinent labs  Imaging Studies: reviewed  EKG, Pathology, and Other Studies: n/a    Counseling / Coordination of Care    Total floor / unit time spent today 30 minutes  Greater than 50% of total time was spent with the patient and / or family counseling and / or coordination of care   A description of the counseling / coordination of care: chart review, medication review, coordination of care, anticipatory guidance and supportive listening, goals of care

## 2023-05-09 LAB
BACTERIA BLD CULT: NORMAL
BACTERIA BLD CULT: NORMAL

## 2023-05-09 NOTE — UTILIZATION REVIEW
"Continued Stay Review    Date: 05/09                       Current Patient Class: IP Current Level of Care: CC    HPI:63 y o  male initially admitted on 05/02    Assessment/Plan: Last night pt's BP was soft (BP around SBP 80), both legs were cold and molded and poor capillary filling, given bolus  albumin, fentanyl increased, propofol decreased and levo increased with some response  He also had 3 nonbloody loose stools  He remains intubated on MV- AVC/VC sedated  CXR today for worsened hypoxemia  Cont Xopenex/Atrovent/Saline nebs  Hep gtt  Thiamine/folate  ASA/statin  Wean vasopressors for MAP goal >65  Cont diuresis  TF  IV PPI  Bowel regimen  Mon UOP  Radiation therapy today  Vital Signs: BP 95/66 (BP Location: Left arm)   Pulse (!) 122   Temp 100 4 °F (38 °C)   Resp 15   Ht 6' 0 99\" (1 854 m) Comment: per previous encounter  Wt 97 6 kg (215 lb 2 7 oz)   SpO2 93%   BMI 28 39 kg/m²       Pertinent Labs/Diagnostic Results:       Results from last 7 days   Lab Units 05/09/23  0426 05/08/23  0537 05/07/23  0451 05/06/23  0550 05/05/23  0516 05/04/23  0409 05/03/23  0509 05/02/23  2136   WBC Thousand/uL 15 53* 16 04* 13 78* 14 80* 18 71*   < > 17 71* 17 38*   HEMOGLOBIN g/dL 10 8* 10 9* 10 4* 11 7* 12 3   < > 11 8* 12 3   HEMATOCRIT % 34 6* 34 4* 33 4* 36 3* 37 6   < > 37 3 39 1   PLATELETS Thousands/uL 321 310 287 300 361   < > 366 366   NEUTROS ABS Thousands/µL  --  14 14*  --   --   --   --  15 37* 15 02*    < > = values in this interval not displayed           Results from last 7 days   Lab Units 05/09/23  0426 05/08/23  0537 05/07/23  1600 05/07/23  0451 05/06/23  0550 05/05/23  0516   SODIUM mmol/L 147 147 146 145 146 143   POTASSIUM mmol/L 4 2 4 3 5 1 3 4* 3 7 3 3*   CHLORIDE mmol/L 115* 116* 117* 115* 115* 112*   CO2 mmol/L 32 28 31 29 29 29   ANION GAP mmol/L 0* 3* -2* 1* 2* 2*   BUN mg/dL 18 17 18 19 28* 17   CREATININE mg/dL 0 77 0 82 0 81 0 86 1 04 0 80   EGFR ml/min/1 73sq m 97 95 95 93 77 96 " CALCIUM mg/dL 8 0* 8 3 7 8* 7 7* 8 3 9 0   CALCIUM, IONIZED mmol/L  --  1 12  --  1 05* 1 12  --    MAGNESIUM mg/dL 2 1 1 9  --  1 9 2 2 1 8   PHOSPHORUS mg/dL 2 6 2 1*  --  1 5* 2 1* 1 2*         Results from last 7 days   Lab Units 05/09/23  1236 05/08/23  2352 05/08/23  1820 05/08/23  1759 05/08/23  1623 05/08/23  1307 05/08/23  0537 05/07/23  2347 05/07/23  1755 05/07/23  1201 05/07/23  0549 05/06/23  2357   POC GLUCOSE mg/dl 160* 188* 157* 158* 184* 142* 207* 203* 210* 194* 258* 257*     Results from last 7 days   Lab Units 05/09/23  0426 05/08/23  0537 05/07/23  1600 05/07/23  0451 05/06/23  0550 05/05/23  0516 05/04/23  1623 05/04/23  1127 05/04/23  0417 05/03/23  0509 05/02/23  2136   GLUCOSE RANDOM mg/dL 176* 222* 219* 348* 194* 156* 150* 163* 131 137 133             No results found for: BETA-HYDROXYBUTYRATE   Results from last 7 days   Lab Units 05/06/23  1136 05/05/23  2000 05/05/23  1336   PH ART  7 434 7 408 7 433   PCO2 ART mm Hg 41 6 43 0 42 1   PO2 ART mm Hg 112 9 72 4* 144 1*   HCO3 ART mmol/L 27 2 26 5 27 5   BASE EXC ART mmol/L 2 7 1 6 2 9   O2 CONTENT ART mL/dL 16 8 16 9 17 4   O2 HGB, ARTERIAL % 97 8* 93 9* 98 5*   ABG SOURCE  Line, Arterial Line, Arterial Line, Arterial     Results from last 7 days   Lab Units 05/02/23  2136   PH JENNIFER  7 380   PCO2 JENNIFER mm Hg 45 0   PO2 JENNIFER mm Hg 92 0*   HCO3 JENNIFER mmol/L 26 0   BASE EXC JENNIFER mmol/L 0 6   O2 CONTENT JENNIFER ml/dL 17 6   O2 HGB, VENOUS % 95 2*                     Results from last 7 days   Lab Units 05/09/23  0426 05/08/23  2056 05/08/23  1307 05/03/23  0509 05/02/23  2136   PROTIME seconds  --   --   --   --  17 9*   INR   --   --   --   --  1 45*   PTT seconds 79* 65* 57*   < > 159*    < > = values in this interval not displayed  Results from last 7 days   Lab Units 05/06/23  1002   LACTIC ACID mmol/L 1 5       Results from last 7 days   Lab Units 05/06/23  1033   BLOOD CULTURE  No Growth at 72 hrs  No Growth at 72 hrs  Medications:   Scheduled Medications:  aspirin, 81 mg, Oral, Daily  bisacodyl, 10 mg, Rectal, Daily  chlorhexidine, 15 mL, Mouth/Throat, T88T GAVIN  folic acid IVPB, 1 mg, Intravenous, Daily  furosemide, 40 mg, Intravenous, BID (diuretic)  insulin lispro, 1-6 Units, Subcutaneous, Q6H [address]  insulin regular, 8 Units, Subcutaneous, Q6H  ipratropium, 0 5 mg, Nebulization, TID  levalbuterol, 1 25 mg, Nebulization, TID  nicotine, 14 mg, Transdermal, Daily  omeprazole (PRILOSEC) suspension 2 mg/mL, 20 mg, Oral, Daily  pravastatin, 40 mg, Oral, Daily With Dinner  senna-docusate sodium, 1 tablet, Oral, HS  sodium chloride, 4 mL, Nebulization, TID  sodium phosphate, 30 mmol, Intravenous, Once  thiamine, 100 mg, Oral, Daily      Continuous IV Infusions:  fentaNYL, 150 mcg/hr, Intravenous, Continuous  heparin (porcine), 3-30 Units/kg/hr (Order-Specific), Intravenous, Titrated  norepinephrine, 1-30 mcg/min, Intravenous, Titrated  propofol, 5-50 mcg/kg/min, Intravenous, Titrated  vasopressin, 0 04 Units/min, Intravenous, Continuous      PRN Meds:  acetaminophen, 650 mg, Oral, Q6H PRN 05/09 x 1  albuterol, 2 5 mg, Nebulization, Q4H PRN  fentanyl citrate (PF), 50 mcg, Intravenous, Q1H PRN  midazolam, 2 mg, Intravenous, Q4H PRN  polyethylene glycol, 17 g, Oral, Daily PRN        Discharge Plan: [facility]    Network Utilization Review Department  ATTENTION: Please call with any questions or concerns to [phone] and carefully listen to the prompts so that you are directed to the right person  All voicemails are confidential   [facility] all requests for admission clinical reviews, approved or denied determinations and any other requests to dedicated fax number below belonging to the campus where the patient is receiving treatment   List of dedicated fax numbers for the Facilities:  [address] DENIALS (Administrative/Medical Necessity) [phone]   [address] (Maternity/NICU/Pediatrics) [phone] [address] [phone]   [address] [place] [address] [address] 310 [facility] 134 028 [facility] [phone]

## 2023-05-09 NOTE — PROGRESS NOTES
Nutrition Recommendations:    Updated TF order with recommendations - changed to cyclic administration to account for hold time for medication and changed formula as current formula (Vital 1 5) along with propofol is overfeeding pt  Recommend Vital High Protein with goal rate of 75mL/hr x22 hrs/day (hold TF for one hour before and one hour after omeprazole administration)  Start at 55mL/hr and increase rate by 10mL/hr q4 hrs as tolerated to goal    At goal, will provide 1650kcals (2107 total calories with current propofol), 144g protein, 1379mL free water  Continue additional free water flushes and one packet banatrol daily per primary team      Will continue to monitor propofol dosing and adjust TF regimen prn  Adjust banatrol dose as needed based on stool output

## 2023-05-09 NOTE — RESPIRATORY THERAPY NOTE
Resp care   05/09/23 1601   Respiratory Assessment   Respiratory Pattern Symmetrical;Assisted; Tachypneic; Accessory muscle use;Labored   Chest Assessment Chest expansion symmetrical   Bilateral Breath Sounds Diminished;Coarse   R Breath Sounds Diminished   L Breath Sounds Coarse   Cough Productive   Vent Information   Frost C3/G5 Vent Mode (S)CMV   (S)CMV Settings   Resp Rate (BPM) 16 BPM   VT (mL) 500 mL   FIO2 (%) 60 %   PEEP (cmH2O) 10 cmH2O   Insp Time (%) 1 2 %   Flow Trigger (LPM) 3   Humidification Heater   Heater Temperature (Set) 95 °F (35 °C)   (S)CMV Alarms   High Peak Pressure (cmH2O) 40   Low Pressure (cmH2O) 5 cm H2O   High Resp Rate (BPM) 40 BPM   Low Resp Rate (BPM) 8 BPM   High MV (L/min) 20 L/min   Low MV (L/min) 4 L/min   Apnea Time (s) 20 S   Maintenance   Water bag changed Yes   Circuit changed No     Pt remains on A/C with settings per flow sheet

## 2023-05-09 NOTE — QUICK NOTE
Radiation Oncology Treatment Note    A treatment dose of 200 cGy was administered today to the involved tumor site(s) neck, lung, mediastinum using 1-10 MV photons  Present total dose at this time is 1000 cGy    Approximately 19 more treatments before completion of treatments or critical review

## 2023-05-09 NOTE — PROGRESS NOTES
Progress note - Palliative and Supportive Care   Grace Jones 66 y o  male [id number]    Patient Active Problem List   Diagnosis   • Acute pneumonia   • Lung mass   • Neck mass   • Hypercalcemia   • Hyponatremia   • Subclavian vein thrombosis (HCC)   • Type 2 diabetes mellitus without complication, without long-term current use of insulin (HCC)   • SIRS (systemic inflammatory response syndrome) (HCC)   • Hypercoagulopathy (HCC)   • Tobacco abuse   • Essential hypertension   • Mixed dyslipidemia   • Gout   • Bilateral pulmonary embolism (HCC)   • Severe systemic inflammatory response syndrome (SIRS) (HCC)   • Acute respiratory failure with hypoxia (HCC)   • Pleural effusion, right   • Acute encephalopathy   • New infarction of cerebellum (HCC)   • Electrolyte abnormality   • ETOH abuse   • Delirium tremens ([facility] [address] )   • Squamous cell lung cancer, right ([facility] [address] )     Active issues specifically addressed today include:   Cerebellar infarct  Mediastinal LAD resulting in mucus plugging, hypoxic respiratory failure requiring intubation  Lung mass suspicious for malignancy  Palliative care encounter  Goals of care discussion    Plan:  1  Symptom management - per ICU     2  Goals - Ongoing medical optimization aimed towards efforts of functional recovery as able  Will continue to have goals of care conversations with family as medical status evolves  Code Status: FULL - Level 1   Decisional apparatus:  Patient is not competent on my exam today  If competence is lost, patient's substitute decision maker would default to spouse by [place]  Advance Directive / Living Will / POLST:  None on file    Interval history:  Patient had radiation this morning  Patient's wife and daughter at bedside  Dayanayonathan Yao had FMLA paperwork to give to [facility] - will ensure Grace Jones receives this form  Family without any other questions or concerns at this time       MEDICATIONS / ALLERGIES:     all current active meds have been reviewed    No Known Allergies    OBJECTIVE:    Physical Exam  Physical Exam  Vitals and nursing note reviewed  Constitutional:       Appearance: He is ill-appearing  Interventions: He is sedated and intubated  Pulmonary:      Effort: No respiratory distress  He is intubated  Lab Results: I have personally reviewed pertinent labs  Imaging Studies: reviewed  EKG, Pathology, and Other Studies: n/a    Counseling / Coordination of Care    Total floor / unit time spent today 20minutes  Greater than 50% of total time was spent with the patient and / or family counseling and / or coordination of care   A description of the counseling / coordination of care: chart review, lab review, imaging review, supportive listening

## 2023-05-09 NOTE — PROGRESS NOTES
Daily Progress Note - Critical Care   Torrie Ortiz 66 y o  male MRN: [id number]  Unit/Bed#: MICU 07 Encounter: [id number]        ----------------------------------------------------------------------------------------  HPI/24hr events:     44-year-old male w/ hx of alcohol abuse, type 2 diabetes, tobacco use (over 30-pack-year smoking history) initially presented on 4/20 with cough and URI and was found to have a necrotic right middle lobe cavitary mass with extensive mediastinal lymphadenopathy, neck lymphadenopathy, subclavian thrombosis and bilateral pulmonary emboli   Represented on 4/28 to [address] [place] [address] with acute encephalopathy and hypoxia requiring 2 L via nasal cannula   Encephalopathy attributable to hypercalcemia, possible alcohol withdrawal   However, last drink 4/26/2023, receive 130 mg of phenobarbital on GSL on 5/2   Pulmonary saw the patient at [address] [place] [address] and due to mediastinal mass compressing the mid and distal trachea, recommended transfer to [facility] for radiation oncology and thoracic surgery evaluation   Patient was transferred to [facility] on 5/3  Chasidy Soto underwent emergent radiation therapy on 5/3   Biopsy diagnosis is squamous cell carcinoma  Found to have R mucous plugging and was subsequently intubated on 5/5  On 5/6/23 patient become febrile with worsening hypotension requiring levo and vasopressin      Last night his blood pressure was soft (BP around SBP 80), both legs were cold and molded and poor capillary filling, so he was given bolus of albumin, fentanyl increased, propofol decreased and levo increased with some response  He also had 3 nonbloody loose stools  ---------------------------------------------------------------------------------------  SUBJECTIVE  Patient seen and examined at bedside  He is intubated/sedated  RASS -3  Review of Systems  Review of systems was unable to be performed secondary to patient intubated/sedated  RASS -3  ---------------------------------------------------------------------------------------  Assessment and Plan:  1  Large middle mediastinal mass, lymphadenopathy, encasing trachea with mid to distal tracheal compression  2  Right middle lobe lung mass, biopsy right neck lymph node squamous cell carcinoma  3  Acute hypoxic respiratory failure likely secondary to lung cancer, bilateral pulmonary emboli, pneumonia, right mucous plug  4  Septic shock   5  Acute encephalopathy, multifactorial likely related to hypercalcemia, infection, questionable alcohol withdrawal  6  Bilateral segmental pulmonary emboli  7  History of chronic alcohol use, last drink 4/26/2023  8  MRI brain on 4/28/2023 with tiny focus of acute or subacute infarct in the left cerebellum  9  Hypercalcemia likely secondary to malignancy - now resolved  10  Hypertension  11  Tobacco abuse history         Neuro:   • Diagnosis: Acute encephalopathy  ? Multifactorial in setting of hypercalcemia, cerebellar infarction, acute illness, possible infection, alcohol withdrawal  Ammonia normal   ? Plan:   - CAM ICU  - Delirium precautions - folate  - Post intubation sedation - Fentanyl and propofol gtt   - Melatonin and seroquel added qHS for sleep wake regulation -> now discontinued     • Diagnosis: Delirium tremens - now resolved   ? Plan:   - History of chronic ETOH abuse  Last alc use on 4/26    - Previously on CIWA, since discontinued     • Diagnosis: Cerebellar infarction  ? Plan:   - Continue heparin gtt  - Echo complete with 70%EF, no PFO  - Continue statin  - Continue aspirin     • Diagnosis: ETOH abuse  ? Plan:   - No current concerns for withdrawal, past window with last drink 4/26  - Outpatient resources        CV:   • Diagnosis: Essential hypertension  ? Plan:  - Holding home losartan  - Hypotension post intubation likely due to sedation/septic shock  - Wean vasopressors for MAP > 65     - Hydrocortisone for septic shock - wean due to hyperglycemia        Pulm:  • Diagnosis: Acute hypoxic respiratory failure requiring mechanical ventilation  ? Multifactorial with lung mass, middle mediastinal LAD, bilateral PEs, pulm effusions, right lower lung opacity  ? Intubated 5/5 for airway protection, bronchoscopy following remarkable for extremely thick R secretions  Bronched with moderate amount of secretions noted  ? Current MV settings: SCMV: 16/500/10/55   ? Plan:   - Airway clearance, chest wall oscillation, respiratory protocol  - Goal to wean to FiO2 40%, PEEP 6  - Continuous pulse oximetry  - Goal SpO2 >92%  - SBT when possible     • Diagnosis: Bilateral PEs  ? Plan:   - Heparin VTE high, continue     • Diagnosis: Right lower lung opacity progressed to full right lung white out  ? Plan:   - Possibly malignant effusion vs infarct vs aspiration  - Possible that increased secretions is causing mucous plugging and atelectasis      • Diagnosis: Tobacco abuse  ? Plan:   - Nicotine patch  - Encourage cessation    • Diagnosis: Pneumonia:  ? Zosyn D7 (last dose 5/8)  ? Previously on vancomycin as well  Held after sputum culture with MRF         GI:   • Did not have bowel movement for several days and was given 3 scheduled bowel regimens  Had 3 nonbloody loose stools  ? Plan:   - Bowel regimen - Senna and duloclax scheduled  PEG PRN   - Tube feeds   - IV PPI ppx        :   • Diagnosis: hypernatremia and hypercalcemia  ? S/p zoledronic acid, calcitonin, IVFs and increasing free water flushes    ? Plan:  - Monitor I&Os, barker in place        F/E/N:   • F- none  • E- monitor and replete as needed, goal Mag >2, Phos >3, K >4  • N- TF Vital 1 5 @ 60cc (goal) with FWF 250cc q6        Heme/Onc:   • Diagnosis: Squamous cell carcinoma of the lung  ?  Plan:   - Radiation therapy x2 on 5/3 and 5/8 with plan for 21 more treatments as per radiation oncology note on 5/8    - Ongoing therapies as per rad/onc recommendations      • Diagnosis: Cervical adenopathy with trachael narrowing  ? Secondary to malignancy  ? 5/4 24 hours of decadron 6mg   ? 5/5 intubated in AM for hypoxia  ? Plan:   - As per thoracics no plan for stent at present  - Continue intubation (see Pulmonary assessment/plan above)     • Diagnosis: Subclavian vein thrombosis, right  • Exam: RUE firm, swollen; patient indicates some pain and numbness/tingling  ? Plan:   - Heparin as above, continue  - Monitor for SVC syndrome        Endo:   • Diagnosis: Type 2 diabetes mellitus  ? Plan:   - SSI, continue  - Goal -180     ID  • Diagnosis: aspiration pna  ? Continue Zosyn for 7 days (last dose on 5/8)  ? Previously on vanc  Held after sputum Cx with MRF  ? Multiple BC with no growth  ? MRSA negative  ? Sputum cx with MRF     MSK/Skin  • Diagnosis: No active issues      Patient appropriate for transfer out of the ICU today?: No  Disposition: Continue Critical Care   Code Status: Level 1 - Full Code  ---------------------------------------------------------------------------------------  ICU CORE MEASURES    Prophylaxis   VTE Pharmacologic Prophylaxis: Heparin Drip  VTE Mechanical Prophylaxis: sequential compression device  Stress Ulcer Prophylaxis: Pantoprazole IV     ABCDE Protocol (if indicated)  Plan to perform spontaneous awakening trial today? No  Plan to perform spontaneous breathing trial today? No  Obvious barriers to extubation?  Yes  CAM-ICU: Negative    Invasive Devices Review  Invasive Devices     Central Venous Catheter Line  Duration           CVC Central Lines 05/02/23 Triple 20cm 6 days          Peripheral Intravenous Line  Duration           Peripheral IV 05/06/23 Left;Upper;Ventral (anterior) Arm 3 days          Arterial Line  Duration           Arterial Line 05/02/23 Radial 6 days          Drain  Duration           NG/OG/Enteral Tube Orogastric Center mouth 3 days    Urethral Catheter Temperature probe 16 Fr  3 days          Airway  Duration           ETT  Cuffed 7 5 mm 6 days Can any invasive devices be discontinued today? No  ---------------------------------------------------------------------------------------  OBJECTIVE    Vitals   Vitals:    23 0430 23 0445 23 0500 23 0545   BP:       BP Location:       Pulse: (!) 116 (!) 114 (!) 114    Resp: (!) 26 15 16    Temp: 100 °F (37 8 °C) 100 °F (37 8 °C) 100 °F (37 8 °C)    TempSrc:       SpO2: 90% 93% 94%    Weight:    97 6 kg (215 lb 2 7 oz)   Height:         Temp (24hrs), Av 3 °F (37 9 °C), Min:99 3 °F (37 4 °C), Max:101 1 °F (38 4 °C)  Current: Temperature: 100 °F (37 8 °C)  HR:   BP: /42-68  RR: 16  SpO2: 94%    Respiratory:  SpO2: SpO2: 94 %  Nasal Cannula O2 Flow Rate (L/min): 55 L/min    Invasive/non-invasive ventilation settings   Respiratory    Lab Data (Last 4 hours)    None         O2/Vent Data (Last 4 hours)    None                Physical Exam  Vitals and nursing note reviewed  Constitutional:       Appearance: He is obese  HENT:      Head: Normocephalic and atraumatic  Mouth/Throat:      Mouth: Mucous membranes are moist    Eyes:      Conjunctiva/sclera: Conjunctivae normal    Cardiovascular:      Rate and Rhythm: Regular rhythm  Tachycardia present  Pulses: Normal pulses  Heart sounds: Normal heart sounds  Abdominal:      General: Bowel sounds are normal       Palpations: Abdomen is soft     Neurological:      Comments: RASS -3           Laboratory and Diagnostics:  Results from last 7 days   Lab Units 23  0426 23  0537 23  0451 23  0550 23  0516 23  0409 23  0509 23  2136   WBC Thousand/uL 15 53* 16 04* 13 78* 14 80* 18 71* 17 86* 17 71* 17 38*   HEMOGLOBIN g/dL 10 8* 10 9* 10 4* 11 7* 12 3 12 1 11 8* 12 3   HEMATOCRIT % 34 6* 34 4* 33 4* 36 3* 37 6 38 1 37 3 39 1   PLATELETS Thousands/uL 321 310 287 300 361 353 366 366   NEUTROS PCT %  --  87*  --   --   --   --  87* 86*   MONOS PCT %  --  7  --   --   --   -- 8 8     Results from last 7 days   Lab Units 05/09/23  0426 05/08/23  0537 05/07/23  1600 05/07/23  0451 05/06/23  0550 05/05/23  0516 05/04/23  1623   SODIUM mmol/L 147 147 146 145 146 143 150*   POTASSIUM mmol/L 4 2 4 3 5 1 3 4* 3 7 3 3* 3 7   CHLORIDE mmol/L 115* 116* 117* 115* 115* 112* 118*   CO2 mmol/L 32 28 31 29 29 29 29   ANION GAP mmol/L 0* 3* -2* 1* 2* 2* 3*   BUN mg/dL 18 17 18 19 28* 17 16   CREATININE mg/dL 0 77 0 82 0 81 0 86 1 04 0 80 0 72   CALCIUM mg/dL 8 0* 8 3 7 8* 7 7* 8 3 9 0 8 8   GLUCOSE RANDOM mg/dL 176* 222* 219* 348* 194* 156* 150*     Results from last 7 days   Lab Units 05/09/23  0426 05/08/23  0537 05/07/23  0451 05/06/23  0550 05/05/23  0516 05/04/23  0417 05/03/23  0509   MAGNESIUM mg/dL 2 1 1 9 1 9 2 2 1 8 1 9 2 1   PHOSPHORUS mg/dL 2 6 2 1* 1 5* 2 1* 1 2* 2 8 2 9      Results from last 7 days   Lab Units 05/09/23  0426 05/08/23  2056 05/08/23  1307 05/08/23  0537 05/07/23  0452 05/06/23  0550 05/05/23  1336 05/03/23  0509 05/02/23  2136   INR   --   --   --   --   --   --   --   --  1 45*   PTT seconds 79* 65* 57* 54* 66* 64* 68*   < > 159*    < > = values in this interval not displayed  Results from last 7 days   Lab Units 05/06/23  1002   LACTIC ACID mmol/L 1 5     ABG:  Results from last 7 days   Lab Units 05/06/23  1136   PH ART  7 434   PCO2 ART mm Hg 41 6   PO2 ART mm Hg 112 9   HCO3 ART mmol/L 27 2   BASE EXC ART mmol/L 2 7   ABG SOURCE  Line, Arterial     VBG:  Results from last 7 days   Lab Units 05/06/23  1136 05/05/23  0916 05/02/23  2136   PH JENNIFER   --   --  7 380   PCO2 JENNIFER mm Hg  --   --  45 0   PO2 JENNIFER mm Hg  --   --  92 0*   HCO3 JENNIFER mmol/L  --   --  26 0   BASE EXC JENNIFER mmol/L  --   --  0 6   ABG SOURCE  Line, Arterial   < >  --     < > = values in this interval not displayed       Results from last 7 days   Lab Units 05/02/23  1051   PROCALCITONIN ng/ml 0 23       Micro  Results from last 7 days   Lab Units 05/06/23  1033 05/02/23  1704 05/02/23  1412 "23  1307 23  1151   BLOOD CULTURE  No Growth at 48 hrs  No Growth at 48 hrs   --   --  No Growth After 5 Days  No Growth After 5 Days  SPUTUM CULTURE   --   --  3+ Growth of  --   --    GRAM STAIN RESULT   --   --  2+ Epithelial cells per low power field*  Rare Polys*  1+ Gram positive cocci in pairs and chains*  1+ Gram positive rods*  Rare Budding yeast*  --   --    MRSA CULTURE ONLY   --  No Methicillin Resistant Staphlyococcus aureus (MRSA) isolated  --   --   --        EK/6 sinus tachycardia with first-degree AV block, QTc 418  Imaging:  I have personally reviewed pertinent reports  Intake and Output  I/O        07 07    I V  (mL/kg) 1770 2 (17 8) 1358 4 (13 9)    NG/GT 1130 720    IV Piggyback 800 1000    Feedings 1232 1172    Total Intake(mL/kg) 4932 2 (49 5) 4250 4 (43 5)    Urine (mL/kg/hr) 2195 (0 9) 3035 (1 3)    Stool  0    Total Output 2195 3035    Net +2737 2 +1215 4          Unmeasured Stool Occurrence  2 x        UOP: 1 3 ml/hr     Height and Weights   Height: 6' 0 99\" (185 4 cm) (per previous encounter)  IBW (Ideal Body Weight): 79 88 kg  Body mass index is 28 39 kg/m²  Weight (last 2 days)     Date/Time Weight    23 0545 97 6 (215 17)    23 0541 99 6 (219 58)            Nutrition       Diet Orders   (From admission, onward)             Start     Ordered    23 0426  Diet Enteral/Parenteral; Tube Feeding No Oral Diet; Vital 1 5; Continuous; 60; Banatrol Plus Banana Flakes - One Packet Daily; 250;  Water; Every 6 hours  Diet effective now        Comments: Start at 10mL/hr and advance by 10mL/hr q8 hrs as tolerated to goal rate   References:    Nutrtion Support Algorithm Enteral vs  Parenteral   Question Answer Comment   Diet Type Enteral/Parenteral    Enteral/Parenteral Tube Feeding No Oral Diet    Tube Feeding Formula: Vital 1 5    Bolus/Cyclic/Continuous Continuous    Tube Feeding Goal Rate (mL/hr): 60    Banatrol " Plus Banana Flakes Banatrol Plus Banana Flakes - One Packet Daily    Tube Feeding flush (mL): 250    Water Flush type: Water    Water flush frequency: Every 6 hours    RD to adjust diet per protocol? Yes        05/09/23 0426              TF currently running at 60 ml/hr with a goal of 60 ml/hr   Formula: Vital 1 5      Active Medications  Scheduled Meds:  Current Facility-Administered Medications   Medication Dose Route Frequency Provider Last Rate   • acetaminophen  650 mg Oral Q6H PRN Oliver [place], DO     • albuterol  2 5 mg Nebulization Q4H PRN Dillon Serrano MD     • aspirin  81 mg Oral Daily Franco Webster MD     • bisacodyl  10 mg Rectal Daily Pebbles Gibbs PA-C     • chlorhexidine  15 mL Mouth/Throat Q12H [address] Perla DO     • dexmedetomidine  0 1-0 7 mcg/kg/hr Intravenous Titrated Andrea Hahn DO Stopped (05/08/23 1556)   • fentaNYL  150 mcg/hr Intravenous Continuous Andrea Hahn  mcg/hr (05/09/23 0003)   • fentanyl citrate (PF)  50 mcg Intravenous Q1H PRN Tania Tavarez MD     • folic acid IVPB  1 mg Intravenous Daily Oliver [place], DO 1 mg (05/08/23 1012)   • heparin (porcine)  3-30 Units/kg/hr (Order-Specific) Intravenous Titrated Nadealeia Blantonia, DO 23 Units/kg/hr (05/09/23 0589)   • insulin lispro  1-6 Units Subcutaneous Q6H [address] Franco Webster MD     • insulin regular  8 Units Subcutaneous Q6H Pebbles Gibbs PA-C     • ipratropium  0 5 mg Nebulization TID Dillon Serrano MD     • levalbuterol  1 25 mg Nebulization TID Dillon Serrano MD     • midazolam  2 mg Intravenous Q4H PRN Tania Tavarez MD     • nicotine  14 mg Transdermal Daily Oliver Ellen[place], DO     • norepinephrine  1-30 mcg/min Intravenous Titrated Pebbles Gibbs PA-C 9 mcg/min (05/08/23 2320)   • omeprazole (PRILOSEC) suspension 2 mg/mL  20 mg Oral Daily Franco Webster MD     • polyethylene glycol  17 g Oral Daily Pebbles Gibbs PA-C     • pravastatin  40 mg Oral Daily With Dinner Nadean Petit "Kam, DO     • propofol  5-50 mcg/kg/min Intravenous Titrated Radha Hassan MD 30 mcg/kg/min (05/09/23 7308)   • senna-docusate sodium  1 tablet Oral HS Constance Long Kam, DO     • sodium chloride  4 mL Nebulization TID Haily Guerrero MD     • vasopressin  0 04 Units/min Intravenous Continuous Haily Guerrero MD Stopped (05/08/23 0022)     Continuous Infusions:  dexmedetomidine, 0 1-0 7 mcg/kg/hr, Last Rate: Stopped (05/08/23 1556)  fentaNYL, 150 mcg/hr, Last Rate: 150 mcg/hr (05/09/23 0003)  heparin (porcine), 3-30 Units/kg/hr (Order-Specific), Last Rate: 23 Units/kg/hr (05/09/23 0313)  norepinephrine, 1-30 mcg/min, Last Rate: 9 mcg/min (05/08/23 2320)  propofol, 5-50 mcg/kg/min, Last Rate: 30 mcg/kg/min (05/09/23 0212)  vasopressin, 0 04 Units/min, Last Rate: Stopped (05/08/23 0022)      PRN Meds:   acetaminophen, 650 mg, Q6H PRN  albuterol, 2 5 mg, Q4H PRN  fentanyl citrate (PF), 50 mcg, Q1H PRN  midazolam, 2 mg, Q4H PRN        Allergies   No Known Allergies  ---------------------------------------------------------------------------------------  Advance Directive and Living Will:      Power of :    POLST:    ---------------------------------------------------------------------------------------  Care Time Delivered:   [facility], DO      Portions of the record may have been created with voice recognition software  Occasional wrong word or \"sound a like\" substitutions may have occurred due to the inherent limitations of voice recognition software    Read the chart carefully and recognize, using context, where substitutions have occurred    "

## 2023-05-10 NOTE — DEATH NOTE
INPATIENT DEATH NOTE  Elma Russo 66 y o  male MRN: [id number]  Unit/Bed#: MICU 07 Encounter: [id number]    Date, Time and Cause of Death    Date of Death: 5/10/23  Time of Death:  3:20 PM  Preliminary Cause of Death: Cancer ([address] )  Entered by: Angelique Blair[CL1 [id number]]     Attribution     CL1 1 Patricia Aleman MD 05/10/23 15:25           Patient's Information  Pronounced by: Jeaneth Huitron  Did the patient's death occur in the ED?: No  Did the patient's death occur in the OR?: No  Did the patient's death occur less than 10 days post-op?: No  Did the patient's death occur within 24 hours of admission?: No  Was code status DNR at the time of death?: Yes    PHYSICAL EXAM:  Unresponsive to noxious stimuli, Spontaneous respirations absent, Breath sounds absent, Carotid pulse absent, Heart sounds absent, Pupillary light reflex absent and Corneal blink reflex absent    Medical Examiner notification criteria:  NONE APPLICABLE   Medical Examiner's office notified?:  Yes   Medical Examiner accepted case?:  No  Name of Medical Examiner: NIMA cummins was the one to reach out          Autopsy Options:  Post-mortem examination declined by next of kin    Primary Service Attending Physician notified?:  yes - Attending:  Neida Chacon MD    Physician/Resident responsible for completing Discharge Summary:  Jeaneth Huitron

## 2023-05-10 NOTE — PLAN OF CARE
Problem: SAFETY,RESTRAINT: NV/NON-SELF DESTRUCTIVE BEHAVIOR  Goal: Remains free of harm/injury (restraint for non violent/non self-detsructive behavior)  Description: INTERVENTIONS:  - Instruct patient/family regarding restraint use   - Assess and monitor physiologic and psychological status   - Provide interventions and comfort measures to meet assessed patient needs   - Identify and implement measures to help patient regain control  - Assess readiness for release of restraint   Outcome: Completed  Goal: Returns to optimal restraint-free functioning  Description: INTERVENTIONS:  - Assess the patient's behavior and symptoms that indicate continued need for restraint  - Identify and implement measures to help patient regain control  - Assess readiness for release of restraint   Outcome: Completed     Problem: Prexisting or High Potential for Compromised Skin Integrity  Goal: Skin integrity is maintained or improved  Description: INTERVENTIONS:  - Identify patients at risk for skin breakdown  - Assess and monitor skin integrity  - Assess and monitor nutrition and hydration status  - Monitor labs   - Assess for incontinence   - Turn and reposition patient  - Assist with mobility/ambulation  - Relieve pressure over bony prominences  - Avoid friction and shearing  - Provide appropriate hygiene as needed including keeping skin clean and dry  - Evaluate need for skin moisturizer/barrier cream  - Collaborate with interdisciplinary team   - Patient/family teaching  - Consider wound care consult   Outcome: Completed     Problem: MOBILITY - ADULT  Goal: Maintain or return to baseline ADL function  Description: INTERVENTIONS:  -  Assess patient's ability to carry out ADLs; assess patient's baseline for ADL function and identify physical deficits which impact ability to perform ADLs (bathing, care of mouth/teeth, toileting, grooming, dressing, etc )  - Assess/evaluate cause of self-care deficits   - Assess range of motion  - Assess patient's mobility; develop plan if impaired  - Assess patient's need for assistive devices and provide as appropriate  - Encourage maximum independence but intervene and supervise when necessary  - Involve family in performance of ADLs  - Assess for home care needs following discharge   - Consider OT consult to assist with ADL evaluation and planning for discharge  - Provide patient education as appropriate  Outcome: Completed  Goal: Maintains/Returns to pre admission functional level  Description: INTERVENTIONS:  - Perform BMAT or MOVE assessment daily    - Set and communicate daily mobility goal to care team and patient/family/caregiver  - Collaborate with rehabilitation services on mobility goals if consulted  - Perform Range of Motion - times a day  - Reposition patient every - hours  - Dangle patient - times a day  - Stand patient - times a day  - Ambulate patient - times a day  - Out of bed to chair - times a day   - Out of bed for meals ----- times a day  - Out of bed for toileting  - Record patient progress and toleration of activity level   Outcome: Completed     Problem: OCCUPATIONAL THERAPY ADULT  Goal: Performs self-care activities at highest level of function for planned discharge setting  See evaluation for individualized goals  Description: Treatment Interventions: ADL retraining, Functional transfer training, Visual perceptual retraining, UE strengthening/ROM, Endurance training, Cognitive reorientation, Patient/family training, Equipment evaluation/education, Compensatory technique education, Continued evaluation, Energy conservation, Activityengagement, Fine motor coordination activities          See flowsheet documentation for full assessment, interventions and recommendations     Outcome: Completed     Problem: Nutrition/Hydration-ADULT  Goal: Nutrient/Hydration intake appropriate for improving, restoring or maintaining nutritional needs  Description: Monitor and assess patient's nutrition/hydration status for malnutrition  Collaborate with interdisciplinary team and initiate plan and interventions as ordered  Monitor patient's weight and dietary intake as ordered or per policy  Utilize nutrition screening tool and intervene as necessary  Determine patient's food preferences and provide high-protein, high-caloric foods as appropriate  INTERVENTIONS:  - Monitor oral intake, urinary output, labs, and treatment plans  - Assess nutrition and hydration status and recommend course of action  - Evaluate amount of meals eaten  - Assist patient with eating if necessary   - Allow adequate time for meals  - Recommend/ encourage appropriate diets, oral nutritional supplements, and vitamin/mineral supplements  - Order, calculate, and assess calorie counts as needed  - Recommend, monitor, and adjust tube feedings and TPN/PPN based on assessed needs  - Assess need for intravenous fluids  - Provide specific nutrition/hydration education as appropriate  - Include patient/family/caregiver in decisions related to nutrition  Outcome: Completed     Problem: PHYSICAL THERAPY ADULT  Goal: Performs mobility at highest level of function for planned discharge setting  See evaluation for individualized goals  Description: Treatment/Interventions: Functional transfer training, LE strengthening/ROM, Therapeutic exercise, Endurance training, Patient/family training, Equipment eval/education, Gait training, Bed mobility, OT, Spoke to nursing, Elevations          See flowsheet documentation for full assessment, interventions and recommendations    Outcome: Completed

## 2023-05-10 NOTE — PROGRESS NOTES
Daily Progress Note - Critical Care   Norma Lopez 66 y o  male MRN: [id number]  Unit/Bed#: MICU 07 Encounter: [id number]        ----------------------------------------------------------------------------------------  HPI/24hr events:     45-year-old male w/ hx of alcohol abuse, type 2 diabetes, tobacco use (over 30-pack-year smoking history) initially presented on 4/20 with cough and URI and was found to have a necrotic right middle lobe cavitary mass with extensive mediastinal lymphadenopathy, neck lymphadenopathy, subclavian thrombosis and bilateral pulmonary emboli   Represented on 4/28 to [facility] with acute encephalopathy and hypoxia requiring 2 L via nasal cannula   Encephalopathy attributable to hypercalcemia, possible alcohol withdrawal   However, last drink 4/26/2023, receive 130 mg of phenobarbital on GSL on 5/2   Pulmonary saw the patient at [facility] and due to mediastinal mass compressing the mid and distal trachea, recommended transfer to [facility] for radiation oncology and thoracic surgery evaluation   Patient was transferred to [facility] on 5/3  Paula Jenningshenrique underwent emergent radiation therapy on 5/3   Biopsy diagnosis is squamous cell carcinoma  Found to have R mucous plugging and was subsequently intubated on 5/5  On 5/6/23 patient become febrile with worsening hypotension requiring levo and vasopressin       In last 24 hours, he remained febrile (max temp 101 5 F), tachycardic (-130) and soft BP (BP 80s to 110s)  He also desatted as low as 60s yesterday late afternoon which quickly resolved  ABG was obtained which showed respiratory alkalosis with PCO2 76 7  He was given 1 dose of 10 mg IV Decadron given he received radiation treatment early morning  His Levophed was decreased and transitioned to Usman in concern of possible bronchodilation  Tylenol was also changed to scheduled from as needed  His ventilation settings were changed to RR 22 and FiO2 80%  Repeat ABG was obtained with some improvement in PCO2 65 3  Given this clinical scenario, low threshold for septic workup if fever and tachycardia persists  ---------------------------------------------------------------------------------------  SUBJECTIVE  Patient seen and examined at bedside  Patient intubated/sedated  RASS -2  Review of Systems   Reason unable to perform ROS: Intubated/sedated  RASS -2  Review of systems was unable to be performed secondary to intubated/sedated  ---------------------------------------------------------------------------------------  Assessment and Plan:  1  Large middle mediastinal mass, lymphadenopathy, encasing trachea with mid to distal tracheal compression  2  Right middle lobe lung mass, biopsy right neck lymph node squamous cell carcinoma  3   Acute hypoxic respiratory failure likely secondary to lung cancer, bilateral pulmonary emboli, pneumonia, right mucous plug  4   Septic shock   5   Acute encephalopathy, multifactorial likely related to hypercalcemia, infection, questionable alcohol withdrawal  6   Bilateral segmental pulmonary emboli  7   History of chronic alcohol use, last drink 4/26/2023  8   MRI brain on 4/28/2023 with tiny focus of acute or subacute infarct in the left cerebellum  9   Hypercalcemia likely secondary to malignancy - now resolved  10   Hypertension  11  Tobacco abuse history         Neuro:   • Diagnosis: Acute encephalopathy  ? Multifactorial in setting of hypercalcemia, cerebellar infarction, acute illness, possible infection, alcohol withdrawal  Ammonia normal   ? Plan:   - CAM ICU  - Delirium precautions - folate/thiamine  - Post intubation sedation - Fentanyl and propofol gtt   - Melatonin and seroquel added qHS for sleep wake regulation -> now discontinued     • Diagnosis: Delirium tremens - now resolved   ? Plan:   - History of chronic ETOH abuse  Last alc use on 4/26    - Previously on CIWA, since discontinued     • Diagnosis: Cerebellar infarction  ?  Plan:   - Continue heparin gtt  - Echo complete with 70%EF, no PFO  - Continue statin  - Continue aspirin     • Diagnosis: ETOH abuse  ? Plan:   - No current concerns for withdrawal, past window with last drink 4/26  - Outpatient resources        CV:   • Diagnosis: Essential hypertension  ? Plan:  - Holding home losartan  - Hypotension post intubation likely due to sedation  - Wean vasopressors for MAP > 65          Pulm:  • Diagnosis: Acute hypoxic respiratory failure requiring mechanical ventilation  ? Multifactorial with lung mass, middle mediastinal LAD, bilateral PEs, pulm effusions, right lower lung opacity  ? Intubated 5/5 for airway protection, bronchoscopy following remarkable for extremely thick R secretions  Bronched with moderate amount of secretions noted  ? Current MV settings: SCMV: 22/500/10/80   ? Plan:   - Airway clearance, chest wall oscillation, respiratory protocol  - Goal to wean to FiO2 40%, PEEP 6  - Continuous pulse oximetry  - Goal SpO2 >92%  - SBT when possible     • Diagnosis: Bilateral PEs  ? Plan:   - Heparin VTE high, continue     • Diagnosis: Right lower lung opacity progressed to full right lung white out  ? Plan:   - Possibly malignant effusion vs infarct vs aspiration  - Possible that increased secretions is causing mucous plugging and atelectasis      • Diagnosis: Tobacco abuse  ? Plan:   - Nicotine patch  - Encourage cessation     • Diagnosis: Pneumonia  ? Completed Zosyn D7 (last dose 5/8)  ? Previously on vancomycin as well   Held after sputum culture with MRF         GI:   • Did not have bowel movement for several days and was given 3 scheduled bowel regimens  ? Plan:   - Bowel regimen - Senna and duloclax scheduled  PEG PRN   - Tube feeds   - IV PPI ppx        :   • Diagnosis: hypernatremia and hypercalcemia - resolved  ? S/p zoledronic acid, calcitonin, IVFs and increasing free water flushes    ?  Plan:  - Monitor I&Os, barker in place        F/E/N:   • F- none  • E- monitor and replete as needed, goal Mag >2, Phos >3, K >4  • N- TF Vital high protein @ 75cc (goal) with FWF 250cc q6        Heme/Onc:   • Diagnosis: Squamous cell carcinoma of the lung  ? Plan:   - Radiation therapy x3 with plan for 19 more treatments as per radiation oncology    - Ongoing therapies as per rad/onc recommendations      • Diagnosis: Cervical adenopathy with trachael narrowing  ? Secondary to malignancy  ? 5/4 24 hours of decadron 6mg   ? 5/5 intubated in AM for hypoxia  ? Plan:   - As per thoracics no plan for stent at present  - Continue intubation (see Pulmonary assessment/plan above)     • Diagnosis: Subclavian vein thrombosis, right  • Exam: RUE firm, swollen; patient indicates some pain and numbness/tingling  ? Plan:   - Heparin as above, continue  - Monitor for SVC syndrome        Endo:   • Diagnosis: Type 2 diabetes mellitus  ? Plan:   - Started insulin drip given pt received decadron s/p radiation and concern for inflammation  Will transition out as appropriate   - SSI, continue  - Goal -180     ID  • Diagnosis: aspiration pna  ? Given febrile and tachycardic in last 24 hours low threshold for septic workup if symptoms persists or worsens  ? Completed Zosyn for 7 days (last dose on 5/8)  ? Previously on vanc  Held after sputum Cx with MRF  ? Multiple BC with no growth    ? MRSA negative  ? Sputum cx with MRF     MSK/Skin  • Diagnosis: No active issues   Plan: PT/OT       Patient appropriate for transfer out of the ICU today?: No  Disposition: Continue Critical Care   Code Status: Level 1 - Full Code  ---------------------------------------------------------------------------------------  ICU CORE MEASURES    Prophylaxis   VTE Pharmacologic Prophylaxis: Heparin Drip  VTE Mechanical Prophylaxis: sequential compression device  Stress Ulcer Prophylaxis: Pantoprazole IV     ABCDE Protocol (if indicated)  Plan to perform spontaneous awakening trial today? No  Plan to perform spontaneous breathing trial today?  No  Obvious barriers to extubation? Yes  CAM-ICU: Negative    Invasive Devices Review  Invasive Devices     Central Venous Catheter Line  Duration           CVC Central Lines 23 Triple 20cm 7 days          Peripheral Intravenous Line  Duration           Peripheral IV 23 Left Forearm <1 day          Arterial Line  Duration           Arterial Line 23 Radial 7 days          Drain  Duration           NG/OG/Enteral Tube Orogastric Center mouth 4 days    Urethral Catheter Temperature probe 16 Fr  4 days          Airway  Duration           ETT  Cuffed 7 5 mm 7 days              Can any invasive devices be discontinued today? No  ---------------------------------------------------------------------------------------  OBJECTIVE    Vitals   Vitals:    05/10/23 0200 05/10/23 0250 05/10/23 0300 05/10/23 0349   BP:       BP Location:       Pulse: 100 100 98 98   Resp: 21 21 21 21   Temp: (!) 101 5 °F (38 6 °C) (!) 101 1 °F (38 4 °C) (!) 101 1 °F (38 4 °C) (!) 101 1 °F (38 4 °C)   TempSrc:       SpO2: 96% 96% 97% 96%   Weight:       Height:         Temp (24hrs), Av 6 °F (38 1 °C), Min:99 °F (37 2 °C), Max:101 5 °F (38 6 °C)  Current: Temperature: (!) 101 1 °F (38 4 °C)  HR:   BP: /44-60  RR: 16-21  SpO2: 82-96%    Respiratory:  SpO2: SpO2: 96 %  Nasal Cannula O2 Flow Rate (L/min): 55 L/min    Invasive/non-invasive ventilation settings   Respiratory    Lab Data (Last 4 hours)      05/10 0438            pH, Arterial       7 306             pCO2, Arterial       65 3             pO2, Arterial       70 4             HCO3, Arterial       31 8             Base Excess, Arterial       3 9                  O2/Vent Data     None                Physical Exam  Vitals and nursing note reviewed  Constitutional:       Appearance: He is obese  HENT:      Head: Normocephalic and atraumatic  Cardiovascular:      Rate and Rhythm: Normal rate and regular rhythm  Pulses: Normal pulses        Heart sounds: Normal heart sounds  Abdominal:      General: Bowel sounds are normal       Palpations: Abdomen is soft     Neurological:      Comments: RASS -3           Laboratory and Diagnostics:  Results from last 7 days   Lab Units 05/10/23  0437 05/09/23  0426 05/08/23  0537 05/07/23  0451 05/06/23  0550 05/05/23  0516 05/04/23  0409   WBC Thousand/uL 15 80* 15 53* 16 04* 13 78* 14 80* 18 71* 17 86*   HEMOGLOBIN g/dL 10 9* 10 8* 10 9* 10 4* 11 7* 12 3 12 1   HEMATOCRIT % 35 9* 34 6* 34 4* 33 4* 36 3* 37 6 38 1   PLATELETS Thousands/uL 281 321 310 287 300 361 353   NEUTROS PCT %  --   --  87*  --   --   --   --    MONOS PCT %  --   --  7  --   --   --   --      Results from last 7 days   Lab Units 05/10/23  0437 05/09/23  0426 05/08/23  0537 05/07/23  1600 05/07/23  0451 05/06/23  0550 05/05/23  0516   SODIUM mmol/L 144 147 147 146 145 146 143   POTASSIUM mmol/L 5 4* 4 2 4 3 5 1 3 4* 3 7 3 3*   CHLORIDE mmol/L 113* 115* 116* 117* 115* 115* 112*   CO2 mmol/L 32 32 28 31 29 29 29   ANION GAP mmol/L -1* 0* 3* -2* 1* 2* 2*   BUN mg/dL 23 18 17 18 19 28* 17   CREATININE mg/dL 0 93 0 77 0 82 0 81 0 86 1 04 0 80   CALCIUM mg/dL 7 9* 8 0* 8 3 7 8* 7 7* 8 3 9 0   GLUCOSE RANDOM mg/dL 350* 176* 222* 219* 348* 194* 156*     Results from last 7 days   Lab Units 05/10/23  0437 05/09/23  0426 05/08/23  0537 05/07/23  0451 05/06/23  0550 05/05/23  0516 05/04/23  0417   MAGNESIUM mg/dL 1 9 2 1 1 9 1 9 2 2 1 8 1 9   PHOSPHORUS mg/dL 2 6 2 6 2 1* 1 5* 2 1* 1 2* 2 8      Results from last 7 days   Lab Units 05/10/23  0437 05/09/23  0426 05/08/23  2056 05/08/23  1307 05/08/23  0537 05/07/23  0452 05/06/23  0550   PTT seconds 96* 79* 65* 57* 54* 66* 64*          Results from last 7 days   Lab Units 05/06/23  1002   LACTIC ACID mmol/L 1 5     ABG:  Results from last 7 days   Lab Units 05/10/23  0438   PH ART  7 306*   PCO2 ART mm Hg 65 3*   PO2 ART mm Hg 70 4*   HCO3 ART mmol/L 31 8*   BASE EXC ART mmol/L 3 9   ABG SOURCE  Line, Arterial     VBG:  Results from "last 7 days   Lab Units 05/10/23  0438   ABG SOURCE  Line, Arterial           Micro  Results from last 7 days   Lab Units 05/06/23  1033   BLOOD CULTURE  No Growth at 72 hrs  No Growth at 72 hrs  Imaging: I have personally reviewed pertinent reports  Intake and Output  I/O       05/08 0701 05/09 0700 05/09 0701  05/10 0700    I V  (mL/kg) 1358 4 (13 9) 1495 5 (15 3)    NG/ 620    IV Piggyback 1000 250    Feedings 1172 1191    Total Intake(mL/kg) 4250 4 (43 5) 3556 5 (36 4)    Urine (mL/kg/hr) 3035 (1 3) 2675 (1 1)    Stool 0 0    Total Output 3035 2675    Net +1215 4 +881 5          Unmeasured Stool Occurrence 2 x 2 x        UOP: 1 2 ml/hr     Height and Weights   Height: 6' 0 99\" (185 4 cm) (per previous encounter)  IBW (Ideal Body Weight): 79 88 kg  Body mass index is 28 39 kg/m²  Weight (last 2 days)     Date/Time Weight    05/09/23 0545 97 6 (215 17)    05/08/23 0541 99 6 (219 58)            Nutrition       Diet Orders   (From admission, onward)             Start     Ordered    05/09/23 1507  Diet Enteral/Parenteral; Tube Feeding No Oral Diet; Vital High Protein; Cyclic; 75; 22 hours; Banatrol Plus Banana Flakes - One Packet Daily; 250; Water; Every 6 hours  Diet effective now        Comments: Hold TF for one hour before and one hour after omeprazole administration to avoid potential drug-nutrient interaction  Start at 55mL/hr and increase rate by 10mL/hr q4 hrs as tolerated to goal    References:    Nutrtion Support Algorithm Enteral vs  Parenteral   Question Answer Comment   Diet Type Enteral/Parenteral    Enteral/Parenteral Tube Feeding No Oral Diet    Tube Feeding Formula: Vital High Protein    Bolus/Cyclic/Continuous Cyclic    Tube Feeding Cyclic Rate (mL/hr): 75    Tube Feeding Cyclic: Administer over: 22 hours    Banatrol Plus Banana Flakes Banatrol Plus Banana Flakes - One Packet Daily    Tube Feeding flush (mL): 250    Water Flush type:  Water    Water flush frequency: Every 6 hours  " RD to adjust diet per protocol? Yes        05/09/23 1507              TF currently running at 75 ml/hr with a goal of 75 ml/hr  Formula: Vital high protein        Active Medications  Scheduled Meds:  Current Facility-Administered Medications   Medication Dose Route Frequency Provider Last Rate   • acetaminophen  975 mg Oral [facility] Johan Scales Cerni, DO     • albuterol  2 5 mg Nebulization Q4H PRN Ernst Smith MD     • aspirin  81 mg Oral Daily Sarah Corado MD     • bisacodyl  10 mg Rectal Daily Olu Dominguez PA-C     • chlorhexidine  15 mL Mouth/Throat Q12H [address] Perla, DO     • fentaNYL  150 mcg/hr Intravenous Continuous Andrea Selma,  mcg/hr (05/10/23 0555)   • fentanyl citrate (PF)  50 mcg Intravenous Q1H PRN Brigette Kinsey MD     • folic acid IVPB  1 mg Intravenous Daily Aga Jesus, DO 1 mg (05/09/23 0578)   • furosemide  40 mg Intravenous BID (diuretic) Olu Dominguez PA-C     • heparin (porcine)  3-30 Units/kg/hr (Order-Specific) Intravenous Titrated Aga Jesus, DO 23 Units/kg/hr (05/10/23 0555)   • insulin lispro  1-6 Units Subcutaneous Q6H [address] Sarah Corado MD     • insulin regular  8 Units Subcutaneous Q6H Olu Dominguez PA-C     • ipratropium  0 5 mg Nebulization TID Ernst Smith MD     • levalbuterol  1 25 mg Nebulization TID Ernst Smith MD     • midazolam  2 mg Intravenous Q4H PRN Brigette Kinsey MD     • nicotine  14 mg Transdermal Daily Aga Jesus DO     • norepinephrine  1-30 mcg/min Intravenous Titrated Johan Scales Cerni, DO 2 mcg/min (05/10/23 0432)   • omeprazole (PRILOSEC) suspension 2 mg/mL  20 mg Oral Daily Sarah Corado MD     • phenylephine   mcg/min Intravenous Titrated Johan Scales Cerni,  mcg/min (05/10/23 0433)   • polyethylene glycol  17 g Oral Daily PRN Ang Betti Schwab, DO     • pravastatin  40 mg Oral Daily With [employer], DO     • propofol  5-50 mcg/kg/min Intravenous Titrated Brigette Kinsey MD 20 "mcg/kg/min (05/10/23 0330)   • senna-docusate sodium  1 tablet Oral HS Jung Calderon DO     • sodium chloride  4 mL Nebulization TID Nanci Kilgore MD     • thiamine  100 mg Oral Daily Debbi Griffin MD     • vasopressin  0 04 Units/min Intravenous Continuous Nanci Kilgore MD 0 04 Units/min (05/10/23 0037)     Continuous Infusions:  fentaNYL, 150 mcg/hr, Last Rate: 150 mcg/hr (05/10/23 0555)  heparin (porcine), 3-30 Units/kg/hr (Order-Specific), Last Rate: 23 Units/kg/hr (05/10/23 0555)  norepinephrine, 1-30 mcg/min, Last Rate: 2 mcg/min (05/10/23 0432)  phenylephine,  mcg/min, Last Rate: 100 mcg/min (05/10/23 0433)  propofol, 5-50 mcg/kg/min, Last Rate: 20 mcg/kg/min (05/10/23 0330)  vasopressin, 0 04 Units/min, Last Rate: 0 04 Units/min (05/10/23 0037)      PRN Meds:   albuterol, 2 5 mg, Q4H PRN  fentanyl citrate (PF), 50 mcg, Q1H PRN  midazolam, 2 mg, Q4H PRN  polyethylene glycol, 17 g, Daily PRN        Allergies   No Known Allergies  ---------------------------------------------------------------------------------------  Advance Directive and Living Will:      Power of :    POLST:    ---------------------------------------------------------------------------------------  Care Time Delivered:   [facility], DO      Portions of the record may have been created with voice recognition software  Occasional wrong word or \"sound a like\" substitutions may have occurred due to the inherent limitations of voice recognition software    Read the chart carefully and recognize, using context, where substitutions have occurred    "

## 2023-05-10 NOTE — RESPIRATORY THERAPY NOTE
Resp care   05/10/23 1500   Respiratory Assessment   Resp Comments Pt level 4 now  Extubated to room air   Family at bedside   Vent Information   Ventilator End Yes   $ Pulse Oximetry Spot Check Charge Completed

## 2023-05-10 NOTE — PROGRESS NOTES
Pastoral Care Progress Note    5/10/2023  Patient: Gilbert Horn : 1960  Admission Date & Time: 2023  MRN: [id number] [facility]: [id number]           05/10/23 1500   Clinical Encounter Type   Visited With Patient and family together   Sacramental Encounters   Sacrament Other Other (Comment)  (Last )     Gino Harirs met with the pt and the pt's wife and daughter and administered the Sacrament of Last Rites  No further needs were expressed at this time  Chaplains still remain available  Consent (Temporal Branch)/Introductory Paragraph: The rationale for Mohs was explained to the patient and consent was obtained. The risks, benefits and alternatives to therapy were discussed in detail. Specifically, the risks of damage to the temporal branch of the facial nerve, infection, scarring, bleeding, prolonged wound healing, incomplete removal, allergy to anesthesia, and recurrence were addressed. Prior to the procedure, the treatment site was clearly identified and confirmed by the patient. All components of Universal Protocol/PAUSE Rule completed.

## 2023-05-10 NOTE — RESPIRATORY THERAPY NOTE
RT Ventilator Management Note  Deion Dent 66 y o  male MRN: [id number]  Unit/Bed#: MICU 07 Encounter: [id number]      Daily Screen         5/8/2023  0805 5/9/2023  0739          Patient safety screen outcome[de-identified] Failed Failed      Not Ready for Weaning due to[de-identified] PEEP > 8cmH2O PEEP > 8cmH2O                Physical Exam:   Assessment Type: (P) Assess only  General Appearance: (P) Sedated  Respiratory Pattern: (P) Assisted  Chest Assessment: (P) Chest expansion symmetrical  Bilateral Breath Sounds: (P) Diminished, Coarse  R Breath Sounds: Diminished  L Breath Sounds: Coarse  Cough: Productive  O2 Device: (P) vent      Resp Comments: (P) No changes made at this time, FIo2 still at 90%  WIll cont to try and wean oxygen as able  Sats are staying mid to high 90s at this time

## 2023-05-10 NOTE — DISCHARGE SUMMARY
"Extubated  Discharge Summary - Rico Thompson 66 y o  male MRN: [id number]    [facility] [id number] PCP: Chana Sprague DO    Admission Date:   Admission Orders (From admission, onward)     Ordered        05/02/23 2038  Inpatient Admission  Once                        Admitting Diagnosis: Lung cancer [facility]) [C34 90]    HPI: Per Dr Jimmy Robles 05/08 Sergio Hugo is a 66 y o  male with a significant past medical history of diabetes, HTN, smoking, who presented to [address] [place] [address] with altered mental status on 4/28/2023  He was found to have bilateral pulmonary emboli and mediastinal and hilar adenopathy with mass effect on the trachea on imaging  He had an MRI that was consistent with a new cerebellar infarct  He was evaluated by pulmonology and thoracics at [address] [place] [address] who recommended transfer for urgent radiation therapy of cervical masses      History obtained from chart review and the patient  \"    Procedures Performed:   Orders Placed This Encounter   Procedures   • Central Line   • Intubation       Summary of Hospital Course: Patient with a history of a neck mass on CT in April 2023, who originally presented to [address] [place] [address] for altered mental status  While at [address] [place] [address] patient was found to be hypoxic requiring 2 L of nasal cannula air  Pulmonary saw patient  and recommended patient be transferred to [facility] for radiation oncology and thoracic surgery evaluation as there was concern that the mediastinal mass was compressing his trachea  Patient was subsequently found to have bilateral pulmonary embolism with mediastinum and hilar adenopathy  During hospital stay at [facility] patient had biopsy that showed squamous cell carcinoma  On 05/02  patient developed worsening shortness of breath and was transferred to the MICU  On 05/03 patient underwent emergent radiation  Patient subsequently found to be hypotensive requiring pressors and fluid boluses  On 05/05 patient was intubated   On 05/10 family decided to make patient comfort " care   Patient was extubated at 3:00 pm end time of the occurred 3:20 PM   Deepest condolences with the patient's family  Significant Findings, Care, Treatment and Services Provided: Squamous Cell  Carcinoma      Complications: none    Disposition:      Final Diagnosis: Cancer     Medical Problems     Resolved Problems  Date Reviewed: 2023   None           Date, Time and Cause of Death    Date of Death: 5/10/23  Time of Death:  3:20 PM  Preliminary Cause of Death: Cancer ([facility] )  Entered by: Ezequiel Klinefelter Lovelace[[email or web address]]     Attribution     CL1 [address] Casiano MD 05/10/23 15:25          Death Note:    INPATIENT DEATH NOTE  Jabari Kearns 66 y o  male MRN: [id number]  Unit/Bed#: MICU 07 Encounter: [id number]    Date, Time and Cause of Death    Date of Death: 5/10/23  Time of Death:  3:20 PM  Preliminary Cause of Death: Cancer ([facility] )  Entered by: Ezequiel Klinefelter Lovelace[CL1 1]     Attribution     CL1 [address] Casiano MD 05/10/23 15:25           Patient's Information  Pronounced by: Enedina Pérez  Did the patient's death occur in the ED?: No  Did the patient's death occur in the OR?: No  Did the patient's death occur less than 10 days post-op?: No  Did the patient's death occur within 24 hours of admission?: No  Was code status DNR at the time of death?: Yes    PHYSICAL EXAM:  Unresponsive to noxious stimuli, Spontaneous respirations absent, Breath sounds absent, Carotid pulse absent, Heart sounds absent, Pupillary light reflex absent and Corneal blink reflex absent    Medical Examiner notification criteria:  NONE APPLICABLE   Medical Examiner's office notified?:  Yes   Medical Examiner accepted case?:  No  Name of Medical Examiner: NIMA cummins was the one to reach out          Autopsy Options:  Post-mortem examination declined by next of kin    Primary Service Attending Physician notified?:  yes - Attending:  Shae Hung MD    Physician/Resident responsible for completing Discharge Summary:  Okeyko

## 2023-05-10 NOTE — ACP (ADVANCE CARE PLANNING)
Advanced Care Planning Note     Deion Dent 66 y o  male MRN: [id number]    Unit/Bed#: MICU 07 Encounter: [id number]    Deion Dent is a 66year-old male that presented today secondary to having an acute condition requiring critical care provider evaluation  The patient has chronic comorbidities, and now is inflicted with following acute conditions: metastatic cancer, respiratory failure, pneumonia  Due to the severity of the patient's acute condition and/or the extent of chronic conditions, there is need for advanced care planning at this time  Please see my previous documentation in regards to the patient's current condition, assessment, and treatment plan  During this discussion with the patient and/or family members, it was established that all stake holders were agreeable and understood the rationale for advanced care planning to occur at this time  The following individuals were present & participated in the face to face conversation I had about the patient's advanced directives & advanced care planning: wife Denice Diehl and daughter  Please see my following comments for details of the conversation & decisions that were made:    Patient is to be transitioned to comfort-directed care given worsening clinical status  Orders placed  Patient will be extubated once his other daughter arrives  Total time spent, (10) minutes (6350 to 1000)      CODE STATUS: Level 4 - Comfort Care  POA:    POLST:      SIGNATURE: Terrance Penaloza MD  DATE: May 10, 2023  TIME: 10:03 AM

## 2023-05-10 NOTE — SOCIAL WORK
LSW assisted in the completion of FMLA paperwork for wife and 2 daughters  Provided emotional support and supportive listening to family as they made the decision to transition patient to comfort focused care      I have spent 90 minutes with Patient and family today in which greater than 50% of this time was spent in counseling/coordination of care    Palliative  will follow-up as requested by patient, family, and primary team   Please contact with any specific requests

## 2023-05-11 LAB
BACTERIA BLD CULT: NORMAL
BACTERIA BLD CULT: NORMAL

## 2023-05-11 NOTE — UTILIZATION REVIEW
NOTIFICATION OF ADMISSION DISCHARGE   This is a Notification of Discharge from [facility]  Please be advised that this patient has been discharge from our facility  Below you will find the admission and discharge date and time including the patient’s disposition  UTILIZATION REVIEW CONTACT:  Arnav Block  Utilization   Network Utilization Review Department  Phone: [phone] x carefully listen to the prompts  All voicemails are confidential   Email: [email or web address]     ADMISSION INFORMATION  PRESENTATION DATE: 2023  8:31 PM  OBERVATION ADMISSION DATE:   INPATIENT ADMISSION DATE: 23  8:31 PM   DISCHARGE DATE: 5/10/2023  5:45 PM   DISPOSITION:    IMPORTANT INFORMATION:  Send all requests for admission clinical reviews, approved or denied determinations and any other requests to dedicated fax number below belonging to the campus where the patient is receiving treatment   List of dedicated fax numbers:  [address] DENIALS (Administrative/Medical Necessity) [phone]   [address] (Maternity/NICU/Pediatrics) [phone]   [facility] [phone]   [facility] [phone]   [address] [phone]   220 [facility] [phone]   90 [facility] [phone]   [address] [facility] [address] [phone]   [facility]  [phone]   [address] [facility] [phone]   412 [facility] [address] [facility] [phone]

## 2023-05-11 NOTE — CLINICAL RISK MANAGEMENT
Progress Note - Death in Restraints   Grace Jones 66 y o  male MRN: [id number]  Unit/Bed#: MICU 07 Encounter: [id number]      Patient  within 24 hours of restraint  with Soft restraint right wrist and Soft restraint left wrist  Death unrelated to use of restraints  This situation was tracked internally  CMS and PA-GISELLE  notification not required

## 2023-05-12 LAB
BACTERIA BRONCH AEROBE CULT: ABNORMAL
GRAM STN SPEC: ABNORMAL

## 2024-07-17 NOTE — PHYSICAL THERAPY NOTE
ED PT pending evaluation of 66year old male admitted to -B on 5/2/2023 as transfer from [address] [place] [address] where he presented with altered mental status on 4/28  Patient was found to have b/l PE and mediastinal and hilar adenopathy with mass effect on the trachea  MRI of brain was consistent with new cerebellar infarct (left)  Thoracic surgery consulted and no emergent surgical intervention is planned  Patient is pending radiation therapy  Per PT evaluation from 04/28: resides with spouse in a 1 level home (5 SAM)  Walk in shower  Raised toilet  Shower chair  Grab bars  [employer] employment (engineering and sales)  5/1 30 feet no AD- confused